# Patient Record
Sex: MALE | Race: WHITE | URBAN - METROPOLITAN AREA
[De-identification: names, ages, dates, MRNs, and addresses within clinical notes are randomized per-mention and may not be internally consistent; named-entity substitution may affect disease eponyms.]

---

## 2018-03-18 ENCOUNTER — HOSPITAL ENCOUNTER (INPATIENT)
Facility: CLINIC | Age: 58
LOS: 19 days | Discharge: NURSING, REHAB OR LONG-TERM CARE | End: 2018-04-06
Attending: THORACIC SURGERY (CARDIOTHORACIC VASCULAR SURGERY) | Admitting: THORACIC SURGERY (CARDIOTHORACIC VASCULAR SURGERY)
Payer: MEDICAID

## 2018-03-18 ENCOUNTER — ANESTHESIA EVENT (OUTPATIENT)
Dept: SURGERY | Facility: CLINIC | Age: 58
End: 2018-03-18
Payer: MEDICAID

## 2018-03-18 ENCOUNTER — ANESTHESIA (OUTPATIENT)
Dept: SURGERY | Facility: CLINIC | Age: 58
End: 2018-03-18
Payer: MEDICAID

## 2018-03-18 ENCOUNTER — APPOINTMENT (OUTPATIENT)
Dept: GENERAL RADIOLOGY | Facility: CLINIC | Age: 58
End: 2018-03-18
Attending: THORACIC SURGERY (CARDIOTHORACIC VASCULAR SURGERY)
Payer: MEDICAID

## 2018-03-18 ENCOUNTER — SURGERY (OUTPATIENT)
Age: 58
End: 2018-03-18
Payer: MEDICAID

## 2018-03-18 DIAGNOSIS — R19.4 BOWEL HABIT CHANGES: ICD-10-CM

## 2018-03-18 DIAGNOSIS — K22.3 ESOPHAGEAL PERFORATION: Primary | ICD-10-CM

## 2018-03-18 LAB
ABO + RH BLD: NORMAL
ABO + RH BLD: NORMAL
ALBUMIN SERPL-MCNC: 3.2 G/DL (ref 3.4–5)
ALP SERPL-CCNC: 101 U/L (ref 40–150)
ALT SERPL W P-5'-P-CCNC: 15 U/L (ref 0–70)
ANION GAP SERPL CALCULATED.3IONS-SCNC: 10 MMOL/L (ref 3–14)
ANION GAP SERPL CALCULATED.3IONS-SCNC: 7 MMOL/L (ref 3–14)
AST SERPL W P-5'-P-CCNC: 22 U/L (ref 0–45)
BASE DEFICIT BLDA-SCNC: 5.5 MMOL/L
BASE DEFICIT BLDA-SCNC: 6.2 MMOL/L
BASE DEFICIT BLDA-SCNC: 6.6 MMOL/L
BASE DEFICIT BLDA-SCNC: 6.8 MMOL/L
BILIRUB SERPL-MCNC: 0.5 MG/DL (ref 0.2–1.3)
BLD GP AB SCN SERPL QL: NORMAL
BLD PROD TYP BPU: NORMAL
BLD UNIT ID BPU: 0
BLD UNIT ID BPU: 0
BLOOD BANK CMNT PATIENT-IMP: NORMAL
BLOOD PRODUCT CODE: NORMAL
BLOOD PRODUCT CODE: NORMAL
BPU ID: NORMAL
BPU ID: NORMAL
BUN SERPL-MCNC: 24 MG/DL (ref 7–30)
BUN SERPL-MCNC: 28 MG/DL (ref 7–30)
CA-I BLD-MCNC: 4.5 MG/DL (ref 4.4–5.2)
CA-I BLD-MCNC: 4.6 MG/DL (ref 4.4–5.2)
CA-I BLD-MCNC: 4.7 MG/DL (ref 4.4–5.2)
CALCIUM SERPL-MCNC: 7.3 MG/DL (ref 8.5–10.1)
CALCIUM SERPL-MCNC: 8 MG/DL (ref 8.5–10.1)
CHLORIDE SERPL-SCNC: 112 MMOL/L (ref 94–109)
CHLORIDE SERPL-SCNC: 113 MMOL/L (ref 94–109)
CO2 SERPL-SCNC: 20 MMOL/L (ref 20–32)
CO2 SERPL-SCNC: 22 MMOL/L (ref 20–32)
CREAT SERPL-MCNC: 0.92 MG/DL (ref 0.66–1.25)
CREAT SERPL-MCNC: 1.03 MG/DL (ref 0.66–1.25)
ERYTHROCYTE [DISTWIDTH] IN BLOOD BY AUTOMATED COUNT: 19.2 % (ref 10–15)
ERYTHROCYTE [DISTWIDTH] IN BLOOD BY AUTOMATED COUNT: 19.9 % (ref 10–15)
GFR SERPL CREATININE-BSD FRML MDRD: 75 ML/MIN/1.7M2
GFR SERPL CREATININE-BSD FRML MDRD: 85 ML/MIN/1.7M2
GLUCOSE BLD-MCNC: 109 MG/DL (ref 70–99)
GLUCOSE BLD-MCNC: 118 MG/DL (ref 70–99)
GLUCOSE BLD-MCNC: 124 MG/DL (ref 70–99)
GLUCOSE BLD-MCNC: 125 MG/DL (ref 70–99)
GLUCOSE BLDC GLUCOMTR-MCNC: 120 MG/DL (ref 70–99)
GLUCOSE SERPL-MCNC: 114 MG/DL (ref 70–99)
GLUCOSE SERPL-MCNC: 128 MG/DL (ref 70–99)
HBA1C MFR BLD: 5.1 % (ref 4.3–6)
HCO3 BLD-SCNC: 20 MMOL/L (ref 21–28)
HCO3 BLD-SCNC: 21 MMOL/L (ref 21–28)
HCT VFR BLD AUTO: 32.5 % (ref 40–53)
HCT VFR BLD AUTO: 41.2 % (ref 40–53)
HGB BLD-MCNC: 11.7 G/DL (ref 13.3–17.7)
HGB BLD-MCNC: 8.9 G/DL (ref 13.3–17.7)
HGB BLD-MCNC: 8.9 G/DL (ref 13.3–17.7)
HGB BLD-MCNC: 9 G/DL (ref 13.3–17.7)
HGB BLD-MCNC: 9.2 G/DL (ref 13.3–17.7)
HGB BLD-MCNC: 9.3 G/DL (ref 13.3–17.7)
INR PPP: 1.13 (ref 0.86–1.14)
INTERPRETATION ECG - MUSE: NORMAL
LACTATE BLD-SCNC: 2.5 MMOL/L (ref 0.7–2)
LACTATE BLD-SCNC: 2.6 MMOL/L (ref 0.7–2)
LACTATE BLD-SCNC: 2.7 MMOL/L (ref 0.7–2)
LACTATE BLD-SCNC: 2.9 MMOL/L (ref 0.7–2)
MAGNESIUM SERPL-MCNC: 1.6 MG/DL (ref 1.6–2.3)
MAGNESIUM SERPL-MCNC: 1.8 MG/DL (ref 1.6–2.3)
MCH RBC QN AUTO: 20.7 PG (ref 26.5–33)
MCH RBC QN AUTO: 20.7 PG (ref 26.5–33)
MCHC RBC AUTO-ENTMCNC: 28.4 G/DL (ref 31.5–36.5)
MCHC RBC AUTO-ENTMCNC: 28.6 G/DL (ref 31.5–36.5)
MCV RBC AUTO: 72 FL (ref 78–100)
MCV RBC AUTO: 73 FL (ref 78–100)
MRSA DNA SPEC QL NAA+PROBE: NEGATIVE
NUM BPU REQUESTED: 4
O2/TOTAL GAS SETTING VFR VENT: 100 %
O2/TOTAL GAS SETTING VFR VENT: 72 %
O2/TOTAL GAS SETTING VFR VENT: 99 %
O2/TOTAL GAS SETTING VFR VENT: 99 %
PCO2 BLD: 40 MM HG (ref 35–45)
PCO2 BLD: 41 MM HG (ref 35–45)
PCO2 BLD: 42 MM HG (ref 35–45)
PCO2 BLD: 46 MM HG (ref 35–45)
PH BLD: 7.25 PH (ref 7.35–7.45)
PH BLD: 7.28 PH (ref 7.35–7.45)
PH BLD: 7.3 PH (ref 7.35–7.45)
PH BLD: 7.31 PH (ref 7.35–7.45)
PHOSPHATE SERPL-MCNC: 3.2 MG/DL (ref 2.5–4.5)
PLATELET # BLD AUTO: 191 10E9/L (ref 150–450)
PLATELET # BLD AUTO: 336 10E9/L (ref 150–450)
PO2 BLD: 75 MM HG (ref 80–105)
PO2 BLD: 78 MM HG (ref 80–105)
PO2 BLD: 81 MM HG (ref 80–105)
PO2 BLD: 82 MM HG (ref 80–105)
POTASSIUM BLD-SCNC: 4.4 MMOL/L (ref 3.4–5.3)
POTASSIUM BLD-SCNC: 4.5 MMOL/L (ref 3.4–5.3)
POTASSIUM BLD-SCNC: 4.6 MMOL/L (ref 3.4–5.3)
POTASSIUM BLD-SCNC: 4.6 MMOL/L (ref 3.4–5.3)
POTASSIUM SERPL-SCNC: 4.5 MMOL/L (ref 3.4–5.3)
POTASSIUM SERPL-SCNC: 4.5 MMOL/L (ref 3.4–5.3)
PROT SERPL-MCNC: 6.5 G/DL (ref 6.8–8.8)
RBC # BLD AUTO: 4.5 10E12/L (ref 4.4–5.9)
RBC # BLD AUTO: 5.64 10E12/L (ref 4.4–5.9)
SODIUM BLD-SCNC: 139 MMOL/L (ref 133–144)
SODIUM BLD-SCNC: 139 MMOL/L (ref 133–144)
SODIUM BLD-SCNC: 140 MMOL/L (ref 133–144)
SODIUM BLD-SCNC: 140 MMOL/L (ref 133–144)
SODIUM SERPL-SCNC: 142 MMOL/L (ref 133–144)
SODIUM SERPL-SCNC: 142 MMOL/L (ref 133–144)
SPECIMEN EXP DATE BLD: NORMAL
SPECIMEN SOURCE: NORMAL
TRANSFUSION STATUS PATIENT QL: NORMAL
TROPONIN I SERPL-MCNC: 0.04 UG/L (ref 0–0.04)
WBC # BLD AUTO: 12.8 10E9/L (ref 4–11)
WBC # BLD AUTO: 8.1 10E9/L (ref 4–11)

## 2018-03-18 PROCEDURE — 25000125 ZZHC RX 250: Performed by: STUDENT IN AN ORGANIZED HEALTH CARE EDUCATION/TRAINING PROGRAM

## 2018-03-18 PROCEDURE — C1769 GUIDE WIRE: HCPCS | Performed by: THORACIC SURGERY (CARDIOTHORACIC VASCULAR SURGERY)

## 2018-03-18 PROCEDURE — 83036 HEMOGLOBIN GLYCOSYLATED A1C: CPT | Performed by: SURGERY

## 2018-03-18 PROCEDURE — 85610 PROTHROMBIN TIME: CPT | Performed by: STUDENT IN AN ORGANIZED HEALTH CARE EDUCATION/TRAINING PROGRAM

## 2018-03-18 PROCEDURE — 82330 ASSAY OF CALCIUM: CPT | Performed by: THORACIC SURGERY (CARDIOTHORACIC VASCULAR SURGERY)

## 2018-03-18 PROCEDURE — 25000128 H RX IP 250 OP 636: Performed by: NURSE ANESTHETIST, CERTIFIED REGISTERED

## 2018-03-18 PROCEDURE — 25000565 ZZH ISOFLURANE, EA 15 MIN: Performed by: THORACIC SURGERY (CARDIOTHORACIC VASCULAR SURGERY)

## 2018-03-18 PROCEDURE — 37000009 ZZH ANESTHESIA TECHNICAL FEE, EACH ADDTL 15 MIN: Performed by: THORACIC SURGERY (CARDIOTHORACIC VASCULAR SURGERY)

## 2018-03-18 PROCEDURE — 36000064 ZZH SURGERY LEVEL 4 EA 15 ADDTL MIN - UMMC: Performed by: THORACIC SURGERY (CARDIOTHORACIC VASCULAR SURGERY)

## 2018-03-18 PROCEDURE — 80053 COMPREHEN METABOLIC PANEL: CPT | Performed by: STUDENT IN AN ORGANIZED HEALTH CARE EDUCATION/TRAINING PROGRAM

## 2018-03-18 PROCEDURE — 99291 CRITICAL CARE FIRST HOUR: CPT | Mod: GC | Performed by: SURGERY

## 2018-03-18 PROCEDURE — 36000062 ZZH SURGERY LEVEL 4 1ST 30 MIN - UMMC: Performed by: THORACIC SURGERY (CARDIOTHORACIC VASCULAR SURGERY)

## 2018-03-18 PROCEDURE — 0KXJ0ZZ TRANSFER LEFT THORAX MUSCLE, OPEN APPROACH: ICD-10-PCS | Performed by: THORACIC SURGERY (CARDIOTHORACIC VASCULAR SURGERY)

## 2018-03-18 PROCEDURE — 25000128 H RX IP 250 OP 636: Performed by: STUDENT IN AN ORGANIZED HEALTH CARE EDUCATION/TRAINING PROGRAM

## 2018-03-18 PROCEDURE — C9399 UNCLASSIFIED DRUGS OR BIOLOG: HCPCS | Performed by: STUDENT IN AN ORGANIZED HEALTH CARE EDUCATION/TRAINING PROGRAM

## 2018-03-18 PROCEDURE — 27210794 ZZH OR GENERAL SUPPLY STERILE: Performed by: THORACIC SURGERY (CARDIOTHORACIC VASCULAR SURGERY)

## 2018-03-18 PROCEDURE — 71045 X-RAY EXAM CHEST 1 VIEW: CPT

## 2018-03-18 PROCEDURE — 85027 COMPLETE CBC AUTOMATED: CPT | Performed by: STUDENT IN AN ORGANIZED HEALTH CARE EDUCATION/TRAINING PROGRAM

## 2018-03-18 PROCEDURE — 93005 ELECTROCARDIOGRAM TRACING: CPT

## 2018-03-18 PROCEDURE — 43415 REPAIR ESOPHAGUS WOUND: CPT | Performed by: THORACIC SURGERY (CARDIOTHORACIC VASCULAR SURGERY)

## 2018-03-18 PROCEDURE — 32551 INSERTION OF CHEST TUBE: CPT | Mod: 59 | Performed by: THORACIC SURGERY (CARDIOTHORACIC VASCULAR SURGERY)

## 2018-03-18 PROCEDURE — P9041 ALBUMIN (HUMAN),5%, 50ML: HCPCS | Performed by: NURSE ANESTHETIST, CERTIFIED REGISTERED

## 2018-03-18 PROCEDURE — 84295 ASSAY OF SERUM SODIUM: CPT | Performed by: THORACIC SURGERY (CARDIOTHORACIC VASCULAR SURGERY)

## 2018-03-18 PROCEDURE — 20000004 ZZH R&B ICU UMMC

## 2018-03-18 PROCEDURE — 83605 ASSAY OF LACTIC ACID: CPT | Performed by: STUDENT IN AN ORGANIZED HEALTH CARE EDUCATION/TRAINING PROGRAM

## 2018-03-18 PROCEDURE — 83735 ASSAY OF MAGNESIUM: CPT | Performed by: STUDENT IN AN ORGANIZED HEALTH CARE EDUCATION/TRAINING PROGRAM

## 2018-03-18 PROCEDURE — 40000141 ZZH STATISTIC PERIPHERAL IV START W/O US GUIDANCE

## 2018-03-18 PROCEDURE — 37000008 ZZH ANESTHESIA TECHNICAL FEE, 1ST 30 MIN: Performed by: THORACIC SURGERY (CARDIOTHORACIC VASCULAR SURGERY)

## 2018-03-18 PROCEDURE — 36415 COLL VENOUS BLD VENIPUNCTURE: CPT | Performed by: STUDENT IN AN ORGANIZED HEALTH CARE EDUCATION/TRAINING PROGRAM

## 2018-03-18 PROCEDURE — 80048 BASIC METABOLIC PNL TOTAL CA: CPT | Performed by: STUDENT IN AN ORGANIZED HEALTH CARE EDUCATION/TRAINING PROGRAM

## 2018-03-18 PROCEDURE — 15734 MUSCLE-SKIN GRAFT TRUNK: CPT | Performed by: THORACIC SURGERY (CARDIOTHORACIC VASCULAR SURGERY)

## 2018-03-18 PROCEDURE — 84132 ASSAY OF SERUM POTASSIUM: CPT | Performed by: THORACIC SURGERY (CARDIOTHORACIC VASCULAR SURGERY)

## 2018-03-18 PROCEDURE — 86850 RBC ANTIBODY SCREEN: CPT | Performed by: STUDENT IN AN ORGANIZED HEALTH CARE EDUCATION/TRAINING PROGRAM

## 2018-03-18 PROCEDURE — 25000125 ZZHC RX 250: Performed by: NURSE ANESTHETIST, CERTIFIED REGISTERED

## 2018-03-18 PROCEDURE — 83605 ASSAY OF LACTIC ACID: CPT | Performed by: THORACIC SURGERY (CARDIOTHORACIC VASCULAR SURGERY)

## 2018-03-18 PROCEDURE — 86900 BLOOD TYPING SEROLOGIC ABO: CPT | Performed by: STUDENT IN AN ORGANIZED HEALTH CARE EDUCATION/TRAINING PROGRAM

## 2018-03-18 PROCEDURE — 84484 ASSAY OF TROPONIN QUANT: CPT | Performed by: STUDENT IN AN ORGANIZED HEALTH CARE EDUCATION/TRAINING PROGRAM

## 2018-03-18 PROCEDURE — 82803 BLOOD GASES ANY COMBINATION: CPT | Performed by: THORACIC SURGERY (CARDIOTHORACIC VASCULAR SURGERY)

## 2018-03-18 PROCEDURE — 87641 MR-STAPH DNA AMP PROBE: CPT | Performed by: THORACIC SURGERY (CARDIOTHORACIC VASCULAR SURGERY)

## 2018-03-18 PROCEDURE — 00000146 ZZHCL STATISTIC GLUCOSE BY METER IP

## 2018-03-18 PROCEDURE — 86901 BLOOD TYPING SEROLOGIC RH(D): CPT | Performed by: STUDENT IN AN ORGANIZED HEALTH CARE EDUCATION/TRAINING PROGRAM

## 2018-03-18 PROCEDURE — 84100 ASSAY OF PHOSPHORUS: CPT | Performed by: STUDENT IN AN ORGANIZED HEALTH CARE EDUCATION/TRAINING PROGRAM

## 2018-03-18 PROCEDURE — 25000128 H RX IP 250 OP 636: Performed by: SURGERY

## 2018-03-18 PROCEDURE — 0D9640Z DRAINAGE OF STOMACH WITH DRAINAGE DEVICE, PERCUTANEOUS ENDOSCOPIC APPROACH: ICD-10-PCS | Performed by: THORACIC SURGERY (CARDIOTHORACIC VASCULAR SURGERY)

## 2018-03-18 PROCEDURE — 93010 ELECTROCARDIOGRAM REPORT: CPT | Performed by: INTERNAL MEDICINE

## 2018-03-18 PROCEDURE — 43246 EGD PLACE GASTROSTOMY TUBE: CPT | Performed by: THORACIC SURGERY (CARDIOTHORACIC VASCULAR SURGERY)

## 2018-03-18 PROCEDURE — 87640 STAPH A DNA AMP PROBE: CPT | Performed by: THORACIC SURGERY (CARDIOTHORACIC VASCULAR SURGERY)

## 2018-03-18 PROCEDURE — 71000014 ZZH RECOVERY PHASE 1 LEVEL 2 FIRST HR: Performed by: THORACIC SURGERY (CARDIOTHORACIC VASCULAR SURGERY)

## 2018-03-18 PROCEDURE — 86923 COMPATIBILITY TEST ELECTRIC: CPT | Performed by: STUDENT IN AN ORGANIZED HEALTH CARE EDUCATION/TRAINING PROGRAM

## 2018-03-18 PROCEDURE — 82330 ASSAY OF CALCIUM: CPT | Performed by: STUDENT IN AN ORGANIZED HEALTH CARE EDUCATION/TRAINING PROGRAM

## 2018-03-18 PROCEDURE — 0DB50ZZ EXCISION OF ESOPHAGUS, OPEN APPROACH: ICD-10-PCS | Performed by: THORACIC SURGERY (CARDIOTHORACIC VASCULAR SURGERY)

## 2018-03-18 PROCEDURE — 25800025 ZZH RX 258: Performed by: STUDENT IN AN ORGANIZED HEALTH CARE EDUCATION/TRAINING PROGRAM

## 2018-03-18 PROCEDURE — 82947 ASSAY GLUCOSE BLOOD QUANT: CPT | Performed by: THORACIC SURGERY (CARDIOTHORACIC VASCULAR SURGERY)

## 2018-03-18 RX ORDER — BISACODYL 10 MG
10 SUPPOSITORY, RECTAL RECTAL DAILY PRN
Status: DISCONTINUED | OUTPATIENT
Start: 2018-03-18 | End: 2018-03-18

## 2018-03-18 RX ORDER — SODIUM CHLORIDE, SODIUM LACTATE, POTASSIUM CHLORIDE, CALCIUM CHLORIDE 600; 310; 30; 20 MG/100ML; MG/100ML; MG/100ML; MG/100ML
INJECTION, SOLUTION INTRAVENOUS CONTINUOUS PRN
Status: DISCONTINUED | OUTPATIENT
Start: 2018-03-18 | End: 2018-03-18

## 2018-03-18 RX ORDER — ONDANSETRON 4 MG/1
4 TABLET, ORALLY DISINTEGRATING ORAL EVERY 30 MIN PRN
Status: DISCONTINUED | OUTPATIENT
Start: 2018-03-18 | End: 2018-03-19 | Stop reason: HOSPADM

## 2018-03-18 RX ORDER — FENTANYL CITRATE 50 UG/ML
INJECTION, SOLUTION INTRAMUSCULAR; INTRAVENOUS PRN
Status: DISCONTINUED | OUTPATIENT
Start: 2018-03-18 | End: 2018-03-18

## 2018-03-18 RX ORDER — NALOXONE HYDROCHLORIDE 0.4 MG/ML
.1-.4 INJECTION, SOLUTION INTRAMUSCULAR; INTRAVENOUS; SUBCUTANEOUS
Status: DISCONTINUED | OUTPATIENT
Start: 2018-03-18 | End: 2018-03-18

## 2018-03-18 RX ORDER — MAGNESIUM SULFATE HEPTAHYDRATE 40 MG/ML
4 INJECTION, SOLUTION INTRAVENOUS EVERY 4 HOURS PRN
Status: DISCONTINUED | OUTPATIENT
Start: 2018-03-18 | End: 2018-03-18

## 2018-03-18 RX ORDER — HYDROMORPHONE HYDROCHLORIDE 1 MG/ML
.3-.5 INJECTION, SOLUTION INTRAMUSCULAR; INTRAVENOUS; SUBCUTANEOUS
Status: DISCONTINUED | OUTPATIENT
Start: 2018-03-18 | End: 2018-03-18

## 2018-03-18 RX ORDER — DEXAMETHASONE SODIUM PHOSPHATE 4 MG/ML
INJECTION, SOLUTION INTRA-ARTICULAR; INTRALESIONAL; INTRAMUSCULAR; INTRAVENOUS; SOFT TISSUE PRN
Status: DISCONTINUED | OUTPATIENT
Start: 2018-03-18 | End: 2018-03-18

## 2018-03-18 RX ORDER — NICOTINE POLACRILEX 4 MG
15-30 LOZENGE BUCCAL
Status: DISCONTINUED | OUTPATIENT
Start: 2018-03-18 | End: 2018-03-18

## 2018-03-18 RX ORDER — ONDANSETRON 2 MG/ML
4 INJECTION INTRAMUSCULAR; INTRAVENOUS EVERY 30 MIN PRN
Status: DISCONTINUED | OUTPATIENT
Start: 2018-03-18 | End: 2018-03-19 | Stop reason: HOSPADM

## 2018-03-18 RX ORDER — ONDANSETRON 4 MG/1
4 TABLET, ORALLY DISINTEGRATING ORAL EVERY 6 HOURS PRN
Status: DISCONTINUED | OUTPATIENT
Start: 2018-03-18 | End: 2018-03-18

## 2018-03-18 RX ORDER — POTASSIUM CHLORIDE 1.5 G/1.58G
20-40 POWDER, FOR SOLUTION ORAL
Status: DISCONTINUED | OUTPATIENT
Start: 2018-03-18 | End: 2018-03-18

## 2018-03-18 RX ORDER — LABETALOL HYDROCHLORIDE 5 MG/ML
10 INJECTION, SOLUTION INTRAVENOUS
Status: DISCONTINUED | OUTPATIENT
Start: 2018-03-18 | End: 2018-03-19 | Stop reason: HOSPADM

## 2018-03-18 RX ORDER — ACETAMINOPHEN 10 MG/ML
INJECTION, SOLUTION INTRAVENOUS PRN
Status: DISCONTINUED | OUTPATIENT
Start: 2018-03-18 | End: 2018-03-18

## 2018-03-18 RX ORDER — PROPOFOL 10 MG/ML
INJECTION, EMULSION INTRAVENOUS PRN
Status: DISCONTINUED | OUTPATIENT
Start: 2018-03-18 | End: 2018-03-18

## 2018-03-18 RX ORDER — PROCHLORPERAZINE MALEATE 5 MG
10 TABLET ORAL EVERY 6 HOURS PRN
Status: DISCONTINUED | OUTPATIENT
Start: 2018-03-18 | End: 2018-03-18

## 2018-03-18 RX ORDER — DEXTROSE MONOHYDRATE 25 G/50ML
25-50 INJECTION, SOLUTION INTRAVENOUS
Status: DISCONTINUED | OUTPATIENT
Start: 2018-03-18 | End: 2018-03-18

## 2018-03-18 RX ORDER — PIPERACILLIN SODIUM, TAZOBACTAM SODIUM 3; .375 G/15ML; G/15ML
3.38 INJECTION, POWDER, LYOPHILIZED, FOR SOLUTION INTRAVENOUS EVERY 6 HOURS
Status: DISCONTINUED | OUTPATIENT
Start: 2018-03-18 | End: 2018-03-18

## 2018-03-18 RX ORDER — POTASSIUM CHLORIDE 750 MG/1
20-40 TABLET, EXTENDED RELEASE ORAL
Status: DISCONTINUED | OUTPATIENT
Start: 2018-03-18 | End: 2018-03-18

## 2018-03-18 RX ORDER — POTASSIUM CHLORIDE 7.45 MG/ML
10 INJECTION INTRAVENOUS
Status: DISCONTINUED | OUTPATIENT
Start: 2018-03-18 | End: 2018-03-18

## 2018-03-18 RX ORDER — LIDOCAINE 40 MG/G
CREAM TOPICAL
Status: DISCONTINUED | OUTPATIENT
Start: 2018-03-18 | End: 2018-03-18

## 2018-03-18 RX ORDER — ONDANSETRON 2 MG/ML
INJECTION INTRAMUSCULAR; INTRAVENOUS PRN
Status: DISCONTINUED | OUTPATIENT
Start: 2018-03-18 | End: 2018-03-18

## 2018-03-18 RX ORDER — LIDOCAINE HYDROCHLORIDE 20 MG/ML
INJECTION, SOLUTION INFILTRATION; PERINEURAL PRN
Status: DISCONTINUED | OUTPATIENT
Start: 2018-03-18 | End: 2018-03-18

## 2018-03-18 RX ORDER — ALBUMIN, HUMAN INJ 5% 5 %
SOLUTION INTRAVENOUS CONTINUOUS PRN
Status: DISCONTINUED | OUTPATIENT
Start: 2018-03-18 | End: 2018-03-18

## 2018-03-18 RX ORDER — FLUCONAZOLE 2 MG/ML
400 INJECTION, SOLUTION INTRAVENOUS EVERY 24 HOURS
Status: DISCONTINUED | OUTPATIENT
Start: 2018-03-18 | End: 2018-03-18

## 2018-03-18 RX ORDER — ESMOLOL HYDROCHLORIDE 10 MG/ML
INJECTION INTRAVENOUS PRN
Status: DISCONTINUED | OUTPATIENT
Start: 2018-03-18 | End: 2018-03-18

## 2018-03-18 RX ORDER — SODIUM CHLORIDE 9 MG/ML
INJECTION, SOLUTION INTRAVENOUS CONTINUOUS PRN
Status: DISCONTINUED | OUTPATIENT
Start: 2018-03-18 | End: 2018-03-18

## 2018-03-18 RX ORDER — PROCHLORPERAZINE 25 MG
25 SUPPOSITORY, RECTAL RECTAL EVERY 12 HOURS PRN
Status: DISCONTINUED | OUTPATIENT
Start: 2018-03-18 | End: 2018-03-18

## 2018-03-18 RX ORDER — PIPERACILLIN SODIUM, TAZOBACTAM SODIUM 2; .25 G/10ML; G/10ML
INJECTION, POWDER, LYOPHILIZED, FOR SOLUTION INTRAVENOUS PRN
Status: DISCONTINUED | OUTPATIENT
Start: 2018-03-18 | End: 2018-03-18

## 2018-03-18 RX ORDER — SODIUM CHLORIDE, SODIUM LACTATE, POTASSIUM CHLORIDE, CALCIUM CHLORIDE 600; 310; 30; 20 MG/100ML; MG/100ML; MG/100ML; MG/100ML
INJECTION, SOLUTION INTRAVENOUS CONTINUOUS
Status: DISCONTINUED | OUTPATIENT
Start: 2018-03-18 | End: 2018-03-19 | Stop reason: HOSPADM

## 2018-03-18 RX ORDER — POTASSIUM CHLORIDE 29.8 MG/ML
20 INJECTION INTRAVENOUS
Status: DISCONTINUED | OUTPATIENT
Start: 2018-03-18 | End: 2018-03-18

## 2018-03-18 RX ORDER — FENTANYL CITRATE 50 UG/ML
25-50 INJECTION, SOLUTION INTRAMUSCULAR; INTRAVENOUS
Status: DISCONTINUED | OUTPATIENT
Start: 2018-03-18 | End: 2018-03-19 | Stop reason: HOSPADM

## 2018-03-18 RX ORDER — DEXTROSE MONOHYDRATE, SODIUM CHLORIDE, AND POTASSIUM CHLORIDE 50; 1.49; 4.5 G/1000ML; G/1000ML; G/1000ML
INJECTION, SOLUTION INTRAVENOUS CONTINUOUS
Status: DISCONTINUED | OUTPATIENT
Start: 2018-03-18 | End: 2018-03-19

## 2018-03-18 RX ORDER — ONDANSETRON 2 MG/ML
4 INJECTION INTRAMUSCULAR; INTRAVENOUS EVERY 6 HOURS PRN
Status: DISCONTINUED | OUTPATIENT
Start: 2018-03-18 | End: 2018-03-18

## 2018-03-18 RX ORDER — POTASSIUM CL/LIDO/0.9 % NACL 10MEQ/0.1L
10 INTRAVENOUS SOLUTION, PIGGYBACK (ML) INTRAVENOUS
Status: DISCONTINUED | OUTPATIENT
Start: 2018-03-18 | End: 2018-03-18

## 2018-03-18 RX ORDER — SODIUM CHLORIDE, SODIUM LACTATE, POTASSIUM CHLORIDE, CALCIUM CHLORIDE 600; 310; 30; 20 MG/100ML; MG/100ML; MG/100ML; MG/100ML
INJECTION, SOLUTION INTRAVENOUS CONTINUOUS
Status: DISCONTINUED | OUTPATIENT
Start: 2018-03-18 | End: 2018-03-18

## 2018-03-18 RX ADMIN — HYDROMORPHONE HYDROCHLORIDE 0.5 MG: 1 INJECTION, SOLUTION INTRAMUSCULAR; INTRAVENOUS; SUBCUTANEOUS at 22:10

## 2018-03-18 RX ADMIN — ACETAMINOPHEN 1000 MG: 10 INJECTION, SOLUTION INTRAVENOUS at 23:12

## 2018-03-18 RX ADMIN — PHENYLEPHRINE HYDROCHLORIDE 100 MCG: 10 INJECTION, SOLUTION INTRAMUSCULAR; INTRAVENOUS; SUBCUTANEOUS at 20:47

## 2018-03-18 RX ADMIN — PHENYLEPHRINE HYDROCHLORIDE 100 MCG: 10 INJECTION, SOLUTION INTRAMUSCULAR; INTRAVENOUS; SUBCUTANEOUS at 16:47

## 2018-03-18 RX ADMIN — POTASSIUM CHLORIDE, DEXTROSE MONOHYDRATE AND SODIUM CHLORIDE: 150; 5; 450 INJECTION, SOLUTION INTRAVENOUS at 23:30

## 2018-03-18 RX ADMIN — ROCURONIUM BROMIDE 10 MG: 10 INJECTION INTRAVENOUS at 20:30

## 2018-03-18 RX ADMIN — PHENYLEPHRINE HYDROCHLORIDE 0.03 MCG/KG/MIN: 10 INJECTION, SOLUTION INTRAMUSCULAR; INTRAVENOUS; SUBCUTANEOUS at 17:56

## 2018-03-18 RX ADMIN — PHENYLEPHRINE HYDROCHLORIDE 100 MCG: 10 INJECTION, SOLUTION INTRAMUSCULAR; INTRAVENOUS; SUBCUTANEOUS at 17:49

## 2018-03-18 RX ADMIN — Medication 0.5 MG: at 15:22

## 2018-03-18 RX ADMIN — ROCURONIUM BROMIDE 10 MG: 10 INJECTION INTRAVENOUS at 21:24

## 2018-03-18 RX ADMIN — Medication 100 MG: at 16:43

## 2018-03-18 RX ADMIN — FLUCONAZOLE 200 MG: 2 INJECTION INTRAVENOUS at 17:00

## 2018-03-18 RX ADMIN — ESMOLOL HYDROCHLORIDE 50 MG: 10 INJECTION, SOLUTION INTRAVENOUS at 23:11

## 2018-03-18 RX ADMIN — DEXAMETHASONE SODIUM PHOSPHATE 8 MG: 4 INJECTION, SOLUTION INTRA-ARTICULAR; INTRALESIONAL; INTRAMUSCULAR; INTRAVENOUS; SOFT TISSUE at 18:28

## 2018-03-18 RX ADMIN — ALBUMIN HUMAN: 0.05 INJECTION, SOLUTION INTRAVENOUS at 17:15

## 2018-03-18 RX ADMIN — SODIUM CHLORIDE: 9 INJECTION, SOLUTION INTRAVENOUS at 16:35

## 2018-03-18 RX ADMIN — PIPERACILLIN SODIUM AND TAZOBACTAM SODIUM 2.25 G: .25; 2 INJECTION, POWDER, LYOPHILIZED, FOR SOLUTION INTRAVENOUS at 19:00

## 2018-03-18 RX ADMIN — ROCURONIUM BROMIDE 30 MG: 10 INJECTION INTRAVENOUS at 17:54

## 2018-03-18 RX ADMIN — PANTOPRAZOLE SODIUM 40 MG: 40 INJECTION, POWDER, FOR SOLUTION INTRAVENOUS at 15:19

## 2018-03-18 RX ADMIN — PHENYLEPHRINE HYDROCHLORIDE 100 MCG: 10 INJECTION, SOLUTION INTRAMUSCULAR; INTRAVENOUS; SUBCUTANEOUS at 16:53

## 2018-03-18 RX ADMIN — PHENYLEPHRINE HYDROCHLORIDE 200 MCG: 10 INJECTION, SOLUTION INTRAMUSCULAR; INTRAVENOUS; SUBCUTANEOUS at 16:51

## 2018-03-18 RX ADMIN — ALBUMIN HUMAN: 0.05 INJECTION, SOLUTION INTRAVENOUS at 16:55

## 2018-03-18 RX ADMIN — ROCURONIUM BROMIDE 20 MG: 10 INJECTION INTRAVENOUS at 19:58

## 2018-03-18 RX ADMIN — SUGAMMADEX 160 MG: 100 INJECTION, SOLUTION INTRAVENOUS at 22:17

## 2018-03-18 RX ADMIN — FENTANYL CITRATE 50 MCG: 50 INJECTION, SOLUTION INTRAMUSCULAR; INTRAVENOUS at 18:28

## 2018-03-18 RX ADMIN — PHENYLEPHRINE HYDROCHLORIDE 100 MCG: 10 INJECTION, SOLUTION INTRAMUSCULAR; INTRAVENOUS; SUBCUTANEOUS at 22:00

## 2018-03-18 RX ADMIN — LIDOCAINE HYDROCHLORIDE 100 MG: 20 INJECTION, SOLUTION INFILTRATION; PERINEURAL at 16:43

## 2018-03-18 RX ADMIN — SODIUM CHLORIDE, POTASSIUM CHLORIDE, SODIUM LACTATE AND CALCIUM CHLORIDE: 600; 310; 30; 20 INJECTION, SOLUTION INTRAVENOUS at 15:19

## 2018-03-18 RX ADMIN — Medication 0.5 MG: at 16:24

## 2018-03-18 RX ADMIN — FENTANYL CITRATE 100 MCG: 50 INJECTION, SOLUTION INTRAMUSCULAR; INTRAVENOUS at 16:43

## 2018-03-18 RX ADMIN — ROCURONIUM BROMIDE 50 MG: 10 INJECTION INTRAVENOUS at 16:48

## 2018-03-18 RX ADMIN — SODIUM CHLORIDE, POTASSIUM CHLORIDE, SODIUM LACTATE AND CALCIUM CHLORIDE: 600; 310; 30; 20 INJECTION, SOLUTION INTRAVENOUS at 16:43

## 2018-03-18 RX ADMIN — ROCURONIUM BROMIDE 20 MG: 10 INJECTION INTRAVENOUS at 18:27

## 2018-03-18 RX ADMIN — FENTANYL CITRATE 50 MCG: 50 INJECTION, SOLUTION INTRAMUSCULAR; INTRAVENOUS at 17:15

## 2018-03-18 RX ADMIN — FLUCONAZOLE 400 MG: 2 INJECTION INTRAVENOUS at 16:22

## 2018-03-18 RX ADMIN — FENTANYL CITRATE 50 MCG: 50 INJECTION, SOLUTION INTRAMUSCULAR; INTRAVENOUS at 18:55

## 2018-03-18 RX ADMIN — PHENYLEPHRINE HYDROCHLORIDE 100 MCG: 10 INJECTION, SOLUTION INTRAMUSCULAR; INTRAVENOUS; SUBCUTANEOUS at 22:12

## 2018-03-18 RX ADMIN — PHENYLEPHRINE HYDROCHLORIDE 200 MCG: 10 INJECTION, SOLUTION INTRAMUSCULAR; INTRAVENOUS; SUBCUTANEOUS at 16:49

## 2018-03-18 RX ADMIN — PHENYLEPHRINE HYDROCHLORIDE 100 MCG: 10 INJECTION, SOLUTION INTRAMUSCULAR; INTRAVENOUS; SUBCUTANEOUS at 16:55

## 2018-03-18 RX ADMIN — PIPERACILLIN SODIUM AND TAZOBACTAM SODIUM 3.38 G: .375; 3 INJECTION, POWDER, LYOPHILIZED, FOR SOLUTION INTRAVENOUS at 17:00

## 2018-03-18 RX ADMIN — ROCURONIUM BROMIDE 20 MG: 10 INJECTION INTRAVENOUS at 17:18

## 2018-03-18 RX ADMIN — PHENYLEPHRINE HYDROCHLORIDE 100 MCG: 10 INJECTION, SOLUTION INTRAMUSCULAR; INTRAVENOUS; SUBCUTANEOUS at 17:54

## 2018-03-18 RX ADMIN — ONDANSETRON 4 MG: 2 INJECTION INTRAMUSCULAR; INTRAVENOUS at 22:17

## 2018-03-18 RX ADMIN — HYDROMORPHONE HYDROCHLORIDE: 10 INJECTION, SOLUTION INTRAMUSCULAR; INTRAVENOUS; SUBCUTANEOUS at 23:30

## 2018-03-18 RX ADMIN — VANCOMYCIN HYDROCHLORIDE 1.5 G: 10 INJECTION, POWDER, LYOPHILIZED, FOR SOLUTION INTRAVENOUS at 19:40

## 2018-03-18 RX ADMIN — ROCURONIUM BROMIDE 20 MG: 10 INJECTION INTRAVENOUS at 19:13

## 2018-03-18 RX ADMIN — PHENYLEPHRINE HYDROCHLORIDE 100 MCG: 10 INJECTION, SOLUTION INTRAMUSCULAR; INTRAVENOUS; SUBCUTANEOUS at 17:58

## 2018-03-18 RX ADMIN — PIPERACILLIN SODIUM AND TAZOBACTAM SODIUM 2.25 G: .375; 3 INJECTION, POWDER, LYOPHILIZED, FOR SOLUTION INTRAVENOUS at 21:00

## 2018-03-18 RX ADMIN — PROPOFOL 180 MG: 10 INJECTION, EMULSION INTRAVENOUS at 16:43

## 2018-03-18 RX ADMIN — PHENYLEPHRINE HYDROCHLORIDE 100 MCG: 10 INJECTION, SOLUTION INTRAMUSCULAR; INTRAVENOUS; SUBCUTANEOUS at 22:05

## 2018-03-18 ASSESSMENT — PAIN DESCRIPTION - DESCRIPTORS: DESCRIPTORS: ACHING

## 2018-03-18 NOTE — LETTER
Transition Communication Hand-off for Care Transitions to Next Level of Care Provider    Name: Michael Saldivar  : 1960  MRN #: [id number]  Primary Care Provider: JOSI YOUNG     Primary Clinic: [facility] [address]   [place] [address]     Reason for Hospitalization:  Esophageal perforation [K22.3]  Status post thoracotomy [Z98.890]  Admit Date/Time: 3/18/2018  2:13 PM  Discharge Date: 18  Payor Source: Payor: [place] / Plan: [place] / Product Type: Medicaid /     Readmission Assessment Measure (ADRI) Risk Score/category: Elevated         Reason for Communication Hand-off Referral: Other D/c to TCU    Discharge Plan:  Social Work Services Discharge Note      Patient Name:  Michael Saldivar     Anticipated Discharge Date:  18    Discharge Disposition:   TCU:   TCU   [address] 4th floor  [phone]     Pre-Admission Screening (PAS) online form has been completed.  The Level of Care (LOC) is:  Determined  Confirmation Code is:  [id number]  Patient/caregiver informed of referral to [facility] for Pre-Admission Screening for skilled nursing facility (SNF) placement and to expect a phone call post discharge from SNF.     Additional Services/Equipment Arranged:  W/c transport arranged via [employer] ([phone]) for today at 4:15pm.      Patient / Family response to discharge plan:  Pt is agreeable and was ok with sw updating his daughter Juanis     Persons notified of above discharge plan:  Pt, pt's daughter Juanis via  ([phone]), bedside nurse, charge nurse, NST, receiving facility, Thoracic team     Concern for non-adherence with plan of care:   Y/N Unknown  Discharge Needs Assessment:  Needs       Most Recent Value    Equipment Currently Used at Home none    Transportation Available car          Already enrolled in Tele-monitoring program and name of program:  Unknown  Follow-up specialty is recommended: Yes    Follow-up plan:  Future  Appointments  Date Time Provider Department Center   4/7/2018 9:30 AM Duane Perez, PT [facility]   4/7/2018 1:45 PM Randee Ferrell, OT [facility]       Any outstanding tests or procedures:        Referrals     Future Labs/Procedures    Nutrition Services Adult IP Consult     Comments:    Reason:  Tube feeds    Occupational Therapy Adult Consult     Comments:    Evaluate and treat as clinically indicated.    Reason:  S/p thoracic surgery, deconditioning    Physical Therapy Adult Consult     Comments:    Evaluate and treat as clinically indicated.    Reason:  S/p thoracic surgery, deconditioning            ISIAH Sweet, MSW  7[address]   [phone] (pager) [phone]  4/6/2018

## 2018-03-18 NOTE — ANESTHESIA PREPROCEDURE EVALUATION
Anesthesia Evaluation     .             ROS/MED HX    ENT/Pulmonary: Comment: Patient on 15 L Oxi Plus; Tachypnea RR 40s      Neurologic:       Cardiovascular: Comment: Tachycardiac with HR 120s    (+) ----. : . . . :. . Previous cardiac testing date:results:date: results:ECG reviewed date: results:3/18/16: Sinus tachycardia with , LAD, and LBBB date: results:          METS/Exercise Tolerance:     Hematologic:         Musculoskeletal:         GI/Hepatic: Comment: -Michael Saldivar is a 57 year old male who presented with esophageal perforation s/p left chest tube placement.     -He had the flu since Friday and has been having nausea and vomiting. Patient had forceful emesis this morning with acute onset of back pain and SOB. He presented to an OSH ED where a left chest tube was placed and a CT showed perforated esophagus. He was found to have WBC of 27 and lactate of 10. Patient was transferred to [facility] for further cares. On admission, patient was tachycardiac and had tachypnea.        (+) GERD       Renal/Genitourinary:         Endo:         Psychiatric:         Infectious Disease: Comment: Recent flu with N/V        Malignancy:         Other:                                     Lab Results   Component Value Date    WBC 12.8 (H) 03/18/2018    HGB 11.7 (L) 03/18/2018    HCT 41.2 03/18/2018     03/18/2018     03/18/2018    POTASSIUM 4.5 03/18/2018    CHLORIDE 112 (H) 03/18/2018    CO2 20 03/18/2018    BUN 28 03/18/2018    CR 1.03 03/18/2018     (H) 03/18/2018    TROPI 0.036 03/18/2018    AST 22 03/18/2018    ALT 15 03/18/2018    ALKPHOS 101 03/18/2018    BILITOTAL 0.5 03/18/2018    INR 1.13 03/18/2018       Anesthesia Plan      History & Physical Review  History and physical reviewed and following examination; no interval change.    ASA Status:  3 emergent.    NPO Status:  Waived due to emergency    Plan for General, RSI and ETT with Intravenous induction. Maintenance will be Balanced.    PONV  prophylaxis:  Ondansetron (or other 5HT-3) and Dexamethasone or Solumedrol  Additional equipment: Arterial Line, 2nd IV and Double Lumen ETT      Postoperative Care  Postoperative pain management:  IV analgesics.      Consents  Anesthetic plan, risks, benefits and alternatives discussed with:  Patient.  Use of blood products discussed: Yes.   Use of blood products discussed with Patient.  Consented to blood products.  .

## 2018-03-18 NOTE — H&P
THORACIC SURGERY H&P  March 18, 2018      ASSESSMENT: Michael Saldivar is a 57 year old male who presented with esophageal perforation s/p left chest tube placement. Patient is currently tachycardiac and has tachypnea.     PLAN:    - To the OR for EGD, possible right chest tube, possible thoracotomy, possible stent and possible NGT.   - STAT labs  - IV zosyn and fluconazole.   - Place salmeron for strict I&Os  - Continue fluid resuscitation     Patient seen, findings and plan discussed with thoracic fellow,   staff, Dr. Ybarra.    ------------------------------------------------------------------------------------------    HISTORY PRESENTING ILLNESS: Michael Saldivar is a 57 year old male with PMH only significant for GERD. He had the flu since Friday and has been having nausea and vomiting. Patient had forceful emesis this morning with acute onset of back pain and SOB. He presented to an OSH ED where a left chest tube was placed and a CT showed perforated esophagus. He was found to have WBC of 27 and lactate of 10. Patient was transferred to [facility] for further cares. On admission, patient was tachycardiac and had tachypnea.     REVIEW OF SYSTEMS: A 10 point ROS was negative except as noted above.    PAST MEDICAL HISTORY:   GERD    SURGICAL HISTORY:   None     SOCIAL HISTORY: Tobacco - Quit smoking 5 years ago. Etoh - Denies.   Social History     Social History     Marital status: N/A     Spouse name: N/A     Number of children: N/A     Years of education: N/A     Occupational History     Not on file.     Social History Main Topics     Smoking status: Not on file     Smokeless tobacco: Not on file     Alcohol use Not on file     Drug use: Not on file     Sexual activity: Not on file     Other Topics Concern     Not on file     Social History Narrative       FAMILY HISTORY: No bleeding/clotting disorders nor problems with anesthesia.     ALLERGIES:    Allergies not on file    MEDICATIONS:    No current  facility-administered medications on file prior to encounter.   No current outpatient prescriptions on file prior to encounter.    PHYSICAL EXAMINATION:  Temp:  [98.3  F (36.8  C)] 98.3  F (36.8  C)  General: Alert, well-appearing in no acute distress.  HEENT: Normocephalic, atraumatic. Patent nares.   Respiratory: somewhat labored breathing. Chest tube on the left without air leak. Dark ss output.   Cardiovascular: Regular rhythm. Tachycardiac   Gastrointestinal: Abdomen soft, non-distended, non-tender to palpation.   Extremities: Moving all four extremities. No pedal edema.   Skin: As noted above. No rashes or lesions appreciated.    LABS: Reviewed.   Arterial Blood Gases   No lab results found in last 7 days.  Complete Blood Count     Recent Labs  Lab 03/18/18  1535   WBC 12.8*   HGB 11.7*        Basic Metabolic Panel    Recent Labs  Lab 03/18/18  1535      POTASSIUM 4.5   CHLORIDE 112*   CO2 20   BUN 28   CR 1.03   *     Liver Function Tests    Recent Labs  Lab 03/18/18  1535   AST 22   ALT 15   ALKPHOS 101   BILITOTAL 0.5   ALBUMIN 3.2*   INR 1.13     Pancreatic Enzymes  No lab results found in last 7 days.  Coagulation Profile    Recent Labs  Lab 03/18/18  1535   INR 1.13         IMAGING:  No results found for this or any previous visit.      CO-MORBIDITIES:   No diagnosis found.      Trever Johnson MD  General Surgery, PGY-2  [phone]

## 2018-03-18 NOTE — PROGRESS NOTES
"D: Patient arrived on 4A via [facility] lift [facility] at 1400. AFVSS, supplemental oxygen via mask at 15LPM, CT to H2O seal, c/o pain at CT site, denies nausea.   I: MD notified upon arrival. Admission documentation started. Attempted to contact patients brother \"Michael\" but was unable. PIV placed, labs drawn, specimens collected. Unable to empty bladder, salmeron placed. Medicated for pain x2. CT placed to -20cm suction. Report given to Anesthesia, brought to OR.  A: Salmeron w/ adequate output.  Pain adequately controlled. CT w/ brown / dark red output, 150ml since arrival.  P: Await return to ICU post-op.  "

## 2018-03-18 NOTE — H&P
SURGICAL ICU ADMISSION NOTE  3/18/2018      ASSESSMENT: Michael Saldivar is a 57 year old male with a history of HTN and GERD who presents with esophageal perforation 2/2 [place].    PLAN:   Neuro/ pain/ sedation:  - Monitor neurological status. Notify the MD/DO for any acute changes in exam.  - Dilaudid for pain.     Pulmonary care:   - Supplemental oxygen to keep saturation above 92 %.  - Chest tube to suction     Cardiovascular:    #Aberrant conduction EKG  #HTN  - Monitor hemodynamic status.      GI care:  #Esophageal rupture   - Strict NPO  - Thoracic surgical plan pending     Fluids/ Electrolytes/ Nutrition:   - LR for IV fluid hydration     Renal/ Fluid Balance:    - Labs pending  - OSH Cr 1.64     Endocrine:    - SSI  - Check Hgb A1c due to glucose of 302     ID/ Antibiotics:  - WBC 25.5, lactate 10  - Zosyn, vancomycin, diflucan for esophageal perforation     Heme:     - Labs pending  - OSH INR 1.1, RBC 12.4,      Prophylaxis:    - Mechanical prophylaxis for DVT  - PPI     Lines/ tubes/ drains:  - PIV  - Place salmeron for strict I&O     Disposition:  - Surgical ICU    CODE:  - Full    - - - - - - - - - - - - - - - - - - - - - - - - - - - - - - - - - - - - - - - - - - - - - - - - - - - - - - - - - - - - - - - - - - - - -     PRIMARY TEAM: Thoracic surgery  PRIMARY PHYSICIAN: Dr. Ybarra    REASON FOR CRITICAL CARE ADMISSION: Esophageal perforation   ADMITTING PHYSICIAN: Dr. Lainez    HISTORY PRESENTING ILLNESS: Michael Saldivar is a 57-year-old male with a PMHx s/f hypertension, GERD who presented on 3/18/18 to ED at OSH in [place] for back pain and hematemesis. Initially with nausea/vomiting since Friday, no fever/chills, no bowel habit changes. Sunday morning with immediate back pain after emesis. CXR showed L hydropneumothorax, pneumomediastinum, distal esophageal perforation; a L chest tube was placed, and he was transferred to [facility]. Currently with moderate back pain. Denies CP, SOB, abdominal  pain. No fever/chills.    REVIEW OF SYSTEMS: As noted above.     PAST MEDICAL HISTORY:   GERD    SURGICAL HISTORY:   None    SOCIAL HISTORY: Tobacco - 40 pack year history, quit 5 years ago. Etoh - None.     FAMILY HISTORY: No bleeding/clotting disorders nor problems with anesthesia.    ALLERGIES:   NKDA    MEDICATIONS:    No current facility-administered medications on file prior to encounter.   No current outpatient prescriptions on file prior to encounter.    PHYSICAL EXAMINATION:  Temp:  [98.3  F (36.8  C)] 98.3  F (36.8  C)  Heart Rate:  [121] 121  Resp:  [40] 40  BP: (129)/(94) 129/94  SpO2:  [97 %] 97 %  General: NAD, AOx3  Chest: tachy at 120s, NLB on 15L facemask. Left chest tube with dark output, no air leaks.  Abdomen: soft, ND, NT  Ext: WWP    LABS: Reviewed.   WBC 25  Lactate 10  Cr 1.64  Glucose 302    IMAGING:  CT with distal esophageal perforation, chest tube in good position.    Patient seen, findings and plan discussed with surgical ICU staff Dr. Lainez.

## 2018-03-18 NOTE — PHARMACY-VANCOMYCIN DOSING SERVICE
Pharmacy Vancomycin Initial Note  Date of Service 2018  Patient's  1960  57 year old, male    Indication: Esophageal perforation, patient was started at outside hospital one time dose of vancomycin 1250 mg IV was at 1122 am in the ED.    Current Calculated CrCl= 92 ml/min    Creatinine for last 3 days  No results found for requested labs within last 72 hours.    Recent Vancomycin Level(s) for last 3 days  No results found for requested labs within last 72 hours.      Vancomycin IV Administrations (past 72 hours)      No vancomycin orders with administrations in past 72 hours.                Nephrotoxins and other renal medications (Future)    Start     Dose/Rate Route Frequency Ordered Stop    18 1645  piperacillin-tazobactam (ZOSYN) 3.375 g vial to attach to  mL bag      3.375 g  over 30 Minutes Intravenous EVERY 6 HOURS 18 1458            Contrast Orders - past 72 hours     None                Plan:  1.  Start vancomycin 1500 mg IV q12h. (18.8 mg/kg, start next dose at 2300)  2.  Goal Trough Level: 10-15 mg/L   3.  Pharmacy will check trough levels as appropriate in 1-3 Days.    4. Serum creatinine levels will be ordered daily for the first week of therapy and at least twice weekly for subsequent weeks.    5. [place] method utilized to dose vancomycin therapy: Method 2    Mela Oleary, PharmD Resident

## 2018-03-19 ENCOUNTER — APPOINTMENT (OUTPATIENT)
Dept: GENERAL RADIOLOGY | Facility: CLINIC | Age: 58
End: 2018-03-19
Attending: THORACIC SURGERY (CARDIOTHORACIC VASCULAR SURGERY)
Payer: MEDICAID

## 2018-03-19 LAB
ALBUMIN SERPL-MCNC: 2.6 G/DL (ref 3.4–5)
ALP SERPL-CCNC: 63 U/L (ref 40–150)
ALT SERPL W P-5'-P-CCNC: 17 U/L (ref 0–70)
ANION GAP SERPL CALCULATED.3IONS-SCNC: 9 MMOL/L (ref 3–14)
AST SERPL W P-5'-P-CCNC: 34 U/L (ref 0–45)
BILIRUB SERPL-MCNC: 0.9 MG/DL (ref 0.2–1.3)
BUN SERPL-MCNC: 22 MG/DL (ref 7–30)
CALCIUM SERPL-MCNC: 8.1 MG/DL (ref 8.5–10.1)
CHLORIDE SERPL-SCNC: 112 MMOL/L (ref 94–109)
CO2 SERPL-SCNC: 22 MMOL/L (ref 20–32)
CREAT SERPL-MCNC: 1.02 MG/DL (ref 0.66–1.25)
ERYTHROCYTE [DISTWIDTH] IN BLOOD BY AUTOMATED COUNT: 19.6 % (ref 10–15)
GFR SERPL CREATININE-BSD FRML MDRD: 75 ML/MIN/1.7M2
GLUCOSE BLDC GLUCOMTR-MCNC: 119 MG/DL (ref 70–99)
GLUCOSE BLDC GLUCOMTR-MCNC: 120 MG/DL (ref 70–99)
GLUCOSE BLDC GLUCOMTR-MCNC: 144 MG/DL (ref 70–99)
GLUCOSE SERPL-MCNC: 122 MG/DL (ref 70–99)
HCT VFR BLD AUTO: 34.3 % (ref 40–53)
HGB BLD-MCNC: 9.6 G/DL (ref 13.3–17.7)
INR PPP: 1.65 (ref 0.86–1.14)
MAGNESIUM SERPL-MCNC: 1.8 MG/DL (ref 1.6–2.3)
MCH RBC QN AUTO: 20.4 PG (ref 26.5–33)
MCHC RBC AUTO-ENTMCNC: 28 G/DL (ref 31.5–36.5)
MCV RBC AUTO: 73 FL (ref 78–100)
PHOSPHATE SERPL-MCNC: 2.1 MG/DL (ref 2.5–4.5)
PLATELET # BLD AUTO: 204 10E9/L (ref 150–450)
POTASSIUM SERPL-SCNC: 4.2 MMOL/L (ref 3.4–5.3)
PROT SERPL-MCNC: 5.2 G/DL (ref 6.8–8.8)
RBC # BLD AUTO: 4.71 10E12/L (ref 4.4–5.9)
SODIUM SERPL-SCNC: 143 MMOL/L (ref 133–144)
TRIGL SERPL-MCNC: 29 MG/DL
WBC # BLD AUTO: 9.7 10E9/L (ref 4–11)

## 2018-03-19 PROCEDURE — 83735 ASSAY OF MAGNESIUM: CPT | Performed by: THORACIC SURGERY (CARDIOTHORACIC VASCULAR SURGERY)

## 2018-03-19 PROCEDURE — 80053 COMPREHEN METABOLIC PANEL: CPT | Performed by: STUDENT IN AN ORGANIZED HEALTH CARE EDUCATION/TRAINING PROGRAM

## 2018-03-19 PROCEDURE — 25000128 H RX IP 250 OP 636: Performed by: NURSE PRACTITIONER

## 2018-03-19 PROCEDURE — 25000128 H RX IP 250 OP 636: Performed by: STUDENT IN AN ORGANIZED HEALTH CARE EDUCATION/TRAINING PROGRAM

## 2018-03-19 PROCEDURE — 25000132 ZZH RX MED GY IP 250 OP 250 PS 637: Performed by: STUDENT IN AN ORGANIZED HEALTH CARE EDUCATION/TRAINING PROGRAM

## 2018-03-19 PROCEDURE — 36569 INSJ PICC 5 YR+ W/O IMAGING: CPT

## 2018-03-19 PROCEDURE — 25000125 ZZHC RX 250: Performed by: STUDENT IN AN ORGANIZED HEALTH CARE EDUCATION/TRAINING PROGRAM

## 2018-03-19 PROCEDURE — 25000125 ZZHC RX 250: Performed by: NURSE PRACTITIONER

## 2018-03-19 PROCEDURE — 99291 CRITICAL CARE FIRST HOUR: CPT | Mod: GC | Performed by: SURGERY

## 2018-03-19 PROCEDURE — 25800025 ZZH RX 258: Performed by: NURSE PRACTITIONER

## 2018-03-19 PROCEDURE — 84100 ASSAY OF PHOSPHORUS: CPT | Performed by: STUDENT IN AN ORGANIZED HEALTH CARE EDUCATION/TRAINING PROGRAM

## 2018-03-19 PROCEDURE — 36415 COLL VENOUS BLD VENIPUNCTURE: CPT | Performed by: STUDENT IN AN ORGANIZED HEALTH CARE EDUCATION/TRAINING PROGRAM

## 2018-03-19 PROCEDURE — 3E0436Z INTRODUCTION OF NUTRITIONAL SUBSTANCE INTO CENTRAL VEIN, PERCUTANEOUS APPROACH: ICD-10-PCS | Performed by: THORACIC SURGERY (CARDIOTHORACIC VASCULAR SURGERY)

## 2018-03-19 PROCEDURE — 85610 PROTHROMBIN TIME: CPT | Performed by: STUDENT IN AN ORGANIZED HEALTH CARE EDUCATION/TRAINING PROGRAM

## 2018-03-19 PROCEDURE — 85027 COMPLETE CBC AUTOMATED: CPT | Performed by: STUDENT IN AN ORGANIZED HEALTH CARE EDUCATION/TRAINING PROGRAM

## 2018-03-19 PROCEDURE — 40000986 XR CHEST PORT 1 VW

## 2018-03-19 PROCEDURE — 25000125 ZZHC RX 250: Performed by: THORACIC SURGERY (CARDIOTHORACIC VASCULAR SURGERY)

## 2018-03-19 PROCEDURE — 83735 ASSAY OF MAGNESIUM: CPT | Performed by: STUDENT IN AN ORGANIZED HEALTH CARE EDUCATION/TRAINING PROGRAM

## 2018-03-19 PROCEDURE — 20000004 ZZH R&B ICU UMMC

## 2018-03-19 PROCEDURE — 84478 ASSAY OF TRIGLYCERIDES: CPT | Performed by: STUDENT IN AN ORGANIZED HEALTH CARE EDUCATION/TRAINING PROGRAM

## 2018-03-19 PROCEDURE — 27210197 ZZH KIT POWER PICC TRIPLE LUMEN

## 2018-03-19 PROCEDURE — 00000146 ZZHCL STATISTIC GLUCOSE BY METER IP

## 2018-03-19 RX ORDER — NALOXONE HYDROCHLORIDE 0.4 MG/ML
.1-.4 INJECTION, SOLUTION INTRAMUSCULAR; INTRAVENOUS; SUBCUTANEOUS
Status: DISCONTINUED | OUTPATIENT
Start: 2018-03-19 | End: 2018-03-19

## 2018-03-19 RX ORDER — ONDANSETRON 2 MG/ML
4 INJECTION INTRAMUSCULAR; INTRAVENOUS EVERY 6 HOURS PRN
Status: DISCONTINUED | OUTPATIENT
Start: 2018-03-19 | End: 2018-04-06 | Stop reason: HOSPADM

## 2018-03-19 RX ORDER — METOCLOPRAMIDE 10 MG/1
10 TABLET ORAL EVERY 6 HOURS PRN
Status: DISCONTINUED | OUTPATIENT
Start: 2018-03-19 | End: 2018-04-06 | Stop reason: HOSPADM

## 2018-03-19 RX ORDER — LIDOCAINE 40 MG/G
CREAM TOPICAL
Status: DISCONTINUED | OUTPATIENT
Start: 2018-03-19 | End: 2018-04-06 | Stop reason: HOSPADM

## 2018-03-19 RX ORDER — POTASSIUM CHLORIDE 7.45 MG/ML
10 INJECTION INTRAVENOUS
Status: DISCONTINUED | OUTPATIENT
Start: 2018-03-19 | End: 2018-03-21 | Stop reason: RX

## 2018-03-19 RX ORDER — HEPARIN SODIUM,PORCINE 10 UNIT/ML
5-10 VIAL (ML) INTRAVENOUS
Status: DISCONTINUED | OUTPATIENT
Start: 2018-03-19 | End: 2018-04-06 | Stop reason: HOSPADM

## 2018-03-19 RX ORDER — HEPARIN SODIUM,PORCINE 10 UNIT/ML
5-10 VIAL (ML) INTRAVENOUS EVERY 24 HOURS
Status: DISCONTINUED | OUTPATIENT
Start: 2018-03-19 | End: 2018-04-06 | Stop reason: HOSPADM

## 2018-03-19 RX ORDER — MAGNESIUM SULFATE HEPTAHYDRATE 40 MG/ML
4 INJECTION, SOLUTION INTRAVENOUS EVERY 4 HOURS PRN
Status: DISCONTINUED | OUTPATIENT
Start: 2018-03-19 | End: 2018-04-06 | Stop reason: HOSPADM

## 2018-03-19 RX ORDER — POTASSIUM CHLORIDE 1.5 G/1.58G
20-40 POWDER, FOR SOLUTION ORAL
Status: DISCONTINUED | OUTPATIENT
Start: 2018-03-19 | End: 2018-04-06 | Stop reason: HOSPADM

## 2018-03-19 RX ORDER — HEPARIN SODIUM 5000 [USP'U]/.5ML
5000 INJECTION, SOLUTION INTRAVENOUS; SUBCUTANEOUS EVERY 8 HOURS
Status: DISCONTINUED | OUTPATIENT
Start: 2018-03-19 | End: 2018-03-27

## 2018-03-19 RX ORDER — POTASSIUM CHLORIDE 29.8 MG/ML
20 INJECTION INTRAVENOUS
Status: DISCONTINUED | OUTPATIENT
Start: 2018-03-19 | End: 2018-04-06 | Stop reason: HOSPADM

## 2018-03-19 RX ORDER — POTASSIUM CL/LIDO/0.9 % NACL 10MEQ/0.1L
10 INTRAVENOUS SOLUTION, PIGGYBACK (ML) INTRAVENOUS
Status: DISCONTINUED | OUTPATIENT
Start: 2018-03-19 | End: 2018-04-06 | Stop reason: HOSPADM

## 2018-03-19 RX ORDER — DEXTROSE MONOHYDRATE 25 G/50ML
25-50 INJECTION, SOLUTION INTRAVENOUS
Status: DISCONTINUED | OUTPATIENT
Start: 2018-03-19 | End: 2018-04-06 | Stop reason: HOSPADM

## 2018-03-19 RX ORDER — NALOXONE HYDROCHLORIDE 0.4 MG/ML
.1-.4 INJECTION, SOLUTION INTRAMUSCULAR; INTRAVENOUS; SUBCUTANEOUS
Status: DISCONTINUED | OUTPATIENT
Start: 2018-03-19 | End: 2018-04-06 | Stop reason: HOSPADM

## 2018-03-19 RX ORDER — ACETAMINOPHEN 650 MG/1
650 SUPPOSITORY RECTAL EVERY 6 HOURS
Status: DISCONTINUED | OUTPATIENT
Start: 2018-03-19 | End: 2018-03-25

## 2018-03-19 RX ORDER — POTASSIUM CHLORIDE 750 MG/1
20-40 TABLET, EXTENDED RELEASE ORAL
Status: DISCONTINUED | OUTPATIENT
Start: 2018-03-19 | End: 2018-04-06 | Stop reason: HOSPADM

## 2018-03-19 RX ORDER — HEPARIN SODIUM,PORCINE 10 UNIT/ML
2-5 VIAL (ML) INTRAVENOUS
Status: COMPLETED | OUTPATIENT
Start: 2018-03-19 | End: 2018-04-01

## 2018-03-19 RX ORDER — FLUCONAZOLE 2 MG/ML
400 INJECTION, SOLUTION INTRAVENOUS EVERY 24 HOURS
Status: DISCONTINUED | OUTPATIENT
Start: 2018-03-19 | End: 2018-03-23

## 2018-03-19 RX ORDER — PROCHLORPERAZINE MALEATE 5 MG
10 TABLET ORAL EVERY 6 HOURS PRN
Status: DISCONTINUED | OUTPATIENT
Start: 2018-03-19 | End: 2018-04-06 | Stop reason: HOSPADM

## 2018-03-19 RX ORDER — NICOTINE POLACRILEX 4 MG
15-30 LOZENGE BUCCAL
Status: DISCONTINUED | OUTPATIENT
Start: 2018-03-19 | End: 2018-04-06 | Stop reason: HOSPADM

## 2018-03-19 RX ORDER — SODIUM CHLORIDE, SODIUM LACTATE, POTASSIUM CHLORIDE, CALCIUM CHLORIDE 600; 310; 30; 20 MG/100ML; MG/100ML; MG/100ML; MG/100ML
INJECTION, SOLUTION INTRAVENOUS CONTINUOUS
Status: DISCONTINUED | OUTPATIENT
Start: 2018-03-19 | End: 2018-03-22

## 2018-03-19 RX ORDER — METOCLOPRAMIDE HYDROCHLORIDE 5 MG/ML
10 INJECTION INTRAMUSCULAR; INTRAVENOUS EVERY 6 HOURS PRN
Status: DISCONTINUED | OUTPATIENT
Start: 2018-03-19 | End: 2018-04-06 | Stop reason: HOSPADM

## 2018-03-19 RX ORDER — PIPERACILLIN SODIUM, TAZOBACTAM SODIUM 4; .5 G/20ML; G/20ML
4.5 INJECTION, POWDER, LYOPHILIZED, FOR SOLUTION INTRAVENOUS EVERY 6 HOURS
Status: DISCONTINUED | OUTPATIENT
Start: 2018-03-19 | End: 2018-03-23

## 2018-03-19 RX ORDER — PANTOPRAZOLE SODIUM 40 MG/1
40 TABLET, DELAYED RELEASE ORAL DAILY
Status: DISCONTINUED | OUTPATIENT
Start: 2018-03-19 | End: 2018-03-19

## 2018-03-19 RX ORDER — ONDANSETRON 4 MG/1
4 TABLET, ORALLY DISINTEGRATING ORAL EVERY 6 HOURS PRN
Status: DISCONTINUED | OUTPATIENT
Start: 2018-03-19 | End: 2018-04-06 | Stop reason: HOSPADM

## 2018-03-19 RX ADMIN — SODIUM CHLORIDE, POTASSIUM CHLORIDE, SODIUM LACTATE AND CALCIUM CHLORIDE: 600; 310; 30; 20 INJECTION, SOLUTION INTRAVENOUS at 14:23

## 2018-03-19 RX ADMIN — POTASSIUM CHLORIDE: 2 INJECTION, SOLUTION, CONCENTRATE INTRAVENOUS at 20:59

## 2018-03-19 RX ADMIN — FLUCONAZOLE 400 MG: 2 INJECTION INTRAVENOUS at 05:55

## 2018-03-19 RX ADMIN — HEPARIN SODIUM 5000 UNITS: 5000 INJECTION, SOLUTION INTRAVENOUS; SUBCUTANEOUS at 08:24

## 2018-03-19 RX ADMIN — PIPERACILLIN SODIUM AND TAZOBACTAM SODIUM 4.5 G: 4; .5 INJECTION, POWDER, LYOPHILIZED, FOR SOLUTION INTRAVENOUS at 14:23

## 2018-03-19 RX ADMIN — Medication 2 G: at 12:12

## 2018-03-19 RX ADMIN — DEXTROSE AND SODIUM CHLORIDE: 5; 450 INJECTION, SOLUTION INTRAVENOUS at 11:09

## 2018-03-19 RX ADMIN — I.V. FAT EMULSION 250 ML: 20 EMULSION INTRAVENOUS at 20:59

## 2018-03-19 RX ADMIN — HEPARIN SODIUM 5000 UNITS: 5000 INJECTION, SOLUTION INTRAVENOUS; SUBCUTANEOUS at 23:46

## 2018-03-19 RX ADMIN — POTASSIUM PHOSPHATE, MONOBASIC AND POTASSIUM PHOSPHATE, DIBASIC 15 MMOL: 224; 236 INJECTION, SOLUTION INTRAVENOUS at 14:25

## 2018-03-19 RX ADMIN — PIPERACILLIN SODIUM AND TAZOBACTAM SODIUM 4.5 G: 4; .5 INJECTION, POWDER, LYOPHILIZED, FOR SOLUTION INTRAVENOUS at 08:25

## 2018-03-19 RX ADMIN — PIPERACILLIN SODIUM AND TAZOBACTAM SODIUM 4.5 G: 4; .5 INJECTION, POWDER, LYOPHILIZED, FOR SOLUTION INTRAVENOUS at 03:23

## 2018-03-19 RX ADMIN — PANTOPRAZOLE SODIUM 40 MG: 40 INJECTION, POWDER, FOR SOLUTION INTRAVENOUS at 08:24

## 2018-03-19 RX ADMIN — PIPERACILLIN SODIUM AND TAZOBACTAM SODIUM 4.5 G: 4; .5 INJECTION, POWDER, LYOPHILIZED, FOR SOLUTION INTRAVENOUS at 21:01

## 2018-03-19 RX ADMIN — LIDOCAINE HYDROCHLORIDE 3 ML: 10 INJECTION, SOLUTION EPIDURAL; INFILTRATION; INTRACAUDAL; PERINEURAL at 09:55

## 2018-03-19 RX ADMIN — HEPARIN SODIUM 5000 UNITS: 5000 INJECTION, SOLUTION INTRAVENOUS; SUBCUTANEOUS at 17:18

## 2018-03-19 ASSESSMENT — PAIN DESCRIPTION - DESCRIPTORS
DESCRIPTORS: ACHING
DESCRIPTORS: ACHING

## 2018-03-19 NOTE — PROGRESS NOTES
Thoracic Surgery Progress Note  March 19, 2018    S: Feeling well this AM. C/o chronic back pain. Breathing well. Went to the OR last night, no acute events since then.    O:  Temp:  [97.8  F (36.6  C)-98.6  F (37  C)] 97.8  F (36.6  C)  Heart Rate:  [] 96  Resp:  [10-40] 20  BP: (123-147)/(68-94) 134/89  MAP:  [89 mmHg-153 mmHg] 124 mmHg  Arterial Line BP: (104-158)/() 135/119  SpO2:  [92 %-98 %] 97 %    Well appearing man in nad w/ NG in place and multiple chest tubes.   Nlb on 5L o2 by NC. Chest tubes to suction. Serosang output. MALINA drain serosang.  Abd not distended    Hgb 9.3->9.6  WBC 9.7    UOP adequate    MALINA put out 70 yesterday  Chest tube 1 (right pleural) put out 35 yesterday, total of 70 since surgery  Chest tube 2 and 3 (left pleural) put out 95 yesterday, total of 400 since surgery    A/P:  - C/w STRICT npo, fluc/zosyn  - Shayla can come out today. If he can't urinate then please replace it w/o straight caths  - TPN today, I have ordered this along with PICC placement  - PT/OT, these orders are placed  - We have changed the LEFT chest tube to water seal. The RIGHT chest tube can stay on suction.  - Ok to start DVT prophylaxis. I have ordered 5000 units of subq heparin tid  - Will need a swallow study in the future but not today  - No other changes at this time. Please call w/ any questions    --  Tushar Roa MD  Gen Surg PGY1

## 2018-03-19 NOTE — BRIEF OP NOTE
[facility], [place]    Brief Operative Note    Pre-operative diagnosis: esophageal perforation   Post-operative diagnosis same  Procedure: Procedure(s):  Esophogastroduodenoscopy, PEG-TUBE INSERTION, Left THORACOTOMY, Chest tube insertion - Wound Class: II-Clean Contaminated  EGD, PEG tube placement, NG tube placement, Left thoracotomy, Repair of esophageal perforation, Right chest tube placement, L chest tube placement x 3  Surgeon: Surgeon(s) and Role:     * Keith Ybarra MD - Primary     * Dominick Galvez MD - Assisting     * Luisito Pichardo MD - Assisting  Anesthesia: General   Estimated blood loss: Less than 100 ml  Drains: Right chest tube x 1, L chest tubes x 3, NG tube  Specimens: * No specimens in log *  Findings:   Esophageal tear in distal esoghagus; see dictated note for complete details.  Complications: None.  Implants: PEG tube (2.5 cm at skin)

## 2018-03-19 NOTE — PLAN OF CARE
Problem: Patient Care Overview  Goal: Plan of Care/Patient Progress Review  Outcome: Improving  D/I:?Patient on unit 4A Surgical/Neuro ICU   Neuro- AxO X4. PERRLA. No neurological deficits. Denies pain. PCA pump of 0.2 dilaudid in place, hasn't been used.   CV- NSR and sometimes sinus tach. HR 90s-low 100s. BPs 130s/ 70s. Left BBB. Arterial line removed due to not being able to read or drawback blood. MD was notified, arterial line was then pulled at MD request.   Pulm- 3 L NC, sats in high 90s.   GI- WDL. NPO  - Mcguire w/ 50 ml of gee urine/ hour  Gtts- Maint-100   Skin- Surgical sites have dried drainage but otherwise CDI.   IV's/Drains- 2 PIVs.  Pain- Denies, PCA pump if needed.   See flow sheets for further interventions and assessments.   A: Stable   P:?Continue to monitor pt closely. Notify MD of significant changes.

## 2018-03-19 NOTE — PLAN OF CARE
Problem: Patient Care Overview  Goal: Plan of Care/Patient Progress Review  Outcome: No Change  D: Pt alert and oriented. Neurologically intact. Sinus tach thorough out day with LBBB. BP wnl. Using IS with encouragement, Continues on 3L NC. NG to LIS. Mcguire removed. Chest tubes changed to water seal. MALINA with moderate output. Small amount of bruising. LR at 100. Will continue to monitor.

## 2018-03-19 NOTE — PROGRESS NOTES
SURGICAL ICU PROGRESS NOTE  March 19, 2018    CO-MORBIDITIES:   GERD    ASSESSMENT: Michael Saldivar is a 57 year old male with history of GERD now POD # s/p repair of esophageal perforation (3/18/18) with associated left thoracotomy and 3x chest tube and PEG placement.    TODAY'S PROGRESS/PLANS:   - continuation of antibiotic prophylaxis with zosyn q6h, fluconazole (stop date remains pending discussion with primary team)  - Heparin for DVT ppx  - Salmeron out  - Tylenol per rectum  - PICC, TPN  - Left chest tube water seal, right chest tube to suction    PLAN:  Neuro/ pain/ sedation:  - Monitor neurological status. Notify the MD for any acute changes in exam.  - Dilaudid PCA for pain.  - Acetaminophen suppository q6h     Pulmonary care:   - Supplemental oxygen to keep saturation above 92 %.  - Incentive spirometer every 15- 30 minutes when awake.  - Chest tube management per thoracic surgery     Cardiovascular:    - Monitor hemodynamic status.   - Telemetry monitoring given high risk of arrhythmia     GI care:   #GERD  #esophageal perforation, of unclear etiology  - NPO, strict   - TPN today     Fluids/ Electrolytes/ Nutrition:   - LR infusion until TPN started; discontinue LR upon initiation of TPN  - electrolyte replacement protocols      Renal/ Fluid Balance:    - Urine output is adequate so far.  - Will continue to monitor intake and output.  - Discontinue salmeron     Endocrine:    - Sliding scale insulin available for BG > 140     ID/ Antibiotics:  - Continue antibiotic prophylaxis with zosyn q6h. Continue fluconazole for fungal prophylaxis. Stop date pending discussion with thoracic team.     Heme:    #microcytic anemia on admission  - Hemoglobin stable at 9.6 from pre-op of 11.7     MSK:  - PT/OT    Prophylaxis:    - Mechanical prophylaxis for DVT. Sub-cutaneous heparin 5000 mg q8h.   - PPI     Lines/ tubes/ drains:  - Chest tube on L   - Chest tube on R x 2  - Chest bulb on L   - PEG to gravity   - NG tube  -  "salmeron, discontinue   - PICC     Disposition:  - Surgical ICU.     CODE:   - FULL    Patient seen and discussed with Dr. Garner.     Nunes \"Delfino\" MD Sammi  Neurosurgery, PGY-1   ====================================    SUBJECTIVE:   - Admitted to SICU after esophageal perforation repair. No acute events overnight.     OBJECTIVE:   1. VITAL SIGNS:   Temp:  [97.8  F (36.6  C)-98.6  F (37  C)] 97.8  F (36.6  C)  Heart Rate:  [] 96  Resp:  [10-40] 20  BP: (123-147)/(68-94) 134/89  MAP:  [89 mmHg-153 mmHg] 124 mmHg  Arterial Line BP: (104-158)/() 135/119  SpO2:  [92 %-98 %] 97 %  Resp: 20    2. INTAKE/ OUTPUT:   I/O last 3 completed shifts:  In: 4050 [I.V.:3550]  Out: 2810 [Urine:1500; Emesis/NG output:95; Drains:150; Chest Tube:1065]    3. PHYSICAL EXAMINATION:   General: awake, alert, on nasal cannula O2  Neuro: A&Ox3, NAD. Moves all 4 extremities to command at least anti-gravity   Resp: Breathing shallow. No air leak in bilateral chest tubes. Appropriately tender at incision sites.   CV: DP pulses 2+ bilaterally.   Abdomen: Soft, Non-distended   Incisions: dressed bandage without significant drainage.   Extremities: warm and well perfused    4. INVESTIGATIONS:   Arterial Blood Gases     Recent Labs  Lab 03/18/18 2121 03/18/18 1953 03/18/18  1848 03/18/18  1808   PH 7.31* 7.30* 7.25* 7.28*   PCO2 41 40 46* 42   PO2 81 75* 82 78*   HCO3 21 20* 20* 20*     Complete Blood Count     Recent Labs  Lab 03/19/18  0628 03/18/18  2317 03/18/18 2121 03/18/18 1953 03/18/18  1535   WBC 9.7 8.1  --   --   --  12.8*   HGB 9.6* 9.3* 9.2* 8.9*  < > 11.7*    191  --   --   --  336   < > = values in this interval not displayed.  Basic Metabolic Panel    Recent Labs  Lab 03/19/18  0628 03/18/18  2317 03/18/18 2121 03/18/18  1953  03/18/18  1535    142 139 140  < > 142   POTASSIUM 4.2 4.5 4.5 4.4  < > 4.5   CHLORIDE 112* 113*  --   --   --  112*   CO2 22 22  --   --   --  20   BUN 22 24  --   --   --  28   CR " 1.02 0.92  --   --   --  1.03   * 128* 125* 124*  < > 114*   < > = values in this interval not displayed.  Liver Function Tests    Recent Labs  Lab 03/19/18  0628 03/18/18  1535   AST 34 22   ALT 17 15   ALKPHOS 63 101   BILITOTAL 0.9 0.5   ALBUMIN 2.6* 3.2*   INR 1.65* 1.13     Pancreatic Enzymes  No lab results found in last 7 days.  Coagulation Profile    Recent Labs  Lab 03/19/18  0628 03/18/18  1535   INR 1.65* 1.13     Lactate  Invalid input(s): LACTATE    5. RADIOLOGY:   Recent Results (from the past 24 hour(s))   XR Chest Port 1 View    Narrative    EXAM: XR CHEST PORT 1 VW  3/18/2018 11:50 PM      HISTORY: Post Op repair of esophageal perforation;     COMPARISON: No previous CTs or chest x-rays for comparison.    FINDINGS: Semiupright portable AP view of the chest obtained. Enteric  tube courses beyond the margins of the film. Left basilar chest tubes  as well as biapical chest tubes. No pleural effusion or pneumothorax.  Patchy right lateral midlung and left basilar opacities. Upper abdomen  is unremarkable. The trachea is displaced to the right at the level of  the aortic arch and there is increased soft tissues within the left  paratracheal region. The cardiac silhouette is within normal limits.  Mild widening of the upper mediastinum.      Impression    IMPRESSION:   1. Patchy right lateral midlung and left basilar opacities, favoring  atelectasis in the setting of lower lung volumes, versus aspiration.  2. Mild widening of the upper mediastinum, possibly postsurgical fluid  or secondary to the esophageal perforation. Consider CT of the chest  for further evaluation.    I have personally reviewed the examination and initial interpretation  and I agree with the findings.    KATHERINE BECERRA MD       =========================================

## 2018-03-19 NOTE — ANESTHESIA CARE TRANSFER NOTE
Patient: Michael Saldivar    Procedure(s):  Esophogastroduodenoscopy, PEG-TUBE INSERTION, Left THORACOTOMY, Chest tube insertion - Wound Class: II-Clean Contaminated  COMPINED ESOPHAGOSCOPY, GASTROSCOPY, DUODENOSCOPY, PEG-TUBE INSERTION, RIGHT CHEST TUBE INSERTION, POSSIBLE NASOGASTRIC TUBE, POSSIBLE THORACOTOMY.  - Wound Class: I-Clean  COMPINED ESOPHAGOSCOPY, GASTROSCOPY, DUODENOSCOPY, PEG-TUBE INSERTION, RIGHT CHEST TUBE INSERTION, POSSIBLE NASOGASTRIC TUBE, POSSIBLE THORACOTOMY.  - Wound Class: II-Clean Contaminated    Diagnosis: esophageal perforation   Diagnosis Additional Information: No value filed.    Anesthesia Type:   General, RSI, ETT     Note:  Airway :Face Mask  Patient transferred to:PACU  Comments: Vss, sats 100% on facemask, No pain on extubation, No adverse anesthesia events.          Vitals: (Last set prior to Anesthesia Care Transfer)    CRNA VITALS  3/18/2018 2222 - 3/18/2018 2314      3/18/2018             Pulse: 94    ART BP: 150/79    ART Mean: 104    Ht Rate: 91    SpO2: 98 %    Resp Rate (observed): (!)  3                Electronically Signed By: Janessa Carreon MD  March 18, 2018  11:14 PM

## 2018-03-19 NOTE — PROGRESS NOTES
CLINICAL NUTRITION SERVICES - ASSESSMENT NOTE     Nutrition Prescription    RECOMMENDATIONS FOR MDs/PROVIDERS TO ORDER:  1.  PHarmacy to begin CPN with the following custom CPN:   --Use dosing weight 77 kg (OSH adm wt)  --Begin CPN @ goal 60 ml/hr (1440 ml/day or volume required to fit the following goal provisions using 15% AA solution) with initial 160 g Dex daily (544 kcal, GIR 1.4), 130 g AA daily (520 kcal, 1.7 g/kg/day), and 250 ml 20% IV lipids M-F.    --Micro/Rx: daily MVI + daily Trace  --ONLY once pt receives ~100% of initial continuous PN volume with K+/Mg++/Phos WNL, advance PN dex by 50 g every 1-2 days (pending lytes/Glu and Pharm D/RD discretion) to initial goal of 310 g Dex (1054 kcal, GIR 2.8) to increase provisions to 1931 kcals (25 kcal/kg/day) with 18% kcals from Fat.    2.  Providers change to non-dextrose containing MIVF and clarify goals for total IVFs (with above CPN regimen).    3.  If/when approp to consider TF trials (use PEG vs convert to PEG-J), order Nutrition Consult for TF (assess/ORDER) to enable RDs to order/help manage TF therapy per all recs below.    4.  Ensure K+/Mg++/Phos ordered daily until CPN dextrose adv to goal infusion to evaluate for sx of refeeding with nutrition received, need for lyte replacement per already-ordered lyte protocols and approp to continue to adv nutritional provisions.     Malnutrition Status:    Patient does not meet two of the above criteria necessary for diagnosing malnutrition but is at risk for malnutrition    Recommendations already ordered by Registered Dietitian (RD):  Ordered add on (baseline) TGs to AM labs.    Future/Additional Recommendations:  1.  If/when approp FT access to begin TF, order Impact Peptide @ 15 ml/hr and adv by 10 ml q 8 hrs (or per Thoracic discretion) to goal 65 ml/hr (1560 ml/day) to provide 2340 kcals (30 kcal/kg/day), 147 g PRO (1.9 g/kg/day), 1201 ml free H2O, 100 g Fat (50% from MCTs), 218 g CHO and no Fiber daily.  "    2.  If/when begin TFs, Order to obtain baseline acute phase PRO (CRP) and recheck every Monday to evaluate trend with duration/volumes of immune-modulating TF received and ongoing approp of specialized TF therapy vs. approp to change to maintenance-type formula.     3.  If/when begin above TFs and anticipate able to wean off CPN (MVI) then order multivitamin/mineral (15 ml/day via FT) to help ensure micronutrient needs being met with suspected hypermetabolic demands and potential interruptions to TF infusions.          REASON FOR ASSESSMENT  Michael Saldivar is a/an 57 year old male assessed by the dietitian for Pharmacy/Nutrition to Start and Manage PN (comments: Pt is having a PICC placed. Please use this for TPN)    NUTRITION HISTORY  -Pt adm from [facility] ([facility] in [place], [place]) where was initially adm on 3/18/18.  Noted c/o N/V since Friday (3/16)    -Pt confirmed that he has only had decreased intakes since 3/16 and prior to that denied any nutrition intake issues (noted wt hx below).    CURRENT NUTRITION ORDERS  -Diet: NPO    -Enteral access: PEG + NGT (for decompression) placed intraoperatively on 3/18/18    -IV access: Peripheral only at this time and pending PICC placement.    -MIVF: currently receiving D5 1/2 NS with KCL (20 mEq/L) @ 100 ml/hr (started this AM, none received yesterday per I/Os; if receives 2400 ml/day would provide 120 g  Dex)    LABS  K+ 4.2, Phos 2.1 mg/dL (today) - high risk for refeeding with unknown diet intake hx and low Phos even with dextrose in MIVF    Alk Phos 63, T.bili 0.9 (today); no TG in EMR - will need baseline TG and recheck LFT/TGs q Mon with duration of CPN therapy.    MEDICATIONS  Medications reviewed    ANTHROPOMETRICS  Height: 167.6 cm (5' 6\")  Most Recent Weight: 79.5 kg (3/18/2018); noted OSH adm wt of 77.1 kg on 3/18/18  IBW: 65 kg (@119%)  BMI: 27.4 kg/m2 =  Overweight BMI 25-29.9  Weight History: Pt denied any unintentional wt " losses PTA and actually reported was probably gaining a little.  Wt Readings from Last 3 Encounters:   03/18/18 79.5 kg (175 lb 4.3 oz)      Dosing Weight: 77 kg (OSH adm/actual) - 77.1 kg on 3/18/18 prior to transfer.    ASSESSED NUTRITION NEEDS  Estimated Energy Needs: 0661-6250 kcals/day (25 - 30 kcals/kg)  Justification: Maintenance and Post-op  Estimated Protein Needs: + grams protein/day (1.2 - 1.5+ grams of pro/kg)  Justification: Hypercatabolism with critical illness and Post-op  Estimated Fluid Needs: (1 mL/kcal) for Maintenance or Per provider pending fluid status    PHYSICAL FINDINGS  See malnutrition section below.  No abnormal nutrition-related physical findings observed.     MALNUTRITION  % Intake: Decreased intake does not meet criteria (NPO x ~4 days)  % Weight Loss: None noted  Subcutaneous Fat Loss: None observed  Muscle Loss: None observed  Fluid Accumulation/Edema: None noted  Malnutrition Diagnosis: Patient does not meet two of the above criteria necessary for diagnosing malnutrition but is at risk for malnutrition    NUTRITION DIAGNOSIS  Inadequate protein-energy intake r/t esophageal perforation inhibiting ability to resume oral diet in pt with flu PTA and sx N/V since 3/16 AEB poor tolerance to oral intakes and NPO since 3/16.    INTERVENTIONS  Implementation  Nutrition Education: Provided education on plan for NPO/TPN to pt.     Collaboration and Referral of Nutrition care - Discussed plan for FEN/GI on rounds with Providers and requested adjust MIVF per above and collaborated with Pharmacy to begin CPN via PICC (once placed/confirmed).      Goals  1.  Tolerate adv of CPN dextrose to goal provision without sx of refeeding within the next 3 days.  2.  Total ave nutrition intakes (PN vs EN once approp) to provide minimum 25 kcal/kg/day and 1.2 g PRO/kg/day (per 77 kg).      Monitoring/Evaluation  Progress toward goals will be monitored and evaluated per protocol.     Hilda Garcia  ,RD, LD, CNSC (4A SICU pgr [phone])

## 2018-03-19 NOTE — OP NOTE
The patient was brought to the room and placed supine upon the table.  After confirming the patient's identity and the consent, appropriate monitoring devices were placed.  General anesthesia was administered and the patient was intubated with a double lumen endotracheal tube.  Proper positioning was confirmed with bronchoscopy.  Intravenous antibiotics were administered within one hour prior to the incision.  The endoscope was passed into the posterior pharynx and into the esophagus without difficulty.  The tumor was noted in the distal esophagus and easily traversed.  The GE junction was located at 40 cm from the incisors and the perforation was noted at approximately 37 cm from the incisors.  There was no evidence of a hernia.  The stomach was easily entered.  Dark contents were aspirated and the mucosa appeared normal.  On retroflexion, there was no evidence of extension of the perforation onto the stomach.  The stomach was insufflated and the proposed PEG site showed 1 to 1 transmission of pressure.  The finder needle entered the stomach easily and a snare was used to pull the guide wire out of the mouth.  A 20 Fr PEG tube was then passed through the esophagus into the stomach and the PEG was secured with a bumper at 2.5 cm at the skin.  The PEG appeared appropriately placed against the stomach wall.  The air was aspirated and the endoscope was withdrawn.  A sterile dressing was placed.  The catheter was placed to gravity drainage.   Next, a 28 Kinyarwanda straight chest tube was placed in the 5th intercostal space on the right.  The tube drained serosanguinous fluid and was secured to the skin with 0-silk suture.    The patient was turned to the right lateral decubitus position and all pressure points were padded.  The bed was flexed and the left arm was suspended.  The patient was secured to the table.  .  The left chest was prepped with chlorhexidine and  draped in the standard surgical fashion.  The previously  placed chest tube was removed.  A posterolateral thoracotomy was made over the 7th intercostal space.  The latissimus dorsi muscle was divided and the serratus anterior spared.  The seventh intercostal space was entered and a retractor was used to retract the chest wall.  Turbid fluid was aspirated from the pleural cavity.  A stitch was placed in the dome of the diaphragm and brought out through a stab incision in the low chest to retract the diaphragm.  The perforation was visualized and necrotic tissue was noted surrounding the mucosal defect.  The esophagus was dissected circumferentially and a Penrose drain was placed to further mobilize the esophagus.  The area was debrided and the muscularis layers were opened to expose the edges of the mucosal defect.  Edges of the mucosa were also debrided.  The mucosal defect was repaired with interrupted 3-0 vicryl sutures and a leak test was performed.  There was no significant leak noted.  The cavity was irrigated copiously with warm saline.  An intercostal muscle flap had been harvested on entry and was now tacked to the repair site.  A 19 Ivorian albino drain was placed along the repair site as well.  A 28 Ivorian angled chest tube was placed along the diaphragm and a 28 Ivorian straight chest tube was placed going ot the apex through the initial chest tube site.  The tubes were secured to the skin with 0-silk suture.  The serratus anterior was reapproximated using 0 Vicryl.  The latissimus dorsi was closed with 0 Vicryl.  Each layer was irrigated prior to closure.  The remaining layers were closed with 2-0 Vicryl, 3-0 Vicryl and 4-0 Monocryl.  The areas were cleaned and dried and benzoin and Steri-Strips were applied.  At the end of the case, sponge, needle, and instrument counts were correct.     The patient was awoken from anesthesia and extubated without incident.

## 2018-03-20 ENCOUNTER — APPOINTMENT (OUTPATIENT)
Dept: GENERAL RADIOLOGY | Facility: CLINIC | Age: 58
End: 2018-03-20
Attending: PHYSICIAN ASSISTANT
Payer: MEDICAID

## 2018-03-20 ENCOUNTER — APPOINTMENT (OUTPATIENT)
Dept: GENERAL RADIOLOGY | Facility: CLINIC | Age: 58
End: 2018-03-20
Attending: THORACIC SURGERY (CARDIOTHORACIC VASCULAR SURGERY)
Payer: MEDICAID

## 2018-03-20 ENCOUNTER — APPOINTMENT (OUTPATIENT)
Dept: OCCUPATIONAL THERAPY | Facility: CLINIC | Age: 58
End: 2018-03-20
Attending: THORACIC SURGERY (CARDIOTHORACIC VASCULAR SURGERY)
Payer: MEDICAID

## 2018-03-20 LAB
ALBUMIN SERPL-MCNC: 2 G/DL (ref 3.4–5)
ALP SERPL-CCNC: 56 U/L (ref 40–150)
ALT SERPL W P-5'-P-CCNC: 17 U/L (ref 0–70)
ANION GAP SERPL CALCULATED.3IONS-SCNC: 5 MMOL/L (ref 3–14)
AST SERPL W P-5'-P-CCNC: 25 U/L (ref 0–45)
BILIRUB SERPL-MCNC: 0.2 MG/DL (ref 0.2–1.3)
BUN SERPL-MCNC: 22 MG/DL (ref 7–30)
CALCIUM SERPL-MCNC: 7.9 MG/DL (ref 8.5–10.1)
CHLORIDE SERPL-SCNC: 113 MMOL/L (ref 94–109)
CO2 SERPL-SCNC: 24 MMOL/L (ref 20–32)
CREAT SERPL-MCNC: 0.92 MG/DL (ref 0.66–1.25)
ERYTHROCYTE [DISTWIDTH] IN BLOOD BY AUTOMATED COUNT: 19.8 % (ref 10–15)
GFR SERPL CREATININE-BSD FRML MDRD: 85 ML/MIN/1.7M2
GLUCOSE BLDC GLUCOMTR-MCNC: 112 MG/DL (ref 70–99)
GLUCOSE BLDC GLUCOMTR-MCNC: 121 MG/DL (ref 70–99)
GLUCOSE BLDC GLUCOMTR-MCNC: 136 MG/DL (ref 70–99)
GLUCOSE BLDC GLUCOMTR-MCNC: 137 MG/DL (ref 70–99)
GLUCOSE BLDC GLUCOMTR-MCNC: 159 MG/DL (ref 70–99)
GLUCOSE BLDC GLUCOMTR-MCNC: 92 MG/DL (ref 70–99)
GLUCOSE SERPL-MCNC: 124 MG/DL (ref 70–99)
HCT VFR BLD AUTO: 29.9 % (ref 40–53)
HGB BLD-MCNC: 8.6 G/DL (ref 13.3–17.7)
INR PPP: 1.37 (ref 0.86–1.14)
MAGNESIUM SERPL-MCNC: 2.3 MG/DL (ref 1.6–2.3)
MCH RBC QN AUTO: 20.6 PG (ref 26.5–33)
MCHC RBC AUTO-ENTMCNC: 28.8 G/DL (ref 31.5–36.5)
MCV RBC AUTO: 72 FL (ref 78–100)
PHOSPHATE SERPL-MCNC: 1.3 MG/DL (ref 2.5–4.5)
PHOSPHATE SERPL-MCNC: 1.5 MG/DL (ref 2.5–4.5)
PLATELET # BLD AUTO: 188 10E9/L (ref 150–450)
POTASSIUM SERPL-SCNC: 3.8 MMOL/L (ref 3.4–5.3)
PREALB SERPL IA-MCNC: 8 MG/DL (ref 15–45)
PROT SERPL-MCNC: 4.8 G/DL (ref 6.8–8.8)
RBC # BLD AUTO: 4.18 10E12/L (ref 4.4–5.9)
SODIUM SERPL-SCNC: 142 MMOL/L (ref 133–144)
WBC # BLD AUTO: 8.9 10E9/L (ref 4–11)

## 2018-03-20 PROCEDURE — 25000125 ZZHC RX 250: Performed by: THORACIC SURGERY (CARDIOTHORACIC VASCULAR SURGERY)

## 2018-03-20 PROCEDURE — 25000131 ZZH RX MED GY IP 250 OP 636 PS 637: Performed by: NURSE PRACTITIONER

## 2018-03-20 PROCEDURE — 12000006 ZZH R&B IMCU INTERMEDIATE UMMC

## 2018-03-20 PROCEDURE — 25000128 H RX IP 250 OP 636: Performed by: NURSE PRACTITIONER

## 2018-03-20 PROCEDURE — 40000986 XR CHEST PORT 1 VW

## 2018-03-20 PROCEDURE — 84100 ASSAY OF PHOSPHORUS: CPT | Performed by: THORACIC SURGERY (CARDIOTHORACIC VASCULAR SURGERY)

## 2018-03-20 PROCEDURE — 84134 ASSAY OF PREALBUMIN: CPT | Performed by: THORACIC SURGERY (CARDIOTHORACIC VASCULAR SURGERY)

## 2018-03-20 PROCEDURE — 36592 COLLECT BLOOD FROM PICC: CPT | Performed by: THORACIC SURGERY (CARDIOTHORACIC VASCULAR SURGERY)

## 2018-03-20 PROCEDURE — 71045 X-RAY EXAM CHEST 1 VIEW: CPT

## 2018-03-20 PROCEDURE — 97165 OT EVAL LOW COMPLEX 30 MIN: CPT | Mod: GO

## 2018-03-20 PROCEDURE — 40000133 ZZH STATISTIC OT WARD VISIT

## 2018-03-20 PROCEDURE — 25000125 ZZHC RX 250: Performed by: NURSE PRACTITIONER

## 2018-03-20 PROCEDURE — 25000128 H RX IP 250 OP 636: Performed by: STUDENT IN AN ORGANIZED HEALTH CARE EDUCATION/TRAINING PROGRAM

## 2018-03-20 PROCEDURE — 80053 COMPREHEN METABOLIC PANEL: CPT | Performed by: THORACIC SURGERY (CARDIOTHORACIC VASCULAR SURGERY)

## 2018-03-20 PROCEDURE — 85027 COMPLETE CBC AUTOMATED: CPT | Performed by: THORACIC SURGERY (CARDIOTHORACIC VASCULAR SURGERY)

## 2018-03-20 PROCEDURE — 97110 THERAPEUTIC EXERCISES: CPT | Mod: GO

## 2018-03-20 PROCEDURE — 83735 ASSAY OF MAGNESIUM: CPT | Performed by: THORACIC SURGERY (CARDIOTHORACIC VASCULAR SURGERY)

## 2018-03-20 PROCEDURE — 00000146 ZZHCL STATISTIC GLUCOSE BY METER IP

## 2018-03-20 PROCEDURE — 85610 PROTHROMBIN TIME: CPT | Performed by: THORACIC SURGERY (CARDIOTHORACIC VASCULAR SURGERY)

## 2018-03-20 PROCEDURE — 97535 SELF CARE MNGMENT TRAINING: CPT | Mod: GO

## 2018-03-20 PROCEDURE — 25000125 ZZHC RX 250: Performed by: STUDENT IN AN ORGANIZED HEALTH CARE EDUCATION/TRAINING PROGRAM

## 2018-03-20 RX ADMIN — SODIUM CHLORIDE, POTASSIUM CHLORIDE, SODIUM LACTATE AND CALCIUM CHLORIDE: 600; 310; 30; 20 INJECTION, SOLUTION INTRAVENOUS at 07:54

## 2018-03-20 RX ADMIN — PIPERACILLIN SODIUM AND TAZOBACTAM SODIUM 4.5 G: 4; .5 INJECTION, POWDER, LYOPHILIZED, FOR SOLUTION INTRAVENOUS at 09:30

## 2018-03-20 RX ADMIN — PANTOPRAZOLE SODIUM 40 MG: 40 INJECTION, POWDER, FOR SOLUTION INTRAVENOUS at 07:54

## 2018-03-20 RX ADMIN — POTASSIUM PHOSPHATE, MONOBASIC AND POTASSIUM PHOSPHATE, DIBASIC 30 MMOL: 224; 236 INJECTION, SOLUTION INTRAVENOUS at 12:00

## 2018-03-20 RX ADMIN — POTASSIUM PHOSPHATE, MONOBASIC AND POTASSIUM PHOSPHATE, DIBASIC 20 MMOL: 224; 236 INJECTION, SOLUTION INTRAVENOUS at 22:40

## 2018-03-20 RX ADMIN — SODIUM CHLORIDE, PRESERVATIVE FREE 5 ML: 5 INJECTION INTRAVENOUS at 20:20

## 2018-03-20 RX ADMIN — PIPERACILLIN SODIUM AND TAZOBACTAM SODIUM 4.5 G: 4; .5 INJECTION, POWDER, LYOPHILIZED, FOR SOLUTION INTRAVENOUS at 21:00

## 2018-03-20 RX ADMIN — FLUCONAZOLE 400 MG: 2 INJECTION INTRAVENOUS at 04:29

## 2018-03-20 RX ADMIN — PIPERACILLIN SODIUM AND TAZOBACTAM SODIUM 4.5 G: 4; .5 INJECTION, POWDER, LYOPHILIZED, FOR SOLUTION INTRAVENOUS at 15:42

## 2018-03-20 RX ADMIN — I.V. FAT EMULSION 250 ML: 20 EMULSION INTRAVENOUS at 20:40

## 2018-03-20 RX ADMIN — POTASSIUM CHLORIDE 20 MEQ: 400 INJECTION, SOLUTION INTRAVENOUS at 04:24

## 2018-03-20 RX ADMIN — POTASSIUM PHOSPHATE, MONOBASIC AND POTASSIUM PHOSPHATE, DIBASIC 20 MMOL: 224; 236 INJECTION, SOLUTION INTRAVENOUS at 06:27

## 2018-03-20 RX ADMIN — HEPARIN SODIUM 5000 UNITS: 5000 INJECTION, SOLUTION INTRAVENOUS; SUBCUTANEOUS at 15:42

## 2018-03-20 RX ADMIN — HEPARIN SODIUM 5000 UNITS: 5000 INJECTION, SOLUTION INTRAVENOUS; SUBCUTANEOUS at 07:54

## 2018-03-20 RX ADMIN — HEPARIN SODIUM 5000 UNITS: 5000 INJECTION, SOLUTION INTRAVENOUS; SUBCUTANEOUS at 23:39

## 2018-03-20 RX ADMIN — PIPERACILLIN SODIUM AND TAZOBACTAM SODIUM 4.5 G: 4; .5 INJECTION, POWDER, LYOPHILIZED, FOR SOLUTION INTRAVENOUS at 02:55

## 2018-03-20 RX ADMIN — INSULIN ASPART 1 UNITS: 100 INJECTION, SOLUTION INTRAVENOUS; SUBCUTANEOUS at 01:03

## 2018-03-20 RX ADMIN — CALCIUM GLUCONATE: 98 INJECTION, SOLUTION INTRAVENOUS at 20:40

## 2018-03-20 ASSESSMENT — PAIN DESCRIPTION - DESCRIPTORS
DESCRIPTORS: ACHING

## 2018-03-20 ASSESSMENT — ACTIVITIES OF DAILY LIVING (ADL)
PREVIOUS_RESPONSIBILITIES: MEAL PREP;HOUSEKEEPING;LAUNDRY;SHOPPING;YARDWORK;MEDICATION MANAGEMENT;FINANCES;DRIVING;WORK
ADLS_ACUITY_SCORE: 12

## 2018-03-20 NOTE — PROGRESS NOTES
03/20/18 1000   Quick Adds   Type of Visit Initial Occupational Therapy Evaluation   Living Environment   Lives With alone   Living Ar[place]ements [place]   Home Accessibility no concerns   Number of Stairs to Enter Home 0   Number of Stairs Within Home 0   Transportation Available car   Living Environment Comment Pt states that he lives alone in a cabin home, no BRENT[place] and home is a single story, all needs met on one level.  Pt's bathroom is set up with a walk in shower, no shower chair.     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    Functional Level Prior   Ambulation 0-->independent   Transferring 0-->independent   Toileting 0-->independent   Bathing 0-->independent   Dressing 0-->independent   Eating 0-->independent   Communication 0-->understands/communicates without difficulty   Swallowing 0-->swallows foods/liquids without difficulty   Cognition 0 - no cognition issues reported   Fall history within last six months no   Which of the above functional risks had a recent onset or change? ambulation;transferring;toileting;bathing;dressing   Prior Functional Level Comment Previously IND        Present no   Language English    General Information   Onset of Illness/Injury or Date of Surgery - Date 03/18/18   Referring Physician Dominick Galvez MD   Patient/Family Goals Statement Home when medically ready    Additional Occupational Profile Info/Pertinent History of Current Problem 57 year old male with history of GERD now POD # s/p repair of esophageal perforation (3/18/18) with associated left thoracotomy and 3x chest tube and PEG placement.   Precautions/Limitations other (see  comments);oxygen therapy device and L/min  (Thoracotomy precautions, 4L/min on eval )   Weight-Bearing Status - LUE other (see comments)  (10 lb lifting restriciton )   Weight-Bearing Status - RUE other (see comments)  (10 lb lifting restriciton )   Weight-Bearing Status - LLE full weight-bearing   Weight-Bearing Status - RLE full weight-bearing   General Observations Pt resting in bed upon arrival, agreeable to OT   General Info Comments Activity: Up in chair   Cognitive Status Examination   Orientation orientation to person, place and time   Cognitive Comment Pt presents with minor delay in processing information - may benefit from formal cog screen   Visual Perception   Visual Perception Wears glasses;No deficits were identified   Sensory Examination   Sensory Quick Adds No deficits were identified   Pain Assessment   Patient Currently in Pain Yes, see Vital Sign flowsheet   Strength   Strength Comments grossly decreased   Hand Strength   Hand Strength Comments WFL for ADLs    Coordination   Coordination Comments No deficits identified    Mobility   Bed Mobility Comments Min A at trunk for supine to sit transfer   Transfer Skill: Bed to Chair/Chair to Bed   Level of [place]: Bed to Chair contact guard   Physical Assist/Nonphysical Assist: Bed to Chair 1 person + 1 person to manage equipment   Weight-Bearing Restrictions full weight-bearing   Transfer Skill: Sit to Stand   Level of [place]: Sit/Stand contact guard   Physical Assist/Nonphysical Assist: Sit/Stand 1 person + 1 person to manage equipment   Transfer Skill: Sit to Stand full weight-bearing   Transfer Skill: Toilet Transfer   Level of [place]: Toilet contact guard   Physical Assist/Nonphysical Assist: Toilet 1 person + 1 person to manage equipment   Weight-Bearing Restrictions: Toilet full weight-bearing   Assistive Device grab bars   Lower Body Dressing   Level of [place]: Dress Lower Body maximum assist (25% patients effort)  "  Physical Assist/Nonphysical Assist: Dress Lower Body 1 person assist  (from supine in bed )   Toileting   Level of [place]: Toilet stand-by assist   Physical Assist/Nonphysical Assist: Toilet 1 person assist   Grooming   Level of [place]: Grooming contact guard   Physical Assist/Nonphysical Assist: Grooming set-up required;1 person assist   Instrumental Activities of Daily Living (IADL)   Previous Responsibilities meal prep;housekeeping;laundry;shopping;yardwork;medication management;finances;driving;work   Activities of Daily Living Analysis   Impairments Contributing to Impaired Activities of Daily Living cognition impaired;pain;post surgical precautions;ROM decreased;strength decreased  (SOB)   General Therapy Interventions   Planned Therapy Interventions ADL retraining;IADL retraining;bed mobility training;cognition;ROM;strengthening;stretching;transfer training;home program guidelines;progressive activity/exercise;risk factor education   Clinical Impression   Criteria for Skilled Therapeutic Interventions Met yes, treatment indicated   OT Diagnosis Decreased I/ADL IND    Influenced by the following impairments ROM, strength, pain, post-surgical precautions, endurance, SOB   Assessment of Occupational Performance 3-5 Performance Deficits   Identified Performance Deficits Dressing, bathing, home management   Clinical Decision Making (Complexity) Low complexity   Therapy Frequency daily   Predicted Duration of Therapy Intervention (days/wks) 2 weeks    Anticipated Equipment Needs at Discharge (TBD )   Anticipated Discharge Disposition Transitional Care Facility;Home with Assist   Risks and Benefits of Treatment have been explained. Yes   Patient, Family & other staff in agreement with plan of care Yes   [facility] AM-PAC TM \"6 Clicks\"   2016, Trustees of [facility], under license to [employer].  All rights reserved.   6 Clicks Short Forms Daily Activity Inpatient Short Form   [facility] AM-PAC  \"6 Clicks\" Daily Activity Inpatient Short Form   1. Putting on and taking off regular lower body clothing? 2 - A Lot   2. Bathing (including washing, rinsing, drying)? 2 - A Lot   3. Toileting, which includes using toilet, bedpan or urinal? 3 - A Little   4. Putting on and taking off regular upper body clothing? 3 - A Little   5. Taking care of personal grooming such as brushing teeth? 4 - None   6. Eating meals? 1 - Total   Daily Activity Raw Score (Score out of 24.Lower scores equate to lower levels of function) 15   Total Evaluation Time   Total Evaluation Time (Minutes) 5     "

## 2018-03-20 NOTE — PLAN OF CARE
Problem: Patient Care Overview  Goal: Plan of Care/Patient Progress Review  Discharge Planner OT   Patient plan for discharge: Pt hopeful to DC home, could stay with his brother temporarily if need be  Current status: OT eval completed, treatment initiated.  Max A to don B socks from supine in bed, min A at trunk for supine to sit transfer.  CGA for STS from EOB, ambulates ~20' X2 (bed<>bathroom) with CGAX1 + AX1 for IV pole management (no use of AD).  CGA for toilet transfer using GBs, supervision for seated hygiene.  Pt tolerates ~4 minutes of standing G//H tasks at sink with set up and CGA for safety.  Educated pt on thoracotomy precautions in relation to I/ADLs, issued handout of exercises and reviewed with pt.  Pt limited by pain and endurance, requires 4L O2 via NC to maintain sats >90%.   Barriers to return to prior living situation: Pain, post-surgical precautions, strength, endurance, SOB, pt lives alone  Recommendations for discharge: TCU at this time; however, anticipate that with continued progress in therapy pt will be safe to return either home or to brother's home if assist is needed at DC.  Rationale for recommendations: Pt lives alone and demonstrates poor tolerance for activity this date (likely due to pain control issues).  Pt will need to be fully IND with I/ADLs and functional mobility prior to return home - currently needing some assist for safety.       Entered by: Javon Hankins 03/20/2018 10:14 AM

## 2018-03-20 NOTE — OP NOTE
Procedure Date: 2018      PREOPERATIVE DIAGNOSIS:  Esophageal perforation.      PROCEDURE:     1.  Primary repair of esophageal perforation.   2.  Esophagogastroscopy.      SURGEON:  Mai Pichardo MD      CO-SURGEON:  Dr. Ybarra.  (Please refer to his operative report for details).      DESCRIPTION OF PROCEDURE:  I assisted Dr. Ybarra in this very challenging case that required 2 staff surgeons for the closure.  The esophagus was extremely frail and several of the stitches were pulling through.  I assisted Dr. Ybarra to debride the edges again and then closed it again primarily.  I then tested it under emersion with endoscopy and there was no leak.  We placed a nasogastric tube over a wire into the stomach to keep the lumen stented open, since we had narrowed the lumen with the repair.         MAI PICHARDO MD             D: 2018   T: 2018   MT: CATHI      Name:     MJ VELASCO   MRN:      [id number]        Account:        [id number]   :      1960           Procedure Date: 2018      Document: [id number]

## 2018-03-20 NOTE — PROGRESS NOTES
SURGICAL ICU PROGRESS NOTE  March 19, 2018    CO-MORBIDITIES:   GERD    ASSESSMENT: Michael Saldivar is a 57 year old male with history of GERD now s/p repair of esophageal perforation (3/18/18) with associated left thoracotomy and 3x chest tube and PEG placement. Patient remains NPO. Chest tubes to water seal.     TODAY'S PROGRESS/PLANS:   - Phos replacement  - LR to TKO  - Chest tubes to water seal   - s/p PICC on 3/19 and TPN started   - transfer to floor     PLAN:  Neuro/ pain/ sedation:  - Monitor neurological status. Notify the MD for any acute changes in exam.  - Dilaudid PCA for pain given NPO status   - Acetaminophen suppository q6h     Pulmonary care:   #bilateral chest tubes to water seal  - Supplemental oxygen to keep saturation above 92 %.  - Incentive spirometer every 15- 30 minutes when awake.  - Chest tube management per thoracic surgery     Cardiovascular:    - Monitor hemodynamic status.   - Telemetry monitoring given high risk of arrhythmia     GI care:   #GERD  #esophageal perforation, of unclear etiology  - NPO, strict   - TPN at goal   - NGT: low continuous at present, management per primary team     Fluids/ Electrolytes/ Nutrition:   - LR TKO while TPN running   - electrolyte replacement protocols      Renal/ Fluid Balance:    - Urine output is adequate so far.  - Will continue to monitor intake and output.    Endocrine:    - Sliding scale insulin available for BG > 140     ID/ Antibiotics:  - Continue antibiotic prophylaxis with zosyn q6h. Continue fluconazole for fungal prophylaxis. Stop date pending discussion with thoracic team.     Heme:    #microcytic anemia on admission  - Hemoglobin stable at 8.6 from pre-op of 11.7     MSK:  - PT (holding)/OT (TCU at present, possibly advancing to home)     Prophylaxis:    - Mechanical prophylaxis for DVT. Sub-cutaneous heparin 5000 mg q8h.   - PPI     Lines/ tubes/ drains:  - Chest tube on L (currently water-sealed)  - Chest tube on R x 2 (currently  "water-sealed)  - Chest bulb on L   - PEG to gravity   - NG tube to low suction   - PICC     Disposition:  - Transfer to floor    CODE:   - FULL    Patient seen and discussed with SICU fellow.     Des \"Delfino\" MD Sammi  Neurosurgery, PGY-1   ====================================    SUBJECTIVE:   - Admitted to SICU after esophageal perforation repair. No acute events overnight.     OBJECTIVE:   1. VITAL SIGNS:   Temp:  [98.2  F (36.8  C)-99.1  F (37.3  C)] 99.1  F (37.3  C)  Heart Rate:  [] 102  Resp:  [13-29] 22  BP: (113-163)/() 138/85  SpO2:  [89 %-98 %] 95 %  Resp: 22    2. INTAKE/ OUTPUT:   I/O last 3 completed shifts:  In: 3568.72 [I.V.:2780.17]  Out: 1861 [Urine:560; Emesis/NG output:470; Drains:321; Chest Tube:510]    3. PHYSICAL EXAMINATION:   General: awake, alert, on nasal cannula O2  Neuro: A&Ox3, NAD. Moves all 4 extremities to command at least anti-gravity   Resp: Breathing shallow. No air leak in bilateral chest tubes. Appropriately tender at incision sites.   CV: DP pulses 2+ bilaterally.   Abdomen: Soft, Non-distended   Incisions: dressed bandage without drainage.   Extremities: warm and well perfused    4. INVESTIGATIONS:   Arterial Blood Gases     Recent Labs  Lab 03/18/18 2121 03/18/18 1953 03/18/18  1848 03/18/18  1808   PH 7.31* 7.30* 7.25* 7.28*   PCO2 41 40 46* 42   PO2 81 75* 82 78*   HCO3 21 20* 20* 20*     Complete Blood Count     Recent Labs  Lab 03/20/18  0258 03/19/18  0628 03/18/18  2317 03/18/18 2121 03/18/18  1535   WBC 8.9 9.7 8.1  --   --  12.8*   HGB 8.6* 9.6* 9.3* 9.2*  < > 11.7*    204 191  --   --  336   < > = values in this interval not displayed.  Basic Metabolic Panel    Recent Labs  Lab 03/20/18  0258 03/19/18  0628 03/18/18  2317 03/18/18  2121  03/18/18  1535    143 142 139  < > 142   POTASSIUM 3.8 4.2 4.5 4.5  < > 4.5   CHLORIDE 113* 112* 113*  --   --  112*   CO2 24 22 22  --   --  20   BUN 22 22 24  --   --  28   CR 0.92 1.02 0.92  --   --  1.03 "   * 122* 128* 125*  < > 114*   < > = values in this interval not displayed.  Liver Function Tests    Recent Labs  Lab 03/20/18  0258 03/19/18  0628 03/18/18  1535   AST 25 34 22   ALT 17 17 15   ALKPHOS 56 63 101   BILITOTAL 0.2 0.9 0.5   ALBUMIN 2.0* 2.6* 3.2*   INR 1.37* 1.65* 1.13     Pancreatic Enzymes  No lab results found in last 7 days.  Coagulation Profile    Recent Labs  Lab 03/20/18  0258 03/19/18  0628 03/18/18  1535   INR 1.37* 1.65* 1.13     Lactate  Invalid input(s): LACTATE    5. RADIOLOGY:   Recent Results (from the past 24 hour(s))   XR Chest Port 1 View    Narrative    EXAM: XR CHEST PORT 1 VW  3/18/2018 11:50 PM      HISTORY: Post Op repair of esophageal perforation;     COMPARISON: No previous CTs or chest x-rays for comparison.    FINDINGS: Semiupright portable AP view of the chest obtained. Enteric  tube courses beyond the margins of the film. Left basilar chest tubes  as well as biapical chest tubes. No pleural effusion or pneumothorax.  Patchy right lateral midlung and left basilar opacities. Upper abdomen  is unremarkable. The trachea is displaced to the right at the level of  the aortic arch and there is increased soft tissues within the left  paratracheal region. The cardiac silhouette is within normal limits.  Mild widening of the upper mediastinum.      Impression    IMPRESSION:   1. Patchy right lateral midlung and left basilar opacities, favoring  atelectasis in the setting of lower lung volumes, versus aspiration.  2. Mild widening of the upper mediastinum, possibly postsurgical fluid  or secondary to the esophageal perforation. Consider CT of the chest  for further evaluation.    I have personally reviewed the examination and initial interpretation  and I agree with the findings.    KATHERINE BECERRA MD       =========================================

## 2018-03-20 NOTE — PLAN OF CARE
Problem: Patient Care Overview  Goal: Plan of Care/Patient Progress Review  Outcome: Improving  N: Alert, oriented X4. Slept most of the night. Pupils equal and reactive. Moves all extremities to command with equal strength. Denies numbness/tingling. Pain controlled with dilaudid PCA pump.  C: NSR/ST with LBBB. 's. 130's with activity. SBP <140, maintained without intervention. Pulses palpable. Afebrile.   R: NC 3L, lungs coarse - clear with coughing. Uses IS independently. CT X3 to waterseal, no leak.   GI: Strict NPO. NG with minimal output. PEG to gravity - dark bile drainage. TPN and lipids started tonight.   : Voiding in urinal without difficulty.   Left thoracotomy dressing CDI, L MALINA to bulb suction.   Access: L PIC, triple lumen. R PIV X2.   Gtts: LR @ 100 ml/hr. TPN + lipids. PCA.   K and Phos replaced.     Continue to monitor and notify MD of changes.

## 2018-03-20 NOTE — PLAN OF CARE
Problem: Patient Care Overview  Goal: Plan of Care/Patient Progress Review  4A - Per discussion with OT and chart review, Plan to HOLD PT at this time, moving with CGA in room, able to stand at sink and walk in room without AD, moving slowly but primarily appears limited due to pain. Anticipate pt will not require skilled inpatient PT unless further deficits such as balance or gait identified. OT following for thoracotomy precautions, ADL education as appropriate.

## 2018-03-21 ENCOUNTER — APPOINTMENT (OUTPATIENT)
Dept: OCCUPATIONAL THERAPY | Facility: CLINIC | Age: 58
End: 2018-03-21
Attending: THORACIC SURGERY (CARDIOTHORACIC VASCULAR SURGERY)
Payer: MEDICAID

## 2018-03-21 ENCOUNTER — APPOINTMENT (OUTPATIENT)
Dept: GENERAL RADIOLOGY | Facility: CLINIC | Age: 58
End: 2018-03-21
Attending: PHYSICIAN ASSISTANT
Payer: MEDICAID

## 2018-03-21 LAB
ANION GAP SERPL CALCULATED.3IONS-SCNC: 9 MMOL/L (ref 3–14)
ANION GAP SERPL CALCULATED.3IONS-SCNC: NORMAL MMOL/L (ref 6–17)
BUN SERPL-MCNC: 18 MG/DL (ref 7–30)
BUN SERPL-MCNC: NORMAL MG/DL (ref 7–30)
CALCIUM SERPL-MCNC: 8 MG/DL (ref 8.5–10.1)
CALCIUM SERPL-MCNC: NORMAL MG/DL (ref 8.5–10.1)
CHLORIDE SERPL-SCNC: 110 MMOL/L (ref 94–109)
CHLORIDE SERPL-SCNC: NORMAL MMOL/L (ref 94–109)
CO2 SERPL-SCNC: 24 MMOL/L (ref 20–32)
CO2 SERPL-SCNC: NORMAL MMOL/L (ref 20–32)
CREAT SERPL-MCNC: 0.75 MG/DL (ref 0.66–1.25)
CREAT SERPL-MCNC: NORMAL MG/DL (ref 0.66–1.25)
ERYTHROCYTE [DISTWIDTH] IN BLOOD BY AUTOMATED COUNT: 20.3 % (ref 10–15)
GFR SERPL CREATININE-BSD FRML MDRD: >90 ML/MIN/1.7M2
GFR SERPL CREATININE-BSD FRML MDRD: NORMAL ML/MIN/1.7M2
GLUCOSE BLDC GLUCOMTR-MCNC: 106 MG/DL (ref 70–99)
GLUCOSE BLDC GLUCOMTR-MCNC: 112 MG/DL (ref 70–99)
GLUCOSE BLDC GLUCOMTR-MCNC: 115 MG/DL (ref 70–99)
GLUCOSE BLDC GLUCOMTR-MCNC: 119 MG/DL (ref 70–99)
GLUCOSE BLDC GLUCOMTR-MCNC: 119 MG/DL (ref 70–99)
GLUCOSE SERPL-MCNC: 117 MG/DL (ref 70–99)
GLUCOSE SERPL-MCNC: NORMAL MG/DL (ref 70–99)
HCT VFR BLD AUTO: 27.7 % (ref 40–53)
HGB BLD-MCNC: 8.3 G/DL (ref 13.3–17.7)
MAGNESIUM SERPL-MCNC: 2.3 MG/DL (ref 1.6–2.3)
MAGNESIUM SERPL-MCNC: NORMAL MG/DL (ref 1.6–2.3)
MCH RBC QN AUTO: 22.1 PG (ref 26.5–33)
MCHC RBC AUTO-ENTMCNC: 30 G/DL (ref 31.5–36.5)
MCV RBC AUTO: 74 FL (ref 78–100)
PHOSPHATE SERPL-MCNC: 1.8 MG/DL (ref 2.5–4.5)
PHOSPHATE SERPL-MCNC: NORMAL MG/DL (ref 2.5–4.5)
PLATELET # BLD AUTO: 198 10E9/L (ref 150–450)
POTASSIUM SERPL-SCNC: 3.6 MMOL/L (ref 3.4–5.3)
POTASSIUM SERPL-SCNC: 3.6 MMOL/L (ref 3.4–5.3)
POTASSIUM SERPL-SCNC: NORMAL MMOL/L (ref 3.4–5.3)
RBC # BLD AUTO: 3.76 10E12/L (ref 4.4–5.9)
SODIUM SERPL-SCNC: 143 MMOL/L (ref 133–144)
SODIUM SERPL-SCNC: NORMAL MMOL/L (ref 133–144)
WBC # BLD AUTO: 8.3 10E9/L (ref 4–11)

## 2018-03-21 PROCEDURE — 25000128 H RX IP 250 OP 636: Performed by: NURSE PRACTITIONER

## 2018-03-21 PROCEDURE — 36592 COLLECT BLOOD FROM PICC: CPT | Performed by: NURSE PRACTITIONER

## 2018-03-21 PROCEDURE — 40000133 ZZH STATISTIC OT WARD VISIT

## 2018-03-21 PROCEDURE — 25000132 ZZH RX MED GY IP 250 OP 250 PS 637: Performed by: NURSE PRACTITIONER

## 2018-03-21 PROCEDURE — 25000132 ZZH RX MED GY IP 250 OP 250 PS 637: Performed by: THORACIC SURGERY (CARDIOTHORACIC VASCULAR SURGERY)

## 2018-03-21 PROCEDURE — 25000125 ZZHC RX 250: Performed by: STUDENT IN AN ORGANIZED HEALTH CARE EDUCATION/TRAINING PROGRAM

## 2018-03-21 PROCEDURE — 84100 ASSAY OF PHOSPHORUS: CPT | Performed by: THORACIC SURGERY (CARDIOTHORACIC VASCULAR SURGERY)

## 2018-03-21 PROCEDURE — 00000146 ZZHCL STATISTIC GLUCOSE BY METER IP

## 2018-03-21 PROCEDURE — 40000802 ZZH SITE CHECK

## 2018-03-21 PROCEDURE — 83735 ASSAY OF MAGNESIUM: CPT | Performed by: THORACIC SURGERY (CARDIOTHORACIC VASCULAR SURGERY)

## 2018-03-21 PROCEDURE — 25000128 H RX IP 250 OP 636: Performed by: STUDENT IN AN ORGANIZED HEALTH CARE EDUCATION/TRAINING PROGRAM

## 2018-03-21 PROCEDURE — 84132 ASSAY OF SERUM POTASSIUM: CPT | Performed by: THORACIC SURGERY (CARDIOTHORACIC VASCULAR SURGERY)

## 2018-03-21 PROCEDURE — 97535 SELF CARE MNGMENT TRAINING: CPT | Mod: GO

## 2018-03-21 PROCEDURE — 97110 THERAPEUTIC EXERCISES: CPT | Mod: GO

## 2018-03-21 PROCEDURE — 85027 COMPLETE CBC AUTOMATED: CPT | Performed by: NURSE PRACTITIONER

## 2018-03-21 PROCEDURE — 25000125 ZZHC RX 250: Performed by: THORACIC SURGERY (CARDIOTHORACIC VASCULAR SURGERY)

## 2018-03-21 PROCEDURE — 12000006 ZZH R&B IMCU INTERMEDIATE UMMC

## 2018-03-21 PROCEDURE — 80048 BASIC METABOLIC PNL TOTAL CA: CPT | Performed by: THORACIC SURGERY (CARDIOTHORACIC VASCULAR SURGERY)

## 2018-03-21 PROCEDURE — 71046 X-RAY EXAM CHEST 2 VIEWS: CPT

## 2018-03-21 PROCEDURE — 36592 COLLECT BLOOD FROM PICC: CPT | Performed by: THORACIC SURGERY (CARDIOTHORACIC VASCULAR SURGERY)

## 2018-03-21 RX ADMIN — HEPARIN SODIUM 5000 UNITS: 5000 INJECTION, SOLUTION INTRAVENOUS; SUBCUTANEOUS at 07:39

## 2018-03-21 RX ADMIN — ACETAMINOPHEN 650 MG: 650 SUPPOSITORY RECTAL at 05:34

## 2018-03-21 RX ADMIN — FLUCONAZOLE 400 MG: 2 INJECTION INTRAVENOUS at 08:07

## 2018-03-21 RX ADMIN — PIPERACILLIN SODIUM AND TAZOBACTAM SODIUM 4.5 G: 4; .5 INJECTION, POWDER, LYOPHILIZED, FOR SOLUTION INTRAVENOUS at 20:41

## 2018-03-21 RX ADMIN — CALCIUM GLUCONATE: 98 INJECTION, SOLUTION INTRAVENOUS at 20:16

## 2018-03-21 RX ADMIN — HEPARIN SODIUM 5000 UNITS: 5000 INJECTION, SOLUTION INTRAVENOUS; SUBCUTANEOUS at 16:30

## 2018-03-21 RX ADMIN — PIPERACILLIN SODIUM AND TAZOBACTAM SODIUM 4.5 G: 4; .5 INJECTION, POWDER, LYOPHILIZED, FOR SOLUTION INTRAVENOUS at 15:27

## 2018-03-21 RX ADMIN — DOCUSATE SODIUM 286 ML: 50 LIQUID ORAL at 13:05

## 2018-03-21 RX ADMIN — I.V. FAT EMULSION 250 ML: 20 EMULSION INTRAVENOUS at 20:16

## 2018-03-21 RX ADMIN — PIPERACILLIN SODIUM AND TAZOBACTAM SODIUM 4.5 G: 4; .5 INJECTION, POWDER, LYOPHILIZED, FOR SOLUTION INTRAVENOUS at 09:49

## 2018-03-21 RX ADMIN — PIPERACILLIN SODIUM AND TAZOBACTAM SODIUM 4.5 G: 4; .5 INJECTION, POWDER, LYOPHILIZED, FOR SOLUTION INTRAVENOUS at 03:30

## 2018-03-21 RX ADMIN — Medication 10 MEQ: at 23:06

## 2018-03-21 RX ADMIN — PANTOPRAZOLE SODIUM 40 MG: 40 INJECTION, POWDER, FOR SOLUTION INTRAVENOUS at 07:39

## 2018-03-21 RX ADMIN — POTASSIUM PHOSPHATE, MONOBASIC AND POTASSIUM PHOSPHATE, DIBASIC 30 MMOL: 224; 236 INJECTION, SOLUTION INTRAVENOUS at 09:49

## 2018-03-21 RX ADMIN — HEPARIN SODIUM 5000 UNITS: 5000 INJECTION, SOLUTION INTRAVENOUS; SUBCUTANEOUS at 23:25

## 2018-03-21 ASSESSMENT — ACTIVITIES OF DAILY LIVING (ADL)
ADLS_ACUITY_SCORE: 11

## 2018-03-21 NOTE — PLAN OF CARE
"Problem: Patient Care Overview  Goal: Plan of Care/Patient Progress Review  Outcome: Improving  Neuro: A&Ox4. Hard of hearing.   Cardiac: SR- Sinus tachycardia with a BBB, HR 's up to 120's with activity. Afebrile.  Respiratory: Sating >94% on 3-4 lpm via NC. Clear/diminished throughout.   GI/: Adequate urine output. No BM, not passing gas. NG tube to LIS, with 0 output.   Diet/appetite: Strict NPO. TPN and lipids infusing.   Activity:  Assist of 1, up to side of bed.   Pain: At acceptable level on current regimen. Dilaudid PCA pump in use.   Skin: Intact, no new deficits noted. Dressings, incisions C/D/I.   LDA's: L PICC, TPN/lipids.  2 R PIVs. LR running 10 ml/hr with PCA.   NG to LIS, no output.    Peg, g to gravity with bile/green drainage.   MALINA drain with minimal output.   R and L CT to waterseal.     Plan: Replaced 20 mmol phosphate, recheck for am. Continue BG q4hrs, did not require any insulin. Continue with POC. Notify primary team with changes.  /87 (BP Location: Right arm)  Temp 98.1  F (36.7  C) (Oral)  Resp 16  Ht 1.676 m (5' 6\")  Wt 83.6 kg (184 lb 6.4 oz)  SpO2 92%  BMI 29.76 kg/m2          "

## 2018-03-21 NOTE — PLAN OF CARE
Problem: Patient Care Overview  Goal: Plan of Care/Patient Progress Review  Discharge Planner OT   Patient plan for discharge: Would like to d/c home  Current status: pt with flat affect and delayed processing, decreased safety awareness. Reminded of L thoracotomy precautions as pt unable to state. Pt completed bed mobility with SBA and v/c. O2 increased from 3L to 4L with activity as pt dropped to 88% for ~30 seconds while sitting at EOB. Pt ambulated x ~120' with CGA while pushing IV pole. No LOB noted, pt required seated rest and then ambulated back to room x 120'. While seated in chair, washed face and brushed teeth. HR up to 124 with ambulation, O2 in low 90s, denied s/s of activity intolerance. RN present at end of tx.   Barriers to return to prior living situation: cognition, decreased endurance, L thoracotomy precautions.   Recommendations for discharge: Currently would rec TCU but pt may progress with therapy to home, will monitor.   Rationale for recommendations: Decreased ADL I/functional endurance. Lives alone, must be highly independent.        Entered by: Sabina Hope 03/21/2018 9:56 AM     OT 6B

## 2018-03-21 NOTE — PROVIDER NOTIFICATION
1045: This writer spoke with Han Carl PA-C in regards to a couple of concerns.    Asked to clarify NG orders.  Ordered for low continuous suction to NG and to flushed with 30 cc of tap water Q 4 hours.  Orders placed.  Also made aware that patient's NG had not had any output.    Made aware that patient lung sounds are crackles at bases.  Han acknowledged.  Stated he is going to d/c the right chest tube and order for a chest x-ray post pull.    Phos was 1.8 this AM.  Replaced with potassium phosphate 30 mmol 1x order per Issac Roa MD.  Asked Han Carl if he wanted a recheck 2 hours after infusion was completed.  MD stated to okay to just recheck in the AM.     Notified that the patient's AM K+ was 3.6 and questioned if I should replace per high dose protocol with 20 mEq per protocol, in addition to the TPN and the potassium phosphate that was ordered this AM.  Han stated that he wanted the patient to be replaced with 40 mEq of potassium total for level of 3.6.  Spoke with pharmacist with [facility] who stated that there is 44 mEq of potassium infusing currently with AM order of potassium phosphate.  Will recheck potassium level 2 hrs after infusion is complete.    1109: Right chest tube d/c'd.      1309: Pt has not had a BM since 3/17/2018. Declines passing gas.  Pink Lady enema given.

## 2018-03-21 NOTE — PROGRESS NOTES
Transfer  Transferred from: 4A  Via:bed  Reason for transfer:Pt appropriate for 6B- improved patient condition  Family: Aware of transfer  Belongings: Received with pt  Chart: Received with pt  Medications: Meds received from old unit with pt    2 RN Skin Assessment Completed By: Margot ZALDIVAR RN and this writer.  NG to LIS with slight bloody drainage from nasal, R & L chest tube to water seal, incision from thoracotomy with steri strips in place, left MALINA drain, PEG tube to gravity, old chest tube site covered with gauze and Tegaderm, and left PICC.  Coccyx intact. Bruising noted t/o.  Area to right ankle, pt states is from IV line.  Report received from: JANELLE Randall  Pt status: Stable

## 2018-03-21 NOTE — PROGRESS NOTES
Thoracic Surgery Progress Note    S: Doing well. Pain adequately controlled. Endorses flatus. No acute events overnight.    O:  Temp:  [98  F (36.7  C)-98.9  F (37.2  C)] 98.1  F (36.7  C)  Heart Rate:  [] 109  Resp:  [16-24] 16  BP: (120-140)/(75-89) 134/87  SpO2:  [92 %-97 %] 95 %    Well appearing in nad  Nlb on 3L O2 by NC  Abd not distended.    NG put out 490 / 24 hrs  MALINA drain 117 / 24 hrs    Chest tube 1 Right 150 / 24 hrs  Chest tube 2 Left 220 / 24 hrs    Phos 1.5->1.8  Electrolytes and cbc otherwise unremarkable    A/P: 58 y/o male w/ esophageal perforation s/p repair on 3-18, recovering appropriately.  - C/w fluc/zosyn  - Phosphorus repletion  - Cxr 2 view today. Possibly d/c right chest tube today  - Otherwise c/w strict NPO, TPN, dilaudid PCA     --  Tushar Roa MD  Gen Surg PGY1

## 2018-03-22 ENCOUNTER — APPOINTMENT (OUTPATIENT)
Dept: PHYSICAL THERAPY | Facility: CLINIC | Age: 58
End: 2018-03-22
Attending: THORACIC SURGERY (CARDIOTHORACIC VASCULAR SURGERY)
Payer: MEDICAID

## 2018-03-22 ENCOUNTER — APPOINTMENT (OUTPATIENT)
Dept: OCCUPATIONAL THERAPY | Facility: CLINIC | Age: 58
End: 2018-03-22
Attending: THORACIC SURGERY (CARDIOTHORACIC VASCULAR SURGERY)
Payer: MEDICAID

## 2018-03-22 LAB
ANION GAP SERPL CALCULATED.3IONS-SCNC: 6 MMOL/L (ref 3–14)
BLD PROD TYP BPU: NORMAL
BLD UNIT ID BPU: 0
BLOOD PRODUCT CODE: NORMAL
BPU ID: NORMAL
BUN SERPL-MCNC: 18 MG/DL (ref 7–30)
CALCIUM SERPL-MCNC: 8 MG/DL (ref 8.5–10.1)
CHLORIDE SERPL-SCNC: 111 MMOL/L (ref 94–109)
CO2 SERPL-SCNC: 24 MMOL/L (ref 20–32)
CREAT SERPL-MCNC: 0.66 MG/DL (ref 0.66–1.25)
ERYTHROCYTE [DISTWIDTH] IN BLOOD BY AUTOMATED COUNT: 19.7 % (ref 10–15)
GFR SERPL CREATININE-BSD FRML MDRD: >90 ML/MIN/1.7M2
GLUCOSE BLDC GLUCOMTR-MCNC: 106 MG/DL (ref 70–99)
GLUCOSE BLDC GLUCOMTR-MCNC: 108 MG/DL (ref 70–99)
GLUCOSE BLDC GLUCOMTR-MCNC: 108 MG/DL (ref 70–99)
GLUCOSE SERPL-MCNC: 133 MG/DL (ref 70–99)
HCT VFR BLD AUTO: 26.7 % (ref 40–53)
HGB BLD-MCNC: 7.7 G/DL (ref 13.3–17.7)
MAGNESIUM SERPL-MCNC: 2.2 MG/DL (ref 1.6–2.3)
MCH RBC QN AUTO: 20.3 PG (ref 26.5–33)
MCHC RBC AUTO-ENTMCNC: 28.8 G/DL (ref 31.5–36.5)
MCV RBC AUTO: 70 FL (ref 78–100)
PHOSPHATE SERPL-MCNC: 2.3 MG/DL (ref 2.5–4.5)
PLATELET # BLD AUTO: 189 10E9/L (ref 150–450)
POTASSIUM SERPL-SCNC: 3.5 MMOL/L (ref 3.4–5.3)
POTASSIUM SERPL-SCNC: 3.7 MMOL/L (ref 3.4–5.3)
RBC # BLD AUTO: 3.79 10E12/L (ref 4.4–5.9)
SODIUM SERPL-SCNC: 142 MMOL/L (ref 133–144)
TRANSFUSION STATUS PATIENT QL: NORMAL
WBC # BLD AUTO: 7.5 10E9/L (ref 4–11)

## 2018-03-22 PROCEDURE — 25000128 H RX IP 250 OP 636: Performed by: NURSE PRACTITIONER

## 2018-03-22 PROCEDURE — 00000146 ZZHCL STATISTIC GLUCOSE BY METER IP

## 2018-03-22 PROCEDURE — 36592 COLLECT BLOOD FROM PICC: CPT | Performed by: NURSE PRACTITIONER

## 2018-03-22 PROCEDURE — 36592 COLLECT BLOOD FROM PICC: CPT | Performed by: THORACIC SURGERY (CARDIOTHORACIC VASCULAR SURGERY)

## 2018-03-22 PROCEDURE — 25000125 ZZHC RX 250: Performed by: PHYSICIAN ASSISTANT

## 2018-03-22 PROCEDURE — 25000132 ZZH RX MED GY IP 250 OP 250 PS 637: Performed by: PHYSICIAN ASSISTANT

## 2018-03-22 PROCEDURE — 25000125 ZZHC RX 250: Performed by: STUDENT IN AN ORGANIZED HEALTH CARE EDUCATION/TRAINING PROGRAM

## 2018-03-22 PROCEDURE — 12000006 ZZH R&B IMCU INTERMEDIATE UMMC

## 2018-03-22 PROCEDURE — 83735 ASSAY OF MAGNESIUM: CPT | Performed by: NURSE PRACTITIONER

## 2018-03-22 PROCEDURE — 94640 AIRWAY INHALATION TREATMENT: CPT

## 2018-03-22 PROCEDURE — 94640 AIRWAY INHALATION TREATMENT: CPT | Mod: 76

## 2018-03-22 PROCEDURE — 40000133 ZZH STATISTIC OT WARD VISIT

## 2018-03-22 PROCEDURE — 97535 SELF CARE MNGMENT TRAINING: CPT | Mod: GO

## 2018-03-22 PROCEDURE — 25000128 H RX IP 250 OP 636: Performed by: STUDENT IN AN ORGANIZED HEALTH CARE EDUCATION/TRAINING PROGRAM

## 2018-03-22 PROCEDURE — 25000125 ZZHC RX 250: Performed by: THORACIC SURGERY (CARDIOTHORACIC VASCULAR SURGERY)

## 2018-03-22 PROCEDURE — 84132 ASSAY OF SERUM POTASSIUM: CPT | Performed by: THORACIC SURGERY (CARDIOTHORACIC VASCULAR SURGERY)

## 2018-03-22 PROCEDURE — 97116 GAIT TRAINING THERAPY: CPT | Mod: GP | Performed by: PHYSICAL THERAPIST

## 2018-03-22 PROCEDURE — 97530 THERAPEUTIC ACTIVITIES: CPT | Mod: GP | Performed by: PHYSICAL THERAPIST

## 2018-03-22 PROCEDURE — 85027 COMPLETE CBC AUTOMATED: CPT | Performed by: NURSE PRACTITIONER

## 2018-03-22 PROCEDURE — 40000193 ZZH STATISTIC PT WARD VISIT: Performed by: PHYSICAL THERAPIST

## 2018-03-22 PROCEDURE — 97161 PT EVAL LOW COMPLEX 20 MIN: CPT | Mod: GP | Performed by: PHYSICAL THERAPIST

## 2018-03-22 PROCEDURE — 40000802 ZZH SITE CHECK

## 2018-03-22 PROCEDURE — 84100 ASSAY OF PHOSPHORUS: CPT | Performed by: NURSE PRACTITIONER

## 2018-03-22 PROCEDURE — 40000275 ZZH STATISTIC RCP TIME EA 10 MIN

## 2018-03-22 PROCEDURE — 80048 BASIC METABOLIC PNL TOTAL CA: CPT | Performed by: NURSE PRACTITIONER

## 2018-03-22 PROCEDURE — 97110 THERAPEUTIC EXERCISES: CPT | Mod: GO

## 2018-03-22 RX ORDER — IPRATROPIUM BROMIDE AND ALBUTEROL SULFATE 2.5; .5 MG/3ML; MG/3ML
3 SOLUTION RESPIRATORY (INHALATION)
Status: DISCONTINUED | OUTPATIENT
Start: 2018-03-22 | End: 2018-03-23

## 2018-03-22 RX ORDER — SODIUM CHLORIDE FOR INHALATION 3 %
3 VIAL, NEBULIZER (ML) INHALATION
Status: DISCONTINUED | OUTPATIENT
Start: 2018-03-22 | End: 2018-03-24

## 2018-03-22 RX ADMIN — Medication 10 MEQ: at 23:51

## 2018-03-22 RX ADMIN — IPRATROPIUM BROMIDE AND ALBUTEROL SULFATE 3 ML: .5; 3 SOLUTION RESPIRATORY (INHALATION) at 20:17

## 2018-03-22 RX ADMIN — PIPERACILLIN SODIUM AND TAZOBACTAM SODIUM 4.5 G: 4; .5 INJECTION, POWDER, LYOPHILIZED, FOR SOLUTION INTRAVENOUS at 18:28

## 2018-03-22 RX ADMIN — HEPARIN SODIUM 5000 UNITS: 5000 INJECTION, SOLUTION INTRAVENOUS; SUBCUTANEOUS at 23:25

## 2018-03-22 RX ADMIN — POTASSIUM PHOSPHATE, MONOBASIC AND POTASSIUM PHOSPHATE, DIBASIC 30 MMOL: 224; 236 INJECTION, SOLUTION INTRAVENOUS at 12:00

## 2018-03-22 RX ADMIN — I.V. FAT EMULSION 250 ML: 20 EMULSION INTRAVENOUS at 19:51

## 2018-03-22 RX ADMIN — CALCIUM GLUCONATE: 98 INJECTION, SOLUTION INTRAVENOUS at 19:50

## 2018-03-22 RX ADMIN — PIPERACILLIN SODIUM AND TAZOBACTAM SODIUM 4.5 G: 4; .5 INJECTION, POWDER, LYOPHILIZED, FOR SOLUTION INTRAVENOUS at 02:20

## 2018-03-22 RX ADMIN — SODIUM CHLORIDE SOLN NEBU 3% 3 ML: 3 NEBU SOLN at 16:03

## 2018-03-22 RX ADMIN — SODIUM CHLORIDE SOLN NEBU 3% 3 ML: 3 NEBU SOLN at 20:17

## 2018-03-22 RX ADMIN — IPRATROPIUM BROMIDE AND ALBUTEROL SULFATE 3 ML: .5; 3 SOLUTION RESPIRATORY (INHALATION) at 16:03

## 2018-03-22 RX ADMIN — PIPERACILLIN SODIUM AND TAZOBACTAM SODIUM 4.5 G: 4; .5 INJECTION, POWDER, LYOPHILIZED, FOR SOLUTION INTRAVENOUS at 09:26

## 2018-03-22 RX ADMIN — HEPARIN SODIUM 5000 UNITS: 5000 INJECTION, SOLUTION INTRAVENOUS; SUBCUTANEOUS at 09:44

## 2018-03-22 RX ADMIN — FLUCONAZOLE 400 MG: 2 INJECTION INTRAVENOUS at 05:04

## 2018-03-22 RX ADMIN — PANTOPRAZOLE SODIUM 40 MG: 40 INJECTION, POWDER, FOR SOLUTION INTRAVENOUS at 09:30

## 2018-03-22 RX ADMIN — PIPERACILLIN SODIUM AND TAZOBACTAM SODIUM 4.5 G: 4; .5 INJECTION, POWDER, LYOPHILIZED, FOR SOLUTION INTRAVENOUS at 22:39

## 2018-03-22 RX ADMIN — Medication 10 MEQ: at 00:41

## 2018-03-22 RX ADMIN — HEPARIN SODIUM 5000 UNITS: 5000 INJECTION, SOLUTION INTRAVENOUS; SUBCUTANEOUS at 17:12

## 2018-03-22 ASSESSMENT — ACTIVITIES OF DAILY LIVING (ADL)
ADLS_ACUITY_SCORE: 12
ADLS_ACUITY_SCORE: 12
ADLS_ACUITY_SCORE: 11
ADLS_ACUITY_SCORE: 12
ADLS_ACUITY_SCORE: 11
ADLS_ACUITY_SCORE: 11

## 2018-03-22 NOTE — PROGRESS NOTES
03/22/18 1500   Quick Adds   Type of Visit Initial PT Evaluation       Present no   Living Environment   Lives With alone   Living Arrangements house   Home Accessibility no concerns   Number of Stairs to Enter Home 0   Number of Stairs Within Home 0   Transportation Available car   Living Environment Comment patient states his brother can help upon discharge   Self-Care   Usual Activity Tolerance good   Current Activity Tolerance fair   Regular Exercise no   Equipment Currently Used at Home none   Functional Level Prior   Ambulation 0-->independent   Transferring 0-->independent   Toileting 0-->independent   Bathing 0-->independent   Dressing 0-->independent   Eating 0-->independent   Communication 0-->understands/communicates without difficulty   Swallowing 0-->swallows foods/liquids without difficulty   Cognition 0 - no cognition issues reported   Fall history within last six months no   Prior Functional Level Comment independent with all functional mobility and ADLs, worked full time in construction prior to admit   General Information   Onset of Illness/Injury or Date of Surgery - Date 03/18/18   Referring Physician Dominick Galvez MD   Patient/Family Goals Statement return home   Pertinent History of Current Problem (include personal factors and/or comorbidities that impact the POC) 56 y/o male w/ esophageal perforation s/p repair on 3-18, recovering appropriately. NG tube isn't bringing anything up, but is not clogged. Awaiting ROBF.   Precautions/Limitations other (see comments)   Weight-Bearing Status - LUE (L thoracotomy)   Cognitive Status Examination   Orientation orientation to person, place and time   Level of Consciousness alert   Follows Commands and Answers Questions 100% of the time   Personal Safety and Judgment intact   Posture    Posture Forward head position;Protracted shoulders   Range of Motion (ROM)   ROM Comment B LE grossly WNL   Strength   Strength Comments SABINE NGUYEN  "grossly 5/5   Bed Mobility   Bed Mobility Comments supine > sit with SBA   Transfer Skills   Transfer Comments sit > stand SBA   Gait   Gait Comments ambulated in room with 1 hand on IV pole and SBA   Balance   Balance Comments maintained romberg eyes closed tolerating min pertubations   Sensory Examination   Sensory Perception no deficits were identified   General Therapy Interventions   Planned Therapy Interventions balance training;bed mobility training;gait training;transfer training;progressive activity/exercise   Clinical Impression   Criteria for Skilled Therapeutic Intervention yes, treatment indicated   PT Diagnosis impaired functional mobility s/p thoracic surgerery   Influenced by the following impairments pain, impaired balance, decreased functional endurance   Functional limitations due to impairments impaired bed mobility, impaired transfers, impaired gait   Clinical Presentation Stable/Uncomplicated   Clinical Presentation Rationale limited comorbidities   Clinical Decision Making (Complexity) Low complexity   Therapy Frequency` daily   Predicted Duration of Therapy Intervention (days/wks) 5 days   Anticipated Discharge Disposition Home with Assist;Transitional Care Facility   Risk & Benefits of therapy have been explained Yes   Patient, Family & other staff in agreement with plan of care Yes   [facility] AM-PAC  \"6 Clicks\" V.2 Basic Mobility Inpatient Short Form   1. Turning from your back to your side while in a flat bed without using bedrails? 4 - None   2. Moving from lying on your back to sitting on the side of a flat bed without using bedrails? 4 - None   3. Moving to and from a bed to a chair (including a wheelchair)? 4 - None   4. Standing up from a chair using your arms (e.g., wheelchair, or bedside chair)? 4 - None   5. To walk in hospital room? 4 - None   6. Climbing 3-5 steps with a railing? 3 - A Little   Basic Mobility Raw Score (Score out of 24.Lower scores equate to lower levels " of function) 23   Total Evaluation Time   Total Evaluation Time (Minutes) 8

## 2018-03-22 NOTE — PLAN OF CARE
Problem: Patient Care Overview  Goal: Plan of Care/Patient Progress Review  Discharge Planner OT   Patient plan for discharge: open to rehab setting   Current status: Pt CGA for bed mobility and functional transfers. Pt ambulated hallway 125ft, pt with balance impairments needing close CGA. Pt with LOB during turn needing mod A to correct. Pt engaged in LUE thoracotomy exercises with min cues. Pt with delayed processing and memory deficits, further cognitive testing required.   Barriers to return to prior living situation: pain, post surgical precautions, balance deficits, deconditioning   Recommendations for discharge: TCU   Rationale for recommendations: Pt lives alone and below baseline in ADls and mobility. Pt would benefit from continued rehab to increase independence and safety prior to discharge home.        Entered by: Eliza Polanco 03/22/2018 10:24 AM

## 2018-03-22 NOTE — PROGRESS NOTES
Thoracic Surgery Progress Note  March 22, 2018    S: Doing well. Got up and out of bed yesterday. Pain is adequately controlled. Still no BM w/ enema. No flatus. No acute events.    O:  Temp:  [98  F (36.7  C)-99.1  F (37.3  C)] 98.3  F (36.8  C)  Pulse:  [107-110] 107  Heart Rate:  [] 108  Resp:  [18-20] 20  BP: (138-160)/(77-89) 160/89  SpO2:  [92 %-98 %] 97 %    Well appearing in nad  Nlb on 4l O2 by NC  Abd not distended    Hgb 8.3->7.7  Phos 1.8->2.3    Glucose 115, 108, 133, 106    UOP adequate   put out 750 / 24 hours  NG tube 0 / 24 hrs  MALINA drain 72.5 / 24 hrs    A/P: 56 y/o male w/ esophageal perforation s/p repair on 3-18, recovering appropriately. NG tube isn't bringing anything up, but is not clogged. Awaiting ROBF.  - Enema today  - Phos repletion  - Pt hasn't gotten any insulin with sliding scale since 3-20. I discontinued his ssi.  - Possibly going to pull remaining chest tube today  - C/w TPN, strict NPO, fluc/zosyn, dilaudid pca  - PT/OT, heparin 5000 tid    --  Tushar Roa MD  Gen Surg PGY1

## 2018-03-22 NOTE — PLAN OF CARE
"Problem: Patient Care Overview  Goal: Plan of Care/Patient Progress Review  Outcome: Improving  Neuro: A&Ox4.   Cardiac: SR- Sinus tachycardia with a LBBB, HR 's. Afebrile.  Respiratory: Sating >96% on 3-4 lpm via NC. Clear with fine crackles at bases.    GI/: Adequate urine output. No BM, not passing gas, faint bowel sounds. NG tube to low cont suction with 0 output, flush 30 ml q4hrs.  Diet/appetite: Strict NPO. TPN and lipids infusing.   Activity: Standby assist, up to side of bed.   Pain: At acceptable level on current regimen. Dilaudid PCA pump in use.   Skin: Intact, no new deficits noted. Dressings, incisions C/D/I.   LDA's: L PICC, TPN/lipids.  2 R PIVs. LR running 10 ml/hr with PCA.   NG tube,  Peg with G to gravity with bile/green drainage.   MALINA drain with minimal output.   L CT to waterseal.      Plan: Replaced 20 meq of potassium. Continue BG q4hrs, did not require any insulin. Continue with POC. Notify primary team with changes.   /79 (BP Location: Right arm)  Pulse 110  Temp 98.7  F (37.1  C) (Oral)  Resp 20  Ht 1.676 m (5' 6\")  Wt 83.6 kg (184 lb 6.4 oz)  SpO2 97%  BMI 29.76 kg/m2        "

## 2018-03-22 NOTE — PLAN OF CARE
Problem: Patient Care Overview  Goal: Plan of Care/Patient Progress Review  Neuro: A&Ox4. Calm and cooperative, flat affect.  Cardiac: Telemetry running NSR/ST with LBB.  Declined any chest pain t/o shift.   Respiratory: Sating above 90% on 3-4 L of oxygen.  Lung sounds crackles noted to bases.  Uses IS independently    GI/: Adequate urine output, using urinal to void.  No results noted with enema.  Last BM 3/17  Diet/appetite: Strict NPO  Activity:  Assist of stand by assistance to chair and ambulating in [place]nard.  Pain: At acceptable level on current regimen. Dilaudid PCA.  Declined scheduled tylenol supp  Skin: Dressing over old right chest tube site, dressings intact.  Coccyx intact.  Bruising noted t/o  LDA's: Right 2x PIV, Left PICC 3x lumen, NG to Low continuous suction, left chest tube to water seal, PEG tube to gravity, Left MALINA drain    Plan: Continue with POC. Notify primary team with changes.

## 2018-03-22 NOTE — PLAN OF CARE
Problem: Patient Care Overview  Goal: Plan of Care/Patient Progress Review  PT 6B: Evaluation completed, treatment initiated.      Discharge Planner PT   Patient plan for discharge: home vs TCU  Current status: transferred OOB with SBA.  Ambulated 225' without AD and min assist.  Slow sandeep but no overt LOB.  Required 1-2 LPM via NC to maintain SpO2 > 90%.   Barriers to return to prior living situation: limited tolerance to activity.   Recommendations for discharge: currently recommend TCU, however from mobility standpoint anticipate patient able to discharge to home setting pending continued improvement during acute stay.  Will defer to OT for additional discharge recommendations.   Rationale for recommendations: Mobility much improved today, however patient lives alone and my have limited help available upon discharge.        Entered by: Jose Alberto Kerr 03/22/2018 3:55 PM

## 2018-03-23 ENCOUNTER — APPOINTMENT (OUTPATIENT)
Dept: PHYSICAL THERAPY | Facility: CLINIC | Age: 58
End: 2018-03-23
Attending: THORACIC SURGERY (CARDIOTHORACIC VASCULAR SURGERY)
Payer: MEDICAID

## 2018-03-23 ENCOUNTER — APPOINTMENT (OUTPATIENT)
Dept: GENERAL RADIOLOGY | Facility: CLINIC | Age: 58
End: 2018-03-23
Attending: THORACIC SURGERY (CARDIOTHORACIC VASCULAR SURGERY)
Payer: MEDICAID

## 2018-03-23 LAB
ANION GAP SERPL CALCULATED.3IONS-SCNC: 8 MMOL/L (ref 3–14)
BUN SERPL-MCNC: 16 MG/DL (ref 7–30)
CALCIUM SERPL-MCNC: 8.1 MG/DL (ref 8.5–10.1)
CHLORIDE SERPL-SCNC: 109 MMOL/L (ref 94–109)
CO2 SERPL-SCNC: 22 MMOL/L (ref 20–32)
CREAT SERPL-MCNC: 0.68 MG/DL (ref 0.66–1.25)
ERYTHROCYTE [DISTWIDTH] IN BLOOD BY AUTOMATED COUNT: 19.6 % (ref 10–15)
GFR SERPL CREATININE-BSD FRML MDRD: >90 ML/MIN/1.7M2
GLUCOSE SERPL-MCNC: 121 MG/DL (ref 70–99)
HCT VFR BLD AUTO: 25.4 % (ref 40–53)
HGB BLD-MCNC: 7.6 G/DL (ref 13.3–17.7)
MAGNESIUM SERPL-MCNC: 2.3 MG/DL (ref 1.6–2.3)
MCH RBC QN AUTO: 20.5 PG (ref 26.5–33)
MCHC RBC AUTO-ENTMCNC: 29.9 G/DL (ref 31.5–36.5)
MCV RBC AUTO: 69 FL (ref 78–100)
PHOSPHATE SERPL-MCNC: 2.7 MG/DL (ref 2.5–4.5)
PLATELET # BLD AUTO: 201 10E9/L (ref 150–450)
POTASSIUM SERPL-SCNC: 3.8 MMOL/L (ref 3.4–5.3)
RBC # BLD AUTO: 3.71 10E12/L (ref 4.4–5.9)
SODIUM SERPL-SCNC: 140 MMOL/L (ref 133–144)
WBC # BLD AUTO: 8.5 10E9/L (ref 4–11)

## 2018-03-23 PROCEDURE — 25000128 H RX IP 250 OP 636: Performed by: PHYSICIAN ASSISTANT

## 2018-03-23 PROCEDURE — 12000006 ZZH R&B IMCU INTERMEDIATE UMMC

## 2018-03-23 PROCEDURE — 25000125 ZZHC RX 250: Performed by: PHYSICIAN ASSISTANT

## 2018-03-23 PROCEDURE — 40000275 ZZH STATISTIC RCP TIME EA 10 MIN

## 2018-03-23 PROCEDURE — 85027 COMPLETE CBC AUTOMATED: CPT | Performed by: NURSE PRACTITIONER

## 2018-03-23 PROCEDURE — 97116 GAIT TRAINING THERAPY: CPT | Mod: GP | Performed by: PHYSICAL THERAPIST

## 2018-03-23 PROCEDURE — 25000128 H RX IP 250 OP 636: Performed by: NURSE PRACTITIONER

## 2018-03-23 PROCEDURE — 94640 AIRWAY INHALATION TREATMENT: CPT | Mod: 76

## 2018-03-23 PROCEDURE — 97112 NEUROMUSCULAR REEDUCATION: CPT | Mod: GP | Performed by: PHYSICAL THERAPIST

## 2018-03-23 PROCEDURE — 84100 ASSAY OF PHOSPHORUS: CPT | Performed by: NURSE PRACTITIONER

## 2018-03-23 PROCEDURE — 80048 BASIC METABOLIC PNL TOTAL CA: CPT | Performed by: NURSE PRACTITIONER

## 2018-03-23 PROCEDURE — 25000125 ZZHC RX 250: Performed by: STUDENT IN AN ORGANIZED HEALTH CARE EDUCATION/TRAINING PROGRAM

## 2018-03-23 PROCEDURE — 71046 X-RAY EXAM CHEST 2 VIEWS: CPT

## 2018-03-23 PROCEDURE — 93005 ELECTROCARDIOGRAM TRACING: CPT

## 2018-03-23 PROCEDURE — 25000128 H RX IP 250 OP 636: Performed by: STUDENT IN AN ORGANIZED HEALTH CARE EDUCATION/TRAINING PROGRAM

## 2018-03-23 PROCEDURE — 93010 ELECTROCARDIOGRAM REPORT: CPT | Performed by: INTERNAL MEDICINE

## 2018-03-23 PROCEDURE — 40000193 ZZH STATISTIC PT WARD VISIT: Performed by: PHYSICAL THERAPIST

## 2018-03-23 PROCEDURE — 36592 COLLECT BLOOD FROM PICC: CPT | Performed by: NURSE PRACTITIONER

## 2018-03-23 PROCEDURE — 25000125 ZZHC RX 250: Performed by: NURSE PRACTITIONER

## 2018-03-23 PROCEDURE — 94640 AIRWAY INHALATION TREATMENT: CPT

## 2018-03-23 PROCEDURE — 25000125 ZZHC RX 250: Performed by: THORACIC SURGERY (CARDIOTHORACIC VASCULAR SURGERY)

## 2018-03-23 PROCEDURE — 83735 ASSAY OF MAGNESIUM: CPT | Performed by: NURSE PRACTITIONER

## 2018-03-23 PROCEDURE — 25000132 ZZH RX MED GY IP 250 OP 250 PS 637: Performed by: PHYSICIAN ASSISTANT

## 2018-03-23 PROCEDURE — 40000802 ZZH SITE CHECK

## 2018-03-23 RX ORDER — METOPROLOL TARTRATE 1 MG/ML
5 INJECTION, SOLUTION INTRAVENOUS EVERY 6 HOURS
Status: DISCONTINUED | OUTPATIENT
Start: 2018-03-23 | End: 2018-03-27

## 2018-03-23 RX ORDER — METOPROLOL TARTRATE 1 MG/ML
2.5 INJECTION, SOLUTION INTRAVENOUS EVERY 6 HOURS
Status: DISCONTINUED | OUTPATIENT
Start: 2018-03-23 | End: 2018-03-23

## 2018-03-23 RX ORDER — LEVALBUTEROL INHALATION SOLUTION 0.63 MG/3ML
0.63 SOLUTION RESPIRATORY (INHALATION)
Status: DISCONTINUED | OUTPATIENT
Start: 2018-03-23 | End: 2018-03-24

## 2018-03-23 RX ORDER — METOPROLOL TARTRATE 1 MG/ML
5 INJECTION, SOLUTION INTRAVENOUS EVERY 6 HOURS
Status: DISCONTINUED | OUTPATIENT
Start: 2018-03-23 | End: 2018-03-23

## 2018-03-23 RX ORDER — POTASSIUM CHLORIDE 29.8 MG/ML
20 INJECTION INTRAVENOUS ONCE
Status: COMPLETED | OUTPATIENT
Start: 2018-03-23 | End: 2018-03-23

## 2018-03-23 RX ORDER — FUROSEMIDE 10 MG/ML
20 INJECTION INTRAMUSCULAR; INTRAVENOUS ONCE
Status: COMPLETED | OUTPATIENT
Start: 2018-03-23 | End: 2018-03-23

## 2018-03-23 RX ADMIN — POTASSIUM CHLORIDE 20 MEQ: 400 INJECTION, SOLUTION INTRAVENOUS at 09:17

## 2018-03-23 RX ADMIN — FLUCONAZOLE 400 MG: 2 INJECTION INTRAVENOUS at 04:36

## 2018-03-23 RX ADMIN — POTASSIUM CHLORIDE 20 MEQ: 400 INJECTION, SOLUTION INTRAVENOUS at 12:42

## 2018-03-23 RX ADMIN — LEVALBUTEROL HYDROCHLORIDE 0.63 MG: 0.63 SOLUTION RESPIRATORY (INHALATION) at 20:35

## 2018-03-23 RX ADMIN — METOPROLOL TARTRATE 5 MG: 5 INJECTION, SOLUTION INTRAVENOUS at 17:49

## 2018-03-23 RX ADMIN — IPRATROPIUM BROMIDE AND ALBUTEROL SULFATE 3 ML: .5; 3 SOLUTION RESPIRATORY (INHALATION) at 08:36

## 2018-03-23 RX ADMIN — LEVALBUTEROL HYDROCHLORIDE 0.63 MG: 0.63 SOLUTION RESPIRATORY (INHALATION) at 12:58

## 2018-03-23 RX ADMIN — CALCIUM GLUCONATE: 98 INJECTION, SOLUTION INTRAVENOUS at 19:42

## 2018-03-23 RX ADMIN — HEPARIN SODIUM 5000 UNITS: 5000 INJECTION, SOLUTION INTRAVENOUS; SUBCUTANEOUS at 16:36

## 2018-03-23 RX ADMIN — FUROSEMIDE 20 MG: 10 INJECTION, SOLUTION INTRAVENOUS at 09:09

## 2018-03-23 RX ADMIN — LEVALBUTEROL HYDROCHLORIDE 0.63 MG: 0.63 SOLUTION RESPIRATORY (INHALATION) at 17:06

## 2018-03-23 RX ADMIN — POTASSIUM PHOSPHATE, MONOBASIC AND POTASSIUM PHOSPHATE, DIBASIC 10 MMOL: 224; 236 INJECTION, SOLUTION INTRAVENOUS at 21:13

## 2018-03-23 RX ADMIN — HEPARIN SODIUM 5000 UNITS: 5000 INJECTION, SOLUTION INTRAVENOUS; SUBCUTANEOUS at 09:13

## 2018-03-23 RX ADMIN — SODIUM CHLORIDE SOLN NEBU 3% 3 ML: 3 NEBU SOLN at 08:36

## 2018-03-23 RX ADMIN — SODIUM CHLORIDE SOLN NEBU 3% 3 ML: 3 NEBU SOLN at 20:35

## 2018-03-23 RX ADMIN — Medication 10 MEQ: at 01:13

## 2018-03-23 RX ADMIN — SODIUM CHLORIDE, PRESERVATIVE FREE 5 ML: 5 INJECTION INTRAVENOUS at 06:04

## 2018-03-23 RX ADMIN — PIPERACILLIN SODIUM AND TAZOBACTAM SODIUM 4.5 G: 4; .5 INJECTION, POWDER, LYOPHILIZED, FOR SOLUTION INTRAVENOUS at 05:46

## 2018-03-23 RX ADMIN — PANTOPRAZOLE SODIUM 40 MG: 40 INJECTION, POWDER, FOR SOLUTION INTRAVENOUS at 09:15

## 2018-03-23 RX ADMIN — I.V. FAT EMULSION 250 ML: 20 EMULSION INTRAVENOUS at 19:50

## 2018-03-23 RX ADMIN — SODIUM CHLORIDE SOLN NEBU 3% 3 ML: 3 NEBU SOLN at 17:06

## 2018-03-23 RX ADMIN — SODIUM CHLORIDE SOLN NEBU 3% 3 ML: 3 NEBU SOLN at 12:58

## 2018-03-23 ASSESSMENT — ACTIVITIES OF DAILY LIVING (ADL)
ADLS_ACUITY_SCORE: 12

## 2018-03-23 NOTE — PLAN OF CARE
"Problem: Patient Care Overview  Goal: Plan of Care/Patient Progress Review  Outcome: Improving  Neuro: A&Ox4. Flat affect.   Cardiac: SR-Sinus tachycardia with a BBB, HR 's. Afebrile.  Respiratory: Sating >96% on 2-3 lpm via NC. Clear with fine crackles at bases. Unable to wean off O2.   GI/: Adequate urine output. No BM, pt states he is passing gas, bowel sounds are still hypoactive. NG tube to low cont suction with 0 output, flush 30 ml q4hrs.  Diet/appetite: Strict NPO. TPN and lipids infusing.   Activity: Standby assist, up to side of bed.   Pain: At acceptable level on current regimen. Dilaudid PCA pump in use.   Skin: Intact, no new deficits noted. Dressings, incisions C/D/I.   LDA's: L PICC, TPN/lipids.  2 R PIVs.   NG tube.  Peg with G to gravity with bile/green drainage, drainage decreased after 0400, flushed with 30 ml.   MALINA drain with minimal output.       Plan: Replaced 20 meq of potassium.  Continue with POC. Notify primary team with changes.   /78 (BP Location: Right arm)  Pulse 100  Temp 98.8  F (37.1  C) (Oral)  Resp 18  Ht 1.676 m (5' 6\")  Wt 83.4 kg (183 lb 14.4 oz)  SpO2 94%  BMI 29.68 kg/m2        "

## 2018-03-23 NOTE — PLAN OF CARE
Problem: Patient Care Overview  Goal: Plan of Care/Patient Progress Review  Outcome: Improving  Neuro: A&Ox4. Flat affect  Cardiac: SR/ST. VSS.   Respiratory: Sating >92 % on 2L  GI/: Adequate urine output. BM X1(unseen)  Diet/appetite: NPO  Activity:  Stand by assist, up to chair and in halls.  Pain: At acceptable level on current regimen.   Skin: Intact, no new deficits noted.  LDA's: No change    Plan: Continue with POC. Notify primary team with changes.

## 2018-03-23 NOTE — PLAN OF CARE
Problem: Patient Care Overview  Goal: Plan of Care/Patient Progress Review  Outcome: No Change  Neuro: A&Ox4. Flat affect.  Cardiac: ST 110s with LBBB. VSS.   Respiratory: Sating 93% on RA.  GI/: Adequate urine output. No BM. Bowel sounds hypoactive. Pt not passing flatus.  Diet/appetite: Strict NPO.  Activity:  SBA, ambulated in hallway. Up in chair for shift.  Pain: At acceptable level on current regimen. Dilaudid PCA in use. No nausea reported.  Skin: Old CT site dressings CDI. No changes to rash. No new deficits noted.  LDA's: G-tube to gravity. MALINA to bulb suction. NG to continuous low suction, flushed per orders.    Plan: Care from [id number]. Continue with POC. Notify primary team with changes.

## 2018-03-23 NOTE — PLAN OF CARE
Problem: Patient Care Overview  Goal: Plan of Care/Patient Progress Review  Outcome: No Change  Alert oriented, flat affect.  Pulse tachy, other vitals stable.  BM reported, although unseen.  Adequate urine output.  NPO, TPN running.  G tube, MALINA, NGt unchanged.  Walking in the halls, using IS, still requiring some O2

## 2018-03-23 NOTE — PROGRESS NOTES
Thoracic Surgery Progress Note  March 23, 2018    S: Feels well this AM. Ambulating well. Pain adequately controlled. +BM. No acute events.    O:  Temp:  [97.9  F (36.6  C)-98.9  F (37.2  C)] 98.8  F (37.1  C)  Pulse:  [100] 100  Heart Rate:  [] 97  Resp:  [18-20] 18  BP: (129-140)/(77-87) 135/78  SpO2:  [93 %-96 %] 94 %    Well appearing in nad   Nlb on 2L O2 by NC. No chest tubes. MALINA serosang.  Abd not distended    UOP adequate  NG put out nothing   out / 24 hrs    Medial chest bulb put out 45 / 24 hrs    Electrolytes wnl    Hgb 7.7->7.6  WBC 8.5    A/P: 56 y/o male w/ esophageal perforation s/p repair on 3-18 recovering appropriately. NG still not bringing anything up but not clogged. +ROBF.  - CXR 2 view this AM. Had chest tube removed yesterday.  - Stopping a 6 day course of fluc/zosyn today  - C/w dilaudid PCA, tylenol suppositories, PCA, TPN  - Strict NPO   - Planning NG out on 3/26, then esophagram and possible GJ after that    --  Tushar Roa MD  Gen Surg PGY1

## 2018-03-23 NOTE — PLAN OF CARE
Problem: Patient Care Overview  Goal: Plan of Care/Patient Progress Review  PT 6B / Discharge Planner PT   Patient plan for discharge: home  Current status: pt ambulates 400ft with SBA, completes dynamic balance challenges. Pt without significant LOB, SOB noted with activity, SpO2 down to 88% on room air with exertion.  Barriers to return to prior living situation: endurance, O2 needs, lives alone  Recommendations for discharge: home   Rationale for recommendations: pt currently moving well but with poor endurance, with progress will likely be able to return home.       Entered by: Kerri Mc 03/23/2018 4:32 PM

## 2018-03-24 ENCOUNTER — APPOINTMENT (OUTPATIENT)
Dept: PHYSICAL THERAPY | Facility: CLINIC | Age: 58
End: 2018-03-24
Attending: THORACIC SURGERY (CARDIOTHORACIC VASCULAR SURGERY)
Payer: MEDICAID

## 2018-03-24 LAB
ANION GAP SERPL CALCULATED.3IONS-SCNC: 10 MMOL/L (ref 3–14)
BUN SERPL-MCNC: 18 MG/DL (ref 7–30)
CALCIUM SERPL-MCNC: 8.5 MG/DL (ref 8.5–10.1)
CHLORIDE SERPL-SCNC: 108 MMOL/L (ref 94–109)
CO2 SERPL-SCNC: 21 MMOL/L (ref 20–32)
CREAT SERPL-MCNC: 0.65 MG/DL (ref 0.66–1.25)
ERYTHROCYTE [DISTWIDTH] IN BLOOD BY AUTOMATED COUNT: 19.7 % (ref 10–15)
GFR SERPL CREATININE-BSD FRML MDRD: >90 ML/MIN/1.7M2
GLUCOSE SERPL-MCNC: 124 MG/DL (ref 70–99)
HCT VFR BLD AUTO: 27.9 % (ref 40–53)
HGB BLD-MCNC: 8.1 G/DL (ref 13.3–17.7)
MAGNESIUM SERPL-MCNC: 2.4 MG/DL (ref 1.6–2.3)
MCH RBC QN AUTO: 20.3 PG (ref 26.5–33)
MCHC RBC AUTO-ENTMCNC: 29 G/DL (ref 31.5–36.5)
MCV RBC AUTO: 70 FL (ref 78–100)
PHOSPHATE SERPL-MCNC: 2.7 MG/DL (ref 2.5–4.5)
PLATELET # BLD AUTO: 260 10E9/L (ref 150–450)
POTASSIUM SERPL-SCNC: 4.5 MMOL/L (ref 3.4–5.3)
RBC # BLD AUTO: 3.99 10E12/L (ref 4.4–5.9)
SODIUM SERPL-SCNC: 139 MMOL/L (ref 133–144)
WBC # BLD AUTO: 11.3 10E9/L (ref 4–11)

## 2018-03-24 PROCEDURE — 40000193 ZZH STATISTIC PT WARD VISIT: Performed by: PHYSICAL THERAPIST

## 2018-03-24 PROCEDURE — 85027 COMPLETE CBC AUTOMATED: CPT | Performed by: NURSE PRACTITIONER

## 2018-03-24 PROCEDURE — 80048 BASIC METABOLIC PNL TOTAL CA: CPT | Performed by: NURSE PRACTITIONER

## 2018-03-24 PROCEDURE — 25000132 ZZH RX MED GY IP 250 OP 250 PS 637: Performed by: PHYSICIAN ASSISTANT

## 2018-03-24 PROCEDURE — 25000125 ZZHC RX 250: Performed by: PHYSICIAN ASSISTANT

## 2018-03-24 PROCEDURE — 25000125 ZZHC RX 250: Performed by: THORACIC SURGERY (CARDIOTHORACIC VASCULAR SURGERY)

## 2018-03-24 PROCEDURE — 40000274 ZZH STATISTIC RCP CONSULT EA 30 MIN

## 2018-03-24 PROCEDURE — 83735 ASSAY OF MAGNESIUM: CPT | Performed by: NURSE PRACTITIONER

## 2018-03-24 PROCEDURE — 94640 AIRWAY INHALATION TREATMENT: CPT

## 2018-03-24 PROCEDURE — 25000125 ZZHC RX 250: Performed by: NURSE PRACTITIONER

## 2018-03-24 PROCEDURE — 25000128 H RX IP 250 OP 636: Performed by: NURSE PRACTITIONER

## 2018-03-24 PROCEDURE — 25000128 H RX IP 250 OP 636: Performed by: STUDENT IN AN ORGANIZED HEALTH CARE EDUCATION/TRAINING PROGRAM

## 2018-03-24 PROCEDURE — 12000006 ZZH R&B IMCU INTERMEDIATE UMMC

## 2018-03-24 PROCEDURE — 36592 COLLECT BLOOD FROM PICC: CPT | Performed by: NURSE PRACTITIONER

## 2018-03-24 PROCEDURE — 40000275 ZZH STATISTIC RCP TIME EA 10 MIN

## 2018-03-24 PROCEDURE — 25000132 ZZH RX MED GY IP 250 OP 250 PS 637: Performed by: THORACIC SURGERY (CARDIOTHORACIC VASCULAR SURGERY)

## 2018-03-24 PROCEDURE — 94640 AIRWAY INHALATION TREATMENT: CPT | Mod: 76

## 2018-03-24 PROCEDURE — 84100 ASSAY OF PHOSPHORUS: CPT | Performed by: NURSE PRACTITIONER

## 2018-03-24 PROCEDURE — 25000125 ZZHC RX 250: Performed by: STUDENT IN AN ORGANIZED HEALTH CARE EDUCATION/TRAINING PROGRAM

## 2018-03-24 PROCEDURE — 97530 THERAPEUTIC ACTIVITIES: CPT | Mod: GP | Performed by: PHYSICAL THERAPIST

## 2018-03-24 RX ORDER — LEVALBUTEROL INHALATION SOLUTION 0.63 MG/3ML
0.63 SOLUTION RESPIRATORY (INHALATION) 2 TIMES DAILY
Status: DISCONTINUED | OUTPATIENT
Start: 2018-03-24 | End: 2018-04-02

## 2018-03-24 RX ORDER — SODIUM CHLORIDE FOR INHALATION 3 %
3 VIAL, NEBULIZER (ML) INHALATION 2 TIMES DAILY
Status: DISCONTINUED | OUTPATIENT
Start: 2018-03-24 | End: 2018-04-02

## 2018-03-24 RX ADMIN — LEVALBUTEROL HYDROCHLORIDE 0.63 MG: 0.63 SOLUTION RESPIRATORY (INHALATION) at 21:22

## 2018-03-24 RX ADMIN — METOPROLOL TARTRATE 5 MG: 5 INJECTION, SOLUTION INTRAVENOUS at 23:32

## 2018-03-24 RX ADMIN — HEPARIN SODIUM 5000 UNITS: 5000 INJECTION, SOLUTION INTRAVENOUS; SUBCUTANEOUS at 23:46

## 2018-03-24 RX ADMIN — SODIUM CHLORIDE SOLN NEBU 3% 3 ML: 3 NEBU SOLN at 09:05

## 2018-03-24 RX ADMIN — METOPROLOL TARTRATE 5 MG: 5 INJECTION, SOLUTION INTRAVENOUS at 18:36

## 2018-03-24 RX ADMIN — HEPARIN SODIUM 5000 UNITS: 5000 INJECTION, SOLUTION INTRAVENOUS; SUBCUTANEOUS at 18:36

## 2018-03-24 RX ADMIN — METOPROLOL TARTRATE 5 MG: 5 INJECTION, SOLUTION INTRAVENOUS at 05:53

## 2018-03-24 RX ADMIN — CALCIUM GLUCONATE: 98 INJECTION, SOLUTION INTRAVENOUS at 19:29

## 2018-03-24 RX ADMIN — SODIUM CHLORIDE SOLN NEBU 3% 4 ML: 3 NEBU SOLN at 21:22

## 2018-03-24 RX ADMIN — METOPROLOL TARTRATE 5 MG: 5 INJECTION, SOLUTION INTRAVENOUS at 12:53

## 2018-03-24 RX ADMIN — METOPROLOL TARTRATE 5 MG: 5 INJECTION, SOLUTION INTRAVENOUS at 00:35

## 2018-03-24 RX ADMIN — HEPARIN SODIUM 5000 UNITS: 5000 INJECTION, SOLUTION INTRAVENOUS; SUBCUTANEOUS at 10:00

## 2018-03-24 RX ADMIN — HEPARIN SODIUM 5000 UNITS: 5000 INJECTION, SOLUTION INTRAVENOUS; SUBCUTANEOUS at 00:35

## 2018-03-24 RX ADMIN — DOCUSATE SODIUM 286 ML: 50 LIQUID ORAL at 14:17

## 2018-03-24 RX ADMIN — POTASSIUM PHOSPHATE, MONOBASIC AND POTASSIUM PHOSPHATE, DIBASIC 10 MMOL: 224; 236 INJECTION, SOLUTION INTRAVENOUS at 06:48

## 2018-03-24 RX ADMIN — LEVALBUTEROL HYDROCHLORIDE 0.63 MG: 0.63 SOLUTION RESPIRATORY (INHALATION) at 09:05

## 2018-03-24 RX ADMIN — PANTOPRAZOLE SODIUM 40 MG: 40 INJECTION, POWDER, FOR SOLUTION INTRAVENOUS at 08:33

## 2018-03-24 ASSESSMENT — ACTIVITIES OF DAILY LIVING (ADL)
ADLS_ACUITY_SCORE: 12

## 2018-03-24 NOTE — PROGRESS NOTES
Thoracic Surgery Progress Note  March 24, 2018    S: Pain well controlled, ambulating. Urinating well. No flatus or BM yesterday. Completed anti-biotic course yesterday    O:  Temp:  [97.9  F (36.6  C)-98.5  F (36.9  C)] 97.9  F (36.6  C)  Pulse:  [92-96] 92  Heart Rate:  [] 92  Resp:  [18-20] 20  BP: (126-142)/(75-90) 126/75  SpO2:  [92 %-96 %] 93 %    Well appearing in nad   Non labored breathing on RA. Albino drain with serous output 25 cc  Abd not distended, not tender  Incisions without any erythema edema or exudate    UOP 2035 yesterday  NG put out nothing   out / 24 hrs    Electrolytes wnl    Hgb 7.7->7.6 -> 8.1  WBC 8.5 -> 11.5    A/P: 56 y/o male w/ esophageal perforation s/p repair on 3-18 recovering appropriately. NG still not bringing anything up but not clogged.     - continue albino drain to bulb suction  - C/w dilaudid PCA, tylenol suppositories, PCA, TPN  - Strict NPO   - Planning NG out on 3/26, then esophagram and possible GJ after that  -enema again today    Patient seen and discussed with Dr. Ybarra  --  Kayla Yung MD  Gen Surg PGY3

## 2018-03-24 NOTE — PROGRESS NOTES
[id number] shift  Problem: Patient Care Overview  Goal: Plan of Care/Patient Progress Review  Outcome: No Change  Neuro: A&Ox4. Flat affect.  Cardiac: ST  with LBBB. VSS.               Respiratory: Sating 95% on RA.  GI/: Adequate urine output, using urinal, voided 810 ml. No BM. Bowel sounds hypoactive. Pt not passing flatus.  Diet/appetite: Strict NPO.  Activity:  SBA, independent in bed. Walked in hallway for 7 minutes with SBA, tolerated well.  Sitting up in chair as of 0630.  Pain: At acceptable level on current regimen. Dilaudid PCA 0.2mg bumps. No nausea reported.  Skin: Old CT site dressings CDI. No changes to rash. No new deficits noted.  LDA's: G-tube to gravity 160 ml out. MALINA to bulb suction 5 ml out. NG to continuous low suction 0 ml out, flushed per orders. TPN continuous at 60 ml/hr, lipids infusing at 20.8 ml/hr. Potassium phosphate replaced.      Plan: Continue with POC. Notify primary team with changes.

## 2018-03-24 NOTE — PLAN OF CARE
Problem: Patient Care Overview  Goal: Plan of Care/Patient Progress Review  PT 6B    Discharge Planner PT   Patient plan for discharge: home  Current status: transfers with SBA.  Ambulated 375' without AD and SBA, no LOB.    Barriers to return to prior living situation: supervision for safety insight  Recommendations for discharge: from mobility standpoint, patient appropriate to discharge to home setting. Will defer to OT for additional recommendations regarding potential cognitive impairments.   Rationale for recommendations: anticipate patient will be independent with functional mobility during acute stay.       Entered by: Jose Alberto Kerr 03/24/2018 4:26 PM

## 2018-03-24 NOTE — PLAN OF CARE
AVSS HR 90s-100s. Pain controlled with PCA dilaudid, declines scheduled Tylenol suppositories. Strict NPO, TPN at 60mL/hr. NG to low continuous suction, no measurable output. PEG Gtube to gravity, output green, moderate amount. L MALINA with minimal serous output. Urine output good, gee. Not yet passing gas. Pink lady enema administered this afternoon with small results.

## 2018-03-25 ENCOUNTER — APPOINTMENT (OUTPATIENT)
Dept: GENERAL RADIOLOGY | Facility: CLINIC | Age: 58
End: 2018-03-25
Attending: THORACIC SURGERY (CARDIOTHORACIC VASCULAR SURGERY)
Payer: MEDICAID

## 2018-03-25 LAB
ANION GAP SERPL CALCULATED.3IONS-SCNC: 8 MMOL/L (ref 3–14)
BUN SERPL-MCNC: 23 MG/DL (ref 7–30)
CALCIUM SERPL-MCNC: 8.6 MG/DL (ref 8.5–10.1)
CHLORIDE SERPL-SCNC: 109 MMOL/L (ref 94–109)
CO2 SERPL-SCNC: 22 MMOL/L (ref 20–32)
CREAT SERPL-MCNC: 0.7 MG/DL (ref 0.66–1.25)
ERYTHROCYTE [DISTWIDTH] IN BLOOD BY AUTOMATED COUNT: 19.7 % (ref 10–15)
GFR SERPL CREATININE-BSD FRML MDRD: >90 ML/MIN/1.7M2
GLUCOSE SERPL-MCNC: 119 MG/DL (ref 70–99)
HCT VFR BLD AUTO: 26.5 % (ref 40–53)
HGB BLD-MCNC: 7.7 G/DL (ref 13.3–17.7)
MAGNESIUM SERPL-MCNC: 2.2 MG/DL (ref 1.6–2.3)
MCH RBC QN AUTO: 20.2 PG (ref 26.5–33)
MCHC RBC AUTO-ENTMCNC: 29.1 G/DL (ref 31.5–36.5)
MCV RBC AUTO: 70 FL (ref 78–100)
PHOSPHATE SERPL-MCNC: 2.9 MG/DL (ref 2.5–4.5)
PLATELET # BLD AUTO: 263 10E9/L (ref 150–450)
POTASSIUM SERPL-SCNC: 4.1 MMOL/L (ref 3.4–5.3)
RBC # BLD AUTO: 3.81 10E12/L (ref 4.4–5.9)
SODIUM SERPL-SCNC: 139 MMOL/L (ref 133–144)
WBC # BLD AUTO: 11.3 10E9/L (ref 4–11)

## 2018-03-25 PROCEDURE — 71046 X-RAY EXAM CHEST 2 VIEWS: CPT

## 2018-03-25 PROCEDURE — 36592 COLLECT BLOOD FROM PICC: CPT | Performed by: NURSE PRACTITIONER

## 2018-03-25 PROCEDURE — 25000128 H RX IP 250 OP 636: Performed by: THORACIC SURGERY (CARDIOTHORACIC VASCULAR SURGERY)

## 2018-03-25 PROCEDURE — 12000006 ZZH R&B IMCU INTERMEDIATE UMMC

## 2018-03-25 PROCEDURE — 25000128 H RX IP 250 OP 636: Performed by: STUDENT IN AN ORGANIZED HEALTH CARE EDUCATION/TRAINING PROGRAM

## 2018-03-25 PROCEDURE — 25000125 ZZHC RX 250: Performed by: THORACIC SURGERY (CARDIOTHORACIC VASCULAR SURGERY)

## 2018-03-25 PROCEDURE — 25000125 ZZHC RX 250: Performed by: STUDENT IN AN ORGANIZED HEALTH CARE EDUCATION/TRAINING PROGRAM

## 2018-03-25 PROCEDURE — 85027 COMPLETE CBC AUTOMATED: CPT | Performed by: NURSE PRACTITIONER

## 2018-03-25 PROCEDURE — 94640 AIRWAY INHALATION TREATMENT: CPT | Mod: 76

## 2018-03-25 PROCEDURE — 40000275 ZZH STATISTIC RCP TIME EA 10 MIN

## 2018-03-25 PROCEDURE — 25000132 ZZH RX MED GY IP 250 OP 250 PS 637: Performed by: THORACIC SURGERY (CARDIOTHORACIC VASCULAR SURGERY)

## 2018-03-25 PROCEDURE — 90686 IIV4 VACC NO PRSV 0.5 ML IM: CPT | Performed by: THORACIC SURGERY (CARDIOTHORACIC VASCULAR SURGERY)

## 2018-03-25 PROCEDURE — 83735 ASSAY OF MAGNESIUM: CPT | Performed by: NURSE PRACTITIONER

## 2018-03-25 PROCEDURE — 25000125 ZZHC RX 250: Performed by: PHYSICIAN ASSISTANT

## 2018-03-25 PROCEDURE — 84100 ASSAY OF PHOSPHORUS: CPT | Performed by: NURSE PRACTITIONER

## 2018-03-25 PROCEDURE — 80048 BASIC METABOLIC PNL TOTAL CA: CPT | Performed by: NURSE PRACTITIONER

## 2018-03-25 RX ORDER — ACETAMINOPHEN 650 MG/1
650 SUPPOSITORY RECTAL EVERY 6 HOURS PRN
Status: DISCONTINUED | OUTPATIENT
Start: 2018-03-25 | End: 2018-04-02

## 2018-03-25 RX ADMIN — CALCIUM GLUCONATE: 98 INJECTION, SOLUTION INTRAVENOUS at 20:08

## 2018-03-25 RX ADMIN — HYDROMORPHONE HYDROCHLORIDE: 10 INJECTION, SOLUTION INTRAMUSCULAR; INTRAVENOUS; SUBCUTANEOUS at 14:59

## 2018-03-25 RX ADMIN — SODIUM CHLORIDE SOLN NEBU 3% 3 ML: 3 NEBU SOLN at 21:49

## 2018-03-25 RX ADMIN — SODIUM CHLORIDE SOLN NEBU 3% 3 ML: 3 NEBU SOLN at 08:19

## 2018-03-25 RX ADMIN — HEPARIN SODIUM 5000 UNITS: 5000 INJECTION, SOLUTION INTRAVENOUS; SUBCUTANEOUS at 17:57

## 2018-03-25 RX ADMIN — INFLUENZA A VIRUS A/MICHIGAN/45/2015 X-275 (H1N1) ANTIGEN (FORMALDEHYDE INACTIVATED), INFLUENZA A VIRUS A/HONG KONG/4801/2014 X-263B (H3N2) ANTIGEN (FORMALDEHYDE INACTIVATED), INFLUENZA B VIRUS B/PHUKET/3073/2013 ANTIGEN (FORMALDEHYDE INACTIVATED), AND INFLUENZA B VIRUS B/BRISBANE/60/2008 ANTIGEN (FORMALDEHYDE INACTIVATED) 0.5 ML: 15; 15; 15; 15 INJECTION, SUSPENSION INTRAMUSCULAR at 11:46

## 2018-03-25 RX ADMIN — METOPROLOL TARTRATE 5 MG: 5 INJECTION, SOLUTION INTRAVENOUS at 11:51

## 2018-03-25 RX ADMIN — METOPROLOL TARTRATE 5 MG: 5 INJECTION, SOLUTION INTRAVENOUS at 06:07

## 2018-03-25 RX ADMIN — LEVALBUTEROL HYDROCHLORIDE 0.63 MG: 0.63 SOLUTION RESPIRATORY (INHALATION) at 08:19

## 2018-03-25 RX ADMIN — HEPARIN SODIUM 5000 UNITS: 5000 INJECTION, SOLUTION INTRAVENOUS; SUBCUTANEOUS at 08:46

## 2018-03-25 RX ADMIN — PANTOPRAZOLE SODIUM 40 MG: 40 INJECTION, POWDER, FOR SOLUTION INTRAVENOUS at 08:48

## 2018-03-25 RX ADMIN — LEVALBUTEROL HYDROCHLORIDE 0.63 MG: 0.63 SOLUTION RESPIRATORY (INHALATION) at 21:49

## 2018-03-25 RX ADMIN — METOPROLOL TARTRATE 5 MG: 5 INJECTION, SOLUTION INTRAVENOUS at 18:00

## 2018-03-25 ASSESSMENT — ACTIVITIES OF DAILY LIVING (ADL)
ADLS_ACUITY_SCORE: 12
ADLS_ACUITY_SCORE: 11
ADLS_ACUITY_SCORE: 12
ADLS_ACUITY_SCORE: 12

## 2018-03-25 NOTE — PROGRESS NOTES
Thoracic Surgery Progress Note  March 25, 2018    S: Feeling well this AM. Pain adequately controlled. +BM. Tachy to low 100s, otherwise no acute events.    O:  Temp:  [98.1  F (36.7  C)-99.3  F (37.4  C)] 98.3  F (36.8  C)  Heart Rate:  [101-105] 105  Resp:  [18-20] 18  BP: (126-136)/(78-83) 135/79  SpO2:  [92 %-95 %] 92 %    Well appearing in nad. NG in place.  Nlb on 2L NC.  Abd not distended. MALINA w/ serosang. Incisions c/d/i.    UOP adequate   out / 24 hrs  NG/OG nothing out  MALINA drain put out 15 / 24 hrs    Electrolytes wnl.  Wbc 11.3->11.3  Hgb 8.1->7.7    A/P: 58 y/o male w/ esophageal perforation s/p repair on 3-18 recovering appropriately. Leukocytosis likely reactive, stable. Completed 6 day course of fluc+zosyn.  - C/w tpn, strict npo, peg to gravity, ng to suction  - D/C telemetry  - CXR today. Has not had one for two days.  - Video swallow study w/ esophagram tomorrow (ordered)> If pass, then will tentatively take out NG and MALINA drain tomorrow.    Pt seen w/ Dr. Keith Ybarra    --  Tushar Roa MD  Gen Surg PGY1

## 2018-03-25 NOTE — PLAN OF CARE
Problem: Patient Care Overview  Goal: Plan of Care/Patient Progress Review    Patient is alert and oriented x 4, pleasant and cooperative with nursing cares. He is NPO with NGT to low continuous suction and irrigated with 30 ml tap water every 4 hours. He was in sinus rhythm on ECG which was discontinued this morning. Left lateral/posterior surgical incision is steri-stripped and open to air. The left lateral MALINA to bulb suction drain output is clear yellow (10cc) and dressing was changed. Left hip rash appears to be resolved; patient denies pain or itching in this area. Right upper lateral old chest tube site dressing was changed; the site is leaking a small amount of clear serosanguinous drainage. The G-tube site is clean and dry, covered with sterile gauze; the drain is to gravity bag drainage with green output. Dilaudid PCA is at 0.2mg bumps every 10 minutes; he is taking this infrequently and consistently denies pain during pain assessments. Scheduled rectal tylenol was changed to PRN as patient is refusing it. He has been up in the chair several times, ambulated in the maynard and walks to the bathroom. He helped wash himself this morning, then stood at the sink and shaved. TPN is infusing into the left forearm triple lumen PICC line. Labs are stable. Peripheral pulses are strong. Abdomen is round with hypoactive bowel sounds. He had a small bowel movement this morning that he flushed before I could see; he described it has small watery and pink in color. Influenza vaccination was given this shift.     Refer to doc flow sheets, MAR and notes for other specifics. Continue nursing cares per care plan and keep doctors informed of changes or concerns. Plan to discontinue NGT, esophogram, and possible G/J placement tomorrow per MDs on rounds this morning.

## 2018-03-26 ENCOUNTER — APPOINTMENT (OUTPATIENT)
Dept: PHYSICAL THERAPY | Facility: CLINIC | Age: 58
End: 2018-03-26
Attending: THORACIC SURGERY (CARDIOTHORACIC VASCULAR SURGERY)
Payer: MEDICAID

## 2018-03-26 ENCOUNTER — APPOINTMENT (OUTPATIENT)
Dept: CT IMAGING | Facility: CLINIC | Age: 58
End: 2018-03-26
Attending: THORACIC SURGERY (CARDIOTHORACIC VASCULAR SURGERY)
Payer: MEDICAID

## 2018-03-26 ENCOUNTER — APPOINTMENT (OUTPATIENT)
Dept: OCCUPATIONAL THERAPY | Facility: CLINIC | Age: 58
End: 2018-03-26
Attending: THORACIC SURGERY (CARDIOTHORACIC VASCULAR SURGERY)
Payer: MEDICAID

## 2018-03-26 LAB
ABO + RH BLD: NORMAL
ABO + RH BLD: NORMAL
ALBUMIN SERPL-MCNC: 1.8 G/DL (ref 3.4–5)
ALP SERPL-CCNC: 179 U/L (ref 40–150)
ALT SERPL W P-5'-P-CCNC: 112 U/L (ref 0–70)
ANION GAP SERPL CALCULATED.3IONS-SCNC: 9 MMOL/L (ref 3–14)
AST SERPL W P-5'-P-CCNC: 59 U/L (ref 0–45)
BILIRUB SERPL-MCNC: 0.5 MG/DL (ref 0.2–1.3)
BLD GP AB SCN SERPL QL: NORMAL
BLOOD BANK CMNT PATIENT-IMP: NORMAL
BUN SERPL-MCNC: 21 MG/DL (ref 7–30)
CALCIUM SERPL-MCNC: 8.2 MG/DL (ref 8.5–10.1)
CHLORIDE SERPL-SCNC: 108 MMOL/L (ref 94–109)
CO2 SERPL-SCNC: 23 MMOL/L (ref 20–32)
CREAT SERPL-MCNC: 0.68 MG/DL (ref 0.66–1.25)
ERYTHROCYTE [DISTWIDTH] IN BLOOD BY AUTOMATED COUNT: 19.7 % (ref 10–15)
GFR SERPL CREATININE-BSD FRML MDRD: >90 ML/MIN/1.7M2
GLUCOSE SERPL-MCNC: 123 MG/DL (ref 70–99)
HCT VFR BLD AUTO: 21.9 % (ref 40–53)
HGB BLD-MCNC: 6.4 G/DL (ref 13.3–17.7)
HGB BLD-MCNC: 8.3 G/DL (ref 13.3–17.7)
INR PPP: 1.16 (ref 0.86–1.14)
INTERPRETATION ECG - MUSE: NORMAL
LACTATE BLD-SCNC: 1 MMOL/L (ref 0.4–1.9)
MAGNESIUM SERPL-MCNC: 2.2 MG/DL (ref 1.6–2.3)
MCH RBC QN AUTO: 20.6 PG (ref 26.5–33)
MCHC RBC AUTO-ENTMCNC: 29.2 G/DL (ref 31.5–36.5)
MCV RBC AUTO: 70 FL (ref 78–100)
PHOSPHATE SERPL-MCNC: 2.6 MG/DL (ref 2.5–4.5)
PLATELET # BLD AUTO: 269 10E9/L (ref 150–450)
POTASSIUM SERPL-SCNC: 4 MMOL/L (ref 3.4–5.3)
PREALB SERPL IA-MCNC: 13 MG/DL (ref 15–45)
PROT SERPL-MCNC: 6 G/DL (ref 6.8–8.8)
RBC # BLD AUTO: 3.11 10E12/L (ref 4.4–5.9)
SODIUM SERPL-SCNC: 140 MMOL/L (ref 133–144)
SPECIMEN EXP DATE BLD: NORMAL
WBC # BLD AUTO: 13.1 10E9/L (ref 4–11)

## 2018-03-26 PROCEDURE — 83735 ASSAY OF MAGNESIUM: CPT | Performed by: NURSE PRACTITIONER

## 2018-03-26 PROCEDURE — 85027 COMPLETE CBC AUTOMATED: CPT | Performed by: NURSE PRACTITIONER

## 2018-03-26 PROCEDURE — 84100 ASSAY OF PHOSPHORUS: CPT | Performed by: NURSE PRACTITIONER

## 2018-03-26 PROCEDURE — 25000125 ZZHC RX 250: Performed by: THORACIC SURGERY (CARDIOTHORACIC VASCULAR SURGERY)

## 2018-03-26 PROCEDURE — 12000006 ZZH R&B IMCU INTERMEDIATE UMMC

## 2018-03-26 PROCEDURE — 86900 BLOOD TYPING SEROLOGIC ABO: CPT | Performed by: STUDENT IN AN ORGANIZED HEALTH CARE EDUCATION/TRAINING PROGRAM

## 2018-03-26 PROCEDURE — 97535 SELF CARE MNGMENT TRAINING: CPT | Mod: GO

## 2018-03-26 PROCEDURE — 85018 HEMOGLOBIN: CPT | Performed by: STUDENT IN AN ORGANIZED HEALTH CARE EDUCATION/TRAINING PROGRAM

## 2018-03-26 PROCEDURE — 40000193 ZZH STATISTIC PT WARD VISIT

## 2018-03-26 PROCEDURE — 36592 COLLECT BLOOD FROM PICC: CPT | Performed by: STUDENT IN AN ORGANIZED HEALTH CARE EDUCATION/TRAINING PROGRAM

## 2018-03-26 PROCEDURE — 84134 ASSAY OF PREALBUMIN: CPT | Performed by: NURSE PRACTITIONER

## 2018-03-26 PROCEDURE — 80053 COMPREHEN METABOLIC PANEL: CPT | Performed by: NURSE PRACTITIONER

## 2018-03-26 PROCEDURE — 94640 AIRWAY INHALATION TREATMENT: CPT | Mod: 76

## 2018-03-26 PROCEDURE — 40000558 ZZH STATISTIC CVC DRESSING CHANGE

## 2018-03-26 PROCEDURE — 97530 THERAPEUTIC ACTIVITIES: CPT | Mod: GP

## 2018-03-26 PROCEDURE — 25000128 H RX IP 250 OP 636

## 2018-03-26 PROCEDURE — 25000128 H RX IP 250 OP 636: Performed by: STUDENT IN AN ORGANIZED HEALTH CARE EDUCATION/TRAINING PROGRAM

## 2018-03-26 PROCEDURE — 25000125 ZZHC RX 250: Performed by: STUDENT IN AN ORGANIZED HEALTH CARE EDUCATION/TRAINING PROGRAM

## 2018-03-26 PROCEDURE — 25000128 H RX IP 250 OP 636: Performed by: NURSE PRACTITIONER

## 2018-03-26 PROCEDURE — 40000275 ZZH STATISTIC RCP TIME EA 10 MIN

## 2018-03-26 PROCEDURE — 25000125 ZZHC RX 250: Performed by: NURSE PRACTITIONER

## 2018-03-26 PROCEDURE — 97116 GAIT TRAINING THERAPY: CPT | Mod: GP

## 2018-03-26 PROCEDURE — 36592 COLLECT BLOOD FROM PICC: CPT | Performed by: THORACIC SURGERY (CARDIOTHORACIC VASCULAR SURGERY)

## 2018-03-26 PROCEDURE — 25000125 ZZHC RX 250: Performed by: PHYSICIAN ASSISTANT

## 2018-03-26 PROCEDURE — 25000132 ZZH RX MED GY IP 250 OP 250 PS 637: Performed by: THORACIC SURGERY (CARDIOTHORACIC VASCULAR SURGERY)

## 2018-03-26 PROCEDURE — 40000133 ZZH STATISTIC OT WARD VISIT

## 2018-03-26 PROCEDURE — 36415 COLL VENOUS BLD VENIPUNCTURE: CPT | Performed by: NURSE PRACTITIONER

## 2018-03-26 PROCEDURE — 86850 RBC ANTIBODY SCREEN: CPT | Performed by: STUDENT IN AN ORGANIZED HEALTH CARE EDUCATION/TRAINING PROGRAM

## 2018-03-26 PROCEDURE — 71260 CT THORAX DX C+: CPT

## 2018-03-26 PROCEDURE — 85610 PROTHROMBIN TIME: CPT | Performed by: NURSE PRACTITIONER

## 2018-03-26 PROCEDURE — 86901 BLOOD TYPING SEROLOGIC RH(D): CPT | Performed by: STUDENT IN AN ORGANIZED HEALTH CARE EDUCATION/TRAINING PROGRAM

## 2018-03-26 PROCEDURE — 83605 ASSAY OF LACTIC ACID: CPT | Performed by: THORACIC SURGERY (CARDIOTHORACIC VASCULAR SURGERY)

## 2018-03-26 PROCEDURE — 40000802 ZZH SITE CHECK

## 2018-03-26 RX ORDER — PIPERACILLIN SODIUM, TAZOBACTAM SODIUM 3; .375 G/15ML; G/15ML
3.38 INJECTION, POWDER, LYOPHILIZED, FOR SOLUTION INTRAVENOUS EVERY 6 HOURS
Status: DISCONTINUED | OUTPATIENT
Start: 2018-03-26 | End: 2018-04-02

## 2018-03-26 RX ORDER — FLUCONAZOLE 2 MG/ML
400 INJECTION, SOLUTION INTRAVENOUS EVERY 24 HOURS
Status: DISCONTINUED | OUTPATIENT
Start: 2018-03-26 | End: 2018-04-02

## 2018-03-26 RX ORDER — IOPAMIDOL 755 MG/ML
86 INJECTION, SOLUTION INTRAVASCULAR ONCE
Status: COMPLETED | OUTPATIENT
Start: 2018-03-26 | End: 2018-03-26

## 2018-03-26 RX ADMIN — HEPARIN SODIUM 5000 UNITS: 5000 INJECTION, SOLUTION INTRAVENOUS; SUBCUTANEOUS at 00:39

## 2018-03-26 RX ADMIN — METOPROLOL TARTRATE 5 MG: 5 INJECTION, SOLUTION INTRAVENOUS at 14:19

## 2018-03-26 RX ADMIN — HEPARIN SODIUM 5000 UNITS: 5000 INJECTION, SOLUTION INTRAVENOUS; SUBCUTANEOUS at 08:40

## 2018-03-26 RX ADMIN — METOPROLOL TARTRATE 5 MG: 5 INJECTION, SOLUTION INTRAVENOUS at 00:39

## 2018-03-26 RX ADMIN — PIPERACILLIN SODIUM AND TAZOBACTAM SODIUM 3.38 G: 3; .375 INJECTION, POWDER, LYOPHILIZED, FOR SOLUTION INTRAVENOUS at 18:47

## 2018-03-26 RX ADMIN — SODIUM CHLORIDE SOLN NEBU 3% 3 ML: 3 NEBU SOLN at 20:31

## 2018-03-26 RX ADMIN — FLUCONAZOLE 400 MG: 2 INJECTION INTRAVENOUS at 19:48

## 2018-03-26 RX ADMIN — LEVALBUTEROL HYDROCHLORIDE 0.63 MG: 0.63 SOLUTION RESPIRATORY (INHALATION) at 20:31

## 2018-03-26 RX ADMIN — POTASSIUM PHOSPHATE, MONOBASIC AND POTASSIUM PHOSPHATE, DIBASIC 10 MMOL: 224; 236 INJECTION, SOLUTION INTRAVENOUS at 09:58

## 2018-03-26 RX ADMIN — CALCIUM GLUCONATE: 98 INJECTION, SOLUTION INTRAVENOUS at 19:51

## 2018-03-26 RX ADMIN — IOPAMIDOL 86 ML: 755 INJECTION, SOLUTION INTRAVENOUS at 08:15

## 2018-03-26 RX ADMIN — POTASSIUM CHLORIDE 20 MEQ: 400 INJECTION, SOLUTION INTRAVENOUS at 08:40

## 2018-03-26 RX ADMIN — METOPROLOL TARTRATE 5 MG: 5 INJECTION, SOLUTION INTRAVENOUS at 08:40

## 2018-03-26 RX ADMIN — METOPROLOL TARTRATE 5 MG: 5 INJECTION, SOLUTION INTRAVENOUS at 19:56

## 2018-03-26 RX ADMIN — HEPARIN SODIUM 5000 UNITS: 5000 INJECTION, SOLUTION INTRAVENOUS; SUBCUTANEOUS at 16:19

## 2018-03-26 RX ADMIN — I.V. FAT EMULSION 250 ML: 20 EMULSION INTRAVENOUS at 19:51

## 2018-03-26 RX ADMIN — PANTOPRAZOLE SODIUM 40 MG: 40 INJECTION, POWDER, FOR SOLUTION INTRAVENOUS at 08:40

## 2018-03-26 ASSESSMENT — ACTIVITIES OF DAILY LIVING (ADL)
ADLS_ACUITY_SCORE: 11

## 2018-03-26 NOTE — PLAN OF CARE
Problem: Patient Care Overview  Goal: Plan of Care/Patient Progress Review  Outcome: Improving  Neuro: A&Ox4.   Cardiac: SR/ST 90-100s. VSS.   Respiratory: Sating 96% on 4L NC. No SOB reported.  GI/: Adequate urine output. BM X1  Diet/appetite: Strict NPO. TPN running.  Activity:  Up independently, with assist only for line management. Ambulated to bathroom. Up in chair x2.   Pain: At acceptable level on current regimen. Dilaudid PCA in use. No nausea reported.  Skin: Intact, no new deficits noted. All dressings changed per plan of care.  LDA's: G-tube to gravity. MALINA to bulb suction. NG pulled this AM.    Plan: Hgb re-check this AM 8.3. Continue with POC. Notify primary team with changes.

## 2018-03-26 NOTE — PLAN OF CARE
Problem: Patient Care Overview  Goal: Discharge Needs Assessment  Outcome: No Change  D AVSS with sat's 93% on room air when awake. Did have to place him on 2L/min of oxygen due to sats dropping with sleep. Has denied nausea and pain is well controlled with current pca setting. Scant out of MALINA and small amount out of the Gastric tube NG tube. Remains a/o x 4 with flat affect. Using IS with encouragement. Noted active bowel sound and reported that he did pass a small amount of gas. Slept well between cares. Patient hoping to get NG and MALINA pulled today with esophagram goes well.   I Vital's, assessment and med's per order.   A resting in bed with call light in reach.   P Continue to monitor and update MD with changes.

## 2018-03-26 NOTE — PROGRESS NOTES
CLINICAL NUTRITION SERVICES - REASSESSMENT NOTE     Nutrition Prescription    RECOMMENDATIONS FOR MDs/PROVIDERS TO ORDER:  1.  If/when approp to consider TF trials (use PEG vs convert to PEG-J), order Nutrition Consult for TF (assess/ORDER) to enable RDs to order/help manage TF therapy per all recs below.    Malnutrition Status:    None    Recommendations already ordered by Registered Dietitian (RD):  None at this time    Future/Additional Recommendations:  1.  If/when approp FT access to begin TF, order Impact Peptide @ 15 ml/hr and adv by 10 ml q 8 hrs (or per Thoracic discretion) to goal 65 ml/hr (1560 ml/day) to provide 2340 kcals (30 kcal/kg/day), 147 g PRO (1.9 g/kg/day), 1201 ml free H2O, 100 g Fat (50% from MCTs), 218 g CHO and no Fiber daily.      2.  If/when begin TFs, Order to obtain baseline acute phase PRO (CRP) and recheck every Monday to evaluate trend with duration/volumes of immune-modulating TF received and ongoing approp of specialized TF therapy vs. approp to change to maintenance-type formula.      3.  If/when begin above TFs and anticipate able to wean off CPN (MVI) then order multivitamin/mineral (15 ml/day via FT) to help ensure micronutrient needs being met with suspected hypermetabolic demands and potential interruptions to TF infusions.      4. If/ when ability to adv diet, order brent counts and supplements      EVALUATION OF THE PROGRESS TOWARD GOALS   Diet: NPO  Nutrition Support: CPN to goal tomorrow, goal of 310 g Dex (1054 kcal, GIR 2.8) to increase provisions to 1931 kcals (25 kcal/kg/day) with 18% kcals from Fat.  Intake: Pt not at goal CPN yet, plan to be at goal tomorrow 3/27.    Access: Pt current has an NG to LIS that is not really working, and a Gtube, no J extension at this time. PEG To gravity with green output.     NEW FINDINGS   Labs: hgb: 6.4 (L),    GI: 3/26:  Small amount out of the Gastric tube NG tube. Remains a/o x 4 with flat affect. Using IS with encouragement. Noted  active bowel sound and reported that he did pass a small amount of gas. Slept well between cares. Patient hoping to get NG and MALINA pulled today with esophagram goes well. Possible GJ placement today as well.     MALNUTRITION  % Intake: < 75% for > 7 days (non-severe)  % Weight Loss: None noted  Subcutaneous Fat Loss: None observed  Muscle Loss: None observed  Fluid Accumulation/Edema: None noted  Malnutrition Diagnosis: Patient does not meet two of the above criteria necessary for diagnosing malnutrition but is at risk for malnutrition    Previous Goals   1.  Tolerate adv of CPN dextrose to goal provision without sx of refeeding within the next 3 days.- not met  2.  Total ave nutrition intakes (PN vs EN once approp) to provide minimum 25 kcal/kg/day and 1.2 g PRO/kg/day (per 77 kg).- not met   Evaluation: Not met    Previous Nutrition Diagnosis  Inadequate protein-energy intake r/t esophageal perforation inhibiting ability to resume oral diet in pt with flu PTA and sx N/V since 3/16 AEB poor tolerance to oral intakes and NPO since 3/16.  Evaluation: No change    CURRENT NUTRITION DIAGNOSIS  Inadequate protein-energy intake related to slow advancement of CPN as evidenced by pt with refeeding syndrome and not yet at goal x 7.       INTERVENTIONS  Implementation  Collaboration with other providers- 6 B rounds    Goals  Total ave nutrition intakes (PN vs EN once approp) to provide minimum 25 kcal/kg/day and 1.2 g PRO/kg/day (per 77 kg).   Monitoring/Evaluation  Progress toward goals will be monitored and evaluated per protocol.      Felicia Lopez, RD, MS, LD  6B- Pager: [phone]

## 2018-03-26 NOTE — PLAN OF CARE
Problem: Patient Care Overview  Goal: Plan of Care/Patient Progress Review  Physical Therapy Discharge Summary    Reason for therapy discharge:    All goals and outcomes met, no further needs identified.    Progress towards therapy goal(s). See goals on Care Plan in [facility] electronic health record for goal details.  Goals met    Therapy recommendation(s):    Continued therapy is recommended.  Rationale/Recommendations:  Continued OT recommended.

## 2018-03-26 NOTE — PLAN OF CARE
Problem: Patient Care Overview  Goal: Plan of Care/Patient Progress Review  Discharge Planner OT   Patient plan for discharge: prefers to discharge home with assist of family   Current status: Pt SBA for bed mobility. Therapist administered MoCA to assess cognitive function, pt scored 19/36 indicating mild-moderate cognitive impairments in delayed recall, attention, language, and abstraction. Pt likely to have safety concerns with medication management, tube-feed management, driving, cooking, and return to work.   Barriers to return to prior living situation: pt lives alone, pt reports brother can assist however works full-time and has multiple medical concerns and not safe to drive pt to follow-up appointments or for community mobility.  Recommendations for discharge: TCU   Rationale for recommendations: Pt previously living alone independently and working full-time as , pt now below baseline in ADLs and IADLs. Pt would benefit from continued rehab to return to [facility].        Entered by: Eliza Polanco 03/26/2018 11:27 AM

## 2018-03-26 NOTE — PROGRESS NOTES
THORACIC SURGERY PROGRESS NOTE    Patient: Michael Saldivar  MRN: [id number]    Subjective  No acute overnight events. Pain well controlled on on PCA. No nausea. Required 2L NC while sleeping to maintain sats. NG tube with scant output. UOPA, voiding independently. Had a BM this morning.    Objective  Temp:  [97.8  F (36.6  C)-99  F (37.2  C)] 98.3  F (36.8  C)  Heart Rate:  [] 106  Resp:  [16-20] 18  BP: (108-135)/(54-83) 115/75  SpO2:  [93 %-97 %] 97 %    I/O last 3 completed shifts:  In: 1933 [NG/GT:200]  Out: 1757 [Urine:1025; Emesis/NG output:715; Drains:17]    PEG tube to gravity: 480 mL  NGT: 30 mL out O/N  Preston drain: 17 mL serous    Gen: middle aged male in NAD, sitting up in bed  CV: regular rhythm, mild intermittent trachycardia (low 100s)  Pulm: respirations even and unlabored on RA  Abd:  Soft, nondistended, nontender. Incisions c/d/i  Ext: warm and well perfused, no edema  Neuro: flat affect. Moves all four ext without apparent deficit    Labs:  Results for orders placed or performed during the hospital encounter of 03/18/18 (from the past 24 hour(s))   Phosphorus   Result Value Ref Range    Phosphorus 2.6 2.5 - 4.5 mg/dL   Comprehensive metabolic panel   Result Value Ref Range    Sodium 140 133 - 144 mmol/L    Potassium 4.0 3.4 - 5.3 mmol/L    Chloride 108 94 - 109 mmol/L    Carbon Dioxide 23 20 - 32 mmol/L    Anion Gap 9 3 - 14 mmol/L    Glucose 123 (H) 70 - 99 mg/dL    Urea Nitrogen 21 7 - 30 mg/dL    Creatinine 0.68 0.66 - 1.25 mg/dL    GFR Estimate >90 >60 mL/min/1.7m2    GFR Estimate If Black >90 >60 mL/min/1.7m2    Calcium 8.2 (L) 8.5 - 10.1 mg/dL    Bilirubin Total 0.5 0.2 - 1.3 mg/dL    Albumin 1.8 (L) 3.4 - 5.0 g/dL    Protein Total 6.0 (L) 6.8 - 8.8 g/dL    Alkaline Phosphatase 179 (H) 40 - 150 U/L     (H) 0 - 70 U/L    AST 59 (H) 0 - 45 U/L   Magnesium   Result Value Ref Range    Magnesium 2.2 1.6 - 2.3 mg/dL   INR   Result Value Ref Range    INR 1.16 (H) 0.86 - 1.14    Prealbumin   Result Value Ref Range    Prealbumin 13 (L) 15 - 45 mg/dL   CBC with platelets   Result Value Ref Range    WBC 13.1 (H) 4.0 - 11.0 10e9/L    RBC Count 3.11 (L) 4.4 - 5.9 10e12/L    Hemoglobin 6.4 (LL) 13.3 - 17.7 g/dL    Hematocrit 21.9 (L) 40.0 - 53.0 %    MCV 70 (L) 78 - 100 fl    MCH 20.6 (L) 26.5 - 33.0 pg    MCHC 29.2 (L) 31.5 - 36.5 g/dL    RDW 19.7 (H) 10.0 - 15.0 %    Platelet Count 269 150 - 450 10e9/L   Hemoglobin   Result Value Ref Range    Hemoglobin 8.3 (L) 13.3 - 17.7 g/dL   Lactic acid level STAT for sepsis protocol   Result Value Ref Range    Lactate for Sepsis Protocol 1.0 0.4 - 1.9 mmol/L       Imaging: Reviewed.  CT chest (3/26/18)  (per my review, full read pending)  Extravasation of oral contrast into paraesophageal spaces bilaterally, L > R into region of drain. Apparent esophageal leak.    Assessment & Plan  58 yo male POD 8 s/p L thoracotomy, primary repair of esophageal perforation, PEG tube placement, and chest tube placement x 4 (3 left, 1 right). Initially recovering appropriately from surgery, now complicated by presence of leak from esophageal repair.    NGT removed shortly after morning rounds prior to imaging results, had been low output. PT to remain NPO and albino drain in place.    HgB low on initial CBC this AM, 8.3 on recheck    Neuro: rectal APAP PRN, dilaudid PCA  CV: IV metoprolol Q6H   Pulm: levalbuterol neds BID  GI/FEN: TPN + lipids at goal. IV protonix QD. IV anti-emetics PRN. Enema PRN. Electrolyte replacement protocol  : UOPA, voiding independently  Heme: anemia, not critically low on recheck. SC hep Q8H  Abx: leukocytosis likely 2/2 leak, pt afebrile. Will restart zosyn and fluconazole    Discussed with staff.     Annabella Newell MD  PGY-1 General Surgery  [facility]

## 2018-03-26 NOTE — PLAN OF CARE
Problem: Patient Care Overview  Goal: Plan of Care/Patient Progress Review  6B - All goals met, Discharged from PT.  Discharge Planner PT   Patient plan for discharge: home with assist from family  Current status: IND with bed mobility, sit <> stands and ambulation with minor balance challenges and No LOB with AD.   Barriers to return to prior living situation: cognition, family support  Recommendations for discharge: home with assist of family; defer to OT based on cognitive concerns  Rationale for recommendations: Pt moving well, transfers and IND with ambulation without AD, Able to ascend/descend 3 stairs without railing. Defer to OT based on concerns for cognition.        Entered by: Ellen Manning 03/26/2018 5:15 PM

## 2018-03-27 ENCOUNTER — APPOINTMENT (OUTPATIENT)
Dept: OCCUPATIONAL THERAPY | Facility: CLINIC | Age: 58
End: 2018-03-27
Attending: THORACIC SURGERY (CARDIOTHORACIC VASCULAR SURGERY)
Payer: MEDICAID

## 2018-03-27 LAB
ANION GAP SERPL CALCULATED.3IONS-SCNC: 8 MMOL/L (ref 3–14)
BUN SERPL-MCNC: 19 MG/DL (ref 7–30)
CALCIUM SERPL-MCNC: 8.2 MG/DL (ref 8.5–10.1)
CHLORIDE SERPL-SCNC: 109 MMOL/L (ref 94–109)
CO2 SERPL-SCNC: 22 MMOL/L (ref 20–32)
CREAT SERPL-MCNC: 0.72 MG/DL (ref 0.66–1.25)
ERYTHROCYTE [DISTWIDTH] IN BLOOD BY AUTOMATED COUNT: 20 % (ref 10–15)
GFR SERPL CREATININE-BSD FRML MDRD: >90 ML/MIN/1.7M2
GLUCOSE BLDC GLUCOMTR-MCNC: 134 MG/DL (ref 70–99)
GLUCOSE SERPL-MCNC: 129 MG/DL (ref 70–99)
HCT VFR BLD AUTO: 24.9 % (ref 40–53)
HGB BLD-MCNC: 7 G/DL (ref 13.3–17.7)
LACTATE BLD-SCNC: 0.8 MMOL/L (ref 0.4–1.9)
MAGNESIUM SERPL-MCNC: 2.2 MG/DL (ref 1.6–2.3)
MCH RBC QN AUTO: 20.2 PG (ref 26.5–33)
MCHC RBC AUTO-ENTMCNC: 28.1 G/DL (ref 31.5–36.5)
MCV RBC AUTO: 72 FL (ref 78–100)
PHOSPHATE SERPL-MCNC: 2.7 MG/DL (ref 2.5–4.5)
PLATELET # BLD AUTO: 272 10E9/L (ref 150–450)
POTASSIUM SERPL-SCNC: 3.8 MMOL/L (ref 3.4–5.3)
RBC # BLD AUTO: 3.47 10E12/L (ref 4.4–5.9)
SODIUM SERPL-SCNC: 140 MMOL/L (ref 133–144)
WBC # BLD AUTO: 13 10E9/L (ref 4–11)

## 2018-03-27 PROCEDURE — 97530 THERAPEUTIC ACTIVITIES: CPT | Mod: GO | Performed by: OCCUPATIONAL THERAPIST

## 2018-03-27 PROCEDURE — 25000128 H RX IP 250 OP 636

## 2018-03-27 PROCEDURE — 25000128 H RX IP 250 OP 636: Performed by: STUDENT IN AN ORGANIZED HEALTH CARE EDUCATION/TRAINING PROGRAM

## 2018-03-27 PROCEDURE — 83605 ASSAY OF LACTIC ACID: CPT | Performed by: THORACIC SURGERY (CARDIOTHORACIC VASCULAR SURGERY)

## 2018-03-27 PROCEDURE — 40000275 ZZH STATISTIC RCP TIME EA 10 MIN

## 2018-03-27 PROCEDURE — 94640 AIRWAY INHALATION TREATMENT: CPT | Mod: 76

## 2018-03-27 PROCEDURE — 25000125 ZZHC RX 250: Performed by: THORACIC SURGERY (CARDIOTHORACIC VASCULAR SURGERY)

## 2018-03-27 PROCEDURE — 25000125 ZZHC RX 250: Performed by: STUDENT IN AN ORGANIZED HEALTH CARE EDUCATION/TRAINING PROGRAM

## 2018-03-27 PROCEDURE — 97535 SELF CARE MNGMENT TRAINING: CPT | Mod: GO | Performed by: OCCUPATIONAL THERAPIST

## 2018-03-27 PROCEDURE — 25000125 ZZHC RX 250: Performed by: PHYSICIAN ASSISTANT

## 2018-03-27 PROCEDURE — 36592 COLLECT BLOOD FROM PICC: CPT | Performed by: NURSE PRACTITIONER

## 2018-03-27 PROCEDURE — 25000128 H RX IP 250 OP 636: Performed by: NURSE PRACTITIONER

## 2018-03-27 PROCEDURE — 25000132 ZZH RX MED GY IP 250 OP 250 PS 637: Performed by: THORACIC SURGERY (CARDIOTHORACIC VASCULAR SURGERY)

## 2018-03-27 PROCEDURE — 83735 ASSAY OF MAGNESIUM: CPT | Performed by: NURSE PRACTITIONER

## 2018-03-27 PROCEDURE — 40000133 ZZH STATISTIC OT WARD VISIT: Performed by: OCCUPATIONAL THERAPIST

## 2018-03-27 PROCEDURE — 25000125 ZZHC RX 250: Performed by: NURSE PRACTITIONER

## 2018-03-27 PROCEDURE — 12000008 ZZH R&B INTERMEDIATE UMMC

## 2018-03-27 PROCEDURE — 80048 BASIC METABOLIC PNL TOTAL CA: CPT | Performed by: NURSE PRACTITIONER

## 2018-03-27 PROCEDURE — 36592 COLLECT BLOOD FROM PICC: CPT | Performed by: THORACIC SURGERY (CARDIOTHORACIC VASCULAR SURGERY)

## 2018-03-27 PROCEDURE — 00000146 ZZHCL STATISTIC GLUCOSE BY METER IP

## 2018-03-27 PROCEDURE — 85027 COMPLETE CBC AUTOMATED: CPT | Performed by: NURSE PRACTITIONER

## 2018-03-27 PROCEDURE — 84100 ASSAY OF PHOSPHORUS: CPT | Performed by: NURSE PRACTITIONER

## 2018-03-27 PROCEDURE — 94640 AIRWAY INHALATION TREATMENT: CPT

## 2018-03-27 RX ORDER — METOPROLOL TARTRATE 1 MG/ML
5 INJECTION, SOLUTION INTRAVENOUS EVERY 12 HOURS
Status: DISCONTINUED | OUTPATIENT
Start: 2018-03-27 | End: 2018-03-28

## 2018-03-27 RX ADMIN — HYDROMORPHONE HYDROCHLORIDE: 10 INJECTION, SOLUTION INTRAMUSCULAR; INTRAVENOUS; SUBCUTANEOUS at 06:26

## 2018-03-27 RX ADMIN — PIPERACILLIN SODIUM AND TAZOBACTAM SODIUM 3.38 G: 3; .375 INJECTION, POWDER, LYOPHILIZED, FOR SOLUTION INTRAVENOUS at 06:21

## 2018-03-27 RX ADMIN — METOPROLOL TARTRATE 5 MG: 5 INJECTION, SOLUTION INTRAVENOUS at 02:35

## 2018-03-27 RX ADMIN — HEPARIN SODIUM 5000 UNITS: 5000 INJECTION, SOLUTION INTRAVENOUS; SUBCUTANEOUS at 08:05

## 2018-03-27 RX ADMIN — FLUCONAZOLE 400 MG: 2 INJECTION INTRAVENOUS at 20:38

## 2018-03-27 RX ADMIN — POTASSIUM PHOSPHATE, MONOBASIC AND POTASSIUM PHOSPHATE, DIBASIC 10 MMOL: 224; 236 INJECTION, SOLUTION INTRAVENOUS at 10:28

## 2018-03-27 RX ADMIN — SODIUM CHLORIDE SOLN NEBU 3% 3 ML: 3 NEBU SOLN at 08:25

## 2018-03-27 RX ADMIN — PIPERACILLIN SODIUM AND TAZOBACTAM SODIUM 3.38 G: 3; .375 INJECTION, POWDER, LYOPHILIZED, FOR SOLUTION INTRAVENOUS at 20:34

## 2018-03-27 RX ADMIN — I.V. FAT EMULSION 250 ML: 20 EMULSION INTRAVENOUS at 20:28

## 2018-03-27 RX ADMIN — HEPARIN SODIUM 5000 UNITS: 5000 INJECTION, SOLUTION INTRAVENOUS; SUBCUTANEOUS at 00:34

## 2018-03-27 RX ADMIN — PANTOPRAZOLE SODIUM 40 MG: 40 INJECTION, POWDER, FOR SOLUTION INTRAVENOUS at 08:05

## 2018-03-27 RX ADMIN — LEVALBUTEROL HYDROCHLORIDE 0.63 MG: 0.63 SOLUTION RESPIRATORY (INHALATION) at 08:24

## 2018-03-27 RX ADMIN — CALCIUM GLUCONATE: 98 INJECTION, SOLUTION INTRAVENOUS at 20:28

## 2018-03-27 RX ADMIN — PIPERACILLIN SODIUM AND TAZOBACTAM SODIUM 3.38 G: 3; .375 INJECTION, POWDER, LYOPHILIZED, FOR SOLUTION INTRAVENOUS at 15:12

## 2018-03-27 RX ADMIN — POTASSIUM CHLORIDE 20 MEQ: 400 INJECTION, SOLUTION INTRAVENOUS at 08:24

## 2018-03-27 RX ADMIN — PIPERACILLIN SODIUM AND TAZOBACTAM SODIUM 3.38 G: 3; .375 INJECTION, POWDER, LYOPHILIZED, FOR SOLUTION INTRAVENOUS at 00:34

## 2018-03-27 RX ADMIN — METOPROLOL TARTRATE 5 MG: 5 INJECTION INTRAVENOUS at 15:15

## 2018-03-27 ASSESSMENT — ACTIVITIES OF DAILY LIVING (ADL)
ADLS_ACUITY_SCORE: 12
ADLS_ACUITY_SCORE: 11
ADLS_ACUITY_SCORE: 12

## 2018-03-27 NOTE — CONSULTS
Patient is on IR schedule 3/28/2018 for a G to GJ tube conversion.   Labs WNL for procedure.    Patient is NPO currently.  Consent will be done prior to procedure.     Please contact the IR [place] RN [place] [phone] for estimated time of procedure.     Case discussed with Dr. Newell and Dr. Ramirez from IR. Current G tube cannot be changed until >6 weeks post placement. We are willing to offer placement of jejunal extension through current G tube. This will limit effectiveness of drainage/decompression through the g-tube. This will be reviewed with thoracic staff.    Antonietta Engle DNP, APRN  Interventional Radiology  Phone: [phone]  Pager: [phone]

## 2018-03-27 NOTE — PLAN OF CARE
Problem: Patient Care Overview  Goal: Plan of Care/Patient Progress Review  Outcome: No Change  Neuro: A&Ox4, flat affect.   Cardiac: HR's upper 90's to low 100's.   Respiratory: Sating >94% on 3L NC.  GI/: Adequate urine output. No BM, hypoactive bowl sounds.  Diet/appetite: Strict NPO, TPN and lipids infusing.  Activity: Independent/SBA for line management.  Pain: Denies pain, well controlled with PCA Dilaudid PRN.   Skin: G-tube and MALINA sites reddened, chest incision w/steri strips CDI.  LDA's: G-tube to gravity, MALINA to bulb suction. (L) Triple Lumen PICC.    Plan: Restarted IV antibiotics this shift for leak in esophageal repair. Continue with POC. Notify primary team with changes.

## 2018-03-27 NOTE — PROGRESS NOTES
Care Coordinator Progress Note     Admission Date/Time:  3/18/2018  Attending MD:  Keith Ybarra MD     Data  Chart reviewed, discussed with interdisciplinary team.   Patient was admitted for: Esophageal perforation     Concerns with insurance coverage for discharge needs: No insurance  Current Living Situation: Patient lives alone  Support System: BrotherMichael is Primary Caregiver  Services Involved:   Transportation: Family  Barriers to Discharge:        Assessment  Pt transferred from [facility] with esophageal perforation. Pt went to the OR on 3/18, for PEG tube placement, left thoracotomy, repair of esophageal perforation, and chest tube placement x3. Pt is currently receiving TPN and lipids, is on the IR schedule tomorrow for GJ tube conversion tomorrow.  Pt does not have insurance, a financial counselor met with Pt today to assist Pt in applying for Medical Assistance.  Prior to admission, Pt was independent working construction. Pt lives alone, he identifies his Brother Michael as his his Primary Caregiver but states he has a Daughter and Niece that have offered to help post discharge.  CC will follow and assist with discharge planning.       Plan  Anticipated Discharge Date:  TBD  Anticipated Discharge Plan:  TBD    Syeda Clark RN   [phone]

## 2018-03-27 NOTE — PLAN OF CARE
Problem: Surgery Nonspecified (Adult)  Goal: Signs and Symptoms of Listed Potential Problems Will be Absent, Minimized or Managed (Surgery Nonspecified)  Signs and symptoms of listed potential problems will be absent, minimized or managed by discharge/transition of care (reference Surgery Nonspecified (Adult) CPG).   Outcome: No Change  Neuro: A&Ox4.   Cardiac: Regular rhythm. VSS.   Respiratory: Sating >96% on 2L NC. Sats dropped to low 90's high 80's on RA  GI/: Adequate urine output in bedside urinal. Last BM yesterday  Diet/appetite: Strict NPO. TPN continuous 60mL/hr  Activity: SBA  Pain: At acceptable level on current regimen. PCA 0.2mg. Pt using once per shift.   Skin: L incision adhesive strips WDL. Old CT site dressings CDI. MALINA and G tube sites WDL  LDA's: 3L PICC TKO x2 w/ PCA and antibiotics, TPN 60mL/hr. MALINA bulb suction, no output. G tube gravity    Hgb 7.0-MD aware, ok. K and phos replaced per protocol     Plan: IR tomorrow for J extension to be placed on current PEG tube. Continue with POC. Notify primary team with changes.

## 2018-03-27 NOTE — PLAN OF CARE
"Problem: Patient Care Overview  Goal: Plan of Care/Patient Progress Review  Outcome: No Change  /73 (BP Location: Right arm)  Pulse 103  Temp 98.4  F (36.9  C) (Oral)  Resp 16  Ht 1.676 m (5' 6\")  Wt 79.3 kg (174 lb 13.2 oz)  SpO2 96%  BMI 28.22 kg/m2  Neuro: A&Ox4, flat affect.   Cardiac: NO tele, regular rhythm w/ HR 80-90s. VSS.       Respiratory: Sating >92% on 2-4L NC.  GI/: Gtube to gravity. Adequate urine output. No BM.   Diet/appetite: Strict NPO. TPN and lipids infusing.  Activity:  Assist stand by, up to chair and in halls.  Pain: At acceptable level on current regimen. Dilaudid PCA w/ 0.2 mg PRN q10min.    Skin: Intact, no new deficits noted.  L MALINA drain in place w/minimal output.    R: Continue with POC. Notify primary team with changes.          "

## 2018-03-27 NOTE — PROGRESS NOTES
THORACIC SURGERY PROGRESS NOTE    Patient: Michael Saldivar  MRN: [id number]    Subjective  No acute overnight events. Continues to have good pain control on PCA. Denies nausea, doing well since NGT removed.  Ambulating frequently and independently. No BM today    Objective  Temp:  [97.9  F (36.6  C)-99.4  F (37.4  C)] 98.4  F (36.9  C)  Pulse:  [] 109  Heart Rate:  [] 103  Resp:  [16-18] 16  BP: (123-130)/(68-76) 127/71  SpO2:  [94 %-97 %] 94 %    I/O last 3 completed shifts:  In: 2453.8 [I.V.:540]  Out: 1905 [Urine:1375; Emesis/NG output:525; Drains:5]    PEG tube to gravity: 100 mL out O/N  Preston drain: 5 mL serous    Gen: middle aged male in NAD, sitting up in bed  CV: regular rhythm, mild intermittent trachycardia (low 100s)  Pulm: respirations even and unlabored on RA  Abd:  Soft, nondistended, nontender. Incisions c/d/i  Ext: warm and well perfused, no edema  Neuro: flat affect. Moves all four ext without apparent deficit    Labs:  Results for orders placed or performed during the hospital encounter of 03/18/18 (from the past 24 hour(s))   Phosphorus   Result Value Ref Range    Phosphorus 2.7 2.5 - 4.5 mg/dL   Basic metabolic panel   Result Value Ref Range    Sodium 140 133 - 144 mmol/L    Potassium 3.8 3.4 - 5.3 mmol/L    Chloride 109 94 - 109 mmol/L    Carbon Dioxide 22 20 - 32 mmol/L    Anion Gap 8 3 - 14 mmol/L    Glucose 129 (H) 70 - 99 mg/dL    Urea Nitrogen 19 7 - 30 mg/dL    Creatinine 0.72 0.66 - 1.25 mg/dL    GFR Estimate >90 >60 mL/min/1.7m2    GFR Estimate If Black >90 >60 mL/min/1.7m2    Calcium 8.2 (L) 8.5 - 10.1 mg/dL   CBC with platelets   Result Value Ref Range    WBC 13.0 (H) 4.0 - 11.0 10e9/L    RBC Count 3.47 (L) 4.4 - 5.9 10e12/L    Hemoglobin 7.0 (L) 13.3 - 17.7 g/dL    Hematocrit 24.9 (L) 40.0 - 53.0 %    MCV 72 (L) 78 - 100 fl    MCH 20.2 (L) 26.5 - 33.0 pg    MCHC 28.1 (L) 31.5 - 36.5 g/dL    RDW 20.0 (H) 10.0 - 15.0 %    Platelet Count 272 150 - 450 10e9/L   Magnesium    Result Value Ref Range    Magnesium 2.2 1.6 - 2.3 mg/dL   Lactic acid level STAT for sepsis protocol   Result Value Ref Range    Lactate for Sepsis Protocol 0.8 0.4 - 1.9 mmol/L       No new imaging    Assessment & Plan  56 yo male POD 9 s/p L thoracotomy, primary repair of esophageal perforation, PEG tube placement, and chest tube placement x 4 (3 left, 1 right). Initially recovering appropriately from surgery, now complicated by presence of leak from esophageal repair.    Discussed placement of GJ tube with IR. PEG tube tract not mature enough for placement of new device, will add J extension to current PEG tube. May impact drainage abilities of G portion, Dr. Flores aware and ok to proceed. Transfer to floor today.     Neuro: rectal APAP PRN, dilaudid PCA  CV: IV metoprolol Q6H   Pulm: levalbuterol neds BID  GI/FEN: TPN + lipids at goal. IV protonix QD. IV anti-emetics PRN. Enema PRN. Electrolyte replacement protocol  : UOPA, voiding independently    Heme: anemia, not critically low on recheck. SC hep held for IR procedure  Abx: WBC count stable, continue zosyn and fluconazole    Discussed with staff.     Annabella Newell MD  PGY-1 General Surgery  [facility]

## 2018-03-27 NOTE — PLAN OF CARE
Problem: Patient Care Overview  Goal: Plan of Care/Patient Progress Review    Discharge Planner OT   Patient plan for discharge: home with assist  Current status: Pt ambulating hallway with SBA and IV pole for 10 min, tolerating well with VSS, appearing steady on feet, with mild SOB reported on 3L O2. Performed simulated medication management task, with pt performing well with 1 minor error. Pt endorsing some lingering cognitive issues yet. Pt will have daily assistance available from family, but no one will be living with him at d/c. Suggest pt have assist for driving initially at d/c. Otherwise, pt will likely be able to manage most ADLs with his available level of assistance, however pt concerned about potentially having to deal with dressing changes.  Barriers to return to prior living situation: decreased ADL independence and cognition   Recommendations for discharge: home with assist for driving and heavy IADLs  Rationale for recommendations: pt still with some cognitive issues and somewhat deconditioned       Entered by: Ricky Ruff 03/27/2018 1:59 PM

## 2018-03-28 ENCOUNTER — HOME INFUSION (PRE-WILLOW HOME INFUSION) (OUTPATIENT)
Dept: PHARMACY | Facility: CLINIC | Age: 58
End: 2018-03-28

## 2018-03-28 ENCOUNTER — ANESTHESIA (OUTPATIENT)
Dept: SURGERY | Facility: CLINIC | Age: 58
End: 2018-03-28
Payer: MEDICAID

## 2018-03-28 ENCOUNTER — ANESTHESIA EVENT (OUTPATIENT)
Dept: SURGERY | Facility: CLINIC | Age: 58
End: 2018-03-28
Payer: MEDICAID

## 2018-03-28 ENCOUNTER — APPOINTMENT (OUTPATIENT)
Dept: OCCUPATIONAL THERAPY | Facility: CLINIC | Age: 58
End: 2018-03-28
Attending: THORACIC SURGERY (CARDIOTHORACIC VASCULAR SURGERY)
Payer: MEDICAID

## 2018-03-28 ENCOUNTER — APPOINTMENT (OUTPATIENT)
Dept: INTERVENTIONAL RADIOLOGY/VASCULAR | Facility: CLINIC | Age: 58
End: 2018-03-28
Attending: STUDENT IN AN ORGANIZED HEALTH CARE EDUCATION/TRAINING PROGRAM
Payer: MEDICAID

## 2018-03-28 LAB
ANION GAP SERPL CALCULATED.3IONS-SCNC: 8 MMOL/L (ref 3–14)
BUN SERPL-MCNC: 16 MG/DL (ref 7–30)
CALCIUM SERPL-MCNC: 8.3 MG/DL (ref 8.5–10.1)
CHLORIDE SERPL-SCNC: 109 MMOL/L (ref 94–109)
CO2 SERPL-SCNC: 22 MMOL/L (ref 20–32)
CREAT SERPL-MCNC: 0.68 MG/DL (ref 0.66–1.25)
ERYTHROCYTE [DISTWIDTH] IN BLOOD BY AUTOMATED COUNT: 20.6 % (ref 10–15)
GFR SERPL CREATININE-BSD FRML MDRD: >90 ML/MIN/1.7M2
GLUCOSE BLDC GLUCOMTR-MCNC: 113 MG/DL (ref 70–99)
GLUCOSE BLDC GLUCOMTR-MCNC: 149 MG/DL (ref 70–99)
GLUCOSE SERPL-MCNC: 126 MG/DL (ref 70–99)
HCT VFR BLD AUTO: 24.9 % (ref 40–53)
HGB BLD-MCNC: 7.1 G/DL (ref 13.3–17.7)
MAGNESIUM SERPL-MCNC: 2.1 MG/DL (ref 1.6–2.3)
MCH RBC QN AUTO: 20.6 PG (ref 26.5–33)
MCHC RBC AUTO-ENTMCNC: 28.5 G/DL (ref 31.5–36.5)
MCV RBC AUTO: 72 FL (ref 78–100)
PHOSPHATE SERPL-MCNC: 2.3 MG/DL (ref 2.5–4.5)
PLATELET # BLD AUTO: 339 10E9/L (ref 150–450)
POTASSIUM SERPL-SCNC: 3.8 MMOL/L (ref 3.4–5.3)
RBC # BLD AUTO: 3.45 10E12/L (ref 4.4–5.9)
SODIUM SERPL-SCNC: 139 MMOL/L (ref 133–144)
WBC # BLD AUTO: 12.4 10E9/L (ref 4–11)

## 2018-03-28 PROCEDURE — 25000125 ZZHC RX 250: Performed by: NURSE PRACTITIONER

## 2018-03-28 PROCEDURE — 40000170 ZZH STATISTIC PRE-PROCEDURE ASSESSMENT II: Performed by: STUDENT IN AN ORGANIZED HEALTH CARE EDUCATION/TRAINING PROGRAM

## 2018-03-28 PROCEDURE — 71000014 ZZH RECOVERY PHASE 1 LEVEL 2 FIRST HR: Performed by: STUDENT IN AN ORGANIZED HEALTH CARE EDUCATION/TRAINING PROGRAM

## 2018-03-28 PROCEDURE — 37000009 ZZH ANESTHESIA TECHNICAL FEE, EACH ADDTL 15 MIN: Performed by: STUDENT IN AN ORGANIZED HEALTH CARE EDUCATION/TRAINING PROGRAM

## 2018-03-28 PROCEDURE — 25000125 ZZHC RX 250: Performed by: STUDENT IN AN ORGANIZED HEALTH CARE EDUCATION/TRAINING PROGRAM

## 2018-03-28 PROCEDURE — C1887 CATHETER, GUIDING: HCPCS | Performed by: STUDENT IN AN ORGANIZED HEALTH CARE EDUCATION/TRAINING PROGRAM

## 2018-03-28 PROCEDURE — C1894 INTRO/SHEATH, NON-LASER: HCPCS | Performed by: STUDENT IN AN ORGANIZED HEALTH CARE EDUCATION/TRAINING PROGRAM

## 2018-03-28 PROCEDURE — 80048 BASIC METABOLIC PNL TOTAL CA: CPT

## 2018-03-28 PROCEDURE — 00000146 ZZHCL STATISTIC GLUCOSE BY METER IP

## 2018-03-28 PROCEDURE — P9041 ALBUMIN (HUMAN),5%, 50ML: HCPCS | Performed by: NURSE ANESTHETIST, CERTIFIED REGISTERED

## 2018-03-28 PROCEDURE — 25000128 H RX IP 250 OP 636: Performed by: STUDENT IN AN ORGANIZED HEALTH CARE EDUCATION/TRAINING PROGRAM

## 2018-03-28 PROCEDURE — 36000053 ZZH SURGERY LEVEL 2 EA 15 ADDTL MIN - UMMC: Performed by: STUDENT IN AN ORGANIZED HEALTH CARE EDUCATION/TRAINING PROGRAM

## 2018-03-28 PROCEDURE — 12000008 ZZH R&B INTERMEDIATE UMMC

## 2018-03-28 PROCEDURE — 25000128 H RX IP 250 OP 636: Performed by: NURSE ANESTHETIST, CERTIFIED REGISTERED

## 2018-03-28 PROCEDURE — 25000128 H RX IP 250 OP 636

## 2018-03-28 PROCEDURE — 0D20XUZ CHANGE FEEDING DEVICE IN UPPER INTESTINAL TRACT, EXTERNAL APPROACH: ICD-10-PCS | Performed by: STUDENT IN AN ORGANIZED HEALTH CARE EDUCATION/TRAINING PROGRAM

## 2018-03-28 PROCEDURE — 27210794 ZZH OR GENERAL SUPPLY STERILE: Performed by: STUDENT IN AN ORGANIZED HEALTH CARE EDUCATION/TRAINING PROGRAM

## 2018-03-28 PROCEDURE — 25000132 ZZH RX MED GY IP 250 OP 250 PS 637: Performed by: THORACIC SURGERY (CARDIOTHORACIC VASCULAR SURGERY)

## 2018-03-28 PROCEDURE — 25000566 ZZH SEVOFLURANE, EA 15 MIN: Performed by: STUDENT IN AN ORGANIZED HEALTH CARE EDUCATION/TRAINING PROGRAM

## 2018-03-28 PROCEDURE — 36592 COLLECT BLOOD FROM PICC: CPT

## 2018-03-28 PROCEDURE — C9399 UNCLASSIFIED DRUGS OR BIOLOG: HCPCS | Performed by: NURSE ANESTHETIST, CERTIFIED REGISTERED

## 2018-03-28 PROCEDURE — 40000279 XR SURGERY CARM FLUORO GREATER THAN 5 MIN W STILLS: Mod: TC

## 2018-03-28 PROCEDURE — 25000125 ZZHC RX 250: Performed by: THORACIC SURGERY (CARDIOTHORACIC VASCULAR SURGERY)

## 2018-03-28 PROCEDURE — 71000015 ZZH RECOVERY PHASE 1 LEVEL 2 EA ADDTL HR: Performed by: STUDENT IN AN ORGANIZED HEALTH CARE EDUCATION/TRAINING PROGRAM

## 2018-03-28 PROCEDURE — 40000133 ZZH STATISTIC OT WARD VISIT: Performed by: OCCUPATIONAL THERAPIST

## 2018-03-28 PROCEDURE — 83735 ASSAY OF MAGNESIUM: CPT

## 2018-03-28 PROCEDURE — 25000128 H RX IP 250 OP 636: Performed by: NURSE PRACTITIONER

## 2018-03-28 PROCEDURE — 85027 COMPLETE CBC AUTOMATED: CPT

## 2018-03-28 PROCEDURE — 37000008 ZZH ANESTHESIA TECHNICAL FEE, 1ST 30 MIN: Performed by: STUDENT IN AN ORGANIZED HEALTH CARE EDUCATION/TRAINING PROGRAM

## 2018-03-28 PROCEDURE — 36000055 ZZH SURGERY LEVEL 2 W FLUORO 1ST 30 MIN - UMMC: Performed by: STUDENT IN AN ORGANIZED HEALTH CARE EDUCATION/TRAINING PROGRAM

## 2018-03-28 PROCEDURE — C1769 GUIDE WIRE: HCPCS | Performed by: STUDENT IN AN ORGANIZED HEALTH CARE EDUCATION/TRAINING PROGRAM

## 2018-03-28 PROCEDURE — 84100 ASSAY OF PHOSPHORUS: CPT

## 2018-03-28 PROCEDURE — 97530 THERAPEUTIC ACTIVITIES: CPT | Mod: GO | Performed by: OCCUPATIONAL THERAPIST

## 2018-03-28 PROCEDURE — 97110 THERAPEUTIC EXERCISES: CPT | Mod: GO | Performed by: OCCUPATIONAL THERAPIST

## 2018-03-28 PROCEDURE — 40000275 ZZH STATISTIC RCP TIME EA 10 MIN

## 2018-03-28 PROCEDURE — 25000125 ZZHC RX 250: Performed by: NURSE ANESTHETIST, CERTIFIED REGISTERED

## 2018-03-28 PROCEDURE — 0W9C40Z DRAINAGE OF MEDIASTINUM WITH DRAINAGE DEVICE, PERCUTANEOUS ENDOSCOPIC APPROACH: ICD-10-PCS | Performed by: STUDENT IN AN ORGANIZED HEALTH CARE EDUCATION/TRAINING PROGRAM

## 2018-03-28 PROCEDURE — 94640 AIRWAY INHALATION TREATMENT: CPT | Mod: 76

## 2018-03-28 PROCEDURE — 94640 AIRWAY INHALATION TREATMENT: CPT

## 2018-03-28 RX ORDER — ONDANSETRON 2 MG/ML
4 INJECTION INTRAMUSCULAR; INTRAVENOUS EVERY 30 MIN PRN
Status: DISCONTINUED | OUTPATIENT
Start: 2018-03-28 | End: 2018-03-28 | Stop reason: HOSPADM

## 2018-03-28 RX ORDER — CEFAZOLIN SODIUM 1 G/3ML
1 INJECTION, POWDER, FOR SOLUTION INTRAMUSCULAR; INTRAVENOUS SEE ADMIN INSTRUCTIONS
Status: DISCONTINUED | OUTPATIENT
Start: 2018-03-28 | End: 2018-03-28 | Stop reason: HOSPADM

## 2018-03-28 RX ORDER — ONDANSETRON 4 MG/1
4 TABLET, ORALLY DISINTEGRATING ORAL EVERY 30 MIN PRN
Status: DISCONTINUED | OUTPATIENT
Start: 2018-03-28 | End: 2018-03-28 | Stop reason: HOSPADM

## 2018-03-28 RX ORDER — HYDRALAZINE HYDROCHLORIDE 20 MG/ML
2.5-5 INJECTION INTRAMUSCULAR; INTRAVENOUS EVERY 10 MIN PRN
Status: DISCONTINUED | OUTPATIENT
Start: 2018-03-28 | End: 2018-03-28 | Stop reason: HOSPADM

## 2018-03-28 RX ORDER — ONDANSETRON 2 MG/ML
INJECTION INTRAMUSCULAR; INTRAVENOUS PRN
Status: DISCONTINUED | OUTPATIENT
Start: 2018-03-28 | End: 2018-03-28

## 2018-03-28 RX ORDER — SODIUM CHLORIDE, SODIUM LACTATE, POTASSIUM CHLORIDE, CALCIUM CHLORIDE 600; 310; 30; 20 MG/100ML; MG/100ML; MG/100ML; MG/100ML
INJECTION, SOLUTION INTRAVENOUS CONTINUOUS
Status: DISCONTINUED | OUTPATIENT
Start: 2018-03-28 | End: 2018-03-28 | Stop reason: HOSPADM

## 2018-03-28 RX ORDER — METOPROLOL TARTRATE 1 MG/ML
1-2 INJECTION, SOLUTION INTRAVENOUS EVERY 5 MIN PRN
Status: DISCONTINUED | OUTPATIENT
Start: 2018-03-28 | End: 2018-03-28 | Stop reason: HOSPADM

## 2018-03-28 RX ORDER — CEFAZOLIN SODIUM 1 G/3ML
INJECTION, POWDER, FOR SOLUTION INTRAMUSCULAR; INTRAVENOUS PRN
Status: DISCONTINUED | OUTPATIENT
Start: 2018-03-28 | End: 2018-03-28

## 2018-03-28 RX ORDER — FENTANYL CITRATE 50 UG/ML
25-50 INJECTION, SOLUTION INTRAMUSCULAR; INTRAVENOUS
Status: DISCONTINUED | OUTPATIENT
Start: 2018-03-28 | End: 2018-03-28 | Stop reason: HOSPADM

## 2018-03-28 RX ORDER — DEXAMETHASONE SODIUM PHOSPHATE 4 MG/ML
INJECTION, SOLUTION INTRA-ARTICULAR; INTRALESIONAL; INTRAMUSCULAR; INTRAVENOUS; SOFT TISSUE PRN
Status: DISCONTINUED | OUTPATIENT
Start: 2018-03-28 | End: 2018-03-28

## 2018-03-28 RX ORDER — PROPOFOL 10 MG/ML
INJECTION, EMULSION INTRAVENOUS PRN
Status: DISCONTINUED | OUTPATIENT
Start: 2018-03-28 | End: 2018-03-28

## 2018-03-28 RX ORDER — ALBUMIN, HUMAN INJ 5% 5 %
SOLUTION INTRAVENOUS CONTINUOUS PRN
Status: DISCONTINUED | OUTPATIENT
Start: 2018-03-28 | End: 2018-03-28

## 2018-03-28 RX ORDER — FENTANYL CITRATE 50 UG/ML
INJECTION, SOLUTION INTRAMUSCULAR; INTRAVENOUS PRN
Status: DISCONTINUED | OUTPATIENT
Start: 2018-03-28 | End: 2018-03-28

## 2018-03-28 RX ORDER — SODIUM CHLORIDE, SODIUM LACTATE, POTASSIUM CHLORIDE, CALCIUM CHLORIDE 600; 310; 30; 20 MG/100ML; MG/100ML; MG/100ML; MG/100ML
INJECTION, SOLUTION INTRAVENOUS CONTINUOUS PRN
Status: DISCONTINUED | OUTPATIENT
Start: 2018-03-28 | End: 2018-03-28

## 2018-03-28 RX ORDER — NALOXONE HYDROCHLORIDE 0.4 MG/ML
.1-.4 INJECTION, SOLUTION INTRAMUSCULAR; INTRAVENOUS; SUBCUTANEOUS
Status: DISCONTINUED | OUTPATIENT
Start: 2018-03-28 | End: 2018-03-28

## 2018-03-28 RX ORDER — CEFAZOLIN SODIUM 2 G/100ML
2 INJECTION, SOLUTION INTRAVENOUS
Status: DISCONTINUED | OUTPATIENT
Start: 2018-03-28 | End: 2018-03-28 | Stop reason: HOSPADM

## 2018-03-28 RX ORDER — LIDOCAINE HYDROCHLORIDE 20 MG/ML
INJECTION, SOLUTION INFILTRATION; PERINEURAL PRN
Status: DISCONTINUED | OUTPATIENT
Start: 2018-03-28 | End: 2018-03-28

## 2018-03-28 RX ADMIN — FENTANYL CITRATE 50 MCG: 50 INJECTION, SOLUTION INTRAMUSCULAR; INTRAVENOUS at 16:46

## 2018-03-28 RX ADMIN — PIPERACILLIN SODIUM AND TAZOBACTAM SODIUM 3.38 G: 3; .375 INJECTION, POWDER, LYOPHILIZED, FOR SOLUTION INTRAVENOUS at 21:44

## 2018-03-28 RX ADMIN — CALCIUM GLUCONATE 60 ML/HR: 98 INJECTION, SOLUTION INTRAVENOUS at 13:21

## 2018-03-28 RX ADMIN — I.V. FAT EMULSION 250 ML: 20 EMULSION INTRAVENOUS at 19:34

## 2018-03-28 RX ADMIN — ROCURONIUM BROMIDE 20 MG: 10 INJECTION INTRAVENOUS at 14:02

## 2018-03-28 RX ADMIN — ROCURONIUM BROMIDE 10 MG: 10 INJECTION INTRAVENOUS at 15:15

## 2018-03-28 RX ADMIN — FENTANYL CITRATE 50 MCG: 50 INJECTION, SOLUTION INTRAMUSCULAR; INTRAVENOUS at 14:02

## 2018-03-28 RX ADMIN — Medication 10 MEQ: at 11:16

## 2018-03-28 RX ADMIN — PIPERACILLIN SODIUM AND TAZOBACTAM SODIUM 3.38 G: 3; .375 INJECTION, POWDER, LYOPHILIZED, FOR SOLUTION INTRAVENOUS at 15:15

## 2018-03-28 RX ADMIN — PIPERACILLIN SODIUM AND TAZOBACTAM SODIUM 3.38 G: 3; .375 INJECTION, POWDER, LYOPHILIZED, FOR SOLUTION INTRAVENOUS at 09:21

## 2018-03-28 RX ADMIN — FLUCONAZOLE 400 MG: 2 INJECTION INTRAVENOUS at 19:37

## 2018-03-28 RX ADMIN — DEXAMETHASONE SODIUM PHOSPHATE 4 MG: 4 INJECTION, SOLUTION INTRA-ARTICULAR; INTRALESIONAL; INTRAMUSCULAR; INTRAVENOUS; SOFT TISSUE at 14:06

## 2018-03-28 RX ADMIN — POTASSIUM PHOSPHATE, MONOBASIC AND POTASSIUM PHOSPHATE, DIBASIC 15 MMOL: 224; 236 INJECTION, SOLUTION INTRAVENOUS at 11:57

## 2018-03-28 RX ADMIN — SODIUM CHLORIDE, POTASSIUM CHLORIDE, SODIUM LACTATE AND CALCIUM CHLORIDE: 600; 310; 30; 20 INJECTION, SOLUTION INTRAVENOUS at 13:21

## 2018-03-28 RX ADMIN — METOPROLOL TARTRATE 5 MG: 5 INJECTION INTRAVENOUS at 02:56

## 2018-03-28 RX ADMIN — CEFAZOLIN 1 G: 1 INJECTION, POWDER, FOR SOLUTION INTRAMUSCULAR; INTRAVENOUS at 16:16

## 2018-03-28 RX ADMIN — CEFAZOLIN 2 G: 1 INJECTION, POWDER, FOR SOLUTION INTRAMUSCULAR; INTRAVENOUS at 13:53

## 2018-03-28 RX ADMIN — ROCURONIUM BROMIDE 20 MG: 10 INJECTION INTRAVENOUS at 14:39

## 2018-03-28 RX ADMIN — LEVALBUTEROL HYDROCHLORIDE 0.63 MG: 0.63 SOLUTION RESPIRATORY (INHALATION) at 21:27

## 2018-03-28 RX ADMIN — CALCIUM GLUCONATE: 98 INJECTION, SOLUTION INTRAVENOUS at 19:37

## 2018-03-28 RX ADMIN — PANTOPRAZOLE SODIUM 40 MG: 40 INJECTION, POWDER, FOR SOLUTION INTRAVENOUS at 09:22

## 2018-03-28 RX ADMIN — PIPERACILLIN SODIUM AND TAZOBACTAM SODIUM 3.38 G: 3; .375 INJECTION, POWDER, LYOPHILIZED, FOR SOLUTION INTRAVENOUS at 02:54

## 2018-03-28 RX ADMIN — FENTANYL CITRATE 50 MCG: 50 INJECTION, SOLUTION INTRAMUSCULAR; INTRAVENOUS at 16:17

## 2018-03-28 RX ADMIN — ROCURONIUM BROMIDE 50 MG: 10 INJECTION INTRAVENOUS at 13:36

## 2018-03-28 RX ADMIN — FENTANYL CITRATE 50 MCG: 50 INJECTION, SOLUTION INTRAMUSCULAR; INTRAVENOUS at 14:39

## 2018-03-28 RX ADMIN — ONDANSETRON 4 MG: 2 INJECTION INTRAMUSCULAR; INTRAVENOUS at 15:11

## 2018-03-28 RX ADMIN — SUGAMMADEX 200 MG: 100 INJECTION, SOLUTION INTRAVENOUS at 16:22

## 2018-03-28 RX ADMIN — DEXAMETHASONE SODIUM PHOSPHATE 6 MG: 4 INJECTION, SOLUTION INTRA-ARTICULAR; INTRALESIONAL; INTRAMUSCULAR; INTRAVENOUS; SOFT TISSUE at 16:17

## 2018-03-28 RX ADMIN — SODIUM CHLORIDE SOLN NEBU 3% 3 ML: 3 NEBU SOLN at 21:27

## 2018-03-28 RX ADMIN — FENTANYL CITRATE 50 MCG: 50 INJECTION, SOLUTION INTRAMUSCULAR; INTRAVENOUS at 13:36

## 2018-03-28 RX ADMIN — PROPOFOL 80 MG: 10 INJECTION, EMULSION INTRAVENOUS at 16:42

## 2018-03-28 RX ADMIN — PROPOFOL 100 MG: 10 INJECTION, EMULSION INTRAVENOUS at 13:36

## 2018-03-28 RX ADMIN — MIDAZOLAM 2 MG: 1 INJECTION INTRAMUSCULAR; INTRAVENOUS at 13:30

## 2018-03-28 RX ADMIN — SODIUM CHLORIDE SOLN NEBU 3% 3 ML: 3 NEBU SOLN at 08:02

## 2018-03-28 RX ADMIN — ALBUMIN HUMAN: 0.05 INJECTION, SOLUTION INTRAVENOUS at 15:08

## 2018-03-28 RX ADMIN — Medication 10 MEQ: at 12:45

## 2018-03-28 RX ADMIN — LEVALBUTEROL HYDROCHLORIDE 0.63 MG: 0.63 SOLUTION RESPIRATORY (INHALATION) at 08:02

## 2018-03-28 RX ADMIN — HYDROMORPHONE HYDROCHLORIDE: 10 INJECTION, SOLUTION INTRAMUSCULAR; INTRAVENOUS; SUBCUTANEOUS at 05:09

## 2018-03-28 RX ADMIN — LIDOCAINE HYDROCHLORIDE 100 MG: 20 INJECTION, SOLUTION INFILTRATION; PERINEURAL at 16:42

## 2018-03-28 ASSESSMENT — PAIN DESCRIPTION - DESCRIPTORS: DESCRIPTORS: ACHING

## 2018-03-28 NOTE — PROGRESS NOTES
Therapy: ENTERAL  Insurance: MA PENDING  Ded: $0  Met: $0    Co-Insurance: 100%  Max Out of Pocket: $0  Met: $0    Please contact Intake with any questions, [phone] or In Basket pool, [facility] ([phone]).    In reference to admission on 3/18/18 to check enteral nutrition coverage. If MA does not go through, pt is responsible for all charges.

## 2018-03-28 NOTE — PROGRESS NOTES
THORACIC SURGERY PROGRESS NOTE    Patient: Michael Saldivar  MRN: [id number]    Subjective  No acute overnight events. Continues to have good pain control on PCA. Denies nausea. Ambulating frequently and independently. No BM today, had one the day before    Objective  Temp:  [97.4  F (36.3  C)-99.7  F (37.6  C)] 97.6  F (36.4  C)  Pulse:  [] 95  Heart Rate:  [] 87  Resp:  [16-18] 18  BP: (117-142)/(60-73) 123/68  SpO2:  [93 %-98 %] 98 %    I/O last 3 completed shifts:  In: 3057.16 [I.V.:810]  Out: 2050 [Urine:1125; Emesis/NG output:925]    PEG tube to gravity: 675 ml yesterday  Preston drain: 5 mL serous output    Gen: middle aged male in NAD, sitting up in bed  CV: regular rhythm, mild intermittent trachycardia (low 100s)  Pulm: respirations even and unlabored on RA  Abd:  Soft, nondistended, nontender. Incisions c/d/i  Ext: warm and well perfused, no edema  Neuro: flat affect. Moves all four ext without apparent deficit    Labs:  Results for orders placed or performed during the hospital encounter of 03/18/18 (from the past 24 hour(s))   Phosphorus   Result Value Ref Range    Phosphorus 2.7 2.5 - 4.5 mg/dL   Basic metabolic panel   Result Value Ref Range    Sodium 140 133 - 144 mmol/L    Potassium 3.8 3.4 - 5.3 mmol/L    Chloride 109 94 - 109 mmol/L    Carbon Dioxide 22 20 - 32 mmol/L    Anion Gap 8 3 - 14 mmol/L    Glucose 129 (H) 70 - 99 mg/dL    Urea Nitrogen 19 7 - 30 mg/dL    Creatinine 0.72 0.66 - 1.25 mg/dL    GFR Estimate >90 >60 mL/min/1.7m2    GFR Estimate If Black >90 >60 mL/min/1.7m2    Calcium 8.2 (L) 8.5 - 10.1 mg/dL   CBC with platelets   Result Value Ref Range    WBC 13.0 (H) 4.0 - 11.0 10e9/L    RBC Count 3.47 (L) 4.4 - 5.9 10e12/L    Hemoglobin 7.0 (L) 13.3 - 17.7 g/dL    Hematocrit 24.9 (L) 40.0 - 53.0 %    MCV 72 (L) 78 - 100 fl    MCH 20.2 (L) 26.5 - 33.0 pg    MCHC 28.1 (L) 31.5 - 36.5 g/dL    RDW 20.0 (H) 10.0 - 15.0 %    Platelet Count 272 150 - 450 10e9/L   Magnesium   Result  Value Ref Range    Magnesium 2.2 1.6 - 2.3 mg/dL   Lactic acid level STAT for sepsis protocol   Result Value Ref Range    Lactate for Sepsis Protocol 0.8 0.4 - 1.9 mmol/L       No new imaging    Assessment & Plan  56 yo male s/p 3/19 L thoracotomy, primary repair of esophageal perforation, PEG tube placement, and chest tube placement x 4 (3 left, 1 right). Initially recovering appropriately from surgery, now complicated by presence of leak from esophageal repair.    Plan for OR today for EGD, pharyngostomy and GJ placement    Neuro: rectal APAP PRN, dilaudid PCA  CV: IV metoprolol Q6H   Pulm: levalbuterol neds BID  GI/FEN: TPN + lipids at goal. IV protonix QD. IV anti-emetics PRN. Enema PRN. Electrolyte replacement protocol  : UOPA, voiding independently    Heme: anemia, not critically low on recheck.   Abx: WBC count stable, continue zosyn and fluconazole    Discussed with staff.     Kayla Yung MD  PGY-3 General Surgery  [facility]

## 2018-03-28 NOTE — PLAN OF CARE
Pt transferred to unit from [facility] at 7 pm. Sats in mid 90s on 2 L NC. Alert and oriented x4. Denies pain. PCA at 0.2 mg q 10 min but pt has not used it this shift.  Continuous TPN infusing at 60 ml/hr and lipids at 20.8 ml/hr. +hypo. Strict NPO. G tube to gravity with 250 ml green output. MALINA so suction with no output. IV abx given via left triple lumen PICC line. Left chest incision with steri strips. Hg stable at 7.0.  Up to bathroom with SBA. Voiding x1. Plan for J extension placement  tomorrow in IR. Continue with poc

## 2018-03-28 NOTE — PLAN OF CARE
Problem: Patient Care Overview  Goal: Plan of Care/Patient Progress Review  Discharge Planner OT   Patient plan for discharge: home with assist as needed from family  Current status: Pt unable to recall thoracotomy precautions or teach back thoracotomy stretches. Pt ambulating ~250ft SBA using no AD.  Barriers to return to prior living situation: Deconditioning, surgical status  Recommendations for discharge: home with assist as needed for ADLs requiring >10lbs lifting  Rationale for recommendations: Pt to benefit from skilled OT to increase functional/strength endurance and training in compensatory strategies for increased safety and I with ADL/IADLs.       Entered by: Maritza Sheffield 03/28/2018 1:39 PM

## 2018-03-28 NOTE — ANESTHESIA CARE TRANSFER NOTE
Patient: Michael Saldivar    Procedure(s):  Esophagogastroduodenoscopy, Pharyngostomy Tube Placement, Gastroplexy, Feeding Tube Exchange - Wound Class: II-Clean Contaminated   - Wound Class: II-Clean Contaminated   - Wound Class: I-Clean    Diagnosis: Esophageal Perforation   Diagnosis Additional Information: No value filed.    Anesthesia Type:   General     Note:  Airway :Nasal Cannula  Patient transferred to:PACU  Comments: Anesthesia Care Transfer Note    Patient: Michael Saldivar    Transferred to: PACU    Patient vital signs: stable    Airway: none    Monitors on, VSS, pt. Stable, Report given to PACU RN.     Keshav Alejandro CRNA  3/28/2018 5:10 PM      Handoff Report: Identifed the Patient, Identified the Reponsible Provider, Reviewed the pertinent medical history, Discussed the surgical course, Reviewed Intra-OP anesthesia mangement and issues during anesthesia, Set expectations for post-procedure period and Allowed opportunity for questions and acknowledgement of understanding      Vitals: (Last set prior to Anesthesia Care Transfer)    CRNA VITALS  3/28/2018 1636 - 3/28/2018 1710      3/28/2018             Pulse: 97    Ht Rate: 98    SpO2: 100 %    Resp Rate (observed): 10                Electronically Signed By: KATHRYN Cordoba CRNA  March 28, 2018  5:10 PM

## 2018-03-28 NOTE — ANESTHESIA PREPROCEDURE EVALUATION
Anesthesia Evaluation     . Pt has had prior anesthetic. Type: MAC and General           ROS/MED HX    ENT/Pulmonary:  - neg pulmonary ROS     Neurologic:  - neg neurologic ROS     Cardiovascular:  - neg cardiovascular ROS       METS/Exercise Tolerance:     Hematologic:  - neg hematologic  ROS       Musculoskeletal:  - neg musculoskeletal ROS       GI/Hepatic:     (+) Other GI/Hepatic perforated esophagus      Renal/Genitourinary:  - ROS Renal section negative       Endo:  - neg endo ROS       Psychiatric:  - neg psychiatric ROS       Infectious Disease:  - neg infectious disease ROS       Malignancy:         Other:                     Physical Exam  Normal systems: cardiovascular and pulmonary    Airway   Mallampati: II  TM distance: >3 FB  Neck ROM: full    Dental   (+) missing and chipped    Cardiovascular       Pulmonary                     Anesthesia Plan      History & Physical Review  History and physical reviewed and following examination; no interval change.    ASA Status:  3 .    NPO Status:  > 8 hours    Plan for General and ETT with Propofol induction. Maintenance will be Balanced.    PONV prophylaxis:  Ondansetron (or other 5HT-3)  Additional equipment: Videolaryngoscope and 2nd IV      Postoperative Care  Postoperative pain management:  IV analgesics.      Consents  Anesthetic plan, risks, benefits and alternatives discussed with:  Patient.  Use of blood products discussed: Yes.   Use of blood products discussed with Patient.  Consented to blood products.  .                          .

## 2018-03-28 NOTE — ANESTHESIA POSTPROCEDURE EVALUATION
Patient: Michael Saldivar    Procedure(s):  Esophagogastroduodenoscopy, Pharyngostomy Tube Placement, Gastroplexy, Feeding Tube Exchange - Wound Class: II-Clean Contaminated   - Wound Class: II-Clean Contaminated   - Wound Class: I-Clean    Diagnosis:Esophageal Perforation   Diagnosis Additional Information: No value filed.    Anesthesia Type:  General    Note:  Anesthesia Post Evaluation    Patient location during evaluation: PACU  Patient participation: Able to fully participate in evaluation  Level of consciousness: awake and alert  Pain management: adequate  Airway patency: patent  Cardiovascular status: acceptable  Respiratory status: acceptable  Hydration status: acceptable  PONV: none     Anesthetic complications: None          Last vitals:  Vitals:    03/28/18 0252 03/28/18 0700 03/28/18 0802   BP: 120/68 123/68    Pulse:  95    Resp:  18    Temp:  36.4  C (97.6  F)    SpO2:  93% 98%         Electronically Signed By: Kate Meneses MD  March 28, 2018  5:16 PM

## 2018-03-28 NOTE — PLAN OF CARE
Problem: Patient Care Overview  Goal: Plan of Care/Patient Progress Review  Outcome: No Change  Vital signs:  Temp: 97.4  F (36.3  C) Temp src: Oral BP: 120/68 Pulse: 108 Heart Rate: 87 Resp: 18 SpO2: 93 % O2 Device: Nasal cannula Oxygen Delivery: 2 LPM   VSS. Denies pain and nausea. L chest incision with steri strips, C/D/I. MALINA in place with no output. G-tube to gravity with brown output. Triple lumen PICC with TPN and lipids infusing and PCA dilaudid. Scheduled abx and metoprolol infused. Voiding adequate amounts in urinal at bedside. Plan for J tube extension in IR today. Appears to sleep between cares.

## 2018-03-29 ENCOUNTER — APPOINTMENT (OUTPATIENT)
Dept: GENERAL RADIOLOGY | Facility: CLINIC | Age: 58
End: 2018-03-29
Attending: PHYSICIAN ASSISTANT
Payer: MEDICAID

## 2018-03-29 ENCOUNTER — ANESTHESIA EVENT (OUTPATIENT)
Dept: SURGERY | Facility: CLINIC | Age: 58
End: 2018-03-29

## 2018-03-29 ENCOUNTER — APPOINTMENT (OUTPATIENT)
Dept: OCCUPATIONAL THERAPY | Facility: CLINIC | Age: 58
End: 2018-03-29
Attending: THORACIC SURGERY (CARDIOTHORACIC VASCULAR SURGERY)
Payer: MEDICAID

## 2018-03-29 LAB
ANION GAP SERPL CALCULATED.3IONS-SCNC: 8 MMOL/L (ref 3–14)
BUN SERPL-MCNC: 16 MG/DL (ref 7–30)
CALCIUM SERPL-MCNC: 8.5 MG/DL (ref 8.5–10.1)
CHLORIDE SERPL-SCNC: 111 MMOL/L (ref 94–109)
CO2 SERPL-SCNC: 22 MMOL/L (ref 20–32)
CREAT SERPL-MCNC: 0.6 MG/DL (ref 0.66–1.25)
ERYTHROCYTE [DISTWIDTH] IN BLOOD BY AUTOMATED COUNT: 21.2 % (ref 10–15)
GFR SERPL CREATININE-BSD FRML MDRD: >90 ML/MIN/1.7M2
GLUCOSE BLDC GLUCOMTR-MCNC: 141 MG/DL (ref 70–99)
GLUCOSE BLDC GLUCOMTR-MCNC: 168 MG/DL (ref 70–99)
GLUCOSE BLDC GLUCOMTR-MCNC: 168 MG/DL (ref 70–99)
GLUCOSE BLDC GLUCOMTR-MCNC: 170 MG/DL (ref 70–99)
GLUCOSE SERPL-MCNC: 164 MG/DL (ref 70–99)
HCT VFR BLD AUTO: 24.4 % (ref 40–53)
HGB BLD-MCNC: 6.9 G/DL (ref 13.3–17.7)
MAGNESIUM SERPL-MCNC: 2.3 MG/DL (ref 1.6–2.3)
MCH RBC QN AUTO: 20.3 PG (ref 26.5–33)
MCHC RBC AUTO-ENTMCNC: 28.3 G/DL (ref 31.5–36.5)
MCV RBC AUTO: 72 FL (ref 78–100)
PHOSPHATE SERPL-MCNC: 1.8 MG/DL (ref 2.5–4.5)
PHOSPHATE SERPL-MCNC: 2.8 MG/DL (ref 2.5–4.5)
PLATELET # BLD AUTO: 392 10E9/L (ref 150–450)
POTASSIUM SERPL-SCNC: 4 MMOL/L (ref 3.4–5.3)
RBC # BLD AUTO: 3.4 10E12/L (ref 4.4–5.9)
SODIUM SERPL-SCNC: 141 MMOL/L (ref 133–144)
WBC # BLD AUTO: 18.5 10E9/L (ref 4–11)

## 2018-03-29 PROCEDURE — 12000008 ZZH R&B INTERMEDIATE UMMC

## 2018-03-29 PROCEDURE — 93010 ELECTROCARDIOGRAM REPORT: CPT | Performed by: INTERNAL MEDICINE

## 2018-03-29 PROCEDURE — 94640 AIRWAY INHALATION TREATMENT: CPT

## 2018-03-29 PROCEDURE — 80048 BASIC METABOLIC PNL TOTAL CA: CPT | Performed by: STUDENT IN AN ORGANIZED HEALTH CARE EDUCATION/TRAINING PROGRAM

## 2018-03-29 PROCEDURE — 25000128 H RX IP 250 OP 636: Performed by: NURSE PRACTITIONER

## 2018-03-29 PROCEDURE — 84100 ASSAY OF PHOSPHORUS: CPT | Performed by: STUDENT IN AN ORGANIZED HEALTH CARE EDUCATION/TRAINING PROGRAM

## 2018-03-29 PROCEDURE — 00000146 ZZHCL STATISTIC GLUCOSE BY METER IP

## 2018-03-29 PROCEDURE — 83605 ASSAY OF LACTIC ACID: CPT | Performed by: THORACIC SURGERY (CARDIOTHORACIC VASCULAR SURGERY)

## 2018-03-29 PROCEDURE — 85027 COMPLETE CBC AUTOMATED: CPT | Performed by: STUDENT IN AN ORGANIZED HEALTH CARE EDUCATION/TRAINING PROGRAM

## 2018-03-29 PROCEDURE — 94640 AIRWAY INHALATION TREATMENT: CPT | Mod: 76

## 2018-03-29 PROCEDURE — 40000133 ZZH STATISTIC OT WARD VISIT: Performed by: OCCUPATIONAL THERAPIST

## 2018-03-29 PROCEDURE — 84100 ASSAY OF PHOSPHORUS: CPT | Performed by: THORACIC SURGERY (CARDIOTHORACIC VASCULAR SURGERY)

## 2018-03-29 PROCEDURE — 71045 X-RAY EXAM CHEST 1 VIEW: CPT

## 2018-03-29 PROCEDURE — 25000128 H RX IP 250 OP 636: Performed by: STUDENT IN AN ORGANIZED HEALTH CARE EDUCATION/TRAINING PROGRAM

## 2018-03-29 PROCEDURE — 25000125 ZZHC RX 250: Performed by: THORACIC SURGERY (CARDIOTHORACIC VASCULAR SURGERY)

## 2018-03-29 PROCEDURE — 25000125 ZZHC RX 250: Performed by: STUDENT IN AN ORGANIZED HEALTH CARE EDUCATION/TRAINING PROGRAM

## 2018-03-29 PROCEDURE — 84132 ASSAY OF SERUM POTASSIUM: CPT | Performed by: THORACIC SURGERY (CARDIOTHORACIC VASCULAR SURGERY)

## 2018-03-29 PROCEDURE — 25000125 ZZHC RX 250: Performed by: NURSE PRACTITIONER

## 2018-03-29 PROCEDURE — 40000275 ZZH STATISTIC RCP TIME EA 10 MIN

## 2018-03-29 PROCEDURE — 25000128 H RX IP 250 OP 636: Performed by: PHYSICIAN ASSISTANT

## 2018-03-29 PROCEDURE — 25000128 H RX IP 250 OP 636

## 2018-03-29 PROCEDURE — 93005 ELECTROCARDIOGRAM TRACING: CPT

## 2018-03-29 PROCEDURE — 36592 COLLECT BLOOD FROM PICC: CPT | Performed by: THORACIC SURGERY (CARDIOTHORACIC VASCULAR SURGERY)

## 2018-03-29 PROCEDURE — 36592 COLLECT BLOOD FROM PICC: CPT | Performed by: STUDENT IN AN ORGANIZED HEALTH CARE EDUCATION/TRAINING PROGRAM

## 2018-03-29 PROCEDURE — 25000132 ZZH RX MED GY IP 250 OP 250 PS 637: Performed by: THORACIC SURGERY (CARDIOTHORACIC VASCULAR SURGERY)

## 2018-03-29 PROCEDURE — 83735 ASSAY OF MAGNESIUM: CPT | Performed by: STUDENT IN AN ORGANIZED HEALTH CARE EDUCATION/TRAINING PROGRAM

## 2018-03-29 PROCEDURE — 97530 THERAPEUTIC ACTIVITIES: CPT | Mod: GO | Performed by: OCCUPATIONAL THERAPIST

## 2018-03-29 RX ORDER — CEFAZOLIN SODIUM 2 G/100ML
2 INJECTION, SOLUTION INTRAVENOUS
Status: CANCELLED | OUTPATIENT
Start: 2018-03-29

## 2018-03-29 RX ORDER — FENTANYL CITRATE 50 UG/ML
25-50 INJECTION, SOLUTION INTRAMUSCULAR; INTRAVENOUS
Status: CANCELLED | OUTPATIENT
Start: 2018-03-29

## 2018-03-29 RX ORDER — NALOXONE HYDROCHLORIDE 0.4 MG/ML
.1-.4 INJECTION, SOLUTION INTRAMUSCULAR; INTRAVENOUS; SUBCUTANEOUS
Status: CANCELLED | OUTPATIENT
Start: 2018-03-29 | End: 2018-03-30

## 2018-03-29 RX ORDER — CEFAZOLIN SODIUM 1 G
1 VIAL (EA) INJECTION SEE ADMIN INSTRUCTIONS
Status: CANCELLED | OUTPATIENT
Start: 2018-03-29

## 2018-03-29 RX ORDER — ONDANSETRON 4 MG/1
4 TABLET, ORALLY DISINTEGRATING ORAL EVERY 30 MIN PRN
Status: CANCELLED | OUTPATIENT
Start: 2018-03-29

## 2018-03-29 RX ORDER — SODIUM CHLORIDE, SODIUM LACTATE, POTASSIUM CHLORIDE, CALCIUM CHLORIDE 600; 310; 30; 20 MG/100ML; MG/100ML; MG/100ML; MG/100ML
INJECTION, SOLUTION INTRAVENOUS CONTINUOUS
Status: CANCELLED | OUTPATIENT
Start: 2018-03-29

## 2018-03-29 RX ORDER — HYDROMORPHONE HYDROCHLORIDE 1 MG/ML
.3-.5 INJECTION, SOLUTION INTRAMUSCULAR; INTRAVENOUS; SUBCUTANEOUS EVERY 5 MIN PRN
Status: CANCELLED | OUTPATIENT
Start: 2018-03-29

## 2018-03-29 RX ORDER — ONDANSETRON 2 MG/ML
4 INJECTION INTRAMUSCULAR; INTRAVENOUS EVERY 30 MIN PRN
Status: CANCELLED | OUTPATIENT
Start: 2018-03-29

## 2018-03-29 RX ADMIN — SODIUM CHLORIDE SOLN NEBU 3% 3 ML: 3 NEBU SOLN at 19:29

## 2018-03-29 RX ADMIN — LEVALBUTEROL HYDROCHLORIDE 0.63 MG: 0.63 SOLUTION RESPIRATORY (INHALATION) at 19:29

## 2018-03-29 RX ADMIN — POTASSIUM PHOSPHATE, MONOBASIC AND POTASSIUM PHOSPHATE, DIBASIC 20 MMOL: 224; 236 INJECTION, SOLUTION INTRAVENOUS at 10:39

## 2018-03-29 RX ADMIN — PANTOPRAZOLE SODIUM 40 MG: 40 INJECTION, POWDER, FOR SOLUTION INTRAVENOUS at 08:07

## 2018-03-29 RX ADMIN — SODIUM CHLORIDE, PRESERVATIVE FREE 5 ML: 5 INJECTION INTRAVENOUS at 21:36

## 2018-03-29 RX ADMIN — CALCIUM GLUCONATE: 98 INJECTION, SOLUTION INTRAVENOUS at 19:32

## 2018-03-29 RX ADMIN — Medication 10 MEQ: at 14:31

## 2018-03-29 RX ADMIN — PIPERACILLIN SODIUM AND TAZOBACTAM SODIUM 3.38 G: 3; .375 INJECTION, POWDER, LYOPHILIZED, FOR SOLUTION INTRAVENOUS at 03:28

## 2018-03-29 RX ADMIN — LEVALBUTEROL HYDROCHLORIDE 0.63 MG: 0.63 SOLUTION RESPIRATORY (INHALATION) at 08:03

## 2018-03-29 RX ADMIN — I.V. FAT EMULSION 250 ML: 20 EMULSION INTRAVENOUS at 20:16

## 2018-03-29 RX ADMIN — SODIUM CHLORIDE SOLN NEBU 3% 3 ML: 3 NEBU SOLN at 08:04

## 2018-03-29 RX ADMIN — Medication 10 MEQ: at 10:05

## 2018-03-29 RX ADMIN — PIPERACILLIN SODIUM AND TAZOBACTAM SODIUM 3.38 G: 3; .375 INJECTION, POWDER, LYOPHILIZED, FOR SOLUTION INTRAVENOUS at 12:30

## 2018-03-29 RX ADMIN — PIPERACILLIN SODIUM AND TAZOBACTAM SODIUM 3.38 G: 3; .375 INJECTION, POWDER, LYOPHILIZED, FOR SOLUTION INTRAVENOUS at 18:51

## 2018-03-29 RX ADMIN — ENOXAPARIN SODIUM 40 MG: 40 INJECTION SUBCUTANEOUS at 19:32

## 2018-03-29 RX ADMIN — FLUCONAZOLE 400 MG: 2 INJECTION INTRAVENOUS at 19:36

## 2018-03-29 RX ADMIN — SODIUM CHLORIDE, PRESERVATIVE FREE 5 ML: 5 INJECTION INTRAVENOUS at 23:42

## 2018-03-29 ASSESSMENT — PAIN DESCRIPTION - DESCRIPTORS: DESCRIPTORS: ACHING

## 2018-03-29 NOTE — PROGRESS NOTES
Focus:  status  D:  Monitoring status  I:    Vitals taken        amb with OT without difficulty         cont'd on strict NPO        At 1300 pt called to the nurse and said he was SOB and unable to swallow, nurse went in to assess pt and found his with slight labored breathing, nurse encourage pt to relay and breath slowly, dr was called and vitals taken, dr came and checked the Pharngostomy tube which was coil in his throat, dr pulled the tube and uncoiled it, dr also place a CT, an EKG and XR was done also the the suction was place to LCS instead of LIS.  said it was ok for pt to amb and go to procedures off suction. Pt said he felt better after what the doctors did.       Voiding adequately and + flatus        Will have another CXR tomorrow        K+ and Phos replaced,  K+ will be checked tomorrow morning and the phos will be rechecked at 2000 and in AM        Dr aware of HGB of  6.9, dr aware  A:    Denied resp distress/cp nor any signs observed  P:    Report to oncoming nurse

## 2018-03-29 NOTE — PROGRESS NOTES
THORACIC & FOREGUT SURGERY    S:  No acute overnight events.  Pt seen at bedside resting comfortably.   Does note discomfort at pharyngostomy site.    O:  Vitals:    03/28/18 2349 03/29/18 0403 03/29/18 0800 03/29/18 1150   BP: 125/66 129/61 133/69 132/73   BP Location: Right arm Right arm  Right arm   Pulse:       Resp: 23 21 20 20   Temp: 96.6  F (35.9  C) 96.2  F (35.7  C) 96.9  F (36.1  C) 96  F (35.6  C)   TempSrc: Axillary Axillary  Oral   SpO2: 92% 94% 98% 99%   Weight:       Height:           A&Ox3, NAD  Breathing non-labored  RRR  Soft, NDNT  Distal extremities warm    A/P: Michael Saldivar is a 58 yo male s/p OR on 3/19- L thoracotomy, primary repair of esophageal perforation, PEG tube placement, and chest tube placement x 4 (3 left, 1 right). Initially recovering appropriately from surgery, now complicated by presence of leak from esophageal repair.  Takeback to OR on 3/28 for pharyngostomy placement and attempted G to GJ conversion.  Now stable.  -Continue NPO  -Continue TPN  -Continue abx  -Will consider surgical J in near future for feedings   -Lovenox    Han Carl PA-C  Thoracic Surgery

## 2018-03-29 NOTE — PLAN OF CARE
"3/29/18 Patient(pt) correctly returned G-J tube /Arvin pump skills,water flushes, medications via J-tube,tube site care and anchoring the tube using [facility] supplies on PLC model.Pt.able to answer and demonstrate \"teach back \" questions,and verbalized understanding of content presented.Home care to follow.Pt.to continue to practice skills on the unit with nursing supervision. Also see education flow sheet  Written material given and reviewed at today's appointment :Hand Washing,Caring For Your G-J tube,Arvin Pump instruction,PLC card         "

## 2018-03-29 NOTE — CONSULTS
IR consulted for GJ tube placement.    Case and imaging reviewed with Dr. Amador and Dr. Godoy from IR. Patient currently has PEG in place and team is looking to have GJ to vent and feed. Per op report from 3/28 surgeon had difficulty under direct visualization to replace G tube with GJ tube due to very tight angle and likely stricturing of the first and second portion of the duodenum. IR would have similar, if not more, difficulty maneuvering jejunal limb with wires and fluoroscopy. We would defer GJ tube placement at this time.    We recommend consideration of direct jejunostomy tube. This was reviewed with Dr. eNwell.    Antonietta Engle, SIXTO, APRN  Interventional Radiology   Phone: [phone]  Pager: [phone]

## 2018-03-29 NOTE — PROGRESS NOTES
Care Coordinator- Discharge Planning     Admission Date/Time:  3/18/2018  Attending MD:  Keith Ybarra MD     Data  Date of initial CC assessment:  3/27/2018  Chart reviewed, discussed with interdisciplinary team.   Patient was admitted for: No diagnosis found.     Assessment  Full assessment completed in previous note    Coordination of Care and Referrals: Provided patient/family with options for Home Infusion.    Per MD team plan for patient to get J tube to start tube feedings and will likely need these at time of discharge.  Spoke with financial counselors who reported that they assisted patient in filling out MA application and now is MA pending.  Met with patient at bedside to introduce RNCC role and discuss discharge planning.  Patient understands that it is possible that he will discharge to home with tube feedings due to his ongoing esophageal perforation.  Patient has patient learning teaching this am to learn about tube feeding cares.  After giving patient choice of home infusion agency patient chose [facility](P: [phone], F: [phone]).  Referral made and orders placed.  RNCC to continue to follow and assist with discharge planning as needed.         Plan  Anticipated Discharge Date: TBD    Anticipated Discharge Plan:  Home with \[facility]\"" for enteral nutrition support    Malini Parekh, JANELLECC  [phone]

## 2018-03-29 NOTE — PLAN OF CARE
Problem: Patient Care Overview  Goal: Plan of Care/Patient Progress Review  Discharge Planner OT   Patient plan for discharge: unstated  Current status: Pt inappropriate for OOB at this time due to procedure yesterday afternoon. Pt mod (I) in supine >EOB. Tolerating EOB well. Pt required repeated commands on multiple occasions and reports continued cognitive fatigue.  Barriers to return to prior living situation: Deconditioning, medical status, precautions of >10lbs lifting  Recommendations for discharge: TCU, due to procedure yesterday, pt displaying increased needs. Pt likely to progress in therapy to maintain safety at home after hospital LOS.  Rationale for recommendations: Pt to benefit from skilled OT to increase strength/functional endurance and training in restorative/compensatory strategies for increased safety and I with ADL/IADLs.       Entered by: Maritza Sheffield 03/29/2018 11:39 AM

## 2018-03-29 NOTE — OP NOTE
Procedure Date: 03/28/2018      DATE OF PROCEDURE: 03/28/2018.      PREOPERATIVE DIAGNOSIS:  Esophageal perforation, status post repair, now with leak.        POSTOPERATIVE DIAGNOSIS:  Esophageal perforation, status post repair, now with leak      PROCEDURE PERFORMED:  Esophagogastroduodenoscopy exchange of gastrostomy tube and pharyngostomy tube placement.      OPERATING SURGEON:  Maria Del Rsoario Flores MD      ASSISTANT SURGEON:  Kayla Oliva MD      OPERATIVE FINDINGS:  An area of stricturing at the GE junction, breakdown of repair of the esophagus leading to a cavity in the mediastinum.  Mediastinal Preston visualized through this cavity.  Attempt to replace gastrostomy with gastrojejunostomy unsuccessful due to a very tight angle and likely stricturing of first and second portion of the duodenum.      DETAILS OF PROCEDURE:  After obtaining informed consent, the patient was brought to the operating room and laid supine on the operating table.  After induction of general anesthesia, and introduction of an endotracheal tube, the patient was positioned supine.  We performed an upper endoscopy.  Upon entering the esophagus and going down to the GE junction, we noted that the GE junction appeared a little tight, however, right adjacent to this, the previous repair seemed to have broken down and led to a large cavity as described above.  We then proceeded to perform a gastropexy using the Bebeto-Melly and 0 Vicryl stitches around the preexisting PEG tube.  The PEG tube was then removed and a wire was inserted and we attempted multiple times with a 26-Finnish peel-away sheath and also with a pediatric endoscope through the sheath to intubate the duodenum.  However, there appeared to be a very tight angle and narrowing at the first and into the second portion of the duodenum and we were not able to accomplish this.  Hence, we decided at this point to replace the gastrostomy tube with a 20-Finnish GENEVIEVE-G-tube to provide access.   The patient will likely need an attempt by Interventional Radiology versus an open jejunostomy tube for feeding.  We then proceeded to perform the pharyngostomy tube using a Castellanos 3 blade and an MD Mancilla the posterior tonsillar pillar was identified.  An incision was made posterior, inferior and anterior to the angle of the mandible where the clamp emerged from the pharynx.   An 18 Malay Nash sump tube was then threaded through this tract.  The gastroscope was then passed into the mediastinal abscess cavity.  After thoroughly washing out this cavity, a 260 Super Stiff Amplatz wire was passed into the cavity using fluoroscopic guidance.  The scope was removed and the Nash sump tube was then threaded over the wire into the cavity and the wire was removed.  We used fluoroscopy to confirm the placement of this tube.  The tube was then secured in position.  The patient tolerated the procedure well.         KRISHAN DOYLE MD             D: 2018   T: 2018   MT: MD      Name:     MJ VELASCO   MRN:      [id number]        Account:        [id number]   :      1960           Procedure Date: 2018      Document: [id number]

## 2018-03-29 NOTE — PLAN OF CARE
"Problem: Patient Care Overview  Goal: Plan of Care/Patient Progress Review  Outcome: No Change  /66 (BP Location: Right arm)  Pulse 95  Temp 96.6  F (35.9  C) (Axillary)  Resp 23  Ht 1.676 m (5' 6\")  Wt 79.5 kg (175 lb 4.3 oz)  SpO2 92%  BMI 28.29 kg/m2  Pharyngostomy tube/on LIS with no output. Voiding adequately. Capnography IPI between 8-10. Vitals ok. PCA hydromorphone is effective for pain. Gtube under gravity/small output. Abdomen appear rounded/no flatus. Incentive with encouragement. Continue with plan of care.        "

## 2018-03-29 NOTE — ANESTHESIA PREPROCEDURE EVALUATION
Anesthesia Evaluation     . Pt has had prior anesthetic. Type: MAC and General           ROS/MED HX    ENT/Pulmonary:  - neg pulmonary ROS     Neurologic:  - neg neurologic ROS     Cardiovascular:  - neg cardiovascular ROS       METS/Exercise Tolerance:     Hematologic:  - neg hematologic  ROS       Musculoskeletal:  - neg musculoskeletal ROS       GI/Hepatic:     (+) Other GI/Hepatic perforated esophagus 3/18.  EGD with pharyngostomy placement 3/28.      Renal/Genitourinary:  - ROS Renal section negative       Endo:  - neg endo ROS       Psychiatric:  - neg psychiatric ROS       Infectious Disease:  - neg infectious disease ROS       Malignancy:      - no malignancy   Other:    - neg other ROS                                Anesthesia Plan      History & Physical Review  History and physical reviewed and following examination; no interval change.    ASA Status:  3 .        Plan for General and ETT with Intravenous and Propofol induction. Maintenance will be Balanced.    PONV prophylaxis:  Ondansetron (or other 5HT-3) and Dexamethasone or Solumedrol        56 yo M known to me after uneventful EGD and pharyngostomy tube placement on 3/28.  Was admitted on 3/18 after esophageal perforation.  Had difficulty under direct visualization to replace G tube with GJ tube on 3/28, now presenting for lap assisted jejunostomy tube.        Postoperative Care  Postoperative pain management:  Multi-modal analgesia.      Consents

## 2018-03-30 ENCOUNTER — APPOINTMENT (OUTPATIENT)
Dept: GENERAL RADIOLOGY | Facility: CLINIC | Age: 58
End: 2018-03-30
Attending: PHYSICIAN ASSISTANT
Payer: MEDICAID

## 2018-03-30 ENCOUNTER — APPOINTMENT (OUTPATIENT)
Dept: INTERVENTIONAL RADIOLOGY/VASCULAR | Facility: CLINIC | Age: 58
End: 2018-03-30
Attending: NURSE PRACTITIONER
Payer: MEDICAID

## 2018-03-30 ENCOUNTER — ANESTHESIA (OUTPATIENT)
Dept: SURGERY | Facility: CLINIC | Age: 58
End: 2018-03-30

## 2018-03-30 ENCOUNTER — APPOINTMENT (OUTPATIENT)
Dept: OCCUPATIONAL THERAPY | Facility: CLINIC | Age: 58
End: 2018-03-30
Attending: THORACIC SURGERY (CARDIOTHORACIC VASCULAR SURGERY)
Payer: MEDICAID

## 2018-03-30 LAB
ANION GAP SERPL CALCULATED.3IONS-SCNC: 9 MMOL/L (ref 3–14)
BUN SERPL-MCNC: 19 MG/DL (ref 7–30)
CALCIUM SERPL-MCNC: 8.6 MG/DL (ref 8.5–10.1)
CHLORIDE SERPL-SCNC: 112 MMOL/L (ref 94–109)
CO2 SERPL-SCNC: 21 MMOL/L (ref 20–32)
CREAT SERPL-MCNC: 0.64 MG/DL (ref 0.66–1.25)
ERYTHROCYTE [DISTWIDTH] IN BLOOD BY AUTOMATED COUNT: 21.5 % (ref 10–15)
GFR SERPL CREATININE-BSD FRML MDRD: >90 ML/MIN/1.7M2
GLUCOSE BLDC GLUCOMTR-MCNC: 130 MG/DL (ref 70–99)
GLUCOSE BLDC GLUCOMTR-MCNC: 131 MG/DL (ref 70–99)
GLUCOSE BLDC GLUCOMTR-MCNC: 142 MG/DL (ref 70–99)
GLUCOSE BLDC GLUCOMTR-MCNC: 148 MG/DL (ref 70–99)
GLUCOSE SERPL-MCNC: 124 MG/DL (ref 70–99)
HCT VFR BLD AUTO: 25.8 % (ref 40–53)
HGB BLD-MCNC: 7.2 G/DL (ref 13.3–17.7)
INTERPRETATION ECG - MUSE: NORMAL
LACTATE BLD-SCNC: 1.1 MMOL/L (ref 0.4–1.9)
MAGNESIUM SERPL-MCNC: 2.1 MG/DL (ref 1.6–2.3)
MCH RBC QN AUTO: 20.3 PG (ref 26.5–33)
MCHC RBC AUTO-ENTMCNC: 27.9 G/DL (ref 31.5–36.5)
MCV RBC AUTO: 73 FL (ref 78–100)
PHOSPHATE SERPL-MCNC: 2.3 MG/DL (ref 2.5–4.5)
PLATELET # BLD AUTO: 465 10E9/L (ref 150–450)
POTASSIUM SERPL-SCNC: 4 MMOL/L (ref 3.4–5.3)
RBC # BLD AUTO: 3.54 10E12/L (ref 4.4–5.9)
SODIUM SERPL-SCNC: 142 MMOL/L (ref 133–144)
WBC # BLD AUTO: 12.4 10E9/L (ref 4–11)

## 2018-03-30 PROCEDURE — 40000275 ZZH STATISTIC RCP TIME EA 10 MIN

## 2018-03-30 PROCEDURE — C1887 CATHETER, GUIDING: HCPCS

## 2018-03-30 PROCEDURE — 27210436 ZZH NUTRITION PRODUCT SEMIELEM INTERMED CAN

## 2018-03-30 PROCEDURE — 49446 CHANGE G-TUBE TO G-J PERC: CPT

## 2018-03-30 PROCEDURE — 25000128 H RX IP 250 OP 636: Performed by: NURSE PRACTITIONER

## 2018-03-30 PROCEDURE — C1769 GUIDE WIRE: HCPCS

## 2018-03-30 PROCEDURE — 25000125 ZZHC RX 250: Performed by: NURSE PRACTITIONER

## 2018-03-30 PROCEDURE — 94640 AIRWAY INHALATION TREATMENT: CPT

## 2018-03-30 PROCEDURE — 00000146 ZZHCL STATISTIC GLUCOSE BY METER IP

## 2018-03-30 PROCEDURE — 0DHA3UZ INSERTION OF FEEDING DEVICE INTO JEJUNUM, PERCUTANEOUS APPROACH: ICD-10-PCS | Performed by: RADIOLOGY

## 2018-03-30 PROCEDURE — 27210905 ZZH KIT CR7

## 2018-03-30 PROCEDURE — 71046 X-RAY EXAM CHEST 2 VIEWS: CPT

## 2018-03-30 PROCEDURE — 94640 AIRWAY INHALATION TREATMENT: CPT | Mod: 76

## 2018-03-30 PROCEDURE — 40000133 ZZH STATISTIC OT WARD VISIT: Performed by: OCCUPATIONAL THERAPIST

## 2018-03-30 PROCEDURE — 25000128 H RX IP 250 OP 636

## 2018-03-30 PROCEDURE — 84100 ASSAY OF PHOSPHORUS: CPT | Performed by: STUDENT IN AN ORGANIZED HEALTH CARE EDUCATION/TRAINING PROGRAM

## 2018-03-30 PROCEDURE — 25000128 H RX IP 250 OP 636: Performed by: STUDENT IN AN ORGANIZED HEALTH CARE EDUCATION/TRAINING PROGRAM

## 2018-03-30 PROCEDURE — 80048 BASIC METABOLIC PNL TOTAL CA: CPT | Performed by: STUDENT IN AN ORGANIZED HEALTH CARE EDUCATION/TRAINING PROGRAM

## 2018-03-30 PROCEDURE — 27210735 ZZH ACCESSORY CR12

## 2018-03-30 PROCEDURE — 25000125 ZZHC RX 250: Performed by: THORACIC SURGERY (CARDIOTHORACIC VASCULAR SURGERY)

## 2018-03-30 PROCEDURE — 36592 COLLECT BLOOD FROM PICC: CPT | Performed by: STUDENT IN AN ORGANIZED HEALTH CARE EDUCATION/TRAINING PROGRAM

## 2018-03-30 PROCEDURE — 85027 COMPLETE CBC AUTOMATED: CPT | Performed by: STUDENT IN AN ORGANIZED HEALTH CARE EDUCATION/TRAINING PROGRAM

## 2018-03-30 PROCEDURE — 83735 ASSAY OF MAGNESIUM: CPT | Performed by: STUDENT IN AN ORGANIZED HEALTH CARE EDUCATION/TRAINING PROGRAM

## 2018-03-30 PROCEDURE — 25000132 ZZH RX MED GY IP 250 OP 250 PS 637: Performed by: THORACIC SURGERY (CARDIOTHORACIC VASCULAR SURGERY)

## 2018-03-30 PROCEDURE — 97530 THERAPEUTIC ACTIVITIES: CPT | Mod: GO | Performed by: OCCUPATIONAL THERAPIST

## 2018-03-30 PROCEDURE — 12000003 ZZH R&B CRITICAL UMMC

## 2018-03-30 PROCEDURE — 27210814 ZZ H TUBE GASTRO CR12

## 2018-03-30 PROCEDURE — 25000125 ZZHC RX 250: Performed by: STUDENT IN AN ORGANIZED HEALTH CARE EDUCATION/TRAINING PROGRAM

## 2018-03-30 RX ADMIN — SODIUM CHLORIDE SOLN NEBU 3% 3 ML: 3 NEBU SOLN at 19:28

## 2018-03-30 RX ADMIN — HYDROMORPHONE HYDROCHLORIDE: 10 INJECTION, SOLUTION INTRAMUSCULAR; INTRAVENOUS; SUBCUTANEOUS at 23:12

## 2018-03-30 RX ADMIN — FLUCONAZOLE 400 MG: 2 INJECTION INTRAVENOUS at 20:29

## 2018-03-30 RX ADMIN — I.V. FAT EMULSION 250 ML: 20 EMULSION INTRAVENOUS at 19:44

## 2018-03-30 RX ADMIN — PIPERACILLIN SODIUM AND TAZOBACTAM SODIUM 3.38 G: 3; .375 INJECTION, POWDER, LYOPHILIZED, FOR SOLUTION INTRAVENOUS at 00:15

## 2018-03-30 RX ADMIN — POTASSIUM PHOSPHATE, MONOBASIC AND POTASSIUM PHOSPHATE, DIBASIC 15 MMOL: 224; 236 INJECTION, SOLUTION INTRAVENOUS at 14:09

## 2018-03-30 RX ADMIN — LEVALBUTEROL HYDROCHLORIDE 0.63 MG: 0.63 SOLUTION RESPIRATORY (INHALATION) at 07:47

## 2018-03-30 RX ADMIN — PANTOPRAZOLE SODIUM 40 MG: 40 INJECTION, POWDER, FOR SOLUTION INTRAVENOUS at 08:39

## 2018-03-30 RX ADMIN — CALCIUM GLUCONATE: 98 INJECTION, SOLUTION INTRAVENOUS at 19:38

## 2018-03-30 RX ADMIN — PIPERACILLIN SODIUM AND TAZOBACTAM SODIUM 3.38 G: 3; .375 INJECTION, POWDER, LYOPHILIZED, FOR SOLUTION INTRAVENOUS at 12:08

## 2018-03-30 RX ADMIN — LEVALBUTEROL HYDROCHLORIDE 0.63 MG: 0.63 SOLUTION RESPIRATORY (INHALATION) at 19:27

## 2018-03-30 RX ADMIN — PIPERACILLIN SODIUM AND TAZOBACTAM SODIUM 3.38 G: 3; .375 INJECTION, POWDER, LYOPHILIZED, FOR SOLUTION INTRAVENOUS at 17:26

## 2018-03-30 RX ADMIN — PIPERACILLIN SODIUM AND TAZOBACTAM SODIUM 3.38 G: 3; .375 INJECTION, POWDER, LYOPHILIZED, FOR SOLUTION INTRAVENOUS at 05:29

## 2018-03-30 RX ADMIN — SODIUM CHLORIDE SOLN NEBU 3% 3 ML: 3 NEBU SOLN at 07:48

## 2018-03-30 NOTE — OR NURSING
Called 7B to obtain updated report for pt to come to preop, advised by JANELLE Ling on 7B pt was taken to IR this am and GJ tube was successfully placed, she is notifying surgeon of this to determine if pt still needs further procedure will advise after discussion with surgeon

## 2018-03-30 NOTE — PLAN OF CARE
"Problem: Patient Care Overview  Goal: Plan of Care/Patient Progress Review  Outcome: No Change  /70 (BP Location: Right arm)  Pulse 95  Temp 97.8  F (36.6  C) (Oral)  Resp 16  Ht 1.676 m (5' 6\")  Wt 79.5 kg (175 lb 4.3 oz)  SpO2 98%  BMI 28.29 kg/m2. Pharyngostomy Tube remain intact and secure on the left chest/Low intermittent suction with no drainage.  Irrigating with saline per order. Gtube under gravity/small output. Pleura vac under gravity with pink tinged thick secretion. PCA hydromorphone for pain. Capnography IPI consistently at 10. Lactic at 1.1. Voiding adequately. TPN/Lipids running.   Continue with plan of care.       "

## 2018-03-30 NOTE — PROGRESS NOTES
Patient Name: Michael Saldivar  Medical Record Number: [id number]  Today's Date: 3/30/2018    Procedure: Convert Gastrostomy to Gastrojejunostomy tube   Proceduralist: Dr. Godoy    Procedure start time: 0920  Procedure end time: 1000    Report given to: LYNDSEY LUIS      Other Notes: Pt arrived to IR room 2 from 7236. Pt denies any questions or concerns regarding procedure. Pt positioned supine and monitored per protocol. Pt tolerated procedure without any noted complications. Pt transferred to [facility]

## 2018-03-30 NOTE — PROGRESS NOTES
CLINICAL NUTRITION SERVICES - BRIEF NOTE  *See RD note on 3/26 for last nutrition reassessment details  *Received consult: Registered Dietitian to Assess and Order TF per Medical Nutrition Therapy Recommendations (Comments: Trickle foods only please, via J tube)   Nutrition Prescription    RECOMMENDATIONS FOR MDs/PROVIDERS TO ORDER:  1. Once providers approve TF advance to goal, recommend increase rate by 10 mL Q8H as tolerated to goal rate Impact Peptide @ goal 60 ml/hr (1440 ml/day) to provide 2160 kcals (28 kcal/kg/day), 135 g PRO (1.8 g/kg/day), 1109 ml free H2O, 92 g Fat (50% from MCTs), 202 g CHO and no Fiber daily.      2. Recommendations for weaning off TPN  -- Once TF advances to 20 mL/hr, recommend d/c IV lipids  -- Wean off PN once TF advances to 40 mL/hr    Recommendations already ordered by Registered Dietitian (RD):  1. Start trickle TF of Isosource 1.5 @ 10 mL/hr    Addendum @ 1433: Thoracic team request using Impact Peptide formula to help support healing of wound/abscess , will change order to Impact Peptide 1.5 @ 10 mL/hr  2. 30 mL Q4H free water flushes for FT patency (does not provide full hydration)    Future/Additional Recommendations:  1. Once no longer on PN (contains MVI + trace elements), recommend order Certavite via FT (15 mL/day)    2. If requires full hydration via FT, recommend increase free water flushes to 70 mL Q2H [provides additional 840 mL/day free water; TF + free water would provide 1934 mL/day (25 mL/kg)]     INTERVENTIONS  Implementation  Enteral Nutrition - Initiate  Feeding tube flush - see above    Monitoring/Evaluation  Progress toward goals will be monitored and evaluated per protocol.     Sherri aBh, RD, LD   7B RD Pager: [phone]

## 2018-03-30 NOTE — OP NOTE
Interventional Radiology Brief Post Minor Procedure Note: Without Sedation    Pre Procedure Diagnosis:  leak from esophageal repair    Post Procedure Diagnosis: Same    Procedure: GI: Jejunal limb added to Gastrostomy tube    Proceduralist: Ashok Godoy MD    Time Out: Prior to the start of the procedure and with procedural staff participation, I verbally confirmed the patient s identity using two indicators, relevant allergies, that the procedure was appropriate and matched the consent or emergent situation, and that the correct equipment/implants were available. Immediately prior to starting the procedure I conducted the Time Out with the procedural staff and re-confirmed the patient s name, procedure, and site/side. (The Joint Commission universal protocol was followed.)  YES    Findings:   None.    Complications: None.    Estimated Blood Loss: None    Fluoroscopy Time: See dictation    SPECIMENS: None    Condition: Stable    Plan: use as needed     Comments: See dictated procedure note for full details      Libby Matta RPA

## 2018-03-30 NOTE — PLAN OF CARE
"Blood pressure 127/73, pulse (!) 8, temperature 96.4  F (35.8  C), temperature source Oral, resp. rate 17, height 1.676 m (5' 6\"), weight 79.5 kg (175 lb 4.3 oz), SpO2 98 %.    Pt afebrile, VSS. Noted dyspnea on exertion, on 1L of O2 sats between 94-97%. PCA at 0.2mls/10min. Has pharyngostomy to LIS with small output. Pharngostomy irrigated as per orders. Pt went down to IR for G/J tube creation, has G tube to gravity and J tube clamped. Plan for TF to start this evening, nutritional consult today. R.Chest drain to pleuro-vac to water seal, no output in chamber, dressing CDI. TPN and lipids running via TL PICC. On zosyn, fluconazole. Voiding adequately via urinal. +BM x1 this am, +BS. Phos 2.3, replacement running. Has been up SBA to bathroom. Will continue with POC.   "

## 2018-03-30 NOTE — PLAN OF CARE
Problem: Patient Care Overview  Goal: Plan of Care/Patient Progress Review  Discharge Planner OT   Patient plan for discharge: unstated   Current status: Pt mod I with bed mobility, ambulating >500 ft with no AD SBA. Pt reports no change in cognition, however appears distracted throughout session, continues to require multiple, direct commands.  Barriers to return to prior living situation: Post surgical precautions, deconditioning  Recommendations for discharge: TCU  Rationale for recommendations: Pt to benefit from skilled OT to increase strength/functional endurance and training in AE/compensatory strategies for increased safety and I with ADL/IADLs.       Entered by: Maritza Sheffield 03/30/2018 4:09 PM

## 2018-03-30 NOTE — PHARMACY-CONSULT NOTE
Pharmacy Tube Feeding Consult    Medication reviewed for administration by feeding tube and for potential food/drug interactions.    Recommendation: No changes are needed at this time.     Pharmacy will continue to follow as new medications are ordered.    Nuvia Fields, PharmD, BCPS

## 2018-03-30 NOTE — PROGRESS NOTES
THORACIC & FOREGUT SURGERY    S:  No acute overnight events.  Pt seen at bedside resting comfortably.   Pharyngostomy discomfort improved after repositioning.    O:  Vitals:    03/30/18 0748 03/30/18 0920 03/30/18 1000 03/30/18 1223   BP:  128/75 126/73 127/73   BP Location:  Right arm Right arm Right arm   Pulse:  90 (!) 8    Resp:  17 20 17   Temp:    96.4  F (35.8  C)   TempSrc:    Oral   SpO2: 97% 96% 96% 98%   Weight:       Height:           A&Ox3, NAD  Breathing non-labored  RRR  Soft, NDNT  Distal extremities warm    A/P: Michael Saldivar is a 56 yo male s/p OR on 3/19- L thoracotomy, primary repair of esophageal perforation, PEG tube placement, and chest tube placement x 4 (3 left, 1 right). Initially recovering appropriately from surgery, now complicated by presence of leak from esophageal repair.  Takeback to OR on 3/28 for pharyngostomy placement and attempted G to GJ conversion.  J extension placed via G tube this AM.    -Continue NPO  -Continue TPN  -Continue abx  -Lovenox  -Trickle Tube feeds to start this PM    FRIEDA CarmonaC  Thoracic Surgery

## 2018-03-31 ENCOUNTER — APPOINTMENT (OUTPATIENT)
Dept: OCCUPATIONAL THERAPY | Facility: CLINIC | Age: 58
End: 2018-03-31
Attending: THORACIC SURGERY (CARDIOTHORACIC VASCULAR SURGERY)
Payer: MEDICAID

## 2018-03-31 LAB
ANION GAP SERPL CALCULATED.3IONS-SCNC: 8 MMOL/L (ref 3–14)
BUN SERPL-MCNC: 18 MG/DL (ref 7–30)
CALCIUM SERPL-MCNC: 8.9 MG/DL (ref 8.5–10.1)
CHLORIDE SERPL-SCNC: 110 MMOL/L (ref 94–109)
CO2 SERPL-SCNC: 22 MMOL/L (ref 20–32)
CREAT SERPL-MCNC: 0.66 MG/DL (ref 0.66–1.25)
ERYTHROCYTE [DISTWIDTH] IN BLOOD BY AUTOMATED COUNT: 21.3 % (ref 10–15)
GFR SERPL CREATININE-BSD FRML MDRD: >90 ML/MIN/1.7M2
GLUCOSE BLDC GLUCOMTR-MCNC: 116 MG/DL (ref 70–99)
GLUCOSE BLDC GLUCOMTR-MCNC: 118 MG/DL (ref 70–99)
GLUCOSE BLDC GLUCOMTR-MCNC: 131 MG/DL (ref 70–99)
GLUCOSE BLDC GLUCOMTR-MCNC: 142 MG/DL (ref 70–99)
GLUCOSE BLDC GLUCOMTR-MCNC: 144 MG/DL (ref 70–99)
GLUCOSE SERPL-MCNC: 112 MG/DL (ref 70–99)
HCT VFR BLD AUTO: 28.9 % (ref 40–53)
HGB BLD-MCNC: 8 G/DL (ref 13.3–17.7)
LACTATE BLD-SCNC: 0.8 MMOL/L (ref 0.7–2)
MAGNESIUM SERPL-MCNC: 2.1 MG/DL (ref 1.6–2.3)
MCH RBC QN AUTO: 20.2 PG (ref 26.5–33)
MCHC RBC AUTO-ENTMCNC: 27.7 G/DL (ref 31.5–36.5)
MCV RBC AUTO: 73 FL (ref 78–100)
PHOSPHATE SERPL-MCNC: 2.6 MG/DL (ref 2.5–4.5)
PLATELET # BLD AUTO: 607 10E9/L (ref 150–450)
POTASSIUM SERPL-SCNC: 4 MMOL/L (ref 3.4–5.3)
RBC # BLD AUTO: 3.97 10E12/L (ref 4.4–5.9)
SODIUM SERPL-SCNC: 139 MMOL/L (ref 133–144)
WBC # BLD AUTO: 11.9 10E9/L (ref 4–11)

## 2018-03-31 PROCEDURE — 40000133 ZZH STATISTIC OT WARD VISIT

## 2018-03-31 PROCEDURE — 25000125 ZZHC RX 250: Performed by: STUDENT IN AN ORGANIZED HEALTH CARE EDUCATION/TRAINING PROGRAM

## 2018-03-31 PROCEDURE — 40000802 ZZH SITE CHECK

## 2018-03-31 PROCEDURE — 97535 SELF CARE MNGMENT TRAINING: CPT | Mod: GO

## 2018-03-31 PROCEDURE — 94640 AIRWAY INHALATION TREATMENT: CPT | Mod: 76

## 2018-03-31 PROCEDURE — 94640 AIRWAY INHALATION TREATMENT: CPT

## 2018-03-31 PROCEDURE — 25000128 H RX IP 250 OP 636: Performed by: NURSE PRACTITIONER

## 2018-03-31 PROCEDURE — 12000003 ZZH R&B CRITICAL UMMC

## 2018-03-31 PROCEDURE — 00000146 ZZHCL STATISTIC GLUCOSE BY METER IP

## 2018-03-31 PROCEDURE — 40000275 ZZH STATISTIC RCP TIME EA 10 MIN

## 2018-03-31 PROCEDURE — 27210437 ZZH NUTRITION PRODUCT SEMIELEM INTERMED LITER

## 2018-03-31 PROCEDURE — 97530 THERAPEUTIC ACTIVITIES: CPT | Mod: GO

## 2018-03-31 PROCEDURE — 85027 COMPLETE CBC AUTOMATED: CPT | Performed by: STUDENT IN AN ORGANIZED HEALTH CARE EDUCATION/TRAINING PROGRAM

## 2018-03-31 PROCEDURE — 25000128 H RX IP 250 OP 636: Performed by: PHYSICIAN ASSISTANT

## 2018-03-31 PROCEDURE — 36592 COLLECT BLOOD FROM PICC: CPT | Performed by: STUDENT IN AN ORGANIZED HEALTH CARE EDUCATION/TRAINING PROGRAM

## 2018-03-31 PROCEDURE — 25000132 ZZH RX MED GY IP 250 OP 250 PS 637: Performed by: THORACIC SURGERY (CARDIOTHORACIC VASCULAR SURGERY)

## 2018-03-31 PROCEDURE — 84100 ASSAY OF PHOSPHORUS: CPT | Performed by: STUDENT IN AN ORGANIZED HEALTH CARE EDUCATION/TRAINING PROGRAM

## 2018-03-31 PROCEDURE — 25000128 H RX IP 250 OP 636: Performed by: STUDENT IN AN ORGANIZED HEALTH CARE EDUCATION/TRAINING PROGRAM

## 2018-03-31 PROCEDURE — 36592 COLLECT BLOOD FROM PICC: CPT | Performed by: THORACIC SURGERY (CARDIOTHORACIC VASCULAR SURGERY)

## 2018-03-31 PROCEDURE — 25000128 H RX IP 250 OP 636

## 2018-03-31 PROCEDURE — 25000125 ZZHC RX 250: Performed by: THORACIC SURGERY (CARDIOTHORACIC VASCULAR SURGERY)

## 2018-03-31 PROCEDURE — 83735 ASSAY OF MAGNESIUM: CPT | Performed by: STUDENT IN AN ORGANIZED HEALTH CARE EDUCATION/TRAINING PROGRAM

## 2018-03-31 PROCEDURE — 25000125 ZZHC RX 250

## 2018-03-31 PROCEDURE — 83605 ASSAY OF LACTIC ACID: CPT | Performed by: THORACIC SURGERY (CARDIOTHORACIC VASCULAR SURGERY)

## 2018-03-31 PROCEDURE — 25000125 ZZHC RX 250: Performed by: NURSE PRACTITIONER

## 2018-03-31 PROCEDURE — 80048 BASIC METABOLIC PNL TOTAL CA: CPT | Performed by: STUDENT IN AN ORGANIZED HEALTH CARE EDUCATION/TRAINING PROGRAM

## 2018-03-31 RX ADMIN — ENOXAPARIN SODIUM 40 MG: 40 INJECTION SUBCUTANEOUS at 09:07

## 2018-03-31 RX ADMIN — PIPERACILLIN SODIUM AND TAZOBACTAM SODIUM 3.38 G: 3; .375 INJECTION, POWDER, LYOPHILIZED, FOR SOLUTION INTRAVENOUS at 05:53

## 2018-03-31 RX ADMIN — PIPERACILLIN SODIUM AND TAZOBACTAM SODIUM 3.38 G: 3; .375 INJECTION, POWDER, LYOPHILIZED, FOR SOLUTION INTRAVENOUS at 18:13

## 2018-03-31 RX ADMIN — LEVALBUTEROL HYDROCHLORIDE 0.63 MG: 0.63 SOLUTION RESPIRATORY (INHALATION) at 20:53

## 2018-03-31 RX ADMIN — SODIUM CHLORIDE, PRESERVATIVE FREE 5 ML: 5 INJECTION INTRAVENOUS at 11:12

## 2018-03-31 RX ADMIN — CALCIUM GLUCONATE: 98 INJECTION, SOLUTION INTRAVENOUS at 21:21

## 2018-03-31 RX ADMIN — Medication 10 MEQ: at 09:08

## 2018-03-31 RX ADMIN — SODIUM CHLORIDE SOLN NEBU 3% 3 ML: 3 NEBU SOLN at 20:53

## 2018-03-31 RX ADMIN — PIPERACILLIN SODIUM AND TAZOBACTAM SODIUM 3.38 G: 3; .375 INJECTION, POWDER, LYOPHILIZED, FOR SOLUTION INTRAVENOUS at 00:44

## 2018-03-31 RX ADMIN — POTASSIUM PHOSPHATE, MONOBASIC AND POTASSIUM PHOSPHATE, DIBASIC 10 MMOL: 224; 236 INJECTION, SOLUTION INTRAVENOUS at 13:30

## 2018-03-31 RX ADMIN — SODIUM CHLORIDE SOLN NEBU 3% 3 ML: 3 NEBU SOLN at 09:51

## 2018-03-31 RX ADMIN — PANTOPRAZOLE SODIUM 40 MG: 40 INJECTION, POWDER, FOR SOLUTION INTRAVENOUS at 09:07

## 2018-03-31 RX ADMIN — LEVALBUTEROL HYDROCHLORIDE 0.63 MG: 0.63 SOLUTION RESPIRATORY (INHALATION) at 09:51

## 2018-03-31 RX ADMIN — FLUCONAZOLE 400 MG: 2 INJECTION INTRAVENOUS at 21:26

## 2018-03-31 RX ADMIN — Medication 10 MEQ: at 11:13

## 2018-03-31 RX ADMIN — HYDROMORPHONE HYDROCHLORIDE: 10 INJECTION, SOLUTION INTRAMUSCULAR; INTRAVENOUS; SUBCUTANEOUS at 21:57

## 2018-03-31 RX ADMIN — PIPERACILLIN SODIUM AND TAZOBACTAM SODIUM 3.38 G: 3; .375 INJECTION, POWDER, LYOPHILIZED, FOR SOLUTION INTRAVENOUS at 12:44

## 2018-03-31 NOTE — PROVIDER NOTIFICATION
Pt c/o throat irritation on R side of neck, felt like something poking him in his neck, reports increased secretions in his mouth. Said the same thing happened 2 days ago. Pt concerns that the pharyngostomy tube may have moved. MD on call notified.

## 2018-03-31 NOTE — PROGRESS NOTES
THORACIC & FOREGUT SURGERY    S:  No acute overnight events.  Pt seen at bedside resting comfortably.  Tolerating trickle tube feeds without nausea or distension.     O:  Vitals:    03/31/18 0200 03/31/18 0400 03/31/18 0600 03/31/18 0814   BP:  129/72  126/77   BP Location:    Right arm   Pulse: 75 68 81 91   Resp: 16 14 15 16   Temp:  96.6  F (35.9  C)  97  F (36.1  C)   TempSrc:  Oral  Oral   SpO2: 98% 98% 96% 97%   Weight:       Height:         A&Ox3, NAD  Breathing non-labored  Minimal output from the pharyngostomy  RRR  Soft, NDNT  Distal extremities warm    A/P: Michael Saldivar is a 58 yo male s/p OR on 3/19- L thoracotomy, primary repair of esophageal perforation, PEG tube placement, and chest tube placement x 4 (3 left, 1 right). Initially recovering appropriately from surgery, now complicated by presence of leak from esophageal repair.  Takeback to OR on 3/28 for pharyngostomy placement and attempted G to GJ conversion.  J extension placed via G tube on 3/30 by IR    -Continue NPO, can advance tube feeds by 20 ml q4h  -Continue TPN  -Continue Zosyn, fluconazole  -Lovenox    Patient discussed with Dr. Mark Yung MD PGY-3  Thoracic Surgery

## 2018-03-31 NOTE — PLAN OF CARE
Problem: Patient Care Overview  Goal: Plan of Care/Patient Progress Review  Outcome: Improving  Ambulated x2 and up in chair for 3 hours this shift. Lungs have fine crackles on L and otherwise diminished througout. TF started this evening at 10cc/hour. Phosphorus replacement finished and redraw in AM. Pharyngostomy tube irrigated x1. G-tube to gravity with green returns. Pt states that pain is well controlled with PCA. NPO. WA=464 and 131.

## 2018-03-31 NOTE — PLAN OF CARE
"Problem: Patient Care Overview  Goal: Plan of Care/Patient Progress Review  Outcome: No Change  Afeb, vitals stable, 02 sats 96% on 3LPM/nc, states pain is \"OK\", cont on dilaudid pca with good relief, all tube patent, see I&O sheet for totals,  voiding in good amts, tube feeds infusing at 10cc/hour, no c/o nausea, belly soft with positive bowel sounds, lungs diminished, glucoses 99 and 131, npo, ZANA/IL cont, offers no c/o, appeared to rest/sleep between cares      "

## 2018-03-31 NOTE — PLAN OF CARE
"Problem: Patient Care Overview  Goal: Plan of Care/Patient Progress Review  Outcome: No Change  /77 (BP Location: Right arm)  Pulse 91  Temp 97  F (36.1  C) (Oral)  Resp 16  Ht 1.676 m (5' 6\")  Wt 79.2 kg (174 lb 8 oz)  SpO2 97%  BMI 28.17 kg/m2    AVSS, 3L NC. Sating upper 90's. Capno in place. Pain controlled with dilaudid pca 0.2mg q 10 minutes, got 1mg total this shift. C/O throat irritation this am. Concerns about pharyngostomy being moved. MD checked pt out and used a flash light to look at his throat and no tube coiling were noted. Nurse was present in the room at that time. Pt reports throat irritation a little better this afternoon, using yaunker for throat secretions.TF increased to 30ml/hr at 11am. No c/o nausea. GT to gravity with green bile. Pharyngostomy to LIS with small brown drainage and was flushed with NS and aspirate back per order. Left CT to water seal. Up to bathroom with 1 assist.     Problem: Surgery Nonspecified (Adult)  Goal: Signs and Symptoms of Listed Potential Problems Will be Absent, Minimized or Managed (Surgery Nonspecified)  Signs and symptoms of listed potential problems will be absent, minimized or managed by discharge/transition of care (reference Surgery Nonspecified (Adult) CPG).    03/31/18 1438   Problems Assessed (Surgery) all   Problems Present (Surgery) pain;situational response         "

## 2018-03-31 NOTE — PROGRESS NOTES
SPIRITUAL HEALTH SERVICES  SPIRITUAL ASSESSMENT Progress Note  [facility] ([place]) 7B    REFERRAL SOURCE:  initiated length of stay.    Attempted. Reviewed documentation. Pt asleep.    PLAN: I will follow up at a later shift to assess need.                                                                                                                                           Roge Michael   Intern  Pager [phone]

## 2018-03-31 NOTE — PLAN OF CARE
Problem: Patient Care Overview  Goal: Plan of Care/Patient Progress Review  Discharge Planner OT   Patient plan for discharge: unstated   Current status: Pt seated in chair upon arrival. Performed seated LE exercises with min cues for pace. Completed transfers and ambulation with IV pole and SBA. Reviewed precautions-pt marika to recall. Provided education on continued cog activity to be done daily; word finds, mazes, puzzles, etc. Pt receptive and agreeable.  Barriers to return to prior living situation: Post surgical precautions, deconditioning  Recommendations for discharge: Per plan established by the OT, the recommendation for dc location is TCU  Rationale for recommendations: Pt to benefit from skilled OT to increase strength/functional endurance and training in AE/compensatory strategies for increased safety and I with ADL/IADLs.

## 2018-03-31 NOTE — PROGRESS NOTES
CLINICAL NUTRITION SERVICES    NEW FINDINGS   Received call from RN. Pt is cleared to adv TF per MD, but no goal rate is specified in orders.    Interventions  Entered goal rate of 60 ml/hr into TF orders as outlined in RD 3/30/18.    Please review RD note 3/30/18 for all current recommendations.    Bryon Mcmanus RD, LD  Wkend Pager: [phone]  7B Wkday Pager: [phone]

## 2018-04-01 ENCOUNTER — APPOINTMENT (OUTPATIENT)
Dept: CT IMAGING | Facility: CLINIC | Age: 58
End: 2018-04-01
Attending: THORACIC SURGERY (CARDIOTHORACIC VASCULAR SURGERY)
Payer: MEDICAID

## 2018-04-01 LAB
ANION GAP SERPL CALCULATED.3IONS-SCNC: 5 MMOL/L (ref 3–14)
BUN SERPL-MCNC: 27 MG/DL (ref 7–30)
C DIFF TOX B STL QL: NEGATIVE
CALCIUM SERPL-MCNC: 8.4 MG/DL (ref 8.5–10.1)
CHLORIDE SERPL-SCNC: 111 MMOL/L (ref 94–109)
CO2 SERPL-SCNC: 24 MMOL/L (ref 20–32)
CREAT SERPL-MCNC: 0.66 MG/DL (ref 0.66–1.25)
ERYTHROCYTE [DISTWIDTH] IN BLOOD BY AUTOMATED COUNT: 21.7 % (ref 10–15)
GFR SERPL CREATININE-BSD FRML MDRD: >90 ML/MIN/1.7M2
GLUCOSE BLDC GLUCOMTR-MCNC: 132 MG/DL (ref 70–99)
GLUCOSE BLDC GLUCOMTR-MCNC: 145 MG/DL (ref 70–99)
GLUCOSE BLDC GLUCOMTR-MCNC: 148 MG/DL (ref 70–99)
GLUCOSE BLDC GLUCOMTR-MCNC: 148 MG/DL (ref 70–99)
GLUCOSE BLDC GLUCOMTR-MCNC: 152 MG/DL (ref 70–99)
GLUCOSE SERPL-MCNC: 124 MG/DL (ref 70–99)
HCT VFR BLD AUTO: 28.2 % (ref 40–53)
HGB BLD-MCNC: 7.6 G/DL (ref 13.3–17.7)
MAGNESIUM SERPL-MCNC: 2.2 MG/DL (ref 1.6–2.3)
MCH RBC QN AUTO: 19.8 PG (ref 26.5–33)
MCHC RBC AUTO-ENTMCNC: 27 G/DL (ref 31.5–36.5)
MCV RBC AUTO: 74 FL (ref 78–100)
PHOSPHATE SERPL-MCNC: 2.1 MG/DL (ref 2.5–4.5)
PLATELET # BLD AUTO: 565 10E9/L (ref 150–450)
POTASSIUM SERPL-SCNC: 4.3 MMOL/L (ref 3.4–5.3)
RBC # BLD AUTO: 3.83 10E12/L (ref 4.4–5.9)
SODIUM SERPL-SCNC: 140 MMOL/L (ref 133–144)
SPECIMEN SOURCE: NORMAL
WBC # BLD AUTO: 11 10E9/L (ref 4–11)

## 2018-04-01 PROCEDURE — 87493 C DIFF AMPLIFIED PROBE: CPT

## 2018-04-01 PROCEDURE — 12000008 ZZH R&B INTERMEDIATE UMMC

## 2018-04-01 PROCEDURE — 00000146 ZZHCL STATISTIC GLUCOSE BY METER IP

## 2018-04-01 PROCEDURE — 25000128 H RX IP 250 OP 636

## 2018-04-01 PROCEDURE — 94640 AIRWAY INHALATION TREATMENT: CPT | Mod: 76

## 2018-04-01 PROCEDURE — 83735 ASSAY OF MAGNESIUM: CPT | Performed by: STUDENT IN AN ORGANIZED HEALTH CARE EDUCATION/TRAINING PROGRAM

## 2018-04-01 PROCEDURE — 25000128 H RX IP 250 OP 636: Performed by: NURSE PRACTITIONER

## 2018-04-01 PROCEDURE — 25000125 ZZHC RX 250: Performed by: THORACIC SURGERY (CARDIOTHORACIC VASCULAR SURGERY)

## 2018-04-01 PROCEDURE — 25000128 H RX IP 250 OP 636: Performed by: PHYSICIAN ASSISTANT

## 2018-04-01 PROCEDURE — 94640 AIRWAY INHALATION TREATMENT: CPT

## 2018-04-01 PROCEDURE — 25000125 ZZHC RX 250: Performed by: NURSE PRACTITIONER

## 2018-04-01 PROCEDURE — 27210437 ZZH NUTRITION PRODUCT SEMIELEM INTERMED LITER

## 2018-04-01 PROCEDURE — 74177 CT ABD & PELVIS W/CONTRAST: CPT

## 2018-04-01 PROCEDURE — 36592 COLLECT BLOOD FROM PICC: CPT | Performed by: STUDENT IN AN ORGANIZED HEALTH CARE EDUCATION/TRAINING PROGRAM

## 2018-04-01 PROCEDURE — 85049 AUTOMATED PLATELET COUNT: CPT | Performed by: PHYSICIAN ASSISTANT

## 2018-04-01 PROCEDURE — 25000132 ZZH RX MED GY IP 250 OP 250 PS 637: Performed by: THORACIC SURGERY (CARDIOTHORACIC VASCULAR SURGERY)

## 2018-04-01 PROCEDURE — 25000128 H RX IP 250 OP 636: Performed by: STUDENT IN AN ORGANIZED HEALTH CARE EDUCATION/TRAINING PROGRAM

## 2018-04-01 PROCEDURE — 85027 COMPLETE CBC AUTOMATED: CPT | Performed by: STUDENT IN AN ORGANIZED HEALTH CARE EDUCATION/TRAINING PROGRAM

## 2018-04-01 PROCEDURE — 84100 ASSAY OF PHOSPHORUS: CPT | Performed by: STUDENT IN AN ORGANIZED HEALTH CARE EDUCATION/TRAINING PROGRAM

## 2018-04-01 PROCEDURE — 80048 BASIC METABOLIC PNL TOTAL CA: CPT | Performed by: STUDENT IN AN ORGANIZED HEALTH CARE EDUCATION/TRAINING PROGRAM

## 2018-04-01 PROCEDURE — 25000125 ZZHC RX 250: Performed by: STUDENT IN AN ORGANIZED HEALTH CARE EDUCATION/TRAINING PROGRAM

## 2018-04-01 PROCEDURE — 25000128 H RX IP 250 OP 636: Performed by: THORACIC SURGERY (CARDIOTHORACIC VASCULAR SURGERY)

## 2018-04-01 RX ORDER — IOPAMIDOL 755 MG/ML
105 INJECTION, SOLUTION INTRAVASCULAR ONCE
Status: COMPLETED | OUTPATIENT
Start: 2018-04-01 | End: 2018-04-01

## 2018-04-01 RX ADMIN — LEVALBUTEROL HYDROCHLORIDE 0.63 MG: 0.63 SOLUTION RESPIRATORY (INHALATION) at 08:55

## 2018-04-01 RX ADMIN — ENOXAPARIN SODIUM 40 MG: 40 INJECTION SUBCUTANEOUS at 08:51

## 2018-04-01 RX ADMIN — LEVALBUTEROL HYDROCHLORIDE 0.63 MG: 0.63 SOLUTION RESPIRATORY (INHALATION) at 20:33

## 2018-04-01 RX ADMIN — SODIUM CHLORIDE, PRESERVATIVE FREE 5 ML: 5 INJECTION INTRAVENOUS at 20:30

## 2018-04-01 RX ADMIN — HYDROMORPHONE HYDROCHLORIDE: 10 INJECTION, SOLUTION INTRAMUSCULAR; INTRAVENOUS; SUBCUTANEOUS at 14:06

## 2018-04-01 RX ADMIN — PIPERACILLIN SODIUM AND TAZOBACTAM SODIUM 3.38 G: 3; .375 INJECTION, POWDER, LYOPHILIZED, FOR SOLUTION INTRAVENOUS at 00:05

## 2018-04-01 RX ADMIN — IOPAMIDOL 105 ML: 755 INJECTION, SOLUTION INTRAVENOUS at 12:35

## 2018-04-01 RX ADMIN — HYDROMORPHONE HYDROCHLORIDE: 10 INJECTION, SOLUTION INTRAMUSCULAR; INTRAVENOUS; SUBCUTANEOUS at 21:09

## 2018-04-01 RX ADMIN — PANTOPRAZOLE SODIUM 40 MG: 40 INJECTION, POWDER, FOR SOLUTION INTRAVENOUS at 08:51

## 2018-04-01 RX ADMIN — SODIUM CHLORIDE SOLN NEBU 3% 3 ML: 3 NEBU SOLN at 20:33

## 2018-04-01 RX ADMIN — PIPERACILLIN SODIUM AND TAZOBACTAM SODIUM 3.38 G: 3; .375 INJECTION, POWDER, LYOPHILIZED, FOR SOLUTION INTRAVENOUS at 12:19

## 2018-04-01 RX ADMIN — FLUCONAZOLE 400 MG: 2 INJECTION INTRAVENOUS at 20:59

## 2018-04-01 RX ADMIN — SODIUM CHLORIDE, PRESERVATIVE FREE 5 ML: 5 INJECTION INTRAVENOUS at 10:00

## 2018-04-01 RX ADMIN — POTASSIUM PHOSPHATE, MONOBASIC AND POTASSIUM PHOSPHATE, DIBASIC 15 MMOL: 224; 236 INJECTION, SOLUTION INTRAVENOUS at 10:00

## 2018-04-01 RX ADMIN — PIPERACILLIN SODIUM AND TAZOBACTAM SODIUM 3.38 G: 3; .375 INJECTION, POWDER, LYOPHILIZED, FOR SOLUTION INTRAVENOUS at 20:00

## 2018-04-01 RX ADMIN — PIPERACILLIN SODIUM AND TAZOBACTAM SODIUM 3.38 G: 3; .375 INJECTION, POWDER, LYOPHILIZED, FOR SOLUTION INTRAVENOUS at 05:40

## 2018-04-01 RX ADMIN — SODIUM CHLORIDE, PRESERVATIVE FREE 5 ML: 5 INJECTION INTRAVENOUS at 07:38

## 2018-04-01 RX ADMIN — SODIUM CHLORIDE, PRESERVATIVE FREE 76 ML: 5 INJECTION INTRAVENOUS at 12:35

## 2018-04-01 RX ADMIN — SODIUM CHLORIDE SOLN NEBU 3% 3 ML: 3 NEBU SOLN at 08:55

## 2018-04-01 ASSESSMENT — PAIN DESCRIPTION - DESCRIPTORS: DESCRIPTORS: DISCOMFORT

## 2018-04-01 NOTE — PROGRESS NOTES
THORACIC & FOREGUT SURGERY    S: NAEO. Pt tolerating tube feeds at goal but now having loose stools. Reports pharyngostomy tube discomfort is manageable.     O:  Vitals:    04/01/18 0000 04/01/18 0400 04/01/18 0600 04/01/18 0716   BP:  124/74  125/76   BP Location:    Right arm   Pulse: 104 100 95 106   Resp: 20 20  20   Temp:  96.3  F (35.7  C)  95.8  F (35.4  C)   TempSrc:  Oral  Oral   SpO2: 94% 98% 98% 96%   Weight:       Height:         A&Ox3, NAD  Breathing non-labored  Pharyngostomy in place, not coiled in throat. Output increased, 170 mL/24 h  RRR  Soft, NDNT  Distal extremities warm    A/P: Michael Saldivar is a 58 yo male s/p OR on 3/19- L thoracotomy, primary repair of esophageal perforation, PEG tube placement, and chest tube placement x 4 (3 left, 1 right). Initially recovering appropriately from surgery, now complicated by presence of leak from esophageal repair.  Takeback to OR on 3/28 for pharyngostomy placement and attempted G to GJ conversion.  J extension placed via G tube on 3/30 by IR    - Continue NPO, can tube feeds at goal. D/C TPN  - Continue Zosyn, fluconazole.   - Will send C diff today given loose stools and prolonged abx  - CT chest/ab/pelvis with IV contrast (no PO) to eval leak  - Lovenox    Patient discussed with Dr. Mark Newell MD   PGY-1 General Surgery

## 2018-04-01 NOTE — PLAN OF CARE
"Problem: Patient Care Overview  Goal: Plan of Care/Patient Progress Review  Outcome: No Change  Afeb, vitals stable, 02 sats 94-96% on 2.5LPM/nc, states pain is \"OK\", cont on dilaudid pca with relief, left thoracotomy incision dry, intact and steri stripped, lap sites covered, dry and intact, belly soft with positive bowel sounds, lungs diminished, CT with 0 output, to water seal, g-tube to gravity with mod amts dark green drng, pharn tube to lis, small amts out, j-tube with tube feeds running at goal, no c/o nausea, ZANA cont via PICC, glucoses 152 and 142, npo, offers no further c/o, appeared to rest/sleep between cares,       "

## 2018-04-01 NOTE — PLAN OF CARE
Problem: Patient Care Overview  Goal: Plan of Care/Patient Progress Review  Outcome: No Change  AVSS. 2L NC. walked in maynard x2 with standby assist. PCA controlling pain. Pharyngostomy tube to LIS with brown fluid 25 ml this shift. Irrigated and same amount aspirated. Nothing out from CT to water seal. Voiding per urinal. Loose stools x2, needs c -diff sample. Had chest/abd CT. Phos replaced.

## 2018-04-01 NOTE — PLAN OF CARE
Problem: Patient Care Overview  Goal: Plan of Care/Patient Progress Review  Outcome: No Change  Up in chair and walked in maynard with standby assist. PCA managing pain well. Pharyngostomy tube draining 125cc green fluid this shift. Irrigated and same amount aspirated. Nothing out from CT. Using urinal and voiding in adequate amounts. G-tube drain 300cc of dark green fluid. Lungs have fine crackles in upper L lobe. Lactic acid result =0.8. VS remained stable except for pulse rate of 12s. Tube feeding at goal rate. TPN at 60.

## 2018-04-02 ENCOUNTER — HOME INFUSION (PRE-WILLOW HOME INFUSION) (OUTPATIENT)
Dept: PHARMACY | Facility: CLINIC | Age: 58
End: 2018-04-02

## 2018-04-02 ENCOUNTER — APPOINTMENT (OUTPATIENT)
Dept: OCCUPATIONAL THERAPY | Facility: CLINIC | Age: 58
End: 2018-04-02
Attending: THORACIC SURGERY (CARDIOTHORACIC VASCULAR SURGERY)
Payer: MEDICAID

## 2018-04-02 LAB
ALBUMIN SERPL-MCNC: 2.2 G/DL (ref 3.4–5)
ALP SERPL-CCNC: 197 U/L (ref 40–150)
ALT SERPL W P-5'-P-CCNC: 66 U/L (ref 0–70)
ANION GAP SERPL CALCULATED.3IONS-SCNC: 6 MMOL/L (ref 3–14)
AST SERPL W P-5'-P-CCNC: 22 U/L (ref 0–45)
BILIRUB SERPL-MCNC: 0.4 MG/DL (ref 0.2–1.3)
BUN SERPL-MCNC: 28 MG/DL (ref 7–30)
CALCIUM SERPL-MCNC: 8.6 MG/DL (ref 8.5–10.1)
CHLORIDE SERPL-SCNC: 113 MMOL/L (ref 94–109)
CO2 SERPL-SCNC: 24 MMOL/L (ref 20–32)
CREAT SERPL-MCNC: 0.67 MG/DL (ref 0.66–1.25)
CRP SERPL-MCNC: 37 MG/L (ref 0–8)
ERYTHROCYTE [DISTWIDTH] IN BLOOD BY AUTOMATED COUNT: 21.8 % (ref 10–15)
GFR SERPL CREATININE-BSD FRML MDRD: >90 ML/MIN/1.7M2
GLUCOSE BLDC GLUCOMTR-MCNC: 111 MG/DL (ref 70–99)
GLUCOSE BLDC GLUCOMTR-MCNC: 132 MG/DL (ref 70–99)
GLUCOSE BLDC GLUCOMTR-MCNC: 140 MG/DL (ref 70–99)
GLUCOSE BLDC GLUCOMTR-MCNC: 143 MG/DL (ref 70–99)
GLUCOSE SERPL-MCNC: 137 MG/DL (ref 70–99)
HCT VFR BLD AUTO: 28.2 % (ref 40–53)
HGB BLD-MCNC: 7.6 G/DL (ref 13.3–17.7)
INR PPP: 1.17 (ref 0.86–1.14)
LACTATE BLD-SCNC: 1.2 MMOL/L (ref 0.4–1.9)
MAGNESIUM SERPL-MCNC: 2.4 MG/DL (ref 1.6–2.3)
MCH RBC QN AUTO: 19.7 PG (ref 26.5–33)
MCHC RBC AUTO-ENTMCNC: 27 G/DL (ref 31.5–36.5)
MCV RBC AUTO: 73 FL (ref 78–100)
PHOSPHATE SERPL-MCNC: 2.4 MG/DL (ref 2.5–4.5)
PLATELET # BLD AUTO: 580 10E9/L (ref 150–450)
POTASSIUM SERPL-SCNC: 4 MMOL/L (ref 3.4–5.3)
PREALB SERPL IA-MCNC: 22 MG/DL (ref 15–45)
PROT SERPL-MCNC: 7 G/DL (ref 6.8–8.8)
RBC # BLD AUTO: 3.85 10E12/L (ref 4.4–5.9)
SODIUM SERPL-SCNC: 142 MMOL/L (ref 133–144)
WBC # BLD AUTO: 12.1 10E9/L (ref 4–11)

## 2018-04-02 PROCEDURE — 00000146 ZZHCL STATISTIC GLUCOSE BY METER IP

## 2018-04-02 PROCEDURE — 97535 SELF CARE MNGMENT TRAINING: CPT | Mod: GO | Performed by: OCCUPATIONAL THERAPIST

## 2018-04-02 PROCEDURE — 84100 ASSAY OF PHOSPHORUS: CPT | Performed by: STUDENT IN AN ORGANIZED HEALTH CARE EDUCATION/TRAINING PROGRAM

## 2018-04-02 PROCEDURE — 40000141 ZZH STATISTIC PERIPHERAL IV START W/O US GUIDANCE

## 2018-04-02 PROCEDURE — 94640 AIRWAY INHALATION TREATMENT: CPT

## 2018-04-02 PROCEDURE — 25000132 ZZH RX MED GY IP 250 OP 250 PS 637: Performed by: THORACIC SURGERY (CARDIOTHORACIC VASCULAR SURGERY)

## 2018-04-02 PROCEDURE — 25000125 ZZHC RX 250: Performed by: NURSE PRACTITIONER

## 2018-04-02 PROCEDURE — 36592 COLLECT BLOOD FROM PICC: CPT | Performed by: THORACIC SURGERY (CARDIOTHORACIC VASCULAR SURGERY)

## 2018-04-02 PROCEDURE — 86140 C-REACTIVE PROTEIN: CPT | Performed by: STUDENT IN AN ORGANIZED HEALTH CARE EDUCATION/TRAINING PROGRAM

## 2018-04-02 PROCEDURE — 25000125 ZZHC RX 250: Performed by: STUDENT IN AN ORGANIZED HEALTH CARE EDUCATION/TRAINING PROGRAM

## 2018-04-02 PROCEDURE — 40000275 ZZH STATISTIC RCP TIME EA 10 MIN

## 2018-04-02 PROCEDURE — 25000128 H RX IP 250 OP 636: Performed by: STUDENT IN AN ORGANIZED HEALTH CARE EDUCATION/TRAINING PROGRAM

## 2018-04-02 PROCEDURE — 25000128 H RX IP 250 OP 636: Performed by: NURSE PRACTITIONER

## 2018-04-02 PROCEDURE — 40000133 ZZH STATISTIC OT WARD VISIT: Performed by: OCCUPATIONAL THERAPIST

## 2018-04-02 PROCEDURE — 12000008 ZZH R&B INTERMEDIATE UMMC

## 2018-04-02 PROCEDURE — 25000132 ZZH RX MED GY IP 250 OP 250 PS 637: Performed by: PHYSICIAN ASSISTANT

## 2018-04-02 PROCEDURE — 85610 PROTHROMBIN TIME: CPT | Performed by: STUDENT IN AN ORGANIZED HEALTH CARE EDUCATION/TRAINING PROGRAM

## 2018-04-02 PROCEDURE — 25000125 ZZHC RX 250: Performed by: THORACIC SURGERY (CARDIOTHORACIC VASCULAR SURGERY)

## 2018-04-02 PROCEDURE — 27210437 ZZH NUTRITION PRODUCT SEMIELEM INTERMED LITER

## 2018-04-02 PROCEDURE — 83605 ASSAY OF LACTIC ACID: CPT | Performed by: THORACIC SURGERY (CARDIOTHORACIC VASCULAR SURGERY)

## 2018-04-02 PROCEDURE — 84134 ASSAY OF PREALBUMIN: CPT | Performed by: STUDENT IN AN ORGANIZED HEALTH CARE EDUCATION/TRAINING PROGRAM

## 2018-04-02 PROCEDURE — 80053 COMPREHEN METABOLIC PANEL: CPT | Performed by: STUDENT IN AN ORGANIZED HEALTH CARE EDUCATION/TRAINING PROGRAM

## 2018-04-02 PROCEDURE — 25000128 H RX IP 250 OP 636

## 2018-04-02 PROCEDURE — 25000128 H RX IP 250 OP 636: Performed by: PHYSICIAN ASSISTANT

## 2018-04-02 PROCEDURE — 83735 ASSAY OF MAGNESIUM: CPT | Performed by: STUDENT IN AN ORGANIZED HEALTH CARE EDUCATION/TRAINING PROGRAM

## 2018-04-02 PROCEDURE — 36592 COLLECT BLOOD FROM PICC: CPT | Performed by: STUDENT IN AN ORGANIZED HEALTH CARE EDUCATION/TRAINING PROGRAM

## 2018-04-02 PROCEDURE — 40000558 ZZH STATISTIC CVC DRESSING CHANGE

## 2018-04-02 PROCEDURE — 85027 COMPLETE CBC AUTOMATED: CPT | Performed by: STUDENT IN AN ORGANIZED HEALTH CARE EDUCATION/TRAINING PROGRAM

## 2018-04-02 RX ORDER — OXYCODONE HCL 5 MG/5 ML
5 SOLUTION, ORAL ORAL EVERY 4 HOURS PRN
Status: DISCONTINUED | OUTPATIENT
Start: 2018-04-02 | End: 2018-04-06 | Stop reason: HOSPADM

## 2018-04-02 RX ORDER — AMOXICILLIN AND CLAVULANATE POTASSIUM 400; 57 MG/5ML; MG/5ML
875 POWDER, FOR SUSPENSION ORAL 2 TIMES DAILY
Status: DISCONTINUED | OUTPATIENT
Start: 2018-04-02 | End: 2018-04-06 | Stop reason: HOSPADM

## 2018-04-02 RX ORDER — FLUCONAZOLE 40 MG/ML
200 POWDER, FOR SUSPENSION ORAL DAILY
Status: DISCONTINUED | OUTPATIENT
Start: 2018-04-02 | End: 2018-04-06 | Stop reason: HOSPADM

## 2018-04-02 RX ADMIN — SODIUM CHLORIDE, PRESERVATIVE FREE 5 ML: 5 INJECTION INTRAVENOUS at 12:22

## 2018-04-02 RX ADMIN — PANTOPRAZOLE SODIUM 40 MG: 40 INJECTION, POWDER, FOR SOLUTION INTRAVENOUS at 08:12

## 2018-04-02 RX ADMIN — PIPERACILLIN SODIUM AND TAZOBACTAM SODIUM 3.38 G: 3; .375 INJECTION, POWDER, LYOPHILIZED, FOR SOLUTION INTRAVENOUS at 01:47

## 2018-04-02 RX ADMIN — MULTIVITAMIN 15 ML: LIQUID ORAL at 12:22

## 2018-04-02 RX ADMIN — POTASSIUM PHOSPHATE, MONOBASIC AND POTASSIUM PHOSPHATE, DIBASIC 15 MMOL: 224; 236 INJECTION, SOLUTION INTRAVENOUS at 13:37

## 2018-04-02 RX ADMIN — Medication 10 MEQ: at 10:01

## 2018-04-02 RX ADMIN — AMOXICILLIN AND CLAVULANATE POTASSIUM 875 MG: 400; 57 POWDER, FOR SUSPENSION ORAL at 12:22

## 2018-04-02 RX ADMIN — SODIUM CHLORIDE SOLN NEBU 3% 3 ML: 3 NEBU SOLN at 09:14

## 2018-04-02 RX ADMIN — FLUCONAZOLE 200 MG: 40 POWDER, FOR SUSPENSION ORAL at 10:06

## 2018-04-02 RX ADMIN — PIPERACILLIN SODIUM AND TAZOBACTAM SODIUM 3.38 G: 3; .375 INJECTION, POWDER, LYOPHILIZED, FOR SOLUTION INTRAVENOUS at 06:58

## 2018-04-02 RX ADMIN — AMOXICILLIN AND CLAVULANATE POTASSIUM 875 MG: 400; 57 POWDER, FOR SUSPENSION ORAL at 21:54

## 2018-04-02 RX ADMIN — ENOXAPARIN SODIUM 40 MG: 40 INJECTION SUBCUTANEOUS at 08:12

## 2018-04-02 RX ADMIN — LEVALBUTEROL HYDROCHLORIDE 0.63 MG: 0.63 SOLUTION RESPIRATORY (INHALATION) at 09:14

## 2018-04-02 NOTE — PROGRESS NOTES
CLINICAL NUTRITION SERVICES - REASSESSMENT NOTE     Nutrition Prescription    RECOMMENDATIONS FOR MDs/PROVIDERS TO ORDER:  None at this time     Malnutrition Status:    Patient does not meet two of the above criteria necessary for diagnosing malnutrition but is at risk for malnutrition    Recommendations already ordered by Registered Dietitian (RD):  1. Start cycling TF: Increase rate by 10 mL Q4H as tolerated to goal rate 90 mL/hr.  Once tolerates goal rate x 4 hours, can turn off TF and begin 16 hour TF schedule from 4 PM - 8 AM (or other times per patient preference).  This will provide same nutrition provisions as current regimen.    2. CRP add-on today and weekly monitoring given use of immune-modulating TF formula    3. 60 mL Q1H free water flushes during 16-hour TF cycle (additional 960 mL/day to provide hydration; total free water from TF + flushes = 2069 mL/day)    4. Certavite (15 mL/day) to help meet micronutrient needs with potential for TF interruptions    Future/Additional Recommendations:  If loose stools persist, consider adding 2 pkts Nutrisource fiber daily to help thicken stools.     EVALUATION OF THE PROGRESS TOWARD GOALS   Diet: NPO    Nutrition Support (EN): Impact Peptide via J-tube @ goal 60 ml/hr (1440 ml/day) to provide 2160 kcals (28 kcal/kg/day), 135 g PRO (1.8 g/kg/day), 1109 ml free H2O, 92 g Fat (50% from MCTs), 202 g CHO and no Fiber daily.     Nutrition Support (PN): Was on CPN 3/19-4/1, weaned off once TF started advancing to goal rate. Goal PN regimen was 1440 mL/day with 310 g dex, 130 g AA, and 250 mL 20% IV lipids 5 days/week which provided 1931 kcal (25 kcal/kg), 1.8 g/kg PRO, GIR 2.8, and 18% kcal from fat.  Reached goal dextrose on 3/27.    Free Water: 30 mL Q4H (for FT patency)    Intake: TF started on 3/30 @ trophic rate via J-tube (jejunal extension added to G-tube on 3/30), started advancing by 20 mL Q4H as tolerated to goal rate on 3/31 (at goal rate by 4/1).  While on  PN/lipids the past week, no indication of any interruptions per review of progress notes; however, I/O document intakes mostly < or > goal ordered volumes, suspect some I/O documentation error? Patient says he is tolerating TF and denies any questions/concerns regarding TF at this time.       NEW FINDINGS   Weight: 77.6 kg on 4/1, down from admission but back near wt of 77.1 kg on 3/18 PTA  Labs: Phos 2.4 (L), has been intermittently low since 3/28  Meds: no IV fluids  GI: Loose stools, C.diff negative.  Writer asked if loose stools have been frequent/bothersome, pt denies.    MALNUTRITION  % Intake: Suspect no decreased intake noted  % Weight Loss: None noted  Subcutaneous Fat Loss: None observed  Muscle Loss: None observed  Fluid Accumulation/Edema: Trace right ankle per nursing documentation today, does not meet criteria  Malnutrition Diagnosis: Patient does not meet two of the above criteria necessary for diagnosing malnutrition but is at risk for malnutrition    Previous Goals   Total ave nutrition intakes (PN vs EN once approp) to provide minimum 25 kcal/kg/day and 1.2 g PRO/kg/day (per 77 kg).   Evaluation: Suspect Met    Previous Nutrition Diagnosis  Inadequate protein-energy intake related to slow advancement of CPN as evidenced by pt with refeeding syndrome and not yet at goal x 7.     Evaluation: Improving, updated    CURRENT NUTRITION DIAGNOSIS  Predicted inadequate nutrient intake (protein-energy) related to tolerating TF at goal rate but potential for TF interruptions and pt NPO    INTERVENTIONS  Implementation  Collaboration with other providers: Thoracic team request to start cycling TF  Enteral Nutrition - Modify rate and schedule  Feeding tube flush - see above  Multivitamin/mineral supplement therapy - see above  Nutrition education: discussed cycling TF with patient    Goals  Total avg nutritional intake to meet a minimum of 25 kcal/kg and 1.2 g PRO/kg daily (per dosing wt 77  kg).    Monitoring/Evaluation  Progress toward goals will be monitored and evaluated per protocol.     Sherri Bah RD, LD   7B RD Pager: [phone]

## 2018-04-02 NOTE — PROGRESS NOTES
THORACIC & FOREGUT SURGERY    S: NAEO. Pt denies pain. Tolerating tube feeds, loose stools have slowed in frequency and becoming more solid. C diff negative. No longer bothered by pharyngostomy tube.     O:  Vitals:    04/02/18 0400 04/02/18 0716 04/02/18 1145 04/02/18 1520   BP: 127/78 116/75  114/73   BP Location:  Right arm  Right arm   Pulse: 109 106  113   Resp: 16 16  16   Temp: 96.7  F (35.9  C) 96.7  F (35.9  C)  96.2  F (35.7  C)   TempSrc: Oral Oral  Oral   SpO2: 96% 96% 97% 95%   Weight:       Height:         A&Ox3, NAD  Breathing non-labored  Pharyngostomy in place, not coiled in throat. Output 100ml/24 h  RRR  Soft, NDNT. G tube to gravity, 1000 mL out/24 h. Albino drain output 0  Distal extremities warm    A/P: Michael Saldivar is a 56 yo male s/p OR on 3/19- L thoracotomy, primary repair of esophageal perforation, PEG tube placement, and chest tube placement x 4 (3 left, 1 right). Initially recovering appropriately from surgery, now complicated by presence of leak from esophageal repair.  Takeback to OR on 3/28 for pharyngostomy placement and attempted G to GJ conversion.  J extension placed via G tube on 3/30 by IR.    Plan for pt to discharge with tube feeds, pharyngostomy tube, and albino drain. Tube will be backed out slowly with serial scopes/swallow studies to eval healing of leak.     Have spoken with care coordination to begin dispo planning. Per OT, patient may d/c to home with home services and strong family support.     - Continue NPO, tube feeds at goal.   - Abx converted to augmentin and fluconazole suspension via J tubes  - PCA d/c'd, pt transitioned to APAP and oxy solutions via J tube PRN  - Lovenox    Patient discussed with Dr. Mark Newell MD   PGY-1 General Surgery

## 2018-04-02 NOTE — PLAN OF CARE
Problem: Patient Care Overview  Goal: Plan of Care/Patient Progress Review  Discharge Planner OT   Patient plan for discharge: home w./ home therapy.  Current status: Pt completed SLUMS, scored 18/30 correct, norm 27+/30 correct. OT educated pt on results w/in OT scope of practice. OT educated pt that if he were cleared to discharge home, OT recommending A w/ all med set up, finances and recommending pt stop driving until cleared by OP OT. Pt agreeable.   Barriers to return to prior living situation: medical status   Recommendations for discharge: see above, pt may be able to discharge home if family can A w/ med set up, driving, finances, pt only microwaves meals at home.   Rationale for recommendations: to increase ind in ADLS/IADLS       Entered by: Maddy Chris 04/02/2018 2:03 PM

## 2018-04-02 NOTE — PROGRESS NOTES
Care Coordinator- Discharge Planning     Admission Date/Time:  3/18/2018  Attending MD:  Keith Ybarra MD     Data  Date of initial CC assessment:  3/27/2018  Chart reviewed, discussed with interdisciplinary team.   Patient was admitted for:   1. Esophageal perforation         Assessment  Full assessment completed in previous note    Coordination of Care and Referrals: Home Infusion     Met with patient to discuss discharge planning.  Patient will be starting cyclic tube feedings this evening.  Encouraged patient to practice tube feeding skills with his bedside RN.  Patient reports that he lives alone, but has people that can help him post discharge.  He names his brother, Michael, and daughter, Juanis, as people that would be able to help with his medical cares.  He did go to PLC class to be taught about tube feeding cares.  Patient will also likely discharge with pharyngostomy tube.  Will have [facility] for nursing support.  RNCC to continue to follow and assist with discharge planning as needed.                Plan  Anticipated Discharge Date: 2-3 days  Anticipated Discharge Plan: Home with [facility] for enteral nutrition and home care support    Malini Parekh, GUNNER  [phone]

## 2018-04-02 NOTE — PLAN OF CARE
Problem: Patient Care Overview  Goal: Plan of Care/Patient Progress Review  Outcome: Improving  Picked pt up at 1200. HR tachy. Denies pain. G tube to gravity with green output. J tube clamped, meds given through. Cycled TF start this evening. P tube to LIS, minimal output, flushed per previous nurse. CT to water seal, no output. Large loose BM. Strict NPO phos replacement infusing. PCA d/c per previous nurse. Up with SBA.

## 2018-04-02 NOTE — PROGRESS NOTES
Home Infusion  Michael will be discharging within the next few days going home on cycled enteral therapy.   He has never done home enteral therapy before but he has been to the [facility] for initial teaching.     Met with Michael at bedside and provided him with information about FHI services.  Explained about administration method via the Arvin pump and plan for the feedings to be cycled to 16 hrs by discharge.   Informed him about supplies and delivery of supplies (first delivery will need to be to the hospital for him to take home), storage of formula, plan for SNV and 24/7 availability of FHI staff while on enteral therapy.   [facility] will be providing home nursing services and will contact Michael to set up visits.  Michael verbalized understanding of all information given.  He is willing and able to learn and manage home enteral therapy.   Questions answered.  As I will be out of town for 2 weeks my FHI Liaison replacement will f/u with Michael on day of discharge with final details and can be reached at my # below if needed..    Amanda Vela [facility] Liaison  [phone] pager

## 2018-04-02 NOTE — PLAN OF CARE
"Problem: Patient Care Overview  Goal: Plan of Care/Patient Progress Review  Outcome: No Change  Afeb,vitals stable, 02 sats 94-96% on 2LPM/nc, states pain is \"OK\", on dilaudid pca, didn't use any this shift, left chest incision dry, intact and steri stripped, lungs clear, diminished, pili soft with positive bowel sounds, gas and 2 loose stools, C. diff came back Neg, tube feeds at goal, iv at tko with iv abx given as ordered, up to bathroom with assist of 1, g-tube to gravity with mod amts out, CT to water seal, no output, pharyngostomy tube to lis, scant output, voiding in good amts, glucoses 140 and 132, offers no further c/o, appeared to rest/sleep between cares,       "

## 2018-04-02 NOTE — PLAN OF CARE
Problem: Patient Care Overview  Goal: Plan of Care/Patient Progress Review  Outcome: Improving  Pt walked in maynard x2-long distance and appeared to tolerate well. On return from second walk, HR was 131. After resting HR was 123 and when recheck later was 109. HR regular and O2 sats 94-96% on 2 L. L lung has fine crackles and R lung is clear with diminished sounds in middle and lower lobe. Pt had one diarrhea stool this evening and specimen was sent to lab for C. Diff. Results are still pending. TPN and lipids are discontinued and tube feeding is at goal rate. Pharyngostomy tube was irrigated with green/brown returns;~55cc out this shift. CT to H2O seal and 0 out. G-tube to gravity with 300cc dark green returns for this shift. Pain comfortably managed with Dilaudid PCA.

## 2018-04-03 LAB
ANION GAP SERPL CALCULATED.3IONS-SCNC: 10 MMOL/L (ref 3–14)
BUN SERPL-MCNC: 31 MG/DL (ref 7–30)
CALCIUM SERPL-MCNC: 8.7 MG/DL (ref 8.5–10.1)
CHLORIDE SERPL-SCNC: 117 MMOL/L (ref 94–109)
CO2 SERPL-SCNC: 19 MMOL/L (ref 20–32)
CREAT SERPL-MCNC: 0.69 MG/DL (ref 0.66–1.25)
ERYTHROCYTE [DISTWIDTH] IN BLOOD BY AUTOMATED COUNT: 22 % (ref 10–15)
GFR SERPL CREATININE-BSD FRML MDRD: >90 ML/MIN/1.7M2
GLUCOSE BLDC GLUCOMTR-MCNC: 119 MG/DL (ref 70–99)
GLUCOSE BLDC GLUCOMTR-MCNC: 144 MG/DL (ref 70–99)
GLUCOSE SERPL-MCNC: 130 MG/DL (ref 70–99)
HCT VFR BLD AUTO: 31.6 % (ref 40–53)
HGB BLD-MCNC: 8.6 G/DL (ref 13.3–17.7)
LACTATE BLD-SCNC: 1.2 MMOL/L (ref 0.4–1.9)
MAGNESIUM SERPL-MCNC: 2.4 MG/DL (ref 1.6–2.3)
MCH RBC QN AUTO: 19.9 PG (ref 26.5–33)
MCHC RBC AUTO-ENTMCNC: 27.2 G/DL (ref 31.5–36.5)
MCV RBC AUTO: 73 FL (ref 78–100)
PHOSPHATE SERPL-MCNC: 1.9 MG/DL (ref 2.5–4.5)
PHOSPHATE SERPL-MCNC: 2.6 MG/DL (ref 2.5–4.5)
PLATELET # BLD AUTO: 715 10E9/L (ref 150–450)
POTASSIUM SERPL-SCNC: 4 MMOL/L (ref 3.4–5.3)
RBC # BLD AUTO: 4.33 10E12/L (ref 4.4–5.9)
SODIUM SERPL-SCNC: 146 MMOL/L (ref 133–144)
WBC # BLD AUTO: 12.8 10E9/L (ref 4–11)

## 2018-04-03 PROCEDURE — 25000132 ZZH RX MED GY IP 250 OP 250 PS 637: Performed by: PHYSICIAN ASSISTANT

## 2018-04-03 PROCEDURE — 80048 BASIC METABOLIC PNL TOTAL CA: CPT | Performed by: STUDENT IN AN ORGANIZED HEALTH CARE EDUCATION/TRAINING PROGRAM

## 2018-04-03 PROCEDURE — 36592 COLLECT BLOOD FROM PICC: CPT | Performed by: THORACIC SURGERY (CARDIOTHORACIC VASCULAR SURGERY)

## 2018-04-03 PROCEDURE — 25000132 ZZH RX MED GY IP 250 OP 250 PS 637: Performed by: NURSE PRACTITIONER

## 2018-04-03 PROCEDURE — 12000008 ZZH R&B INTERMEDIATE UMMC

## 2018-04-03 PROCEDURE — 40000802 ZZH SITE CHECK

## 2018-04-03 PROCEDURE — 36415 COLL VENOUS BLD VENIPUNCTURE: CPT | Performed by: THORACIC SURGERY (CARDIOTHORACIC VASCULAR SURGERY)

## 2018-04-03 PROCEDURE — 83605 ASSAY OF LACTIC ACID: CPT | Performed by: THORACIC SURGERY (CARDIOTHORACIC VASCULAR SURGERY)

## 2018-04-03 PROCEDURE — 25000125 ZZHC RX 250: Performed by: NURSE PRACTITIONER

## 2018-04-03 PROCEDURE — 27210437 ZZH NUTRITION PRODUCT SEMIELEM INTERMED LITER

## 2018-04-03 PROCEDURE — 25000125 ZZHC RX 250: Performed by: STUDENT IN AN ORGANIZED HEALTH CARE EDUCATION/TRAINING PROGRAM

## 2018-04-03 PROCEDURE — 85027 COMPLETE CBC AUTOMATED: CPT | Performed by: STUDENT IN AN ORGANIZED HEALTH CARE EDUCATION/TRAINING PROGRAM

## 2018-04-03 PROCEDURE — 83735 ASSAY OF MAGNESIUM: CPT | Performed by: STUDENT IN AN ORGANIZED HEALTH CARE EDUCATION/TRAINING PROGRAM

## 2018-04-03 PROCEDURE — 25000128 H RX IP 250 OP 636: Performed by: NURSE PRACTITIONER

## 2018-04-03 PROCEDURE — 25000128 H RX IP 250 OP 636: Performed by: STUDENT IN AN ORGANIZED HEALTH CARE EDUCATION/TRAINING PROGRAM

## 2018-04-03 PROCEDURE — 00000146 ZZHCL STATISTIC GLUCOSE BY METER IP

## 2018-04-03 PROCEDURE — 84100 ASSAY OF PHOSPHORUS: CPT | Performed by: STUDENT IN AN ORGANIZED HEALTH CARE EDUCATION/TRAINING PROGRAM

## 2018-04-03 PROCEDURE — 25000128 H RX IP 250 OP 636: Performed by: PHYSICIAN ASSISTANT

## 2018-04-03 PROCEDURE — 36415 COLL VENOUS BLD VENIPUNCTURE: CPT | Performed by: STUDENT IN AN ORGANIZED HEALTH CARE EDUCATION/TRAINING PROGRAM

## 2018-04-03 PROCEDURE — 84100 ASSAY OF PHOSPHORUS: CPT | Performed by: THORACIC SURGERY (CARDIOTHORACIC VASCULAR SURGERY)

## 2018-04-03 RX ORDER — GUAR GUM
1 PACKET (EA) ORAL 2 TIMES DAILY
Status: DISCONTINUED | OUTPATIENT
Start: 2018-04-03 | End: 2018-04-06 | Stop reason: HOSPADM

## 2018-04-03 RX ORDER — LOPERAMIDE HYDROCHLORIDE 1 MG/5ML
2 SOLUTION ORAL 4 TIMES DAILY PRN
Status: DISCONTINUED | OUTPATIENT
Start: 2018-04-03 | End: 2018-04-05

## 2018-04-03 RX ADMIN — ENOXAPARIN SODIUM 40 MG: 40 INJECTION SUBCUTANEOUS at 08:24

## 2018-04-03 RX ADMIN — Medication 1 PACKET: at 19:15

## 2018-04-03 RX ADMIN — MULTIVITAMIN 15 ML: LIQUID ORAL at 08:22

## 2018-04-03 RX ADMIN — SODIUM CHLORIDE, PRESERVATIVE FREE 5 ML: 5 INJECTION INTRAVENOUS at 10:51

## 2018-04-03 RX ADMIN — POTASSIUM PHOSPHATE, MONOBASIC AND POTASSIUM PHOSPHATE, DIBASIC 20 MMOL: 224; 236 INJECTION, SOLUTION INTRAVENOUS at 12:43

## 2018-04-03 RX ADMIN — SODIUM CHLORIDE, PRESERVATIVE FREE 10 ML: 5 INJECTION INTRAVENOUS at 11:19

## 2018-04-03 RX ADMIN — FLUCONAZOLE 200 MG: 40 POWDER, FOR SUSPENSION ORAL at 08:22

## 2018-04-03 RX ADMIN — AMOXICILLIN AND CLAVULANATE POTASSIUM 875 MG: 400; 57 POWDER, FOR SUSPENSION ORAL at 19:15

## 2018-04-03 RX ADMIN — LOPERAMIDE HYDROCHLORIDE 2 MG: 1 SOLUTION ORAL at 13:32

## 2018-04-03 RX ADMIN — PANTOPRAZOLE SODIUM 40 MG: 40 INJECTION, POWDER, FOR SOLUTION INTRAVENOUS at 08:24

## 2018-04-03 RX ADMIN — POTASSIUM CHLORIDE 20 MEQ: 1.5 POWDER, FOR SOLUTION ORAL at 11:18

## 2018-04-03 RX ADMIN — POTASSIUM PHOSPHATE, MONOBASIC AND POTASSIUM PHOSPHATE, DIBASIC 10 MMOL: 224; 236 INJECTION, SOLUTION INTRAVENOUS at 22:41

## 2018-04-03 RX ADMIN — AMOXICILLIN AND CLAVULANATE POTASSIUM 875 MG: 400; 57 POWDER, FOR SUSPENSION ORAL at 08:23

## 2018-04-03 NOTE — PROGRESS NOTES
This is a recent snapshot of the patient's [facility] medical record.  For current drug dose and complete information and questions, call [phone] or In Basket pool, [facility] home infusion ([id number])  CSN Number:  [id number]

## 2018-04-03 NOTE — PLAN OF CARE
Problem: Patient Care Overview  Goal: Plan of Care/Patient Progress Review  Outcome: Improving  Frequently stooling, PRN loperimide administered per MAR.  Patient receiving phosphorous replacement per white port on triple lumen PICC, tachycardia triggered sepsis protocol, Lactate level = 1.2  Drain pleurovac changed to atrium, no output. 25mL emptied from g-tube gravity drainage. Tube feeds stopped at 8am per order, meds administered through J-tube. Pharyngostomy to low intermittent suction; flushed per orders, minimal output. NPO, encourage patient to perform all cares as discharge home is possible/planned for tomorrow. Replaced electrolytes per protocol.

## 2018-04-03 NOTE — PROGRESS NOTES
CLINICAL NUTRITION SERVICES - BRIEF NOTE  *See RD note on 4/2 for last nutrition reassessment details    Per Care Coordinator and review of chart, pt's loose stools continuing. C.Diff negative.  Patient says loose stools were worse this morning after increasing TF cycle rate overnight.      INTERVENTIONS  Implementation  Medical food supplement therapy: will add 1 pkt Nutrisource fiber BID to see if this helps thicken stools.  Each packet provides 3 g soluble fiber and 15 kcals.  Nutrition Education: discussed above plan with patient who is agreeable.    Monitoring/Evaluation  Progress toward goals will be monitored and evaluated per protocol.     Sherri Bah RD, LD   7B RD Pager: [phone]

## 2018-04-03 NOTE — PROGRESS NOTES
Care Coordinator- Discharge Planning     Admission Date/Time:  3/18/2018  Attending MD:  Keith Ybarra MD     Data  Date of initial CC assessment:  4/3/2018  Chart reviewed, discussed with interdisciplinary team.   Patient was admitted for:   1. Esophageal perforation         Assessment  Full assessment completed in previous note    Coordination of Care and Referrals: Provided patient/family with options for Home Care.    Per MD patient will be medically ready to discharge to home tomorrow 4/4.  Per Bryon Carl, patient will have J tube for tube feedings, G tube to gravity, pharyngostomy to low intermittent suction, and albino drain to mini atrium.  Met with patient to discuss discharge planning.  Explained all cares to patient and patient reported that he feels like he can manage.  Encouraged patient to practice cares.  Patient will need intermittent suction for the pharyngostomy tube at home.  [facility] has intermittent suction in stock, order was sent to Leonor at [facility](P: [phone], F: [phone]). Suction will be delivered later today or between 8a-12p tomorrow.  Patient asked that this writer update his daughter and brother.  Call made to patients Juanis tobin(P: [phone]) and left a vm. Call also made to patients angelerMichael(P: [phone]) and voice mail left.  Patient reports that his brother or daughter should be able to assist with transport to home.  Patient agreeable to transportation being set up with [facility] if family unable to transport. RNCC to continue to follow and assist with discharge planning as needed.                    Plan  Anticipated Discharge Date:  4/4/2018  Anticipated Discharge Plan:  Home with [facility] for tube feeding support and [facility] for nursing support.      Malini Parekh, GUNNER  [phone]

## 2018-04-03 NOTE — PLAN OF CARE
Problem: Patient Care Overview  Goal: Plan of Care/Patient Progress Review  Outcome: Improving  Pt walked in maynard x1 and sat in chair for approximately 3 hours. Given Tylenol x1 through g-tube with good relief--pt slept. Lungs have fine crackles in left lobe and right lobe clear although diminished in base. NPO. Pharyngostomy tube irrigated x1.

## 2018-04-03 NOTE — PLAN OF CARE
"Problem: Patient Care Overview  Goal: Plan of Care/Patient Progress Review  Outcome: Improving  /72 (BP Location: Right arm)  Pulse 98  Temp 97.3  F (36.3  C) (Oral)  Resp 16  Ht 1.676 m (5' 6\")  Wt 77.6 kg (171 lb)  SpO2 96%  BMI 27.6 kg/m2. Voiding adequately. Increased yellow secretion from the pharyngostomy tube during irrigation/remain on LIS. Stool X2. Preston tube under gravity. Tube feeding at 90ml/hr. Continue with plan of care.        "

## 2018-04-03 NOTE — PROGRESS NOTES
THORACIC & FOREGUT SURGERY    S: NAEO. Pt denies pain. Tolerating tube feeds, loose stools have slowed in frequency and becoming more solid. C diff negative. No longer bothered by pharyngostomy tube.  Taking over self cares in anticipation of dispo tomorrow.    O:  Vitals:    04/02/18 2231 04/03/18 0736 04/03/18 1008 04/03/18 1045   BP: 114/72 120/85 128/89 129/81   BP Location: Right arm Right arm Right arm    Pulse: 98 120 115 114   Resp: 16 16 22 20   Temp: 97.3  F (36.3  C) 96.3  F (35.7  C) 96.7  F (35.9  C) 97.1  F (36.2  C)   TempSrc: Oral Oral Axillary Oral   SpO2: 96% 94% 94% 96%   Weight:       Height:         A&O, NAD  Breathing non-labored  Pharyngostomy in place. Output 25ml/24 hrs  Soft, NDNT. G tube to gravity, 800 mL out/24 h. Preston drain output 0cc  Distal extremities warm    A/P: Michael Saldivar is a 58 yo male s/p OR on 3/19- L thoracotomy, primary repair of esophageal perforation, PEG tube placement, and chest tube placement x 4 (3 left, 1 right). Does have significant leak from esophageal repair.  Takeback to OR on 3/28 for pharyngostomy placement and attempted G to GJ conversion.  J extension placed via G tube on 3/30 by IR.    - Continue NPO, tube feeds at goal.   - Abx converted to augmentin and fluconazole suspension via J tubes  - PCA d/c'd, pt transitioned to APAP and oxy solutions via J tube PRN  - Lovenox    Patient discussed with Dr. Mark Carl, PA-C  P: [phone]

## 2018-04-04 ENCOUNTER — APPOINTMENT (OUTPATIENT)
Dept: OCCUPATIONAL THERAPY | Facility: CLINIC | Age: 58
End: 2018-04-04
Attending: THORACIC SURGERY (CARDIOTHORACIC VASCULAR SURGERY)
Payer: MEDICAID

## 2018-04-04 LAB
ANION GAP SERPL CALCULATED.3IONS-SCNC: 7 MMOL/L (ref 3–14)
BUN SERPL-MCNC: 30 MG/DL (ref 7–30)
CALCIUM SERPL-MCNC: 8.6 MG/DL (ref 8.5–10.1)
CHLORIDE SERPL-SCNC: 119 MMOL/L (ref 94–109)
CO2 SERPL-SCNC: 19 MMOL/L (ref 20–32)
CREAT SERPL-MCNC: 0.64 MG/DL (ref 0.66–1.25)
ERYTHROCYTE [DISTWIDTH] IN BLOOD BY AUTOMATED COUNT: 22.3 % (ref 10–15)
GFR SERPL CREATININE-BSD FRML MDRD: >90 ML/MIN/1.7M2
GLUCOSE BLDC GLUCOMTR-MCNC: 114 MG/DL (ref 70–99)
GLUCOSE BLDC GLUCOMTR-MCNC: 120 MG/DL (ref 70–99)
GLUCOSE BLDC GLUCOMTR-MCNC: 125 MG/DL (ref 70–99)
GLUCOSE BLDC GLUCOMTR-MCNC: 155 MG/DL (ref 70–99)
GLUCOSE SERPL-MCNC: 138 MG/DL (ref 70–99)
HCT VFR BLD AUTO: 32 % (ref 40–53)
HGB BLD-MCNC: 8.6 G/DL (ref 13.3–17.7)
MAGNESIUM SERPL-MCNC: 2.2 MG/DL (ref 1.6–2.3)
MCH RBC QN AUTO: 19.7 PG (ref 26.5–33)
MCHC RBC AUTO-ENTMCNC: 26.9 G/DL (ref 31.5–36.5)
MCV RBC AUTO: 73 FL (ref 78–100)
PHOSPHATE SERPL-MCNC: 2.7 MG/DL (ref 2.5–4.5)
PLATELET # BLD AUTO: 644 10E9/L (ref 150–450)
POTASSIUM SERPL-SCNC: 4 MMOL/L (ref 3.4–5.3)
RBC # BLD AUTO: 4.37 10E12/L (ref 4.4–5.9)
SODIUM SERPL-SCNC: 145 MMOL/L (ref 133–144)
WBC # BLD AUTO: 11.9 10E9/L (ref 4–11)

## 2018-04-04 PROCEDURE — 40000133 ZZH STATISTIC OT WARD VISIT: Performed by: OCCUPATIONAL THERAPIST

## 2018-04-04 PROCEDURE — 25000128 H RX IP 250 OP 636: Performed by: PHYSICIAN ASSISTANT

## 2018-04-04 PROCEDURE — 40000802 ZZH SITE CHECK

## 2018-04-04 PROCEDURE — 85027 COMPLETE CBC AUTOMATED: CPT | Performed by: STUDENT IN AN ORGANIZED HEALTH CARE EDUCATION/TRAINING PROGRAM

## 2018-04-04 PROCEDURE — 83735 ASSAY OF MAGNESIUM: CPT | Performed by: STUDENT IN AN ORGANIZED HEALTH CARE EDUCATION/TRAINING PROGRAM

## 2018-04-04 PROCEDURE — 25000132 ZZH RX MED GY IP 250 OP 250 PS 637: Performed by: NURSE PRACTITIONER

## 2018-04-04 PROCEDURE — 25000132 ZZH RX MED GY IP 250 OP 250 PS 637: Performed by: PHYSICIAN ASSISTANT

## 2018-04-04 PROCEDURE — 27210437 ZZH NUTRITION PRODUCT SEMIELEM INTERMED LITER

## 2018-04-04 PROCEDURE — 00000146 ZZHCL STATISTIC GLUCOSE BY METER IP

## 2018-04-04 PROCEDURE — 84100 ASSAY OF PHOSPHORUS: CPT | Performed by: STUDENT IN AN ORGANIZED HEALTH CARE EDUCATION/TRAINING PROGRAM

## 2018-04-04 PROCEDURE — 97535 SELF CARE MNGMENT TRAINING: CPT | Mod: GO | Performed by: OCCUPATIONAL THERAPIST

## 2018-04-04 PROCEDURE — 36592 COLLECT BLOOD FROM PICC: CPT | Performed by: STUDENT IN AN ORGANIZED HEALTH CARE EDUCATION/TRAINING PROGRAM

## 2018-04-04 PROCEDURE — 12000008 ZZH R&B INTERMEDIATE UMMC

## 2018-04-04 PROCEDURE — 25000125 ZZHC RX 250: Performed by: STUDENT IN AN ORGANIZED HEALTH CARE EDUCATION/TRAINING PROGRAM

## 2018-04-04 PROCEDURE — 25000128 H RX IP 250 OP 636: Performed by: STUDENT IN AN ORGANIZED HEALTH CARE EDUCATION/TRAINING PROGRAM

## 2018-04-04 PROCEDURE — 80048 BASIC METABOLIC PNL TOTAL CA: CPT | Performed by: STUDENT IN AN ORGANIZED HEALTH CARE EDUCATION/TRAINING PROGRAM

## 2018-04-04 PROCEDURE — 25000128 H RX IP 250 OP 636: Performed by: NURSE PRACTITIONER

## 2018-04-04 PROCEDURE — 25000125 ZZHC RX 250: Performed by: NURSE PRACTITIONER

## 2018-04-04 RX ORDER — LOPERAMIDE HYDROCHLORIDE 1 MG/5ML
2 SOLUTION ORAL 4 TIMES DAILY PRN
Qty: 236 ML | Refills: 1 | Status: SHIPPED | OUTPATIENT
Start: 2018-04-04 | End: 2018-04-06

## 2018-04-04 RX ORDER — AMOXICILLIN AND CLAVULANATE POTASSIUM 400; 57 MG/5ML; MG/5ML
875 POWDER, FOR SUSPENSION ORAL 2 TIMES DAILY
Qty: 300 ML | Refills: 0 | Status: SHIPPED | OUTPATIENT
Start: 2018-04-04 | End: 2018-04-06

## 2018-04-04 RX ORDER — OXYCODONE HCL 5 MG/5 ML
5 SOLUTION, ORAL ORAL EVERY 6 HOURS PRN
Qty: 70 ML | Refills: 0 | Status: SHIPPED | OUTPATIENT
Start: 2018-04-04 | End: 2018-04-06

## 2018-04-04 RX ORDER — GUAR GUM
1 PACKET (EA) ORAL 2 TIMES DAILY
Qty: 75 PACKET | Refills: 0 | Status: SHIPPED | OUTPATIENT
Start: 2018-04-04 | End: 2018-04-06

## 2018-04-04 RX ORDER — FLUCONAZOLE 40 MG/ML
200 POWDER, FOR SUSPENSION ORAL DAILY
Qty: 105 ML | Refills: 0 | Status: SHIPPED | OUTPATIENT
Start: 2018-04-05 | End: 2018-04-06

## 2018-04-04 RX ADMIN — FLUCONAZOLE 200 MG: 40 POWDER, FOR SUSPENSION ORAL at 07:45

## 2018-04-04 RX ADMIN — Medication 1 PACKET: at 20:05

## 2018-04-04 RX ADMIN — Medication 1 PACKET: at 10:26

## 2018-04-04 RX ADMIN — POTASSIUM PHOSPHATE, MONOBASIC AND POTASSIUM PHOSPHATE, DIBASIC 10 MMOL: 224; 236 INJECTION, SOLUTION INTRAVENOUS at 12:24

## 2018-04-04 RX ADMIN — MULTIVITAMIN 15 ML: LIQUID ORAL at 07:45

## 2018-04-04 RX ADMIN — PANTOPRAZOLE SODIUM 40 MG: 40 INJECTION, POWDER, FOR SOLUTION INTRAVENOUS at 07:46

## 2018-04-04 RX ADMIN — AMOXICILLIN AND CLAVULANATE POTASSIUM 875 MG: 400; 57 POWDER, FOR SUSPENSION ORAL at 20:04

## 2018-04-04 RX ADMIN — LOPERAMIDE HYDROCHLORIDE 2 MG: 1 SOLUTION ORAL at 10:26

## 2018-04-04 RX ADMIN — ENOXAPARIN SODIUM 40 MG: 40 INJECTION SUBCUTANEOUS at 09:19

## 2018-04-04 RX ADMIN — POTASSIUM CHLORIDE 20 MEQ: 1.5 POWDER, FOR SOLUTION ORAL at 10:26

## 2018-04-04 RX ADMIN — SODIUM CHLORIDE, PRESERVATIVE FREE 10 ML: 5 INJECTION INTRAVENOUS at 10:26

## 2018-04-04 RX ADMIN — AMOXICILLIN AND CLAVULANATE POTASSIUM 875 MG: 400; 57 POWDER, FOR SUSPENSION ORAL at 07:44

## 2018-04-04 NOTE — PLAN OF CARE
Problem: Patient Care Overview  Goal: Plan of Care/Patient Progress Review  Discharge Planner OT   Patient plan for discharge: rehab  Current status: Pt able to complete ADL tasks with SBA for A with lines and to problem solve more complex tasks. VSS during standing g/h routine SBA. Pt reported concern about managing lines at home Ind.   Barriers to return to prior living situation: line mgmt, possible cognitive deficits  Recommendations for discharge: TCU, if pt is able to manage lines Ind prior to discharge home may be an option.  Rationale for recommendations: to help ensure pt safety.        Entered by: Sim Bobo 04/04/2018 4:29 PM

## 2018-04-04 NOTE — PLAN OF CARE
Problem: Patient Care Overview  Goal: Plan of Care/Patient Progress Review  Outcome: Improving  Vitals:    04/03/18 1008 04/03/18 1045 04/03/18 1538 04/03/18 1700   BP: 128/89 129/81 127/73    BP Location: Right arm  Right arm    Pulse: 115 114 108    Resp: 22 20 20    Temp: 96.7  F (35.9  C) 97.1  F (36.2  C) 96.4  F (35.8  C)    TempSrc: Axillary Oral Oral    SpO2: 94% 96% 95%    Weight:    78.3 kg (172 lb 8.2 oz)   Height:         Pt is alert .standby assist.Pharyngostomy to suction.G-tube to gravity. Chest tube to water seal. Tube feeding running at 90 ml/hr.Educate the pt about the tube feeding.Phosphorous replacement running,.educate the pt about the cares.Possible discharge tomorrow.Will monitor..

## 2018-04-04 NOTE — PLAN OF CARE
"Problem: Patient Care Overview  Goal: Plan of Care/Patient Progress Review  Outcome: Improving  Afeb, vitals stable, 02 sats 95% on room air, states pain is \"OK\", declined need for pain meds, left chest incision dry, intact and steri stripped, lap sites dry and intact, belly round with positive bowel sounds gas and stool, lungs clear, diminished on left, CT to water seal, scant output, g-tube to gravity, mod amts of green drng out, pharyngostomy tube to suction, with mod amts out, swabbing mouth often, tube feeds at goal with free water, no c/o nausea, up to bathroom with sba, voiding with loose stools x3, glucoses 144 and 155,appeared to rest/sleep between cares, offers no further c/o, hopes for dc later today      "

## 2018-04-04 NOTE — PROGRESS NOTES
"  Care Coordinator- Discharge Planning     Admission Date/Time:  3/18/2018  Attending MD:  Keith Ybarra MD     Data  Date of initial CC assessment:  3/27/2018  Chart reviewed, discussed with interdisciplinary team.   Patient was admitted for:   1. Esophageal perforation         Assessment  Full assessment completed in previous note    Met with patient at bedside with bedside RN, Jililan, and Thoracic PA, Han Carl, to discuss discharge planning.  Patient practiced skills, giving medication/tube feedings, yesterday and reports it went \"ok\".  Patient is concerned with going home and having to manage cares independently and does not feel at this point he will be able to manage.  Patient does not have any family that can be there to assist regularly.  This writer called his brother, Michael, and daughter, Juanis, and have not heard back from them.  Due to patient being medically ready to discharge discussed TCU placement to continue practicing skills until he feels more comfortable managing on his own.  Patient agreeable to TCU referrals being made. Will continue to practice skills with bedside RN help as patients goal is to get home.             Plan  Anticipated Discharge Date:  Pending safe DC plan  Anticipated Discharge Plan:  TCU vs home with home care support    Malini Parekh, JANELLECC  [phone]  "

## 2018-04-04 NOTE — PROGRESS NOTES
THORACIC & FOREGUT SURGERY    S: NAEO. Has concerns about safety performing self cares in anticipation of dispo tomorrow.    O:  Vitals:    04/03/18 2236 04/04/18 0000 04/04/18 0300 04/04/18 0733   BP: 139/85 133/87 135/89 131/82   BP Location: Right arm   Right arm   Pulse: 120 111 112 108   Resp: 20 20 20 20   Temp: 96  F (35.6  C) 96.6  F (35.9  C)  95.8  F (35.4  C)   TempSrc: Axillary Oral  Oral   SpO2: 96% 95% 95% 95%   Weight:       Height:         A&O, NAD  Breathing non-labored  Pharyngostomy in place.    Soft, NDNT. G tube to gravity   Distal extremities warm    A/P: Michael Saldivar is a 58 yo male s/p OR on 3/19- L thoracotomy, primary repair of esophageal perforation & PEG tube placement. Does have significant leak from esophageal repair with adjacent cavity.  OR on 3/28 for pharyngostomy placement into cavity and attempted G to GJ conversion.  J extension placed via G tube on 3/30 by IR.    - Continue NPO, tube feeds at goal.   - Abx converted to augmentin and fluconazole suspension via J tubes  - PCA d/c'd, pt transitioned to APAP and oxy solutions via J tube PRN  - Lovenox  - Medically appropriate for dispo, but not comfortable with self cares.  TCU placement pending while pt continues to perform/learn self cares    Patient discussed with Dr. Mark Carl, PA-C  P: [phone]

## 2018-04-04 NOTE — PROGRESS NOTES
"Social Work: Assessment with Discharge Plan    Patient Name:  Michael Saldivar  :  1960  Age:  57 year old  MRN:  [id number]  Risk/Complexity Score:  Filed Complexity Screen Score: 3  Completed assessment with:  Chart review, pt, RNCALIXTO Nayak    Presenting Information   Reason for Referral:  Discharge plan, TCU placement  Date of Intake:  2018  Referral Source:  Physician and GUNNER Nayak  Decision Maker:  Pt at baseline  Alternate Decision Maker:  Pt verbalized that he would want his brother Michael to be his alternate decision maker. Sw explained NO policy would identify pt's daughter as his alternate decision maker.   Health Care Directive:  Provided education and Declined completing  Living Situation:  House alone with no stairs  Previous Functional Status:  Independent  Patient and family understanding of hospitalization:  Pt has a fairly good understanding of his hospitalization and has some understanding of the cares he needs.   Cultural/Language/Spiritual Considerations:  Pt is an english speaking male.   Adjustment to Illness:  Pt's adjustment is difficult to assess at times but pt presents and acknowledges being overwhelmed by his nursing cares and what he would need to be able to do at home.   Pt expressed being worried about his PICC line and seems to think he will have IV abx at d/c but per conversation with RNCC pt will not have IV abx at d/c. Pt discussed IV abx being a lot of his worry and reported that maybe within a couple of days he would be able to manage his tube feeds. Pt discussed worry about needing a \"vacuum\" machine for his pharyngostomy.   Pt reported that it was depressing to find out he can't eat for at least 6 weeks.     Physical Health  Reason for Admission:    1. Esophageal perforation      Services Needed/Recommended:  TCU    Mental Health/Chemical Dependency  Diagnosis:  None per chart review. Pt presented with flat affect and acknowledged being overwhelmed with his " "situation. Pt acknowledged feeling depressed when he found out he can't eat for at least 6 weeks.   Support/Services in Place:  Unknown  Services Needed/Recommended:  Pt would benefit from outpatient therapy to process everything he has been through.     Support System  Significant relationship at present time:  Pt's brother and pt's daughter both of whom live in [place]  Family of origin is available for support:  Yes but local family members work and have families they are busy with. Pt reported \"I'm doing fine by myself, it's been that way for years\".   Pt reported having a sister in [place] and sister in [place].  Other support available:  Unknown  Gaps in support system:  Pt does not have much support at home to help with his current cares and may be in need of a TCU stay.  Patient is caregiver to:  None     Provider Information   Primary Care Physician:  Jemal Michel   [phone]   Clinic:  [facility] [address]  / [facility] [address]      :  Unknown    Financial   Income Source:  Pt is a   Financial Concerns:  None identified  Insurance:    Payor/Plan Subscriber Name Rel Member # Group #   MEDICAID [place] - MED[place]* MJ VELASCO  [id number]       [address] [address]       Discharge Plan   Patient and family discharge goal:  Potentially TCU if he is not able to comfortable learn how to handle his cares in the next couple days. Pt is agreeable to TCU referrals being made in case he isn't able to learn his cares and feel comfortable going home right away.  Pt is uncertain at what point he would be comfortable going home and with what cares.   Pt reported he would like to go home from the hospital but doesn't feel he can handle his cares.   Provided education on discharge plan:  YES. Pt was informed of limited TCU options for pharyngostomy.   Patient agreeable to discharge plan:  YES  A list of Medicare Certified Facilities was provided to the patient and/or family to " encourage patient choice. Patient's choices for facility are:  Yes. Pt is agreeable to referrals being made to the following:  - [facility] and [facility] (via liaison Lea [phone], f. [phone] whom does admissions for [place] and [facility] [facility])- left vm inquiring about ability to manage pharyngostomy, waiting to hear back. Lea called back reporting that [facility] is not accepting admissions but she will review pt's information for [place], referral faxed and waiting to hear back. Lea called back reporting that none of the [facility] facilities (3 of them) can manage pt's cares.  - [facility] (p. [phone], f.[phone], has access to Epic) - spoke with Radha in admissions and there will be a bed available later today and she will review pt in Epic, waiting to hear back.   - [facility]- referral initiated via page to Adriane with admissions, waiting to hear back. Jason called later reporting that pt needs to be able to be off of suction for at least 2 hours to be managed on TCU. Ashok also has concerns about pt's cognition and ability to eventually learn the needed cares. Ashok reported he will continue to follow pt and see what OT is saying and if pt can be off of suction for more than an hour.     Pt inquired about the [facility] or [facility] being an options and sw explained swing bed option but that [facility] doesn't pay for this service.   Will [facility] provide Skilled rehabilitation or complex medical:  YES   General information regarding anticipated insurance coverage and possible out of pocket cost was discussed. Patient and patient's family are aware patient may incur the cost of transportation to the facility, pending insurance payment: YES  Barriers to discharge:  Bed availability/acceptance, [facility] was just notified today (day after pt is medically ready for d/c) that pt now needs TCU    Discharge Recommendations   Anticipated Disposition:  TCU vs  home  Transportation Needs:  Other:  Unknown  Name of Transportation Company and Phone:  [facility] if needed- [phone]    Additional comments   Per RNCC Malini pt already has a home suction machine, [facility] is on board for tube feeds, and [facility] is in place for home care.     Mary Anne Fair, ISIAH, MSW  7B   [phone] (pager) [phone]  4/4/2018

## 2018-04-04 NOTE — PLAN OF CARE
Problem: Patient Care Overview  Goal: Plan of Care/Patient Progress Review  Outcome: No Change  Patient apprehensive about potential discharge, teaching in process regarding patient assuming care for his drains and tubes.  Gastrostomy tube to gravity drainage, chest drain to gravity drainage, meds administered via j-tube, triple lumen PICC flushing x3, pharyngostomy drain with moderate output; likely due to patient's frequent use of oral swabs. Phosphorus and potassium replaced per protocol.  Rechecks ordered for AM.  Loose stools; responding well to immodium.  Needs encouragement to toilet independently. Denies pain, NPO. Social work looking into possible rehab placement.

## 2018-04-04 NOTE — PROGRESS NOTES
PHARYNGOSTOMY CARE     There is a pharyngostomy tube entering the skin on the side of the neck and entering the back of the mouth and then the esophagus to descend into the infection cavity next to your esophagus.     1. The pharyngostomy tube is to be kept to low intermittent suction while in bed at night and when lying in bed (or up in a chair) during the day. The pharyngostomy tube can be clamped for short periods less than one hour, or preferably put to a leg bag or gravity drainage for therapies.    2. Though the tube is secured with a skin suture, keep a cath secure device on the pharyngostomy tube to prevent incidental removal.    5. Flush the tube to prevent clogging every 8 hours with 10ml of saline.

## 2018-04-05 ENCOUNTER — APPOINTMENT (OUTPATIENT)
Dept: OCCUPATIONAL THERAPY | Facility: CLINIC | Age: 58
End: 2018-04-05
Attending: THORACIC SURGERY (CARDIOTHORACIC VASCULAR SURGERY)
Payer: MEDICAID

## 2018-04-05 LAB
ANION GAP SERPL CALCULATED.3IONS-SCNC: 8 MMOL/L (ref 3–14)
BUN SERPL-MCNC: 33 MG/DL (ref 7–30)
CALCIUM SERPL-MCNC: 8.9 MG/DL (ref 8.5–10.1)
CHLORIDE SERPL-SCNC: 118 MMOL/L (ref 94–109)
CO2 SERPL-SCNC: 19 MMOL/L (ref 20–32)
CREAT SERPL-MCNC: 0.69 MG/DL (ref 0.66–1.25)
ERYTHROCYTE [DISTWIDTH] IN BLOOD BY AUTOMATED COUNT: 21.8 % (ref 10–15)
GFR SERPL CREATININE-BSD FRML MDRD: >90 ML/MIN/1.7M2
GLUCOSE BLDC GLUCOMTR-MCNC: 145 MG/DL (ref 70–99)
GLUCOSE BLDC GLUCOMTR-MCNC: 147 MG/DL (ref 70–99)
GLUCOSE SERPL-MCNC: 133 MG/DL (ref 70–99)
HCT VFR BLD AUTO: 32.5 % (ref 40–53)
HGB BLD-MCNC: 8.7 G/DL (ref 13.3–17.7)
MAGNESIUM SERPL-MCNC: 2 MG/DL (ref 1.6–2.3)
MCH RBC QN AUTO: 19.6 PG (ref 26.5–33)
MCHC RBC AUTO-ENTMCNC: 26.8 G/DL (ref 31.5–36.5)
MCV RBC AUTO: 73 FL (ref 78–100)
PHOSPHATE SERPL-MCNC: 2.5 MG/DL (ref 2.5–4.5)
PLATELET # BLD AUTO: 631 10E9/L (ref 150–450)
POTASSIUM SERPL-SCNC: 3.7 MMOL/L (ref 3.4–5.3)
RBC # BLD AUTO: 4.43 10E12/L (ref 4.4–5.9)
SODIUM SERPL-SCNC: 145 MMOL/L (ref 133–144)
WBC # BLD AUTO: 11.1 10E9/L (ref 4–11)

## 2018-04-05 PROCEDURE — 40000133 ZZH STATISTIC OT WARD VISIT: Performed by: OCCUPATIONAL THERAPIST

## 2018-04-05 PROCEDURE — 36592 COLLECT BLOOD FROM PICC: CPT | Performed by: STUDENT IN AN ORGANIZED HEALTH CARE EDUCATION/TRAINING PROGRAM

## 2018-04-05 PROCEDURE — 84100 ASSAY OF PHOSPHORUS: CPT | Performed by: STUDENT IN AN ORGANIZED HEALTH CARE EDUCATION/TRAINING PROGRAM

## 2018-04-05 PROCEDURE — 97530 THERAPEUTIC ACTIVITIES: CPT | Mod: GO | Performed by: OCCUPATIONAL THERAPIST

## 2018-04-05 PROCEDURE — 25000128 H RX IP 250 OP 636: Performed by: NURSE PRACTITIONER

## 2018-04-05 PROCEDURE — 25000128 H RX IP 250 OP 636: Performed by: PHYSICIAN ASSISTANT

## 2018-04-05 PROCEDURE — 25000132 ZZH RX MED GY IP 250 OP 250 PS 637: Performed by: PHYSICIAN ASSISTANT

## 2018-04-05 PROCEDURE — 25000125 ZZHC RX 250: Performed by: NURSE PRACTITIONER

## 2018-04-05 PROCEDURE — 80048 BASIC METABOLIC PNL TOTAL CA: CPT | Performed by: STUDENT IN AN ORGANIZED HEALTH CARE EDUCATION/TRAINING PROGRAM

## 2018-04-05 PROCEDURE — 25000132 ZZH RX MED GY IP 250 OP 250 PS 637: Performed by: NURSE PRACTITIONER

## 2018-04-05 PROCEDURE — 85027 COMPLETE CBC AUTOMATED: CPT | Performed by: STUDENT IN AN ORGANIZED HEALTH CARE EDUCATION/TRAINING PROGRAM

## 2018-04-05 PROCEDURE — 83735 ASSAY OF MAGNESIUM: CPT | Performed by: STUDENT IN AN ORGANIZED HEALTH CARE EDUCATION/TRAINING PROGRAM

## 2018-04-05 PROCEDURE — 00000146 ZZHCL STATISTIC GLUCOSE BY METER IP

## 2018-04-05 PROCEDURE — 25000128 H RX IP 250 OP 636: Performed by: STUDENT IN AN ORGANIZED HEALTH CARE EDUCATION/TRAINING PROGRAM

## 2018-04-05 PROCEDURE — 25000132 ZZH RX MED GY IP 250 OP 250 PS 637: Performed by: THORACIC SURGERY (CARDIOTHORACIC VASCULAR SURGERY)

## 2018-04-05 PROCEDURE — 12000008 ZZH R&B INTERMEDIATE UMMC

## 2018-04-05 RX ORDER — LOPERAMIDE HYDROCHLORIDE 1 MG/5ML
2 SOLUTION ORAL 3 TIMES DAILY
Status: DISCONTINUED | OUTPATIENT
Start: 2018-04-05 | End: 2018-04-05

## 2018-04-05 RX ORDER — LOPERAMIDE HYDROCHLORIDE 1 MG/5ML
4 SOLUTION ORAL 4 TIMES DAILY
Status: DISCONTINUED | OUTPATIENT
Start: 2018-04-05 | End: 2018-04-06 | Stop reason: HOSPADM

## 2018-04-05 RX ORDER — LOPERAMIDE HYDROCHLORIDE 1 MG/5ML
4 SOLUTION ORAL 4 TIMES DAILY PRN
Status: DISCONTINUED | OUTPATIENT
Start: 2018-04-05 | End: 2018-04-05

## 2018-04-05 RX ADMIN — AMOXICILLIN AND CLAVULANATE POTASSIUM 875 MG: 400; 57 POWDER, FOR SUSPENSION ORAL at 08:21

## 2018-04-05 RX ADMIN — Medication 2 G: at 09:41

## 2018-04-05 RX ADMIN — AMOXICILLIN AND CLAVULANATE POTASSIUM 875 MG: 400; 57 POWDER, FOR SUSPENSION ORAL at 20:27

## 2018-04-05 RX ADMIN — MULTIVITAMIN 15 ML: LIQUID ORAL at 08:22

## 2018-04-05 RX ADMIN — SODIUM CHLORIDE, PRESERVATIVE FREE 10 ML: 5 INJECTION INTRAVENOUS at 12:08

## 2018-04-05 RX ADMIN — LOPERAMIDE HYDROCHLORIDE 4 MG: 1 SOLUTION ORAL at 17:24

## 2018-04-05 RX ADMIN — FLUCONAZOLE 200 MG: 40 POWDER, FOR SUSPENSION ORAL at 08:22

## 2018-04-05 RX ADMIN — ENOXAPARIN SODIUM 40 MG: 40 INJECTION SUBCUTANEOUS at 08:22

## 2018-04-05 RX ADMIN — PANTOPRAZOLE SODIUM 40 MG: 40 TABLET, DELAYED RELEASE ORAL at 08:22

## 2018-04-05 RX ADMIN — LOPERAMIDE HYDROCHLORIDE 4 MG: 1 SOLUTION ORAL at 20:27

## 2018-04-05 RX ADMIN — LOPERAMIDE HYDROCHLORIDE 4 MG: 1 SOLUTION ORAL at 12:08

## 2018-04-05 RX ADMIN — POTASSIUM CHLORIDE 20 MEQ: 1.5 POWDER, FOR SOLUTION ORAL at 09:41

## 2018-04-05 RX ADMIN — POTASSIUM PHOSPHATE, MONOBASIC AND POTASSIUM PHOSPHATE, DIBASIC 10 MMOL: 224; 236 INJECTION, SOLUTION INTRAVENOUS at 12:08

## 2018-04-05 RX ADMIN — Medication 1 PACKET: at 20:31

## 2018-04-05 RX ADMIN — SODIUM CHLORIDE, PRESERVATIVE FREE 5 ML: 5 INJECTION INTRAVENOUS at 07:34

## 2018-04-05 RX ADMIN — Medication 1 PACKET: at 09:40

## 2018-04-05 RX ADMIN — LOPERAMIDE HYDROCHLORIDE 2 MG: 1 SOLUTION ORAL at 08:20

## 2018-04-05 NOTE — PROGRESS NOTES
THORACIC & FOREGUT SURGERY    S: NAEO. Pain well controlled. No nausea or emesis. Having multiple soft BMs. Ambulating.     O:  Vitals:    04/04/18 1658 04/05/18 0000 04/05/18 0449 04/05/18 0745   BP: 130/83 132/88 131/80 128/84   BP Location: Right arm  Right arm Right arm   Pulse: 105 112 112 110   Resp: 20 20 18 18   Temp: 95.9  F (35.5  C) 97.3  F (36.3  C) 96.1  F (35.6  C) 96.9  F (36.1  C)   TempSrc: Oral Oral Oral Oral   SpO2: 96% 95% 95% 97%   Weight:       Height:         A&O, NAD  Breathing non-labored  Pharyngostomy in place.    Soft, NDNT. G tube to gravity   Distal extremities warm    Pharyngostomy tube 300 cc   Chest tube: 20  G tube 150    A/P: Michael Saldivar is a 58 yo male s/p OR on 3/19- L thoracotomy, primary repair of esophageal perforation & PEG tube placement. Does have significant leak from esophageal repair with adjacent cavity.  OR on 3/28 for pharyngostomy placement into cavity and attempted G to GJ conversion.  J extension placed via G tube on 3/30 by IR.     - Continue NPO, tube feeds at goal.   - Continuing fiber, adding imodium. On PPI 40 mg daily  - Abx converted to augmentin and fluconazole suspension via J tubes  - APAP and oxy solutions via J tube PRN  - Lovenox  - Medically appropriate for dispo, but not comfortable with self cares. TCU placement pending while pt continues to perform/learn self cares    Patient to be discussed with Dr. Mark Yung MD PGY-3  P: [phone]

## 2018-04-05 NOTE — PLAN OF CARE
Problem: Patient Care Overview  Goal: Plan of Care/Patient Progress Review  Outcome: No Change  Afeb, vitals stable, 02 sats 95% on room air, denies pain, abd soft, round with positive bowel sounds, lungs clear, diminished, tube feeds at goal with free water, g-tube to gravity with mod output, CT to water seal, small amt out, pharn tube to lis, up to bathroom with SBA, voiding, possible dc today? TCU?

## 2018-04-05 NOTE — PROGRESS NOTES
Social Work Services Progress Note    Hospital Day: 19  Date of Initial Social Work Evaluation:  4/4/18- please see for details  Collaborated with:  Chart review, nursing facilities, Thoracic team    Data:  Pt is a 57 year old male admitted with esophageal perforation and underwent a thoracotomy, repair of perforation, PEG tube placement and chest tube placement.   Pt will have pharyngostomy to suction, GJ tube feeds, and chest drain at d/c. Pt was initially planning to d/c to home with home care but now needs TCU as he doesn't yet feel able to manage his current cares at home.   TCU referrals have been made and pt was medically ready for d/c since Tuesday.     Per Thoracic team pt can have pharyngostomy off of suction for 3 hours.     Intervention:  Madison spoke with Radha in admissions at [facility] (p. [phone], f. [phone])- clinically pt can be accepted but there are no TCU beds open until next week. Radha reported that there may be a shared LTC bed pt could go to but she needs to ensure the LTC is able to manage the pharyngostomy, waiting to hear back. Radha later reported that the LTC DON does not feel comfortable managing pharyngostomy so this is not an option for pt.     Madison spoke with Adriane with [facility] admissions and informed her that pt can be off of suction for 3 hours (previously a concern) and explained that pt needs practice learning his nursing cares. Adriane reported she doesn't believe there would be a bed available for pt today but she will review and update madison, waiting to hear back. Adriane called back at 2:45 pm reporting that pt is accepted but there is not a bed available today but there may be tomorrow.   Adriane requested that in pt's d/c orders it state that pt can have pharyngostomy off of suction for 3 hours (especially because pt's drive home is 3 hours long and there is no suction device to accommodate this).     Assessment:  See bedside RN, PT/OT, medical  team notes    Plan:    Anticipated Disposition:  Facility:  TCU, location to be determined    Barriers to d/c plan:  Bed availability/acceptance    Follow Up:  madison GONZALEZ will continue to follow    ISIAH Sweet, MSW  7B   [phone] (pager) [phone]  4/5/2018

## 2018-04-05 NOTE — PLAN OF CARE
Problem: Patient Care Overview  Goal: Plan of Care/Patient Progress Review  Outcome: No Change  Patient VSS, slight apprehension verbalized at the idea/mention of caring for his own drains/tubes/lines. Encouraged ambulation, but pt declined. Potassium, phosphorous and magnesium replaced.  Loperimide changed to scheduled (versus PRN). Denies pain, possible discharge to TCU pending placement.

## 2018-04-05 NOTE — PLAN OF CARE
Problem: Patient Care Overview  Goal: Plan of Care/Patient Progress Review  Outcome: No Change  HR remains tachy. Denies pain. G to gravity with green drainage. TF started, tolerating well. P tube to LIS, pt ind hooking back up. Drain to atrium, minimal output. Pt denies loose stools this afternoon. Up with SBA. Flat affect. Plan to d/c to TCU.

## 2018-04-06 ENCOUNTER — APPOINTMENT (OUTPATIENT)
Dept: OCCUPATIONAL THERAPY | Facility: CLINIC | Age: 58
End: 2018-04-06
Attending: THORACIC SURGERY (CARDIOTHORACIC VASCULAR SURGERY)
Payer: MEDICAID

## 2018-04-06 ENCOUNTER — HOSPITAL ENCOUNTER (INPATIENT)
Facility: SKILLED NURSING FACILITY | Age: 58
LOS: 14 days | Discharge: HOME | End: 2018-04-20
Attending: INTERNAL MEDICINE | Admitting: INTERNAL MEDICINE
Payer: MEDICAID

## 2018-04-06 VITALS
WEIGHT: 164.6 LBS | SYSTOLIC BLOOD PRESSURE: 125 MMHG | HEIGHT: 66 IN | TEMPERATURE: 95.3 F | HEART RATE: 110 BPM | OXYGEN SATURATION: 96 % | DIASTOLIC BLOOD PRESSURE: 78 MMHG | RESPIRATION RATE: 16 BRPM | BODY MASS INDEX: 26.45 KG/M2

## 2018-04-06 PROBLEM — K22.3 PERFORATION OF ESOPHAGUS: Status: ACTIVE | Noted: 2018-04-06

## 2018-04-06 LAB
ANION GAP SERPL CALCULATED.3IONS-SCNC: 10 MMOL/L (ref 3–14)
BUN SERPL-MCNC: 30 MG/DL (ref 7–30)
CALCIUM SERPL-MCNC: 9.1 MG/DL (ref 8.5–10.1)
CHLORIDE SERPL-SCNC: 115 MMOL/L (ref 94–109)
CO2 SERPL-SCNC: 21 MMOL/L (ref 20–32)
CREAT SERPL-MCNC: 0.63 MG/DL (ref 0.66–1.25)
ERYTHROCYTE [DISTWIDTH] IN BLOOD BY AUTOMATED COUNT: 21.3 % (ref 10–15)
GFR SERPL CREATININE-BSD FRML MDRD: >90 ML/MIN/1.7M2
GLUCOSE SERPL-MCNC: 130 MG/DL (ref 70–99)
HCT VFR BLD AUTO: 30.8 % (ref 40–53)
HGB BLD-MCNC: 8.6 G/DL (ref 13.3–17.7)
MAGNESIUM SERPL-MCNC: 2.2 MG/DL (ref 1.6–2.3)
MCH RBC QN AUTO: 20.2 PG (ref 26.5–33)
MCHC RBC AUTO-ENTMCNC: 27.9 G/DL (ref 31.5–36.5)
MCV RBC AUTO: 73 FL (ref 78–100)
PHOSPHATE SERPL-MCNC: 2.6 MG/DL (ref 2.5–4.5)
PLATELET # BLD AUTO: 552 10E9/L (ref 150–450)
POTASSIUM SERPL-SCNC: 3.6 MMOL/L (ref 3.4–5.3)
RBC # BLD AUTO: 4.25 10E12/L (ref 4.4–5.9)
SODIUM SERPL-SCNC: 146 MMOL/L (ref 133–144)
WBC # BLD AUTO: 11.2 10E9/L (ref 4–11)

## 2018-04-06 PROCEDURE — 84100 ASSAY OF PHOSPHORUS: CPT | Performed by: STUDENT IN AN ORGANIZED HEALTH CARE EDUCATION/TRAINING PROGRAM

## 2018-04-06 PROCEDURE — 25000132 ZZH RX MED GY IP 250 OP 250 PS 637: Performed by: PHYSICIAN ASSISTANT

## 2018-04-06 PROCEDURE — 25000132 ZZH RX MED GY IP 250 OP 250 PS 637: Performed by: THORACIC SURGERY (CARDIOTHORACIC VASCULAR SURGERY)

## 2018-04-06 PROCEDURE — 25000128 H RX IP 250 OP 636: Performed by: PHYSICIAN ASSISTANT

## 2018-04-06 PROCEDURE — 83735 ASSAY OF MAGNESIUM: CPT | Performed by: STUDENT IN AN ORGANIZED HEALTH CARE EDUCATION/TRAINING PROGRAM

## 2018-04-06 PROCEDURE — 12000022 ZZH R&B SNF

## 2018-04-06 PROCEDURE — 25000128 H RX IP 250 OP 636: Performed by: NURSE PRACTITIONER

## 2018-04-06 PROCEDURE — 97530 THERAPEUTIC ACTIVITIES: CPT | Mod: GO | Performed by: OCCUPATIONAL THERAPIST

## 2018-04-06 PROCEDURE — 25000125 ZZHC RX 250: Performed by: NURSE PRACTITIONER

## 2018-04-06 PROCEDURE — 97535 SELF CARE MNGMENT TRAINING: CPT | Mod: GO | Performed by: OCCUPATIONAL THERAPIST

## 2018-04-06 PROCEDURE — 40000133 ZZH STATISTIC OT WARD VISIT: Performed by: OCCUPATIONAL THERAPIST

## 2018-04-06 PROCEDURE — 40000802 ZZH SITE CHECK

## 2018-04-06 PROCEDURE — 00000146 ZZHCL STATISTIC GLUCOSE BY METER IP

## 2018-04-06 PROCEDURE — 25000132 ZZH RX MED GY IP 250 OP 250 PS 637: Performed by: NURSE PRACTITIONER

## 2018-04-06 PROCEDURE — 85027 COMPLETE CBC AUTOMATED: CPT | Performed by: STUDENT IN AN ORGANIZED HEALTH CARE EDUCATION/TRAINING PROGRAM

## 2018-04-06 PROCEDURE — 80048 BASIC METABOLIC PNL TOTAL CA: CPT | Performed by: STUDENT IN AN ORGANIZED HEALTH CARE EDUCATION/TRAINING PROGRAM

## 2018-04-06 PROCEDURE — 36592 COLLECT BLOOD FROM PICC: CPT | Performed by: STUDENT IN AN ORGANIZED HEALTH CARE EDUCATION/TRAINING PROGRAM

## 2018-04-06 PROCEDURE — 25000128 H RX IP 250 OP 636: Performed by: STUDENT IN AN ORGANIZED HEALTH CARE EDUCATION/TRAINING PROGRAM

## 2018-04-06 RX ORDER — AMOXICILLIN AND CLAVULANATE POTASSIUM 400; 57 MG/5ML; MG/5ML
875 POWDER, FOR SUSPENSION ORAL 2 TIMES DAILY
Qty: 300 ML | Refills: 0 | Status: ON HOLD | DISCHARGE
Start: 2018-04-06 | End: 2018-06-07

## 2018-04-06 RX ORDER — MAGNESIUM HYDROXIDE 1200 MG/15ML
LIQUID ORAL
Status: DISCONTINUED
Start: 2018-04-06 | End: 2018-04-08 | Stop reason: HOSPADM

## 2018-04-06 RX ORDER — LOPERAMIDE HYDROCHLORIDE 1 MG/5ML
2 SOLUTION ORAL 4 TIMES DAILY PRN
Qty: 236 ML | Refills: 1 | Status: ON HOLD | DISCHARGE
Start: 2018-04-06 | End: 2018-06-07

## 2018-04-06 RX ORDER — GUAR GUM
1 PACKET (EA) ORAL 2 TIMES DAILY
Status: DISCONTINUED | OUTPATIENT
Start: 2018-04-06 | End: 2018-04-08

## 2018-04-06 RX ORDER — AMOXICILLIN AND CLAVULANATE POTASSIUM 400; 57 MG/5ML; MG/5ML
875 POWDER, FOR SUSPENSION ORAL 2 TIMES DAILY
Status: DISCONTINUED | OUTPATIENT
Start: 2018-04-06 | End: 2018-04-20 | Stop reason: HOSPADM

## 2018-04-06 RX ORDER — LOPERAMIDE HYDROCHLORIDE 1 MG/5ML
4 SOLUTION ORAL 4 TIMES DAILY
Qty: 200 ML | Status: ON HOLD | DISCHARGE
Start: 2018-04-06 | End: 2018-06-07

## 2018-04-06 RX ORDER — LOPERAMIDE HYDROCHLORIDE 1 MG/5ML
4 SOLUTION ORAL 4 TIMES DAILY
Status: DISCONTINUED | OUTPATIENT
Start: 2018-04-06 | End: 2018-04-08

## 2018-04-06 RX ORDER — OXYCODONE HCL 5 MG/5 ML
5 SOLUTION, ORAL ORAL EVERY 6 HOURS PRN
Qty: 70 ML | Refills: 0 | Status: ON HOLD | DISCHARGE
Start: 2018-04-06 | End: 2018-04-18

## 2018-04-06 RX ORDER — FLUCONAZOLE 40 MG/ML
200 POWDER, FOR SUSPENSION ORAL DAILY
Status: DISCONTINUED | OUTPATIENT
Start: 2018-04-07 | End: 2018-04-20 | Stop reason: HOSPADM

## 2018-04-06 RX ORDER — GUAR GUM
1 PACKET (EA) ORAL 2 TIMES DAILY
Qty: 75 PACKET | Refills: 0 | Status: ON HOLD | DISCHARGE
Start: 2018-04-06 | End: 2018-06-07

## 2018-04-06 RX ORDER — OXYCODONE HCL 5 MG/5 ML
5 SOLUTION, ORAL ORAL EVERY 6 HOURS PRN
Status: DISCONTINUED | OUTPATIENT
Start: 2018-04-06 | End: 2018-04-16

## 2018-04-06 RX ORDER — FLUCONAZOLE 40 MG/ML
200 POWDER, FOR SUSPENSION ORAL DAILY
Qty: 105 ML | Refills: 0 | Status: ON HOLD | DISCHARGE
Start: 2018-04-06 | End: 2018-06-07

## 2018-04-06 RX ORDER — NALOXONE HYDROCHLORIDE 0.4 MG/ML
.1-.4 INJECTION, SOLUTION INTRAMUSCULAR; INTRAVENOUS; SUBCUTANEOUS
Status: DISCONTINUED | OUTPATIENT
Start: 2018-04-06 | End: 2018-04-20 | Stop reason: HOSPADM

## 2018-04-06 RX ADMIN — MULTIVITAMIN 15 ML: LIQUID ORAL at 08:53

## 2018-04-06 RX ADMIN — POTASSIUM CHLORIDE 20 MEQ: 1.5 POWDER, FOR SOLUTION ORAL at 12:11

## 2018-04-06 RX ADMIN — FLUCONAZOLE 200 MG: 40 POWDER, FOR SUSPENSION ORAL at 08:53

## 2018-04-06 RX ADMIN — SODIUM CHLORIDE, PRESERVATIVE FREE 5 ML: 5 INJECTION INTRAVENOUS at 12:20

## 2018-04-06 RX ADMIN — AMOXICILLIN AND CLAVULANATE POTASSIUM 875 MG: 400; 57 POWDER, FOR SUSPENSION ORAL at 21:04

## 2018-04-06 RX ADMIN — Medication 1 PACKET: at 08:54

## 2018-04-06 RX ADMIN — ENOXAPARIN SODIUM 40 MG: 40 INJECTION SUBCUTANEOUS at 08:53

## 2018-04-06 RX ADMIN — PANTOPRAZOLE SODIUM 40 MG: 40 TABLET, DELAYED RELEASE ORAL at 08:53

## 2018-04-06 RX ADMIN — LOPERAMIDE HYDROCHLORIDE 4 MG: 1 SOLUTION ORAL at 17:51

## 2018-04-06 RX ADMIN — LOPERAMIDE HYDROCHLORIDE 4 MG: 1 SOLUTION ORAL at 12:10

## 2018-04-06 RX ADMIN — POTASSIUM PHOSPHATE, MONOBASIC AND POTASSIUM PHOSPHATE, DIBASIC 10 MMOL: 224; 236 INJECTION, SOLUTION INTRAVENOUS at 12:10

## 2018-04-06 RX ADMIN — LOPERAMIDE HYDROCHLORIDE 4 MG: 1 SOLUTION ORAL at 21:04

## 2018-04-06 RX ADMIN — LOPERAMIDE HYDROCHLORIDE 4 MG: 1 SOLUTION ORAL at 08:53

## 2018-04-06 RX ADMIN — AMOXICILLIN AND CLAVULANATE POTASSIUM 875 MG: 400; 57 POWDER, FOR SUSPENSION ORAL at 08:53

## 2018-04-06 ASSESSMENT — ACTIVITIES OF DAILY LIVING (ADL)
RETIRED_EATING: 0-->INDEPENDENT
SWALLOWING: 0-->SWALLOWS FOODS/LIQUIDS WITHOUT DIFFICULTY
WHICH_OF_THE_ABOVE_FUNCTIONAL_RISKS_HAD_A_RECENT_ONSET_OR_CHANGE?: AMBULATION;TRANSFERRING;BATHING
RETIRED_COMMUNICATION: 0-->UNDERSTANDS/COMMUNICATES WITHOUT DIFFICULTY
COGNITION: 0 - NO COGNITION ISSUES REPORTED
BATHING: 0-->INDEPENDENT
FALL_HISTORY_WITHIN_LAST_SIX_MONTHS: NO
TRANSFERRING: 0-->INDEPENDENT
TOILETING: 0-->INDEPENDENT
AMBULATION: 0-->INDEPENDENT
DRESS: 0-->INDEPENDENT

## 2018-04-06 NOTE — PROGRESS NOTES
"Social Work Services Progress Note    Hospital Day: 20  Date of Initial Social Work Evaluation:  4/4/18- please see for details  Collaborated with:  Chart review, team rounds, Thoracic team,  TCU admissions    Data:  Pt is a 57 year old male admitted with esophageal perforation and underwent a thoracotomy, repair of perforation, PEG tube placement and chest tube placement.   Pt will have pharyngostomy to suction, GJ tube feeds, and chest drain at d/c. Pt was initially planning to d/c to home with home care but now needs TCU as he doesn't yet feel able to manage his current cares at home.   TCU referrals have been made and pt was medically ready for d/c since Tuesday. Pt has been accepted at  TCU.      Per Thoracic team pt can have pharyngostomy off of suction for 3 hours.     Intervention:  Madison met with pt in pt's room to check in and discuss d/c planning. Pt was asleep upon arrival but was able to be awoken. When asked if pt recalled previous conversation with madison he reported \"I guess\" and did remember talking about TCU and specific locations.   Sw provided update on referrals made and that [facility] facilities that were chosen are not able to manage pt's cares. Pt was informed of acceptance at  TCU and reported he doesn't have \"much say\". Pt reported that he doesn't feel able to manage his cares at home and feels if were to go home he would \"mess\" it up. Pt did not at this time want sw to update anyone.     Madison spoke with Adriane with  TCU admissions and she asked that transport be arranged for pt for 4pm today.     W/c transport arranged via [employer] ([phone]) for today at 4:15pm.     Assessment:  See bedside RN, PT/OT, medical team notes    Plan:    Anticipated Disposition:  Facility:   TCU    Barriers to d/c plan:  NA    Follow Up:  madison GONZALEZ will continue to follow    ISIAH Sweet, MSW  7B   [phone] (pager) [phone]  4/6/2018          "

## 2018-04-06 NOTE — DISCHARGE SUMMARY
NAME: Michael Saldivar   MRN: [id number]   : 1960     DATE OF ADMISSION: 3/18/2018     PRE/POSTOPERATIVE DIAGNOSES: Esophageal perforation    PROCEDURES PERFORMED: 3/18/18  EGD, PEG tube placement, NG tube placement, Left thoracotomy, Repair of esophageal perforation, Right chest tube placement, L chest tube placement x 3    ADDITIONAL PROCEDURES PERFORMED:  3/28/18  -Persistent leak seen from esophagus leading into L pleural cavity  EGD, Pharyngostomy Tube Placement, Gastroplexy, Feeding Tube Exchange (attempted conversion from G to GJ)    ADDITIONAL PROCEDURES PERFORMED:  3/30  Interventional radiology: Jejunal limb added to Gastrostomy tube    PATHOLOGY RESULTS: None     CULTURE RESULTS: None    INTRAOPERATIVE COMPLICATIONS: None     POSTOPERATIVE COMPLICATIONS: None     DRAINS/TUBES PRESENT AT DISCHARGE:   GJ tube, pharyngostomy, and chest tube (instructions below)    -OK for pharyngostomy to be off of suction for up to 3 hrs for transport.    DATE OF DISCHARGE:  2018     HOSPITAL COURSE: Michael Saldivar is a 57 year old male who on 3/18/2018 presented with [place] esophageal perforation.  He underwent the above-named procedures.  He tolerated the operation well and postoperatively was transferred to the general post-surgical unit.  The remainder of his course was essentially uncomplicated.  Prior to discharge, his pain was controlled well.  He was unable to perform ADLs and self cares given his multiple tubes and drains.  On 2018, he was discharged to [facility] in stable condition.    DISCHARGE EXAM:   A&O, NAD  Resp non-labored  Distal extremities warm    Incisions healing well, all drains sutured in place well    DISCHARGE INSTRUCTIONS:    Discharge Procedure Orders  Reason for your hospital stay   Order Comments: Esophageal perforation     Adult [facility] Follow-up and recommended labs and tests   Order Comments: Follow up with Dr. Ybarra in two weeks with a repeat upper  "endoscopy.     Appointments on [facility] and/or [facility] (with [facility] or [facility] provider or service). Call [phone] if you haven't heard regarding these appointments within 7 days of discharge.     Monitor and record   Order Comments: Pharyngostomy (neck) tube output    Gastrostomy (stomach) tube output     Discharge Instructions   Order Comments: THORACIC SURGERY DISCHARGE INSTRUCTIONS      If your plans upon discharge include prolonged periods of sitting (i.e a lengthy car or plane ride), it is highly beneficial to get up and walk at least once per hour to help prevent swelling and blood clots.     Take incentive spirometer home for continued frequent use    Activity as tolerated, no strenous activity until seen in follow-up, No lifting greater than 20 pounds until seen in follow up.       Call for fever greater than 101.5, chills, increased size of incision, red skin around incision, vision changes, muscle strength changes, sensation changes, shortness of breath, or other concerns.    No driving while taking narcotic pain medication.    Transition to ibuprofen or tylenol/acetaminophen for pain control. Do not take tylenol/acetaminophen and acetaminophen containing narcotic (e.g., percocet or vicodin) at the same time. If you have known ulcer problems, or kidney trouble (elevated creatinine) do not take the ibuprofen.    In emergencies, call 911    For other Questions or Concerns;  A.) During weekday working hours (Monday through Friday 8am to 4:30pm)  call [phone] and ask to speak to a clinical nurse specialist.    B.) At nights (after 4:30pm), on weekends, or if urgent call [phone] and  tell the  \"I would like to page job code [phone], the thoracic surgery  fellow on call, please.\"     General info for SNF   Order Comments: Length of Stay Estimate: Short Term Care: Estimated # of Days <30  Condition at Discharge: Improving  Level of care:skilled   Rehabilitation Potential: " Excellent  Admission H&P remains valid and up-to-date: Yes  Recent Chemotherapy: N/A  Use Nursing Home Standing Orders: Yes     Mantoux instructions   Order Comments: Give two-step Mantoux (PPD) Per Facility Policy Yes     Glucose monitor nursing POCT   Order Comments: Before meals and at bedtime     Intake and output   Order Comments: Every shift     Daily weights   Order Comments: Call Provider for weight gain of more than 2 pounds per day or 5 pounds per week.     Follow Up and recommended labs and tests   Order Comments: Follow up in the OR with Dr. Keith Ybarra in 2 weeks for repeat EGD     Additional Discharge Instructions   Order Comments: THORACIC SURGERY DISCHARGE INSTRUCTIONS    DIET: NPO due to esophageal perforation    If your plans upon discharge include prolonged periods of sitting (i.e a lengthy car or plane ride), it is highly beneficial to get up and walk at least once per hour to help prevent swelling and blood clots.     Take incentive spirometer home for continued frequent use    Activity as tolerated, no strenous activity until seen in follow-up     Stay hydrated. Take over the counter fiber (metamucil or benefiber) and stool softeners (Miralax, docusate or senna) if becoming constipated.     Call for fever greater than 101.5, chills, increased size of incision, red skin around incision, vision changes, muscle strength changes, sensation changes, shortness of breath, or other concerns.    No driving while taking narcotic pain medication.    Transition to ibuprofen or tylenol/acetaminophen for pain control. Do not take tylenol/acetaminophen and acetaminophen containing narcotic (e.g., percocet or vicodin) at the same time. If you have known ulcer problems, or kidney trouble (elevated creatinine) do not take the ibuprofen.    In emergencies, call 911    For other Questions or Concerns;  A.) During weekday working hours (Monday through Friday 8am to 4:30pm)  call [phone] and ask to  "speak to a clinical nurse specialist.    LEXA) At nights (after 4:30pm), on weekends, or if urgent call [phone] and  tell the  \"I would like to page job code [phone], the thoracic surgery  fellow on call, please.\"     Tubes   Order Comments: PHARYNGOSTOMY CARE      There is a pharyngostomy tube entering the skin on the side of the neck and entering the back of the mouth and then the esophagus to descend into the infection cavity next to your esophagus.      1. The pharyngostomy tube is to be kept to low intermittent suction while in bed at night and when lying in bed (or up in a chair) during the day. The pharyngostomy tube can be clamped for short periods less than one hour, or preferably put to a leg bag or gravity drainage for therapies.     2. Though the tube is secured with a skin suture, keep a cath secure device on the pharyngostomy tube to prevent incidental removal.     5. Flush the tube to prevent clogging every 8 hours with 10ml of saline.     Tubes   Order Comments: G TUBE INSTRUCTIONS: To gravity drainage at all times to prevent reflux of gastric contents into esophageal perforation  _____________________________________  J TUBE INSTRUCTIONS: for tube feeds    Flush your feeding tube with 30cc of water;  1. After medication administration through the feeding tube  2. Before and after tube feeds  3. Every 8 hours while awake if you have not used the tube during that time      -If possible take medications by mouth or as solution via j tube (Do not put crushed pills through the tube if possible)    -Change the gauze around your tube daily or more often if soiled    -KEEP A FLEXI-TRACK (or other tube retention device) on your tube at all times to prevent incidental removal.     Tubes   Order Comments: CHEST TUBE INSTRUCTIONS    Chest Tube: To water seal, please record drainage daily until seen in follow up to assist with the decision for removal    DO NOT CLAMP OR PINCH YOUR CHEST TUBE.  ENSURE " THAT THE TUBE IS NOT KINKED AFTER REPOSITIONING, SITTING, OR LYING DOWN.      You should change your chest tube dressing every 24-48 hours     Keep Flexi-track on your chest tube at all times for additional securement.  There should be a small amount of slack between the Flexi-track and insertion site.  This will help to ensure the tube is not inadvertently removed.     Nutrition Services Adult IP Consult   Order Comments: Reason:  Tube feeds     Physical Therapy Adult Consult   Order Comments: Evaluate and treat as clinically indicated.    Reason:  S/p thoracic surgery, deconditioning     Occupational Therapy Adult Consult   Order Comments: Evaluate and treat as clinically indicated.    Reason:  S/p thoracic surgery, deconditioning     Adult Formula Drip Feeding   Order Comments: Cycling TF: Impact peptide @ 90 mL/hr.  16 hour TF schedule from 4 PM - 8 AM (or other times per patient preference).        - 60 mL Q1H free water flushes during 16-hour TF cycle (additional 960 mL/day to provide hydration; total free water from TF + flushes = 2069 mL/day)     - Certavite (15 mL/day) to help meet micronutrient needs with potential for TF interruptions     Diet   Order Comments: Follow this diet upon discharge: Orders Placed This Encounter    Nothing by mouth.        Adult Formula Drip Feeding: Specified Time Impact Peptide 1.5; Jejunostomy; Goal Rate: 90; mL/hr; From: 4:00 PM; 8:00 AM; Medication - Tube Feeding Flush Frequency: At least 15-30 mL water before and after medication administration and with tube c...   Order Specific Question Answer Comments   Is discharge order? Yes          DISCHARGE MEDICATIONS:   Current Discharge Medication List      START taking these medications    Details   !! loperamide (IMODIUM) 1 MG/5ML liquid 20 mLs (4 mg) by Per J Tube route 4 times daily  Qty: 200 mL    Associated Diagnoses: Bowel habit changes      pantoprazole (PROTONIX) SUSP suspension 20 mLs (40 mg) by Per J Tube route daily     Associated Diagnoses: Esophageal perforation      acetaminophen (TYLENOL) 32 mg/mL solution 20.3 mLs (650 mg) by Per J Tube route every 6 hours as needed for mild pain or fever  Qty: 473 mL, Refills: 0    Associated Diagnoses: Esophageal perforation      amoxicillin-clavulanate (AUGMENTIN) 400-57 MG/5ML suspension 10.9 mLs (875 mg) by Per J Tube route 2 times daily  Qty: 300 mL, Refills: 0    Associated Diagnoses: Esophageal perforation      fiber modular (NUTRISOURCE FIBER) packet 1 packet by Per J Tube route 2 times daily  Qty: 75 packet, Refills: 0    Associated Diagnoses: Esophageal perforation      fluconazole (DIFLUCAN) 40 MG/ML suspension 5 mLs (200 mg) by Per J Tube route daily  Qty: 105 mL, Refills: 0    Associated Diagnoses: Esophageal perforation      !! loperamide (IMODIUM) 1 MG/5ML liquid 10 mLs (2 mg) by Per J Tube route 4 times daily as needed for diarrhea  Qty: 236 mL, Refills: 1    Associated Diagnoses: Esophageal perforation      multivitamins with minerals (CERTAVITE/CEROVITE) LIQD liquid 15 mLs by Per Feeding Tube route daily  Qty: 2 Bottle, Refills: 0    Associated Diagnoses: Esophageal perforation      oxyCODONE (ROXICODONE) 5 MG/5ML solution 5 mLs (5 mg) by Per J Tube route every 6 hours as needed for moderate to severe pain  Qty: 70 mL, Refills: 0    Associated Diagnoses: Esophageal perforation      !! order for DME Equipment being ordered: Other: suction and cannister for pharyngostomy tube  Treatment Diagnosis: Esophageal perforation  Qty: 7 Container, Refills: 0    Associated Diagnoses: Esophageal perforation      !! order for DME Equipment being ordered:   [facility] Suction Machine  Suction Canister-2  Suction Connect Tube-2  5 in 1 Connector-2  Bacteria Filter-2    Treatment Diagnosis: Esophogeal Perforation  Qty: 1 Month, Refills: 0    Associated Diagnoses: Esophageal perforation       !! - Potential duplicate medications found. Please discuss with provider.

## 2018-04-06 NOTE — PLAN OF CARE
Problem: Patient Care Overview  Goal: Plan of Care/Patient Progress Review  Outcome: Improving  Vitals:    04/05/18 1711 04/05/18 2249 04/06/18 0750 04/06/18 1229   BP:  132/85 123/81 128/80   BP Location:  Right arm Right arm Right arm   Pulse:       Resp:  16 16 16   Temp:  96.1  F (35.6  C) 97  F (36.1  C) 95.2  F (35.1  C)   TempSrc:  Oral Axillary Axillary   SpO2:  95% 97% 97%   Weight: 74.7 kg (164 lb 9.6 oz)      Height:       Pt denied pain had multiple loose stools mot saved voiding spontaneously with out difficulty Tube feeding off since 9am.  GT to gravity with moderate light tan. JT clamped after meds. Phos 2.6 and K 3.6 replaced per protocol.  All tubes sites clean dry and intact and well secured.. Pt aware of transfer to  TCU  Report given to the receiving site. Waiting for transport to be here at 4:15pm  Continue to follow up per plan of care.   PICC line triple lumen Hep locked. Pt left to  TCU as planned accompanied by [facility] attendant,

## 2018-04-06 NOTE — PLAN OF CARE
Problem: Patient Care Overview  Goal: Plan of Care/Patient Progress Review  AVSS ex tachycardic, on RA. Denies pain. G-tube to gravity. TF @ goal of 90 via J-tube. Pharyngostomy tube to LIS with moderate amount of output. plueravac in place in old MALINA site. meds through J-tube. x2 loose stools, given imodium and supplemental fiber. Voiding spontaneously. SBA to bathroom. NPO. PIV-SL. A/Ox4, calling appropriately and making needs known. Will continue to monitor and follow POC. Awaiting TCU placement.

## 2018-04-06 NOTE — PLAN OF CARE
Problem: Patient Care Overview  Goal: Plan of Care/Patient Progress Review  Discharge Planner OT   Patient plan for discharge: TCU  Current status: Pt completing bed mobility mod I and ambulating ~500 ft mod I holding onto IV pole.  Pt verbalizes understanding of thoracotomy precautions.  Barriers to return to prior living situation: Comfort with new lines  Recommendations for discharge: TCU  Rationale for recommendations: Pt will benefit from skilled OT services to increase activity tolerance and to continue to monitor cognition and provide strategies       Entered by: Pantera Gay 04/05/2018 8:34 PM

## 2018-04-06 NOTE — PLAN OF CARE
Problem: Patient Care Overview  Goal: Plan of Care/Patient Progress Review  Discharge Planner OT   Patient plan for discharge: TCU  Current status: Pt ambulating ~500 ft with SBA while holding onto IV pole, Pt able to ascend/descend 8 stairs with SBA for line management.  Pt donning and doffing undergarments with SBA using foot to contralateral knee technique.  Pt able to complete simulated medication management task without any verbal cues required.  Pt with no errors while completing med management.  Barriers to return to prior living situation: Line management  Recommendations for discharge: TCU  Rationale for recommendations: Pt will benefit from skilled OT services to increase independence with ADL and activity tolerance       Entered by: Pantera Gay 04/06/2018 4:01 PM         Occupational Therapy Discharge Summary    Reason for therapy discharge:    Discharged to transitional care facility.    Progress towards therapy goal(s). See goals on Care Plan in [facility] electronic health record for goal details.  Goals partially met.  Barriers to achieving goals:   discharge from facility.    Therapy recommendation(s):    Continued therapy is recommended.  Rationale/Recommendations:  Pt will benefit from continued skilled OT services to increase independence with ADL.

## 2018-04-06 NOTE — PHARMACY
Medication reviewed for administration by feeding tube and for potential food/drug interactions.     Recommendation: No changes are needed at this time.      Pharmacy will continue to follow as new medications are ordered.

## 2018-04-06 NOTE — PLAN OF CARE
Patient is a 57 year old male admitted to room 415 via wheelchair.  Patient is alert and oriented X 3. See Epic for VS and assessment.  Patient is able to transfer Independently using iv pole. Patient was settled into their room, shown call light, tv, mealtimes etc. Oriented to unit. Will continue monitoring pain level and VS. Notifying MD with any concerns.  Follow MD orders for cares and medications.    Level of Schooling:high school  Ethnicity:  Marital Status:single  Dentures: No  Hearing Aid: No  Smoker:  No  Glasses: No  Occupation: construction  Falls 0-1 mo: no 2-6 mo: no   Advanced Care Directive Referral to Social Work?Yes

## 2018-04-06 NOTE — PROGRESS NOTES
Social Work Services Discharge Note      Patient Name:  Michael Saldivar     Anticipated Discharge Date:  4/6/18    Discharge Disposition:   TCU:   TCU   [address] 4th floor  [phone]     Pre-Admission Screening (PAS) online form has been completed.  The Level of Care (LOC) is:  Determined  Confirmation Code is:  [id number]  Patient/caregiver informed of referral to [facility] for Pre-Admission Screening for skilled nursing facility (SNF) placement and to expect a phone call post discharge from SNF.     Additional Services/Equipment Arranged:  W/c transport arranged via [employer] ([phone]) for today at 4:15pm.      Patient / Family response to discharge plan:  Pt is agreeable and was ok with sw updating his daughter Juanis     Persons notified of above discharge plan:  Pt, pt's daughter Juanis via  ([phone]), bedside nurse, charge nurse, NST, receiving facility, Thoracic team    Staff Discharge Instructions:  Please fax discharge orders and signed hard scripts for any controlled substances.  Please print a packet and send with patient.     CTS Handoff completed:  YES    Medicare Notice of Rights provided to the patient/family:  ISIAH Figueroa, MSW  7B   [phone] (pager) [phone]  4/6/2018

## 2018-04-07 LAB
GLUCOSE BLDC GLUCOMTR-MCNC: 113 MG/DL (ref 70–99)
GLUCOSE BLDC GLUCOMTR-MCNC: 125 MG/DL (ref 70–99)

## 2018-04-07 PROCEDURE — 25000125 ZZHC RX 250: Performed by: PHYSICIAN ASSISTANT

## 2018-04-07 PROCEDURE — 99207 ZZC APP CREDIT; MD BILLING SHARED VISIT: CPT | Performed by: NURSE PRACTITIONER

## 2018-04-07 PROCEDURE — 25000128 H RX IP 250 OP 636: Performed by: PHYSICIAN ASSISTANT

## 2018-04-07 PROCEDURE — 25000132 ZZH RX MED GY IP 250 OP 250 PS 637: Performed by: PHYSICIAN ASSISTANT

## 2018-04-07 PROCEDURE — 99207 ZZC DOWN CODE DUE TO INITIAL EXAM: CPT | Performed by: INTERNAL MEDICINE

## 2018-04-07 PROCEDURE — 25000128 H RX IP 250 OP 636: Performed by: NURSE PRACTITIONER

## 2018-04-07 PROCEDURE — 00000146 ZZHCL STATISTIC GLUCOSE BY METER IP

## 2018-04-07 PROCEDURE — 12000022 ZZH R&B SNF

## 2018-04-07 PROCEDURE — 99304 1ST NF CARE SF/LOW MDM 25: CPT | Performed by: INTERNAL MEDICINE

## 2018-04-07 RX ORDER — HEPARIN SODIUM,PORCINE 10 UNIT/ML
5-10 VIAL (ML) INTRAVENOUS EVERY 24 HOURS
Status: DISCONTINUED | OUTPATIENT
Start: 2018-04-07 | End: 2018-04-16

## 2018-04-07 RX ORDER — LIDOCAINE 40 MG/G
CREAM TOPICAL
Status: DISCONTINUED | OUTPATIENT
Start: 2018-04-07 | End: 2018-04-20 | Stop reason: HOSPADM

## 2018-04-07 RX ORDER — HEPARIN SODIUM,PORCINE 10 UNIT/ML
5-10 VIAL (ML) INTRAVENOUS
Status: DISCONTINUED | OUTPATIENT
Start: 2018-04-07 | End: 2018-04-16

## 2018-04-07 RX ADMIN — SODIUM CHLORIDE, PRESERVATIVE FREE 5 ML: 5 INJECTION INTRAVENOUS at 08:30

## 2018-04-07 RX ADMIN — AMOXICILLIN AND CLAVULANATE POTASSIUM 875 MG: 400; 57 POWDER, FOR SUSPENSION ORAL at 08:32

## 2018-04-07 RX ADMIN — Medication 1 PACKET: at 13:46

## 2018-04-07 RX ADMIN — Medication 1 PACKET: at 22:04

## 2018-04-07 RX ADMIN — ENOXAPARIN SODIUM 40 MG: 40 INJECTION SUBCUTANEOUS at 08:28

## 2018-04-07 RX ADMIN — LOPERAMIDE HYDROCHLORIDE 4 MG: 1 SOLUTION ORAL at 16:58

## 2018-04-07 RX ADMIN — LOPERAMIDE HYDROCHLORIDE 4 MG: 1 SOLUTION ORAL at 13:46

## 2018-04-07 RX ADMIN — FLUCONAZOLE 200 MG: 40 POWDER, FOR SUSPENSION ORAL at 08:32

## 2018-04-07 RX ADMIN — LOPERAMIDE HYDROCHLORIDE 4 MG: 1 SOLUTION ORAL at 08:32

## 2018-04-07 RX ADMIN — PANTOPRAZOLE SODIUM 40 MG: 40 TABLET, DELAYED RELEASE ORAL at 08:32

## 2018-04-07 RX ADMIN — Medication 5 UNITS: at 08:26

## 2018-04-07 RX ADMIN — MULTIVITAMIN 15 ML: LIQUID ORAL at 08:32

## 2018-04-07 RX ADMIN — LOPERAMIDE HYDROCHLORIDE 4 MG: 1 SOLUTION ORAL at 21:56

## 2018-04-07 RX ADMIN — AMOXICILLIN AND CLAVULANATE POTASSIUM 875 MG: 400; 57 POWDER, FOR SUSPENSION ORAL at 21:56

## 2018-04-07 NOTE — H&P
[facility]  HISTORY AND PHYSICAL   Chief Complaint     Deconditioning    History of Present Illness       Michael Saldivar is a 58 y/o male with hx of GERD admitted with Boerhaave esophageal perforation likely related to forceful vomiting in the context of viral illness who is  S/p left thoracotomy with repair of esophageal perforation  c/b persistent leak from esophagus to L pleural cavity s/p pharyngostomy tube placement is being admitted to  TCU on 04/06  for further drain, nutrition cares.      Currently, reports doing well.  Reports chest pain around tube side. Pain is toleratble and controlled with mediations.  Reports having loose stools. No abdominal pain. No fever, chills, no nausea, vomitingDenies any head pain, abdominal pain. No lightheadedness or dizziness.            Past Medical History     Past Medical History:   Diagnosis Date     GERD (gastroesophageal reflux disease)          Past Surgical History     Past Surgical History:   Procedure Laterality Date     ENDOSCOPIC INSERTION TUBE GASTROSTOMY N/A 3/18/2018    Procedure: ENDOSCOPIC INSERTION TUBE GASTROSTOMY;  COMPINED ESOPHAGOSCOPY, GASTROSCOPY, DUODENOSCOPY, PEG-TUBE INSERTION, RIGHT CHEST TUBE INSERTION, POSSIBLE NASOGASTRIC TUBE, POSSIBLE THORACOTOMY. ;  Surgeon: Keith Ybarra MD;  Location[facility]     ENDOSCOPIC INSERTION TUBE GASTROSTOMY N/A 3/28/2018    Procedure: ENDOSCOPIC INSERTION TUBE GASTROSTOMY;;  Surgeon: Maria Del Rosario Flores MD;  [facility]     ESOPHAGOSCOPY, GASTROSCOPY, DUODENOSCOPY (EGD), COMBINED N/A 3/18/2018    Procedure: COMBINED ESOPHAGOSCOPY, GASTROSCOPY, DUODENOSCOPY (EGD);  COMPINED ESOPHAGOSCOPY, GASTROSCOPY, DUODENOSCOPY, PEG-TUBE INSERTION, RIGHT CHEST TUBE INSERTION, POSSIBLE NASOGASTRIC TUBE, POSSIBLE THORACOTOMY. ;  Surgeon: Keith Ybarra MD;  [facility]     ESOPHAGOSCOPY, GASTROSCOPY, DUODENOSCOPY (EGD), COMBINED N/A 3/28/2018    Procedure: COMBINED ESOPHAGOSCOPY, GASTROSCOPY,  DUODENOSCOPY (EGD);  Esophagogastroduodenoscopy, Pharyngostomy Tube Placement, Gastroplexy, Feeding Tube Exchange;  Surgeon: Maria Del Rosario Flores MD;  Location[facility] OR     HAND SURGERY       PHARYNGOSTOMY N/A 3/28/2018    Procedure: PHARYNGOSTOMY;;  Surgeon: Maria Del Rosario Flores MD;  [facility]     THORACOTOMY Left 3/18/2018    Procedure: THORACOTOMY;  Esophogastroduodenoscopy, PEG-TUBE INSERTION, Left THORACOTOMY, Chest tube insertion;  Surgeon: Keith Ybarra MD;  Location[facility] OR        Social History     Social History     Social History     Marital status: Single     Spouse name: N/A     Number of children: N/A     Years of education: N/A     Occupational History     Not on file.     Social History Main Topics     Smoking status: Former Smoker     Packs/day: 1.50     Years: 36.00     Types: Cigarettes     Quit date: 3/18/2013     Smokeless tobacco: Never Used     Alcohol use No     Drug use: No     Sexual activity: Not on file     Other Topics Concern     Not on file     Social History Narrative            Family History     No family history on file.         Medications     Reviewed and medication reconciliation done.  Current Facility-Administered Medications   Medication     lidocaine 1 % 1 mL     lidocaine (LMX4) kit     sodium chloride (PF) 0.9% PF flush 10-20 mL     heparin lock flush 10 UNIT/ML injection 5-10 mL     heparin lock flush 10 UNIT/ML injection 5-10 mL     acetaminophen (TYLENOL) solution 650 mg     amoxicillin-clavulanate (AUGMENTIN) 400-57 MG/5ML suspension 875 mg     fiber modular (NUTRISOURCE FIBER) packet 1 packet     fluconazole (DIFLUCAN) suspension 200 mg     loperamide (IMODIUM) liquid 4 mg     multivitamins with minerals (CERTAVITE/CEROVITE) liquid 15 mL     oxyCODONE (ROXICODONE) solution 5 mg     pantoprazole (PROTONIX) suspension 40 mg     enoxaparin (LOVENOX) injection 40 mg     naloxone (NARCAN) injection 0.1-0.4 mg     medication instructions     tuberculin injection 5 Units      "[START ON 4/9/2018] - Skin Test Reading -          Review of Systems     10 point  review of systems negative, except for what is mentioned above      Physical Exam     General:   Vital signs:    Blood pressure 127/65, pulse 76, temperature 96.8  F (36  C), temperature source Oral, resp. rate 16, weight 74.4 kg (164 lb), SpO2 96 %.  Estimated body mass index is 26.47 kg/(m^2) as calculated from the following:    Height as of 3/19/18: 1.676 m (5' 6\").    Weight as of this encounter: 74.4 kg (164 lb).      Intake/Output Summary (Last 24 hours) at 04/07/18 1116  Last data filed at 04/07/18 0707   Gross per 24 hour   Intake              270 ml   Output             1075 ml   Net             -805 ml      HEENT: No icterus, no pallor. Left sided neck: Pharyngostomy tube in place. Site stable.   Cardiovascular: S1, S2 normal.   Respiratory: B/L CTA  Abdomen: Soft, NT, BS+. GJ site stable.   Neurology: Alert, awake, and oriented  Extremities: No pretibial edema.               Laboratory/Imaging Studies     I have reviewed  laboratory and imaging studies in the Epic. Pertinent findings are as below    CMP  Recent Labs  Lab 04/06/18  0709 04/05/18  0739 04/04/18  0807 04/03/18  1957 04/03/18  0932 04/02/18  0829   * 145* 145*  --  146* 142   POTASSIUM 3.6 3.7 4.0  --  4.0 4.0   CHLORIDE 115* 118* 119*  --  117* 113*   CO2 21 19* 19*  --  19* 24   ANIONGAP 10 8 7  --  10 6   * 133* 138*  --  130* 137*   BUN 30 33* 30  --  31* 28   CR 0.63* 0.69 0.64*  --  0.69 0.67   GFRESTIMATED >90 >90 >90  --  >90 >90   GFRESTBLACK >90 >90 >90  --  >90 >90   MU 9.1 8.9 8.6  --  8.7 8.6   MAG 2.2 2.0 2.2  --  2.4* 2.4*   PHOS 2.6 2.5 2.7 2.6 1.9* 2.4*   PROTTOTAL  --   --   --   --   --  7.0   ALBUMIN  --   --   --   --   --  2.2*   BILITOTAL  --   --   --   --   --  0.4   ALKPHOS  --   --   --   --   --  197*   AST  --   --   --   --   --  22   ALT  --   --   --   --   --  66     CBC  Recent Labs  Lab 04/06/18  0709 " 04/05/18  0739 04/04/18  0807 04/03/18  0932   WBC 11.2* 11.1* 11.9* 12.8*   RBC 4.25* 4.43 4.37* 4.33*   HGB 8.6* 8.7* 8.6* 8.6*   HCT 30.8* 32.5* 32.0* 31.6*   MCV 73* 73* 73* 73*   MCH 20.2* 19.6* 19.7* 19.9*   MCHC 27.9* 26.8* 26.9* 27.2*   RDW 21.3* 21.8* 22.3* 22.0*   * 631* 644* 715*     INR  Recent Labs  Lab 04/02/18  0829   INR 1.17*         Impression/Plan       58 y/o male with hx of GERD admitted with Boerhaave esophageal perforation likely related to forceful vomiting in the context of viral illness who is  S/p left thoracotomy with repair of esophageal perforation  c/b persistent leak from esophagus to L pleural cavity s/p pharyngostomy tube placement is being admitted to [facility] on 04/06  for further drain, nutrition cares.      # Boerhaave esophageal perforation s/p left thoracotomy, chest tube placement x 3, pleurovac drain placement with repair of esophageal perforation (3/18). Empirically treated with fluconazole, zosyn. Transitioned to fluconazole, levaquin (4/2 - present). Has triple lumen PICC. Post-op course c/b persistent leak from esophagus to L pleural cavity. S/p pharyngostomy tube placement, gastroplexy (3/28). Requiring NPO status. S/p PEG 3/18 with IR able to add jejunal limb 3/30. Chest tubes removed 3/20 x 1, 3/21 x 2.   - Continue NPO with TF via J tube.   - G tube to gravity at all times.   - Output monitoring of 19 fr pleurovac drain.   - Pharyngostomy tube to LIS with flushes TID, output monitoring.   - F/u with Dr. Ybarra in two weeks with a repeat upper endoscopy.   - Continue Augmentin and Fluconazole at least until seen by Thoracic surgery in 2 weeks for repeat EGD (abx duration TBD pending results of EGD).   - No PT/OT needs.      # Diarrhea. Likely r/t TFs. C-diff 4/1 neg. Continue fiber BID.      # Thrombocytosis. First noted 3/30. Peaked 715 and down trending. Most likely reactive from inflammation above. Infection seems to be well controlled (WBC peaked 18.3<11.2,  Crp 37)     # Hypernatremia. Persistent since 4/3. Most likely r/t free water deficit (on TFs, NPO). Receiving 60cc q 1 hour x 16 hours daily with free water   - Trend M, Th. If with escalation, consider increased FWF.      # GERD: On Pantoprazole. Continue       # Diet and/or tube feedings: NPO, TFs  # Lines, tubes, drains: Pharyngostomy tube, G tube.   # DVT/GI prophylaxis: Lovenox ppx; GI - Pantoprazole.   # Indications for psychotropic medications: None.   Code status discussed on admission: Full Code  Vaccination status: No prior pneumonia vaccination however <65 without medical conditions prompting early vaccination; UTD.        Discussed with Luz Elena Jenkins NP

## 2018-04-07 NOTE — PLAN OF CARE
Problem: Goal Outcome Summary  Goal: Goal Outcome Summary  Pt is alert and oriented, quiet. Independent in room per report from 7B. NPO. Pt c/o dry mouth, uses swabs at bedside. Denies any shortness of breath or chest pain. Denies any concerns with bowel/bladder. Pt could not remember when last BM was, however he states he has been having loose stools. On immodium 4x daily. Skin- 2 small areas to left upper chest covered with small primapore. Pt states they are old chest tube sites.Has left posterior chest incision that is CDI with steri strips. Open to air. Chest tube to left side to water seal. Dressing applied. Pharyngostomy to low intermittent suction. Site is open to air. Flushed with saline. GJ tube, G tube is to gravity drainage. Meds through J tube. Impact peptide tube feeding started late at 2230. Did not come up from the kitchen until that time. Runs for 16 hours at 90 ml/hr. Denies pain. Has TL picc (non-valved) CDI.

## 2018-04-07 NOTE — PLAN OF CARE
Problem: Goal Outcome Summary  Goal: Goal Outcome Summary  RN: No new problems, denies pain. Chest tube without drainage since begin of shift per level on drainage container.  Continues with water seal drainage. No resp distress. Continues NPO.  Continues with diarrhea and scheduled imodium.

## 2018-04-07 NOTE — PLAN OF CARE
Problem: Goal Outcome Summary  Goal: Goal Outcome Summary  Outcome: No Change  RN: Pt new admit yesterday. Alert and oriented. Denies pain or any discomfort.  NPO maintained. Tolerating TF and hourly H20 cycle of 60 ml via J-tube. Please note that TF was not started until around 10 pm instead of 4 pm d/t Formula was not available. L Pharyngostomy tube in place connected to low intermittent suction.- Irrigated with 10 ml of NS at midnight. G-tube open to gravity with very dark green drainage. R chest tube with no air leak noted. See I&O for amount. SBA to BR with TF pole needs some assistance managing drains. Appear to be sleeping/resting between cares. Continue with plan of care.

## 2018-04-07 NOTE — PROGRESS NOTES
[facility]  Transitional Care Unit  Extended Progress Note           Assessment and Plan:     Michael Saldivar is a 56 y/o male admitted with Boerhaave esophageal perforation likely related to forceful vomiting in the context of viral illness. S/p left thoracotomy with repair of esophageal perforation. Post-op course c/b persistent leak from esophagus to L pleural cavity s/p pharyngostomy tube placement. Transferred to TCU 4/6 for further drain, nutrition cares.     Boerhaave esophageal perforation s/p left thoracotomy, chest tube placement x 3, pleurovac drain placement with repair of esophageal perforation (3/18). Empirically treated with fluconazole, zosyn. Transitioned to fluconazole, augmentin (4/2 - present). Has triple lumen PICC. Post-op course c/b persistent leak from esophagus to L pleural cavity. S/p pharyngostomy tube placement, gastroplexy (3/28). Requiring NPO status. S/p PEG 3/18 with IR able to add jejunal limb 3/30. Chest tubes removed 3/20 x 1, 3/21 x 2.   - Continue NPO with TF via J tube.   - G tube to gravity at all times.   - Output monitoring of 19 fr pleurovac drain.   - Pharyngostomy tube to LIS with flushes TID, output monitoring.   - F/u with Dr. Ybarra in two weeks with a repeat upper endoscopy.   - Continue Augmentin and Fluconazole at least until seen by Thoracic surgery in 2 weeks for repeat EGD (abx duration TBD pending results of EGD).   - No PT/OT needs.     Diarrhea. Likely r/t TFs. C-diff 4/1 neg. Continue fiber BID.     Thrombocytosis. First noted 3/30. Peaked 715 and down trending. Most likely reactive from inflammation above. Infection seems to be well controlled (WBC peaked 18.3<11.2, Crp 37)    Hypernatremia. Persistent since 4/3. Most likely r/t free water deficit (on TFs, NPO). Receiving 60cc q 1 hour x 16 hours daily with free water   - Trend M, Th. If with escalation, consider increased FWF.     GERD. Continues protonix.     Discussed with Dr. Gross,  MD    Diet and/or tube feedings: NPO, TFs  Lines, tubes, drains: Pharyngostomy tube, G tube.   DVT/GI prophylaxis: Lovenox ppx; GI - protonix  Indications for psychotropic medications: None.   Code status discussed on admission: Full Code  Vaccination status: No prior pneumonia vaccination however <65 without medical conditions prompting early vaccination; UTD.          Consults:     Nutrition  Pharmacy         History of Present Illness:     Michael Saldivar is a 56 y/o male admitted with Boerhaave esophageal perforation likely related to forceful vomiting in the context of viral illness. S/p left thoracotomy with repair of esophageal perforation. Post-op course c/b persistent leak from esophagus to L pleural cavity s/p pharyngostomy tube placement. Transferred to TCU 4/6 for further drain, nutrition cares.     States left sided chest pain noted. Tolerable. Slowly improving. Eludes pain is r/t past chest tubes that was in place and is not new.   Wishes he could eat. Doing cold water sponges to the mouth.   Denies any head pain, abdominal pain. Tolerating TFs however with diarrhea. States the loose stools started when TFs initiated. Has had a negative c-diff check.   Mentions he is mentally handling his medical course. Denies feeling depressed.          Physical Exam:     /65 (BP Location: Right arm)  Pulse 76  Temp 96.8  F (36  C) (Oral)  Resp 16  Wt 74.4 kg (164 lb)  SpO2 96%  BMI 26.47 kg/m2    Vitals are reviewed. Stable.     GENERAL: NAD. No toxic appearance.   HEENT: Anicteric sclera. PERRLa intact. Mucous membranes moist. No thrush. Normal neck movements. Pharngostomy tube in place on left. Insertion site stable. LIS noted.    CV: RRR. S1, S2. No murmurs appreciated.   RESPIRATORY: Effort normal at rest and with talking activity. Lungs clear. On RA.   GI: Abdomen soft and non distended with normoactive bowel sounds present in all quadrants. No tenderness. PEG-J site stable.   NEUROLOGICAL: Mood  seems flat/down. A&Ox 3. Facial symmetry intact. No confusion.   MUSC: Joint appearance without acute swelling, warmth, redness. No muscle rigidity.   EXTREMITIES: No peripheral edema. Good distal perfusion.   SKIN: No jaundice. Left chest with multiple dressing in place. Has linear incision that is healing well. No sig discoloration, mass production.          Past Medical History:     Past Medical History:   Diagnosis Date     GERD (gastroesophageal reflux disease)              Past Surgical History:      Past Surgical History:   Procedure Laterality Date     ENDOSCOPIC INSERTION TUBE GASTROSTOMY N/A 3/18/2018    Procedure: ENDOSCOPIC INSERTION TUBE GASTROSTOMY;  COMPINED ESOPHAGOSCOPY, GASTROSCOPY, DUODENOSCOPY, PEG-TUBE INSERTION, RIGHT CHEST TUBE INSERTION, POSSIBLE NASOGASTRIC TUBE, POSSIBLE THORACOTOMY. ;  Surgeon: Keith Ybarra MD;  [facility]     ENDOSCOPIC INSERTION TUBE GASTROSTOMY N/A 3/28/2018    Procedure: ENDOSCOPIC INSERTION TUBE GASTROSTOMY;;  Surgeon: Maria Del Rosario Flores MD;  [facility]     ESOPHAGOSCOPY, GASTROSCOPY, DUODENOSCOPY (EGD), COMBINED N/A 3/18/2018    Procedure: COMBINED ESOPHAGOSCOPY, GASTROSCOPY, DUODENOSCOPY (EGD);  COMPINED ESOPHAGOSCOPY, GASTROSCOPY, DUODENOSCOPY, PEG-TUBE INSERTION, RIGHT CHEST TUBE INSERTION, POSSIBLE NASOGASTRIC TUBE, POSSIBLE THORACOTOMY. ;  Surgeon: Keith Ybarra MD;  [facility]     ESOPHAGOSCOPY, GASTROSCOPY, DUODENOSCOPY (EGD), COMBINED N/A 3/28/2018    Procedure: COMBINED ESOPHAGOSCOPY, GASTROSCOPY, DUODENOSCOPY (EGD);  Esophagogastroduodenoscopy, Pharyngostomy Tube Placement, Gastroplexy, Feeding Tube Exchange;  Surgeon: Maria Del Rosario Flores MD;  Location: [facility] OR     HAND SURGERY       PHARYNGOSTOMY N/A 3/28/2018    Procedure: PHARYNGOSTOMY;;  Surgeon: Maria Del Rosario Flores MD;  [facility]     THORACOTOMY Left 3/18/2018    Procedure: THORACOTOMY;  Esophogastroduodenoscopy, PEG-TUBE INSERTION, Left THORACOTOMY, Chest tube insertion;  Surgeon:  Keith Yabrra MD;  Location:  OR             Family History:   No family history on file.   Family Status   Relation Status     Brother Alive            Social History:     Social History   Substance Use Topics     Smoking status: Former Smoker     Packs/day: 1.50     Years: 36.00     Types: Cigarettes     Quit date: 3/18/2013     Smokeless tobacco: Never Used     Alcohol use No      Social History     Social History Narrative     Living situation prior to admission: Lives in [place], [place].          Medications:     Current Facility-Administered Medications on File Prior to Encounter:  [DISCONTINUED] loperamide (IMODIUM) liquid 4 mg   [DISCONTINUED] pantoprazole (PROTONIX) suspension 40 mg   [DISCONTINUED] fiber modular (NUTRISOURCE FIBER) packet 1 packet   [DISCONTINUED] oxyCODONE (ROXICODONE) solution 5 mg   [DISCONTINUED] fluconazole (DIFLUCAN) suspension 200 mg   [DISCONTINUED] multivitamins with minerals (CERTAVITE/CEROVITE) liquid 15 mL   [DISCONTINUED] amoxicillin-clavulanate (AUGMENTIN) 400-57 MG/5ML suspension 875 mg   [DISCONTINUED] acetaminophen (TYLENOL) solution 650 mg   [DISCONTINUED] enoxaparin (LOVENOX) injection 40 mg   [DISCONTINUED] dextrose 10 % 1,000 mL infusion   [DISCONTINUED] naloxone (NARCAN) injection 0.1-0.4 mg   [DISCONTINUED] ondansetron (ZOFRAN-ODT) ODT tab 4 mg   [DISCONTINUED] ondansetron (ZOFRAN) injection 4 mg   [DISCONTINUED] prochlorperazine (COMPAZINE) injection 10 mg   [DISCONTINUED] prochlorperazine (COMPAZINE) tablet 10 mg   [DISCONTINUED] metoclopramide (REGLAN) tablet 10 mg   [DISCONTINUED] metoclopramide (REGLAN) injection 10 mg   [DISCONTINUED] lidocaine (LMX4) kit   [DISCONTINUED] potassium chloride SA (K-DUR/KLOR-CON M) CR tablet 20-40 mEq   [DISCONTINUED] potassium chloride (KLOR-CON) Packet 20-40 mEq   [DISCONTINUED] potassium chloride 10 mEq in 100 mL intermittent infusion with 10 mg lidocaine   [DISCONTINUED] potassium chloride 20 mEq in 50 mL intermittent  infusion   [DISCONTINUED] magnesium sulfate 2 g in NS intermittent infusion ([facility] or [facility] Cmpd)   [DISCONTINUED] magnesium sulfate 4 g in 100 mL sterile water (premade)   [DISCONTINUED] potassium phosphate 10 mmol in D5W 250 mL intermittent infusion   [DISCONTINUED] potassium phosphate 15 mmol in D5W 250 mL intermittent infusion   [DISCONTINUED] potassium phosphate 20 mmol in D5W 500 mL intermittent infusion   [DISCONTINUED] potassium phosphate 20 mmol in D5W 250 mL intermittent infusion   [DISCONTINUED] potassium phosphate 25 mmol in D5W 500 mL intermittent infusion   [DISCONTINUED] glucose 40 % gel 15-30 g   [DISCONTINUED] dextrose 50 % injection 25-50 mL   [DISCONTINUED] glucagon injection 1 mg   [DISCONTINUED] sodium chloride (PF) 0.9% PF flush 10-20 mL   [DISCONTINUED] heparin lock flush 10 UNIT/ML injection 5-10 mL   [DISCONTINUED] heparin lock flush 10 UNIT/ML injection 5-10 mL   [DISCONTINUED] dextrose 10 % 1,000 mL infusion     Current Outpatient Prescriptions on File Prior to Encounter:  loperamide (IMODIUM) 1 MG/5ML liquid 20 mLs (4 mg) by Per J Tube route 4 times daily   pantoprazole (PROTONIX) SUSP suspension 20 mLs (40 mg) by Per J Tube route daily   acetaminophen (TYLENOL) 32 mg/mL solution 20.3 mLs (650 mg) by Per J Tube route every 6 hours as needed for mild pain or fever   amoxicillin-clavulanate (AUGMENTIN) 400-57 MG/5ML suspension 10.9 mLs (875 mg) by Per J Tube route 2 times daily   fiber modular (NUTRISOURCE FIBER) packet 1 packet by Per J Tube route 2 times daily   fluconazole (DIFLUCAN) 40 MG/ML suspension 5 mLs (200 mg) by Per J Tube route daily   loperamide (IMODIUM) 1 MG/5ML liquid 10 mLs (2 mg) by Per J Tube route 4 times daily as needed for diarrhea   multivitamins with minerals (CERTAVITE/CEROVITE) LIQD liquid 15 mLs by Per Feeding Tube route daily   oxyCODONE (ROXICODONE) 5 MG/5ML solution 5 mLs (5 mg) by Per J Tube route every 6 hours as needed for moderate to severe pain   order  for DME Equipment being ordered: Other: suction and cannister for pharyngostomy tubeTreatment Diagnosis: Esophageal perforation   order for DME Equipment being ordered[place] [facility] [place] Canister-2Suction Connect Tube-25 in 1 Connector-2Bacteria Filter-2Treatment Diagnosis: Esophogeal Perforation            Allergies:   No Known Allergies          Labs:     Lab Results   Component Value Date    WBC 11.2 (H) 04/06/2018    WBC 11.1 (H) 04/05/2018    WBC 11.9 (H) 04/04/2018    HGB 8.6 (L) 04/06/2018    HGB 8.7 (L) 04/05/2018    HGB 8.6 (L) 04/04/2018    HCT 30.8 (L) 04/06/2018    HCT 32.5 (L) 04/05/2018    HCT 32.0 (L) 04/04/2018     (H) 04/06/2018     (H) 04/05/2018     (H) 04/04/2018     (H) 04/06/2018     (H) 04/05/2018     (H) 04/04/2018    POTASSIUM 3.6 04/06/2018    POTASSIUM 3.7 04/05/2018    POTASSIUM 4.0 04/04/2018    CHLORIDE 115 (H) 04/06/2018    CHLORIDE 118 (H) 04/05/2018    CHLORIDE 119 (H) 04/04/2018    CO2 21 04/06/2018    CO2 19 (L) 04/05/2018    CO2 19 (L) 04/04/2018    BUN 30 04/06/2018    BUN 33 (H) 04/05/2018    BUN 30 04/04/2018    CR 0.63 (L) 04/06/2018    CR 0.69 04/05/2018    CR 0.64 (L) 04/04/2018     (H) 04/06/2018     (H) 04/05/2018     (H) 04/04/2018    TROPI 0.036 03/18/2018    AST 22 04/02/2018    AST 59 (H) 03/26/2018    AST 25 03/20/2018    ALT 66 04/02/2018     (H) 03/26/2018    ALT 17 03/20/2018    ALKPHOS 197 (H) 04/02/2018    ALKPHOS 179 (H) 03/26/2018    ALKPHOS 56 03/20/2018    BILITOTAL 0.4 04/02/2018    BILITOTAL 0.5 03/26/2018    BILITOTAL 0.2 03/20/2018    INR 1.17 (H) 04/02/2018    INR 1.16 (H) 03/26/2018    INR 1.37 (H) 03/20/2018      Luz Elena Jenkins DNP, CNP  [facility]

## 2018-04-07 NOTE — PLAN OF CARE
Problem: Individualization  Goal: Patient Preferences  PT: Evaluation orders received and appreciated. Per discussion with interdisciplinary team and chart review pt does not have skilled PT needs at this time. Pt IND with bed mobility, transfers, and gait. Discussed this with pt as well. PT will not follow pt during stay

## 2018-04-07 NOTE — PROGRESS NOTES
CLINICAL NUTRITION SERVICES - ASSESSMENT NOTE     Nutrition Prescription    RECOMMENDATIONS FOR MDs/PROVIDERS TO ORDER:  1. Recommend monitor hydration status/labs and adjust free water flushes as needed. Current TF + free water flushes provide 2069 mL/day meeting 100% estimated hydration needs.     Malnutrition Status:    Unable to determine due to unable to complete physical assessment.     Recommendations already ordered by Registered Dietitian (RD):  1. Continue enteral feeds via J-tube as ordered: Impact Peptide 1.5 @ 90 mL/hr x 16 hours (4PM-8AM) to meet 100% estimated nutrition needs. Provisions provided below.     2. Continue free water flushes as ordered at this time (60 mL q1h during 16-hr TF cycle). TF + flushes = 100% hydration needs. However, recommend monitor as expect some fluid losses 2/2 G-tube to gravity with drainage and if loose stools continue.     3. Continue 15 mL Certavite daily to meet micronutrient needs.     4. Continue Nutrisource Fiber 2 pckts daily to provide 6 g fiber daily     Future/Additional Recommendations:  1. Recommend CRP weekly monitoring given use of immune-modulating TF formula.   2. If loose stools persists, may consider increasing Nutrisource Fiber to 1 pckt TID for a total of 3 pckts daily to provide 9 g fiber/day.      REASON FOR ASSESSMENT  Michael Saldivar is a/an 57 year old male assessed by the dietitian for Provider Order - Registered Dietitian to Assess and Order TF per Medical Nutrition Therapy Protocol    NUTRITION HISTORY  PMH only significant for GERD. Per chart review, pt admitted to hospital 2/2 esophageal perforation after left chest tube placement now s/p L thoracotomy, primary repair of esophageal perforation and PEG tube placement on 3/19 and s/p pharyngostomy placement and attempted G to GJ-tube conversion on 3/28 with J-tube extension placed on 3/30 by IR.     Per RD notes during hospitalization, pt NPO due to esophageal perforation and started on TPN  "until able to have PEGJ tube placement. Pt started on enteral feeds 3/30 with most recent regimen of EN via J-tube of Impact Peptide 1.5 @ 90 mL/hr x 16 hrs (4pm-8am) to provide 1440 mL (19 mL/kg), 2160 kcals (29 kcal/kg), 135 g pro (1.8 g/kg), 1109 free water, 92 g Fat (50% from MCTs), 202 g CHO and no Fiber daily. Free water flushes of 60 mL q1h during 16-hr cycle to provide a total of 2069 mL/day combined with TF. Per notes, pt was tolerating enteral regimen well with the exception of c/o loose stools. At this time, pt was started on imodium with added fiber to TF in attempts to bulk stool.     CURRENT NUTRITION ORDERS  Diet: NPO  Nutrition Support: EN via J-tube of Impact Peptide 1.5 @ 90 mL/hr x 16 hrs (4pm-8am) to provide 1440 mL (19 mL/kg), 2160 kcals (29 kcal/kg), 135 g pro (1.8 g/kg), 1109 free water, 92 g Fat (50% from MCTs), 202 g CHO and no Fiber daily.  -Addition of 1 pckt Nutrisource Fiber BID provides 6 g fiber/day.   -Free Water Flushes: 60 mL q 1 hr during 16 hr cycle (TF + flushes = 2069 mL/day to meet 100% estimated hydration needs)  -Per RN notes, pt tolerating tube feeds.   -G-tube currently open to gravity with dark green drainage.     LABS  Labs reviewed  Na 146 (H)     MEDICATIONS  Medications reviewed  Nutrisource Fiber 2 pckts daily  Imodium   Certavite 15 mL daily     ANTHROPOMETRICS  Height: 0 cm (Data Unavailable) (Per RD note 3/19/2018: 167.6 cm (5'6\")  Most Recent Weight: 74.4 kg (164 lb)    IBW: 64.5  kg (142 lb)  BMI: Overweight BMI 25-29.9  Weight History: Per RD note on hospital admission, pt denied any unintentional wt loss PTA. Per review of weight records from hospitalization, weight trending down to lowest weight of 74.7 kg on 4/05. Per RD notes during hospitalization, suspect pt was weight was up on admission as weight was trending ~77 kg PTA.     04/05/18 1711 74.7 kg (164 lb 9.6 oz) GS     04/03/18 1700 78.3 kg (172 lb 8.2 oz) EK     04/01/18 1900 77.6 kg (171 lb) EK     " 03/31/18 1523 78 kg (171 lb 14.4 oz) MALINA     03/30/18 1747 79.2 kg (174 lb 8 oz) HB     03/27/18 2100 79.5 kg (175 lb 4.3 oz) EW     03/27/18 0039 79.3 kg (174 lb 13.2 oz) KJ     03/25/18 0500 80.7 kg (177 lb 14.6 oz) MA     03/24/18 0711 81.2 kg (179 lb 1.6 oz) SJ     03/23/18 0523 83.4 kg (183 lb 14.4 oz) HJ     03/21/18 0652 83.6 kg (184 lb 6.4 oz) HJ     03/18/18 1500 79.5 kg (175 lb 4.3 oz) JH     Dosing Weight: 74 kg (admit wt)    ASSESSED NUTRITION NEEDS  Estimated Energy Needs: 2193-9257 kcals/day (25 - 30 kcals/kg)  Justification: Maintenance and Post-op  Estimated Protein Needs: + grams protein/day (1.2 - 1.5+ grams of pro/kg)  Justification: Hypercatabolism with critical illness and Post-op   Estimated Fluid Needs: (1 mL/kcal) for Maintenance or Per provider pending fluid status    PHYSICAL FINDINGS  See malnutrition section below.    MALNUTRITION  % Intake: Decreased intake does not meet criteria (TF to meet nutrition needs)  % Weight Loss: > 2% in 1 week (severe)  Subcutaneous Fat Loss: Unable to assess  Muscle Loss: Unable to assess  Fluid Accumulation/Edema: None noted  Malnutrition Diagnosis: Unable to determine due to unable to complete physical assessment.     NUTRITION DIAGNOSIS  Predicted inadequate nutrient intake (protein-energy) related to tolerating TF at goal rate but potential for TF interruptions and pt NPO with reliance on TF to meet 100% assessed needs.     INTERVENTIONS  Implementation  Nutrition Education: Unable to complete due to pt unavailable and/or sleeping on attempts to visit.   Enteral Nutrition - Continue as ordered   Feeding tube flush - Continue as ordered   Multivitamin/mineral supplement therapy - Continue as ordered   Modulars - Continue nutrisource fiber BID daily as ordered     Goals  Total avg nutritional intake to meet a minimum of 25 kcal/kg and 1.2 g PRO/kg daily (per dosing wt 74 kg).     Monitoring/Evaluation  Progress toward goals will be monitored and  evaluated per protocol.    Debra Castellanos RD, LD  Weekend/On-call Pager: [phone]

## 2018-04-08 LAB — GLUCOSE BLDC GLUCOMTR-MCNC: 135 MG/DL (ref 70–99)

## 2018-04-08 PROCEDURE — 25000132 ZZH RX MED GY IP 250 OP 250 PS 637: Performed by: PHYSICIAN ASSISTANT

## 2018-04-08 PROCEDURE — 00000146 ZZHCL STATISTIC GLUCOSE BY METER IP

## 2018-04-08 PROCEDURE — 25000132 ZZH RX MED GY IP 250 OP 250 PS 637: Performed by: INTERNAL MEDICINE

## 2018-04-08 PROCEDURE — 25000128 H RX IP 250 OP 636: Performed by: NURSE PRACTITIONER

## 2018-04-08 PROCEDURE — 12000022 ZZH R&B SNF

## 2018-04-08 PROCEDURE — 25000128 H RX IP 250 OP 636: Performed by: INTERNAL MEDICINE

## 2018-04-08 RX ORDER — LOPERAMIDE HYDROCHLORIDE 1 MG/5ML
4 SOLUTION ORAL 4 TIMES DAILY
Status: DISCONTINUED | OUTPATIENT
Start: 2018-04-08 | End: 2018-04-16

## 2018-04-08 RX ORDER — GUAR GUM
1 PACKET (EA) ORAL 2 TIMES DAILY
Status: DISCONTINUED | OUTPATIENT
Start: 2018-04-08 | End: 2018-04-09

## 2018-04-08 RX ADMIN — PANTOPRAZOLE SODIUM 40 MG: 40 TABLET, DELAYED RELEASE ORAL at 08:49

## 2018-04-08 RX ADMIN — FLUCONAZOLE 200 MG: 40 POWDER, FOR SUSPENSION ORAL at 08:49

## 2018-04-08 RX ADMIN — Medication 1 PACKET: at 08:53

## 2018-04-08 RX ADMIN — Medication 1 PACKET: at 08:46

## 2018-04-08 RX ADMIN — SODIUM CHLORIDE, PRESERVATIVE FREE 5 ML: 5 INJECTION INTRAVENOUS at 08:52

## 2018-04-08 RX ADMIN — LOPERAMIDE HYDROCHLORIDE 4 MG: 1 SOLUTION ORAL at 17:07

## 2018-04-08 RX ADMIN — LOPERAMIDE HYDROCHLORIDE 4 MG: 1 SOLUTION ORAL at 08:49

## 2018-04-08 RX ADMIN — LOPERAMIDE HYDROCHLORIDE 4 MG: 1 SOLUTION ORAL at 22:21

## 2018-04-08 RX ADMIN — AMOXICILLIN AND CLAVULANATE POTASSIUM 875 MG: 400; 57 POWDER, FOR SUSPENSION ORAL at 22:00

## 2018-04-08 RX ADMIN — ENOXAPARIN SODIUM 40 MG: 40 INJECTION SUBCUTANEOUS at 08:48

## 2018-04-08 RX ADMIN — Medication 1 PACKET: at 22:00

## 2018-04-08 RX ADMIN — AMOXICILLIN AND CLAVULANATE POTASSIUM 875 MG: 400; 57 POWDER, FOR SUSPENSION ORAL at 08:48

## 2018-04-08 RX ADMIN — MULTIVITAMIN 15 ML: LIQUID ORAL at 08:50

## 2018-04-08 NOTE — PLAN OF CARE
Problem: Goal Outcome Summary  Goal: Goal Outcome Summary  Outcome: No Change  Alert and oriented x 4. Denied having pain and discomfort. No SOB or respiratory distress noted.  Changed chest tube site small dry brown drainage noted no output. TF is running at 90ml/hr 500 cc green out put from G tube bag. Ambulated to bathroom with standby assist. intermittent suction from pharyngostomy tube, light brown -green 60cc drainage noted and cleaned the tube site small amount of dry green drainage noted, pt none compliant of NPO order he asks for water more often.

## 2018-04-08 NOTE — PHARMACY
I have reviewed this patient's TCU admission History & Physical for medication related changes/recommendations identified by the admitting provider.  The following recommendations/changes have been identified:   Pt is on Augmentin and not on Levaquin--NP will change the note.      These modifications/changes have been put into effect or appropriately addressed by the provider.    Sandee Peralta, LibbyD

## 2018-04-08 NOTE — PROGRESS NOTES
Writer introduced self and explained role.   provided patient with printout of orders and initial care plan goals which were given on 4/8 for patient for review.  Writer explained this is an overview and available to answer questions as able.  If further questions, then writer can direct patient to the nurse, provider, therapy staff or interdisciplinary team member as needed.  Patient didn't have any questions at this time.  Copies of orders and care plan placed in chart.  Writer also told patient that the unit  would meet in the coming days to complete an assessment.4

## 2018-04-08 NOTE — PLAN OF CARE
Problem: Goal Outcome Summary  Goal: Goal Outcome Summary  Outcome: No Change  RN: Pt has no c/o pain or discomfort this shift. Tolerating TF and hourly H20 flushes via J-tube. New bag hung at 0200 and cycle TF should be done at 0800. SBA to BR with TF pole. Needs some assistance in managing multiple drains/lines safely. Pharyngostomy tube intact on L neck and connected to low intermittent suction. Irrigated with 10 ml of NS at 0200. Water-seal Chest tube in place, did not see any change is the amount in the canister. G-tube to gravity drainage with 550 ml of dark green output. Appear to be sleeping/resting between cares. Using call light appropriately. Continue with plan of care.

## 2018-04-08 NOTE — H&P
H&P by Juwan Gross MD was reviewed.    Luz Elena Jenkins DNP, CNP  [facility]

## 2018-04-08 NOTE — PHARMACY-MEDICATION REGIMEN REVIEW
Pharmacy Medication Regimen Review  Michael Saldivar is a 57 year old male who is currently in the Transitional Care Unit.    Assessment: Upon review of the medications and patient chart the following irregularities were found: Medications without appropriate indications/durations: Augmentin and Fluconazole do not have stop times. Duration will be determined by EGD findings in 2 weeks.     Plan:   Continue current medications.   Lovenox--plts monitored QM&TH  Augmentin/Flucon--Scr monitored QM&TH     Attending provider will be sent moise ferrari for review.  If there are any emergent issues noted above, pharmacist will contact provider directly by phone.      Pharmacy will periodically review the resident's medication regimen for any PRN medications not administered in > 72 hours and discontinue them. The pharmacist will discuss gradual dose reductions of psychopharmacologic medications with interdisciplinary team on a regular basis.    Please contact pharmacy if the above does not answer specific medication questions/concerns.    Background:  A pharmacist has reviewed all medications and pertinent medical history today.  Medications were reviewed for appropriate use and any irregularities found are listed with recommendations.      Current Facility-Administered Medications:      fiber modular (NUTRISOURCE FIBER) packet 1 packet, 1 packet, Per J Tube, BID, Juwan Gross MD     loperamide (IMODIUM) liquid 4 mg, 4 mg, Per J Tube, 4x Daily, Juwan Gross MD     [START ON 4/9/2018] multivitamins with minerals (CERTAVITE/CEROVITE) liquid 15 mL, 15 mL, Per Feeding Tube, Daily, Juwan Gross MD     [START ON 4/9/2018] pantoprazole (PROTONIX) suspension 40 mg, 40 mg, Per J Tube, Daily, Juwan Gross MD     [START ON 4/9/2018] enoxaparin (LOVENOX) injection 40 mg, 40 mg, Subcutaneous, Q24H, Juwan Gross MD     lidocaine 1 % 1 mL, 1 mL, Other, Q1H PRN, Luz Elena Jenkins APRN CNP     lidocaine (LMX4) kit, , Topical, Q1H PRN,  Luz Elena Jenkins APRN CNP     sodium chloride (PF) 0.9% PF flush 10-20 mL, 10-20 mL, Intracatheter, Q1H PRN, Luz Elena Jenkins APRN CNP     heparin lock flush 10 UNIT/ML injection 5-10 mL, 5-10 mL, Intracatheter, Q24H, Luz Elena Jenkins APRN CNP, 5 mL at 04/07/18 0830     heparin lock flush 10 UNIT/ML injection 5-10 mL, 5-10 mL, Intracatheter, Q1H PRN, Luz Elena Jenkins APRN CNP     acetaminophen (TYLENOL) solution 650 mg, 650 mg, Per J Tube, Q6H PRN, Lisa Gross PA     amoxicillin-clavulanate (AUGMENTIN) 400-57 MG/5ML suspension 875 mg, 875 mg, Per J Tube, BID, Lisa Gross PA, 875 mg at 04/07/18 2156     fluconazole (DIFLUCAN) suspension 200 mg, 200 mg, Per J Tube, Daily, Lisa Gross PA, 200 mg at 04/07/18 0832     oxyCODONE (ROXICODONE) solution 5 mg, 5 mg, Per J Tube, Q6H PRN, Lisa Gross PA     naloxone (NARCAN) injection 0.1-0.4 mg, 0.1-0.4 mg, Intravenous, Q2 Min PRN, Juwan Gross MD     medication instructions, , Does not apply, Continuous PRN, Lisa Gross PA     tuberculin injection 5 Units, 5 Units, Intradermal, Q21 Days, Lisa Gross PA, 5 Units at 04/07/18 0826     [START ON 4/9/2018] - Skin Test Reading -, , Does not apply, Q21 Days, Lisa Gross PA  No current outpatient prescriptions on file.   PMH: GERD, esophageal perforation due to forceful vomiting in the setting of viral illness.     Sandee Peralta, Luly

## 2018-04-08 NOTE — PLAN OF CARE
Problem: Goal Outcome Summary  Goal: Goal Outcome Summary  RN: Denies pain.  Makes needs known.  NPO, no output in Chest tube container. Room change r/t facility need. Ambulated in [place] with staff SBA to assist with tubes and drains. No resp distress.  Continues with diarrhea with scheduled imodium, provider updated.

## 2018-04-08 NOTE — PROGRESS NOTES
SPIRITUAL HEALTH SERVICES  SPIRITUAL ASSESSMENT Progress Note  [facility] ([facility]) TCU     REFERRAL SOURCE: Initial Visit    Met with Michael who said he was doing okay and declined a further visit today.    PLAN: No plans for follow up but will be available should [facility].    Biju Mancilla   Intern  Pager [phone]

## 2018-04-09 ENCOUNTER — APPOINTMENT (OUTPATIENT)
Dept: LAB | Facility: CLINIC | Age: 58
End: 2018-04-09
Attending: INTERNAL MEDICINE
Payer: MEDICAID

## 2018-04-09 LAB
ANION GAP SERPL CALCULATED.3IONS-SCNC: 7 MMOL/L (ref 3–14)
BASOPHILS # BLD AUTO: 0.1 10E9/L (ref 0–0.2)
BASOPHILS NFR BLD AUTO: 0.5 %
BUN SERPL-MCNC: 28 MG/DL (ref 7–30)
CALCIUM SERPL-MCNC: 8.5 MG/DL (ref 8.5–10.1)
CHLORIDE SERPL-SCNC: 106 MMOL/L (ref 94–109)
CO2 SERPL-SCNC: 25 MMOL/L (ref 20–32)
CREAT SERPL-MCNC: 0.61 MG/DL (ref 0.66–1.25)
DIFFERENTIAL METHOD BLD: ABNORMAL
EOSINOPHIL # BLD AUTO: 0.3 10E9/L (ref 0–0.7)
EOSINOPHIL NFR BLD AUTO: 2.5 %
ERYTHROCYTE [DISTWIDTH] IN BLOOD BY AUTOMATED COUNT: 20 % (ref 10–15)
GFR SERPL CREATININE-BSD FRML MDRD: >90 ML/MIN/1.7M2
GLUCOSE SERPL-MCNC: 129 MG/DL (ref 70–99)
HCT VFR BLD AUTO: 29.8 % (ref 40–53)
HGB BLD-MCNC: 8.3 G/DL (ref 13.3–17.7)
IMM GRANULOCYTES # BLD: 0 10E9/L (ref 0–0.4)
IMM GRANULOCYTES NFR BLD: 0.3 %
LACTATE BLD-SCNC: 0.9 MMOL/L (ref 0.7–2)
LYMPHOCYTES # BLD AUTO: 2.2 10E9/L (ref 0.8–5.3)
LYMPHOCYTES NFR BLD AUTO: 18.1 %
MAGNESIUM SERPL-MCNC: 2.1 MG/DL (ref 1.6–2.3)
MCH RBC QN AUTO: 20 PG (ref 26.5–33)
MCHC RBC AUTO-ENTMCNC: 27.9 G/DL (ref 31.5–36.5)
MCV RBC AUTO: 72 FL (ref 78–100)
MONOCYTES # BLD AUTO: 0.9 10E9/L (ref 0–1.3)
MONOCYTES NFR BLD AUTO: 7.1 %
NEUTROPHILS # BLD AUTO: 8.6 10E9/L (ref 1.6–8.3)
NEUTROPHILS NFR BLD AUTO: 71.5 %
NRBC # BLD AUTO: 0.1 10*3/UL
NRBC BLD AUTO-RTO: 1 /100
PHOSPHATE SERPL-MCNC: 2.1 MG/DL (ref 2.5–4.5)
PLATELET # BLD AUTO: 459 10E9/L (ref 150–450)
POTASSIUM SERPL-SCNC: 3.5 MMOL/L (ref 3.4–5.3)
RBC # BLD AUTO: 4.14 10E12/L (ref 4.4–5.9)
SODIUM SERPL-SCNC: 138 MMOL/L (ref 133–144)
WBC # BLD AUTO: 12.1 10E9/L (ref 4–11)

## 2018-04-09 PROCEDURE — 25000132 ZZH RX MED GY IP 250 OP 250 PS 637: Performed by: PHYSICIAN ASSISTANT

## 2018-04-09 PROCEDURE — 25000128 H RX IP 250 OP 636: Performed by: NURSE PRACTITIONER

## 2018-04-09 PROCEDURE — 83735 ASSAY OF MAGNESIUM: CPT | Performed by: PHYSICIAN ASSISTANT

## 2018-04-09 PROCEDURE — 12000022 ZZH R&B SNF

## 2018-04-09 PROCEDURE — 85025 COMPLETE CBC W/AUTO DIFF WBC: CPT | Performed by: PHYSICIAN ASSISTANT

## 2018-04-09 PROCEDURE — 25000128 H RX IP 250 OP 636: Performed by: INTERNAL MEDICINE

## 2018-04-09 PROCEDURE — 84100 ASSAY OF PHOSPHORUS: CPT | Performed by: PHYSICIAN ASSISTANT

## 2018-04-09 PROCEDURE — 36592 COLLECT BLOOD FROM PICC: CPT | Performed by: PHYSICIAN ASSISTANT

## 2018-04-09 PROCEDURE — 25000132 ZZH RX MED GY IP 250 OP 250 PS 637: Performed by: INTERNAL MEDICINE

## 2018-04-09 PROCEDURE — 36592 COLLECT BLOOD FROM PICC: CPT | Performed by: INTERNAL MEDICINE

## 2018-04-09 PROCEDURE — 80048 BASIC METABOLIC PNL TOTAL CA: CPT | Performed by: PHYSICIAN ASSISTANT

## 2018-04-09 PROCEDURE — 83605 ASSAY OF LACTIC ACID: CPT | Performed by: INTERNAL MEDICINE

## 2018-04-09 RX ORDER — GUAR GUM
2 PACKET (EA) ORAL 2 TIMES DAILY
Status: DISCONTINUED | OUTPATIENT
Start: 2018-04-09 | End: 2018-04-20 | Stop reason: HOSPADM

## 2018-04-09 RX ADMIN — LOPERAMIDE HYDROCHLORIDE 4 MG: 1 SOLUTION ORAL at 13:43

## 2018-04-09 RX ADMIN — MULTIVITAMIN 15 ML: LIQUID ORAL at 09:58

## 2018-04-09 RX ADMIN — ENOXAPARIN SODIUM 40 MG: 40 INJECTION SUBCUTANEOUS at 09:58

## 2018-04-09 RX ADMIN — LOPERAMIDE HYDROCHLORIDE 4 MG: 1 SOLUTION ORAL at 20:39

## 2018-04-09 RX ADMIN — SODIUM CHLORIDE, PRESERVATIVE FREE 5 ML: 5 INJECTION INTRAVENOUS at 06:28

## 2018-04-09 RX ADMIN — Medication 1 PACKET: at 09:59

## 2018-04-09 RX ADMIN — LOPERAMIDE HYDROCHLORIDE 4 MG: 1 SOLUTION ORAL at 09:58

## 2018-04-09 RX ADMIN — PANTOPRAZOLE SODIUM 40 MG: 40 TABLET, DELAYED RELEASE ORAL at 10:03

## 2018-04-09 RX ADMIN — FLUCONAZOLE 200 MG: 40 POWDER, FOR SUSPENSION ORAL at 09:58

## 2018-04-09 RX ADMIN — AMOXICILLIN AND CLAVULANATE POTASSIUM 875 MG: 400; 57 POWDER, FOR SUSPENSION ORAL at 20:39

## 2018-04-09 RX ADMIN — Medication 2 PACKET: at 20:39

## 2018-04-09 RX ADMIN — LOPERAMIDE HYDROCHLORIDE 4 MG: 1 SOLUTION ORAL at 16:40

## 2018-04-09 RX ADMIN — SODIUM CHLORIDE, PRESERVATIVE FREE 5 ML: 5 INJECTION INTRAVENOUS at 09:44

## 2018-04-09 RX ADMIN — SODIUM CHLORIDE, PRESERVATIVE FREE 5 ML: 5 INJECTION INTRAVENOUS at 10:00

## 2018-04-09 RX ADMIN — AMOXICILLIN AND CLAVULANATE POTASSIUM 875 MG: 400; 57 POWDER, FOR SUSPENSION ORAL at 09:58

## 2018-04-09 NOTE — PROGRESS NOTES
Social Work: Initial Assessment with Discharge Plan    Patient Name: Michael Saldivar  : 1960  Age: 57 year old  MRN: [id number]  Completed assessment with: Pt  Admitted to TCU: 2018    Presenting Information   Date of SW assessment: 2018  Health Care Directive: Patient considering completing  Primary Health Care Agent: default to next of kin.   Secondary Health Care Agent: ROWDY  Living Situation: Pt lives alone in [facility]   Previous Functional Status: Pt was independent and working prior to this hospitalization.   DME available: Kg   Patient and family understanding of hospitalization: Pt stated that he is here to have his cares manage and is hopeful that he can probably return home if he can manage all his cares.   Cultural/Language/Spiritual Considerations: English speaking.   Abuse concerns: No concerns   BIMS: Pt scored 15 on BIMS indicating cognitively intact.  PHQ-9: Pt scored 02 on PHQ-9 indicating minimal depressive symptoms.   PAS: confirmation number- [id number]  Has there been a level II screen?  No  Were there any recommendations in the screen? N/A  If yes, will the recommendations we incorporated into the Plan of Care?  N/A  Physical Health  Reason for admission: Perforation of esophagus    Provider Information   Primary Care Physician:Jemal Michel   : ROWDY    Mental Health:   Diagnosis: No current concerns   Current Support/Services: NA  Previous Services: NA  Services Needed/Recommended: NA    Substance Use:  Diagnosis: No current concerns   Current Support/Services: NA  Previous Services: NA  Services Needed/Recommended: NA    Support System  Marital Status: Single  Family support: Pt has his brother Michael as a support and pt's DTR who both live in [place].   Other support available: Other family, but not readily able to take care of the pt.   Gaps in support system: Pt is alone, and does not have a lot of hands on support.     Community Resources  Current  in home services: NA  Previous services: NA    Financial/Employment/Education  Employment Status: Employed- [employer].   Income Source: Employment   Education: High school   Financial Concerns:  No concerns   Insurance: [place]      Discharge Plan   Patient and family discharge goal: Pt's goal is to return home but states that there are a lot of things that need to be confirmed first.   Provided Education on discharge plan: YES  Patient agreeable to discharge plan:  YES    Barriers to discharge: Medical clearance and safe d/c plans.      Discharge Recommendations   Disposition: Home if the pt can manage his cares.   Transportation Needs: Will need assistance/ or brother may be able to transport home.   Name of Transportation Company and Phone: [employer].     Additional comments   Pt was given care plan goals and orders by coverage SW please see her note for details. SW will continue to follow and assist as needed.    MARY Muro  [facility]   P: [phone]  Pgr: [phone]               04/09/18 1000   Living Arrangements   Lives With alone   Living Arrangements house   Able to Return to Prior Living Arrangements yes   Home Safety   Patient Feels Safe Living in Home? yes   Discharge Planning   Expected Discharge Date 04/24/18  (TBD on cares)   Anticipated Discharge Disposition home   Concerns To Be Addressed adjustment to diagnosis/illness concerns   Discharge Needs Assessment   Patient/family verbalizes understanding of discharge plan recommendations? Yes   Transportation Available family or friend will provide;Medicaid transportation   Abuse Risk Screen   QUESTION TO PATIENT:  Has a member of your family or a partner(now or in the past) intimidated, hurt, manipulated, or controlled you in any way? no   QUESTION TO PATIENT: Do you feel safe going back to the place where you are living? yes   OBSERVATION: Is there reason to believe there has been maltreatment of a  vulnerable adult (ie. Physical/Sexual/Emotional abuse, self neglect, lack of adequate food, shelter, medical care, or financial exploitation)? no   Mental Health Suicide Risk   Have you ever thought about hurting yourself now or in the past? no   Coping/Stress   Major Change/Loss/Stressor illness

## 2018-04-09 NOTE — PLAN OF CARE
Problem: Goal Outcome Summary  Goal: Goal Outcome Summary  Outcome: Improving  Patient is A&O x 3, able to make needs known.Denies pain or SOB.. On continous suctioning at pharyngo tube, irrigated  at 1837. Transfers with assist of one using a walker. Tube feeding infusing as per order. Tube are patent. GT drain output is 540 cc.No increase on chest tube caninsta output. Dressings changed . PICC patent with flushes and dressing changed. Loose stools x 4 thi shift. Nursing will continue to monito.

## 2018-04-09 NOTE — PLAN OF CARE
Problem: Goal Outcome Summary  Goal: Goal Outcome Summary  Outcome: No Change  RN: Pt has no c/o pain or discomfort so far this shift. SBA to BR with TF pole. Steady gait but needs assistance managing multiple lines/tubes safely. Had loose stool x 1 so far tonight. Had 3 on evenings. On scheduled Imodium. Cdiff negative on 4/1. Will continue to monitor. TF infusing at 90 ml/hr with hourly H20 flush of 60 ml via J-tube. Tolerating well and will be completed around 0800. Water seal Chest tube in place with minimal drainage per marked level. G-tube is open to gravity with a drainage bag. Dark green output noted. Pharyngostomy tube on his left neck is intact and attached to low intermittent suction per order. Irrigated with 10 ml NS at 0100. Light green output noted in the cannister. Please see I & O for amounts. Appear to be sleeping/resting between cares. Using call light appropriately. Continue with plan of care.

## 2018-04-09 NOTE — PLAN OF CARE
Problem: Goal Outcome Summary  Goal: Goal Outcome Summary  RN: Pt triggered septic protocol, Vital signs taken per protocol and awaiting lactic acid draw from lab, provider updated, will continue to monitor, see VS flow sheet. Pt denies pain and and other adverse sx.     Lactic acid level WDL, no further problems.     Pt not receiving therapies. Ambula zunilda in [place] with walker with staff,no unsteadiness noted.  Off of suction for short time to ambulate in [place].  Staff needed toarry chest tube drainage container and g tube bag.

## 2018-04-09 NOTE — PROGRESS NOTES
Nutrition Brief Note    Per discussion with RN, pt continues to c/o watery, loose stools. He also c/o having a very dry throat. Will adjust free water to have pt receive water throughout the day to continue meeting 100% of hydration needs. Will also increase fiber to help bulk stools.    Implementations:  - Water flushes: 60 mL before/after cycled TF and 150 mL q 4 hours  - NutriSource Fiber 2 pkts BID      Angela Pelletier RD, LD  Unit Pager: [phone]

## 2018-04-09 NOTE — PROGRESS NOTES
"   04/09/18 1300   General Information   Patient Profile Review See Profile for full history and prior level of function   Daily Contact with Relatives or Friends Phone call;Visit   Community Involvement   Community Involvement Currently employed  (construction)   Drives Yes   Spiritual Practice Not connected/interested   Hobbies/Interests   Cards (has cards in room. )   Word Puzzles (has puzzle books in room)   Music   Music Preferences Rock   Media   TV / Movies TV;Movie list   Sports / Physical Activities   Outdoor Activities Lawn / yard care   Sports Fan Baseball;Football   Impression   Open to Socializing with Others Reluctant  (patient reports \" I'm not a people person.\")   Barriers to Leisure No barriers / independent   Patient, family and / or staff in agreement with Plan of Care Yes   Treatment Plan   Independent Activities watches t.v.   Type of Intervention Independent with activity   Assessment Assessment completed. Recreational services educated patient on resources/activites provided on unit. Pt. reports no intervention from recreational services needed at this time. Pt. is aware to notify staff of any needs and will utilize leisure rooms ad nidhi during stay on unit.      "

## 2018-04-10 PROCEDURE — 25000128 H RX IP 250 OP 636: Performed by: INTERNAL MEDICINE

## 2018-04-10 PROCEDURE — 25000132 ZZH RX MED GY IP 250 OP 250 PS 637: Performed by: INTERNAL MEDICINE

## 2018-04-10 PROCEDURE — 25000132 ZZH RX MED GY IP 250 OP 250 PS 637: Performed by: PHYSICIAN ASSISTANT

## 2018-04-10 PROCEDURE — 25000128 H RX IP 250 OP 636: Performed by: NURSE PRACTITIONER

## 2018-04-10 PROCEDURE — 12000022 ZZH R&B SNF

## 2018-04-10 RX ADMIN — FLUCONAZOLE 200 MG: 40 POWDER, FOR SUSPENSION ORAL at 09:44

## 2018-04-10 RX ADMIN — PANTOPRAZOLE SODIUM 40 MG: 40 TABLET, DELAYED RELEASE ORAL at 09:43

## 2018-04-10 RX ADMIN — ENOXAPARIN SODIUM 40 MG: 40 INJECTION SUBCUTANEOUS at 09:42

## 2018-04-10 RX ADMIN — Medication 2 PACKET: at 09:44

## 2018-04-10 RX ADMIN — LOPERAMIDE HYDROCHLORIDE 4 MG: 1 SOLUTION ORAL at 09:43

## 2018-04-10 RX ADMIN — SODIUM CHLORIDE, PRESERVATIVE FREE 5 ML: 5 INJECTION INTRAVENOUS at 09:45

## 2018-04-10 RX ADMIN — AMOXICILLIN AND CLAVULANATE POTASSIUM 875 MG: 400; 57 POWDER, FOR SUSPENSION ORAL at 09:43

## 2018-04-10 RX ADMIN — Medication 2 PACKET: at 20:38

## 2018-04-10 RX ADMIN — MULTIVITAMIN 15 ML: LIQUID ORAL at 09:44

## 2018-04-10 RX ADMIN — LOPERAMIDE HYDROCHLORIDE 4 MG: 1 SOLUTION ORAL at 17:14

## 2018-04-10 RX ADMIN — LOPERAMIDE HYDROCHLORIDE 4 MG: 1 SOLUTION ORAL at 14:02

## 2018-04-10 RX ADMIN — AMOXICILLIN AND CLAVULANATE POTASSIUM 875 MG: 400; 57 POWDER, FOR SUSPENSION ORAL at 20:38

## 2018-04-10 RX ADMIN — LOPERAMIDE HYDROCHLORIDE 4 MG: 1 SOLUTION ORAL at 20:38

## 2018-04-10 NOTE — PLAN OF CARE
Problem: Goal Outcome Summary  Goal: Goal Outcome Summary  RN: Denies pain.  Quiet. Alert and oriented. Pharyngostomy continues to suction. Pt ambulating in halls with staff SBA and holding tubes and bags, drainage containers. Continues with watery diarrhea. NPO with cycled tube feed. Chest tube drainage continues at 200 cc level, no output, no resp distress.

## 2018-04-10 NOTE — PLAN OF CARE
Problem: Goal Outcome Summary  Goal: Goal Outcome Summary  Outcome: No Change  RN: Alert and oriented with flat affect. Pt has no c/o pain or discomfort so far this shift. SBA to BR with TF pole. Steady gait but needs assistance managing multiple lines/tubes safely. Had yellow watery stool x 1 so far tonight. Cdiff neg 4/1. On going issue and on scheduled Imodium. TF H20 flushes and fiber increased by nutrition. TF infusing at 90 ml/hr with 150 ml H20 flushes q 4H via Jtube. New bag hung at 0300 and should be done at 0800. Tolerating well. Water seal Chest tube in place with minimal serous drainage per marked level. G-tube is open to gravity with a drainage bag. Dark green output noted. Pharyngostomy tube on his left neck is intact and attached to low intermittent suction per order. Irrigated with 10 ml NS at 0300. Thick yellow drainage noted along the tube which becomes lighter in the cannister. Please see I & O for amounts. Appear to be sleeping/resting between cares. Using call light appropriately. Continue with plan of care.

## 2018-04-10 NOTE — PLAN OF CARE
Problem: TCU Patient Goals  Goal: TCU Plan of Care  Outcome: No Change  VSS. Awake, alert and oriented. Denies any pain. Not in respiratory distress. Chest tube dressings changed, site intact, secured properly. Ambulatory with A-1 to carry chest tube and other drainage. Gastrostomy tube to gravity drain with 450mls greenish output this shift. With on going feeding to jejunostomy tube Impact Peptide 1.5 started at 4pm until 0800, running at 90 mls/hr flushed set up 150 mls every 4 hrs. Pharyngostomy site intact cleanse with microklenz, secured with device, with 50 mls output this shift. Had 1 BM loose this shift per pt.  Vital signs:  Temp: 98.9  F (37.2  C) Temp src: Oral BP: 127/78 Pulse: 98   Resp: 18 SpO2: 96 % O2 Device: None (Room air)

## 2018-04-10 NOTE — PROGRESS NOTES
Nutrition Brief Note    RN reports pt continues to c/o dry/sore throat. Suspect s/p esophageal perforation repair may be attributing to dry/sore throat and flushes may not provide relief.      Lytes are WNL - pt is likely not dehydrated but may increase flushes slightly.     Implementations:  - Increase water flushes to 180 mL q 4 hours. Continue 60 mL before/after cycled TF.  (Flushes + TF = 2309 mL; meeting 104% upper end hydration needs). Pt is also receiving 2 pkts Fiber BID that needs to be mixed with H2O.    Future Recommendations:  - Continue to monitor lytes and adjust flushes as needed.  - Consider allowing pt to have minimal ice chips or suck on hard candy pending SLP/MD discretion if continues to c/o throat irritation.      Angela Pelletier RD, LD  Unit Pager: [phone]

## 2018-04-11 PROCEDURE — 99309 SBSQ NF CARE MODERATE MDM 30: CPT | Performed by: PHYSICIAN ASSISTANT

## 2018-04-11 PROCEDURE — 25000128 H RX IP 250 OP 636: Performed by: INTERNAL MEDICINE

## 2018-04-11 PROCEDURE — 99207 ZZC CDG-MDM COMPONENT: MEETS MODERATE - UP CODED: CPT | Performed by: PHYSICIAN ASSISTANT

## 2018-04-11 PROCEDURE — 27210437 ZZH NUTRITION PRODUCT SEMIELEM INTERMED LITER

## 2018-04-11 PROCEDURE — 25000128 H RX IP 250 OP 636: Performed by: NURSE PRACTITIONER

## 2018-04-11 PROCEDURE — 12000022 ZZH R&B SNF

## 2018-04-11 PROCEDURE — 25000132 ZZH RX MED GY IP 250 OP 250 PS 637: Performed by: PHYSICIAN ASSISTANT

## 2018-04-11 PROCEDURE — 25000132 ZZH RX MED GY IP 250 OP 250 PS 637: Performed by: INTERNAL MEDICINE

## 2018-04-11 RX ADMIN — PANTOPRAZOLE SODIUM 40 MG: 40 TABLET, DELAYED RELEASE ORAL at 08:36

## 2018-04-11 RX ADMIN — Medication 2 PACKET: at 20:55

## 2018-04-11 RX ADMIN — SODIUM CHLORIDE, PRESERVATIVE FREE 5 ML: 5 INJECTION INTRAVENOUS at 08:38

## 2018-04-11 RX ADMIN — FLUCONAZOLE 200 MG: 40 POWDER, FOR SUSPENSION ORAL at 08:35

## 2018-04-11 RX ADMIN — LOPERAMIDE HYDROCHLORIDE 4 MG: 1 SOLUTION ORAL at 13:46

## 2018-04-11 RX ADMIN — AMOXICILLIN AND CLAVULANATE POTASSIUM 875 MG: 400; 57 POWDER, FOR SUSPENSION ORAL at 08:35

## 2018-04-11 RX ADMIN — AMOXICILLIN AND CLAVULANATE POTASSIUM 875 MG: 400; 57 POWDER, FOR SUSPENSION ORAL at 20:55

## 2018-04-11 RX ADMIN — LOPERAMIDE HYDROCHLORIDE 4 MG: 1 SOLUTION ORAL at 08:36

## 2018-04-11 RX ADMIN — LOPERAMIDE HYDROCHLORIDE 4 MG: 1 SOLUTION ORAL at 18:21

## 2018-04-11 RX ADMIN — ENOXAPARIN SODIUM 40 MG: 40 INJECTION SUBCUTANEOUS at 08:36

## 2018-04-11 RX ADMIN — Medication 2 PACKET: at 08:42

## 2018-04-11 RX ADMIN — MULTIVITAMIN 15 ML: LIQUID ORAL at 08:36

## 2018-04-11 RX ADMIN — LOPERAMIDE HYDROCHLORIDE 4 MG: 1 SOLUTION ORAL at 20:55

## 2018-04-11 NOTE — UTILIZATION REVIEW
Pain Interview  Admission    1. Have you had pain or hurting at any time in the last 5 days?  No

## 2018-04-11 NOTE — PLAN OF CARE
Problem: Goal Outcome Summary  Goal: Goal Outcome Summary  Patient alert and oriented x 4. Pharyngostomy irrigated with sterile NS- no resistance noted, attached to intermittent wall suction, unclamped. GT drain to gravity, JT- TF running @ 90 ml/hr infusing and water flushes- patient tolerating well. Patient L chest tube to water seal drain-intact no respiratory distress noted.Patient up to the BR with assist of 1 staff, patient hold on to IV pole during ambulation. Patient uses call light appropriately. Patient stated had a little pain but when this RN offered pain medication patient stated no more pain. Patient sleeps in between care. Will continue to monitor patient's status.

## 2018-04-11 NOTE — PLAN OF CARE
Problem: TCU Patient Goals  Goal: TCU Plan of Care  Outcome: No Change  VSS. Alert and orientedx4. Pleasant and cooperative. Ambulated in the hallway with SBA for chest tube/drainage/TF support. Pt denies any pain. No SOB noted. Chest tube dressing changed with 0 output. GJT site dressing changed. Gastrostomy tube to gravity drain with greenish 300 mls output. On going feeding to jejunostomy tube at 90 mls/hr, flushed se up to 180 mls every 4 hrs started around 4pm until 8am.  Pharyngostomy site intact secured properly, with light yellow 25 mls output to intermittent low suction, irrigated around 1800 with 10mls NS. Had 2 loose yellowish stool. Voided freely. Will cont to monitor pt.  Vital signs:  Temp: 98.5  F (36.9  C) Temp src: Oral BP: 119/75 Pulse: 100   Resp: 16 SpO2: 95 % O2 Device: None (Room air)

## 2018-04-11 NOTE — PLAN OF CARE
Problem: Goal Outcome Summary  Goal: Goal Outcome Summary  Outcome: No Change  Patient is alert x 4 and he uses his call light and directs his cares. Patient is very quiet and he answers questions but doesn't initiate any conversation. Patient didn't want to go to the flower arrangement this afternoon. Patient denies any pain. VSS. Weight done. Patient is NPO and use pink swabs for his mouth. Patient is up to the bathroom with help with the many tubes. Patient tolerated his medications down his JT with no c/o of nausea. No output from chest tube. Pharyngostomy tube irrigated this am with sterile NS without any problems. GT open output is greenish to salmeron bag. Continue with present plan of cares.

## 2018-04-11 NOTE — PROGRESS NOTES
[facility]  Transitional Care Unit Progress Note  Michael Saldivar  [id number]  April 11, 2018         Assessment & Plan:   Michael Saldivar is a 56 y/o male admitted with Boerhaave esophageal perforation likely related to forceful vomiting in the context of viral illness. S/p left thoracotomy with repair of esophageal perforation. Post-op course c/b persistent leak from esophagus to L pleural cavity s/p pharyngostomy tube placement. Transferred to TCU 4/6 for further drain, nutrition cares.      Boerhaave esophageal perforation s/p left thoracotomy, chest tube placement x 3, pleurovac drain placement with repair of esophageal perforation (3/18). Empirically treated with fluconazole, zosyn. Transitioned to fluconazole, augmentin (4/2 - present). Has triple lumen PICC. Post-op course c/b persistent leak from esophagus to L pleural cavity. S/p pharyngostomy tube placement, gastroplexy (3/28). Requiring NPO status. S/p PEG 3/18 with IR able to add jejunal limb 3/30. Chest tubes removed 3/20 x 1, 3/21 x 2. Afebrile. VSS. WBC wnl.  - FEN: NPO, TFs via J tube   Abx: cont augmentin and fluconazole until seen by thoracic surgery   - G tube to vent at all times  - Monitor output from 19fr pleurovac drain   - Pharyngostomy tube to LIS with flushes TID, output monitoring  - Continue Augmentin and Fluconazole at least until EGD, final abx plan to determined after  - Needs EGD with Dr Ybarra, originally planned for sometime next week, but d/t surgeon illness - may need to be delayed. Thoracic working on arranging.      Diarrhea. Ongoing. Likely TF induced. CDiff neg 4/1. Continue fiber BID.      Thrombocytosis. Improving. Likely reactive from inflammation, infection appears well controlled. Plt 459 (552). Trend CBC qM/Th.      Hypernatremia, improving. Most likely r/t free water deficit (on TFs, NPO). Free water flushes were increased on TCU admission, Na 138 on 4/9. Cont FWF as ordered. Trend BMP M/Th,  adjust FWF as indicated.      GERD. Symptoms stable. Cont PPI.     Diet and/or tube feedings: NPO, TFs  Lines, tubes, drains: Pharyngostomy tube, G tube.   DVT/GI prophylaxis: Lovenox ppx; GI - protonix  Indications for psychotropic medications: None.   Code status discussed on admission: Full Code  Vaccination status: No prior pneumonia vaccination however <65 without medical conditions prompting early vaccination; UTD.          Consults:   Nutrition         Discharge Planning:   No therapy needs. Will stay at TCU pending plan for drains.         Interval History:   Timothy is doing well today. He denies any acute concerns. Up and moving around without issue. Still having loose stools. Not watery or foul smelling. Denies any chest pain, sob, or dyspnea. No fevers or chills. Understanding of plan for EGD hopefully sometime next week.           Physical Exam:   Vitals were reviewed  Blood pressure 119/75, pulse 100, temperature 98.5  F (36.9  C), temperature source Oral, resp. rate 16, weight 74.4 kg (164 lb 1.6 oz), SpO2 95 %.  General:alert, NAD, resting in bed, pleasant  HEENT: MMM. Pharyngostomy tube on L to LIS.   Cardiovascular: RRR  Lungs:CTAB, effort normal on RA  Abdomen: normoactive BS, soft with no distention and no tenderness. PEG-J in place.   Vascular: no peripheral edema, distal pulses palpable  Neurologic: AAO X 3, CN 2-12 grossly intact, no focal deficits  Skin: L chest with multiple dressing in place, appear c/d/i      Zunilda Lazar  Internal Medicine [facility] Hospitalist  [phone]

## 2018-04-12 ENCOUNTER — APPOINTMENT (OUTPATIENT)
Dept: LAB | Facility: CLINIC | Age: 58
End: 2018-04-12
Attending: INTERNAL MEDICINE
Payer: MEDICAID

## 2018-04-12 LAB
ANION GAP SERPL CALCULATED.3IONS-SCNC: 8 MMOL/L (ref 3–14)
ANISOCYTOSIS BLD QL SMEAR: SLIGHT
BASOPHILS # BLD AUTO: 0.1 10E9/L (ref 0–0.2)
BASOPHILS NFR BLD AUTO: 0.8 %
BUN SERPL-MCNC: 28 MG/DL (ref 7–30)
CALCIUM SERPL-MCNC: 8.9 MG/DL (ref 8.5–10.1)
CHLORIDE SERPL-SCNC: 107 MMOL/L (ref 94–109)
CO2 SERPL-SCNC: 26 MMOL/L (ref 20–32)
CREAT SERPL-MCNC: 0.6 MG/DL (ref 0.66–1.25)
DIFFERENTIAL METHOD BLD: ABNORMAL
EOSINOPHIL # BLD AUTO: 0.4 10E9/L (ref 0–0.7)
EOSINOPHIL NFR BLD AUTO: 3.5 %
ERYTHROCYTE [DISTWIDTH] IN BLOOD BY AUTOMATED COUNT: 19.7 % (ref 10–15)
GFR SERPL CREATININE-BSD FRML MDRD: >90 ML/MIN/1.7M2
GLUCOSE SERPL-MCNC: 114 MG/DL (ref 70–99)
HCT VFR BLD AUTO: 33.1 % (ref 40–53)
HGB BLD-MCNC: 9 G/DL (ref 13.3–17.7)
IMM GRANULOCYTES # BLD: 0 10E9/L (ref 0–0.4)
IMM GRANULOCYTES NFR BLD: 0.3 %
LYMPHOCYTES # BLD AUTO: 2 10E9/L (ref 0.8–5.3)
LYMPHOCYTES NFR BLD AUTO: 16 %
MAGNESIUM SERPL-MCNC: 2.3 MG/DL (ref 1.6–2.3)
MCH RBC QN AUTO: 19.6 PG (ref 26.5–33)
MCHC RBC AUTO-ENTMCNC: 27.2 G/DL (ref 31.5–36.5)
MCV RBC AUTO: 72 FL (ref 78–100)
MICROCYTES BLD QL SMEAR: PRESENT
MONOCYTES # BLD AUTO: 0.9 10E9/L (ref 0–1.3)
MONOCYTES NFR BLD AUTO: 7.6 %
NEUTROPHILS # BLD AUTO: 8.7 10E9/L (ref 1.6–8.3)
NEUTROPHILS NFR BLD AUTO: 71.8 %
NRBC # BLD AUTO: 0 10*3/UL
NRBC BLD AUTO-RTO: 0 /100
PHOSPHATE SERPL-MCNC: 2.4 MG/DL (ref 2.5–4.5)
PLATELET # BLD AUTO: 468 10E9/L (ref 150–450)
PLATELET # BLD EST: ABNORMAL 10*3/UL
POIKILOCYTOSIS BLD QL SMEAR: SLIGHT
POLYCHROMASIA BLD QL SMEAR: SLIGHT
POTASSIUM SERPL-SCNC: 3.9 MMOL/L (ref 3.4–5.3)
RBC # BLD AUTO: 4.6 10E12/L (ref 4.4–5.9)
RBC INCLUSIONS BLD: SLIGHT
SODIUM SERPL-SCNC: 141 MMOL/L (ref 133–144)
WBC # BLD AUTO: 12.2 10E9/L (ref 4–11)

## 2018-04-12 PROCEDURE — 25000132 ZZH RX MED GY IP 250 OP 250 PS 637: Performed by: PHYSICIAN ASSISTANT

## 2018-04-12 PROCEDURE — 36592 COLLECT BLOOD FROM PICC: CPT | Performed by: PHYSICIAN ASSISTANT

## 2018-04-12 PROCEDURE — 25000128 H RX IP 250 OP 636: Performed by: INTERNAL MEDICINE

## 2018-04-12 PROCEDURE — 84100 ASSAY OF PHOSPHORUS: CPT | Performed by: PHYSICIAN ASSISTANT

## 2018-04-12 PROCEDURE — 83735 ASSAY OF MAGNESIUM: CPT | Performed by: PHYSICIAN ASSISTANT

## 2018-04-12 PROCEDURE — 85025 COMPLETE CBC W/AUTO DIFF WBC: CPT | Performed by: PHYSICIAN ASSISTANT

## 2018-04-12 PROCEDURE — 12000022 ZZH R&B SNF

## 2018-04-12 PROCEDURE — 25000128 H RX IP 250 OP 636: Performed by: NURSE PRACTITIONER

## 2018-04-12 PROCEDURE — 27210437 ZZH NUTRITION PRODUCT SEMIELEM INTERMED LITER

## 2018-04-12 PROCEDURE — 80048 BASIC METABOLIC PNL TOTAL CA: CPT | Performed by: PHYSICIAN ASSISTANT

## 2018-04-12 PROCEDURE — 25000132 ZZH RX MED GY IP 250 OP 250 PS 637: Performed by: INTERNAL MEDICINE

## 2018-04-12 RX ADMIN — LOPERAMIDE HYDROCHLORIDE 4 MG: 1 SOLUTION ORAL at 13:51

## 2018-04-12 RX ADMIN — LOPERAMIDE HYDROCHLORIDE 4 MG: 1 SOLUTION ORAL at 10:15

## 2018-04-12 RX ADMIN — LOPERAMIDE HYDROCHLORIDE 4 MG: 1 SOLUTION ORAL at 17:16

## 2018-04-12 RX ADMIN — PANTOPRAZOLE SODIUM 40 MG: 40 TABLET, DELAYED RELEASE ORAL at 10:12

## 2018-04-12 RX ADMIN — Medication 2 PACKET: at 10:18

## 2018-04-12 RX ADMIN — AMOXICILLIN AND CLAVULANATE POTASSIUM 875 MG: 400; 57 POWDER, FOR SUSPENSION ORAL at 10:14

## 2018-04-12 RX ADMIN — LOPERAMIDE HYDROCHLORIDE 4 MG: 1 SOLUTION ORAL at 20:05

## 2018-04-12 RX ADMIN — ENOXAPARIN SODIUM 40 MG: 40 INJECTION SUBCUTANEOUS at 10:13

## 2018-04-12 RX ADMIN — MULTIVITAMIN 15 ML: LIQUID ORAL at 10:17

## 2018-04-12 RX ADMIN — Medication 2 PACKET: at 20:06

## 2018-04-12 RX ADMIN — SODIUM CHLORIDE, PRESERVATIVE FREE 5 ML: 5 INJECTION INTRAVENOUS at 10:16

## 2018-04-12 RX ADMIN — SODIUM CHLORIDE, PRESERVATIVE FREE 5 ML: 5 INJECTION INTRAVENOUS at 06:32

## 2018-04-12 RX ADMIN — FLUCONAZOLE 200 MG: 40 POWDER, FOR SUSPENSION ORAL at 10:15

## 2018-04-12 RX ADMIN — AMOXICILLIN AND CLAVULANATE POTASSIUM 875 MG: 400; 57 POWDER, FOR SUSPENSION ORAL at 20:06

## 2018-04-12 NOTE — PLAN OF CARE
Problem: Goal Outcome Summary  Goal: Goal Outcome Summary  Outcome: No Change  Pt slept well, denies pain and discomfort. No SOB noted or reported. NSG to continue monitoring.

## 2018-04-12 NOTE — PLAN OF CARE
Problem: Goal Outcome Summary  Goal: Goal Outcome Summary  Outcome: No Change  Patient alert and oriented,denies pain.G-tube to gravity drainage,350 cc greenish OP.J-tube tube feed infusing @ 90 cc/hour with water flushes of 180 cc every 4 hours,site dressing changed.Pharyngostomy tube irrigated with 10 cc saline per orders.Chest tube site dressing changed,no redness noted.Patient denies SOB.Flat affect,SBA with a walker to bathroom.All medications given via J-tube.

## 2018-04-12 NOTE — PLAN OF CARE
Problem: Goal Outcome Summary  Goal: Goal Outcome Summary  RN: Denies pain.  Pleasant, makes needs known. No new problems. No resp distress.

## 2018-04-13 PROCEDURE — 12000022 ZZH R&B SNF

## 2018-04-13 PROCEDURE — 25000132 ZZH RX MED GY IP 250 OP 250 PS 637: Performed by: PHYSICIAN ASSISTANT

## 2018-04-13 PROCEDURE — 27210437 ZZH NUTRITION PRODUCT SEMIELEM INTERMED LITER

## 2018-04-13 PROCEDURE — 25000132 ZZH RX MED GY IP 250 OP 250 PS 637: Performed by: INTERNAL MEDICINE

## 2018-04-13 PROCEDURE — 25000128 H RX IP 250 OP 636: Performed by: INTERNAL MEDICINE

## 2018-04-13 PROCEDURE — 99309 SBSQ NF CARE MODERATE MDM 30: CPT | Performed by: PHYSICIAN ASSISTANT

## 2018-04-13 PROCEDURE — 25000128 H RX IP 250 OP 636: Performed by: NURSE PRACTITIONER

## 2018-04-13 PROCEDURE — 99207 ZZC CDG-MDM COMPONENT: MEETS MODERATE - UP CODED: CPT | Performed by: PHYSICIAN ASSISTANT

## 2018-04-13 RX ORDER — SALIVA STIMULANT COMB. NO.3
1 SPRAY, NON-AEROSOL (ML) MUCOUS MEMBRANE 4 TIMES DAILY
Status: DISCONTINUED | OUTPATIENT
Start: 2018-04-13 | End: 2018-04-15

## 2018-04-13 RX ADMIN — LOPERAMIDE HYDROCHLORIDE 4 MG: 1 SOLUTION ORAL at 21:49

## 2018-04-13 RX ADMIN — Medication 2 PACKET: at 21:49

## 2018-04-13 RX ADMIN — FLUCONAZOLE 200 MG: 40 POWDER, FOR SUSPENSION ORAL at 09:49

## 2018-04-13 RX ADMIN — SODIUM CHLORIDE, PRESERVATIVE FREE 5 ML: 5 INJECTION INTRAVENOUS at 09:54

## 2018-04-13 RX ADMIN — AMOXICILLIN AND CLAVULANATE POTASSIUM 875 MG: 400; 57 POWDER, FOR SUSPENSION ORAL at 09:47

## 2018-04-13 RX ADMIN — SODIUM CHLORIDE, PRESERVATIVE FREE 5 ML: 5 INJECTION INTRAVENOUS at 09:53

## 2018-04-13 RX ADMIN — LOPERAMIDE HYDROCHLORIDE 4 MG: 1 SOLUTION ORAL at 16:44

## 2018-04-13 RX ADMIN — LOPERAMIDE HYDROCHLORIDE 4 MG: 1 SOLUTION ORAL at 09:49

## 2018-04-13 RX ADMIN — PANTOPRAZOLE SODIUM 40 MG: 40 TABLET, DELAYED RELEASE ORAL at 09:48

## 2018-04-13 RX ADMIN — SODIUM CHLORIDE, PRESERVATIVE FREE 5 ML: 5 INJECTION INTRAVENOUS at 09:50

## 2018-04-13 RX ADMIN — Medication 1 SPRAY: at 09:55

## 2018-04-13 RX ADMIN — AMOXICILLIN AND CLAVULANATE POTASSIUM 875 MG: 400; 57 POWDER, FOR SUSPENSION ORAL at 21:49

## 2018-04-13 RX ADMIN — Medication 1 SPRAY: at 13:33

## 2018-04-13 RX ADMIN — Medication 2 PACKET: at 09:56

## 2018-04-13 RX ADMIN — ENOXAPARIN SODIUM 40 MG: 40 INJECTION SUBCUTANEOUS at 09:46

## 2018-04-13 RX ADMIN — MULTIVITAMIN 15 ML: LIQUID ORAL at 09:54

## 2018-04-13 RX ADMIN — LOPERAMIDE HYDROCHLORIDE 4 MG: 1 SOLUTION ORAL at 13:33

## 2018-04-13 NOTE — PROGRESS NOTES
CLINICAL NUTRITION SERVICES - REASSESSMENT NOTE     Nutrition Prescription    RECOMMENDATIONS FOR MDs/PROVIDERS TO ORDER:  None at this time    Malnutrition Status:     Patient does not meet two of the above criteria necessary for diagnosing malnutrition    Recommendations already ordered by Registered Dietitian (RD):  None at this time    Future/Additional Recommendations:  None at this time.      EVALUATION OF THE PROGRESS TOWARD GOALS   Diet: NPO  Nutrition Support: EN via J-tube of Impact Peptide 1.5 @ 90 mL/hr x 16 hrs (4pm-8am) to provide 1440 mL (19 mL/kg), 2160 kcals (29 kcal/kg), 135 g pro (1.8 g/kg), 1109 free water, 92 g Fat (50% from MCTs), 202 g CHO and no Fiber daily.- 100% of needs  - Addition of 2 pkts Fiber BID for 12 g fiber  - Free Water Flushes: 180 mL q 4 hr during + 60 mL before/after TF  - Certavite 15ml  - Per RN notes, pt tolerating tube feeds.   - G-tube currently open to gravity with dark green drainage.        NEW FINDINGS   - EN: 4/10 - RD increased water flushes to 180 mL q 4 hours. Continue 60 mL before/after cycled TF.  (Flushes + TF = 2309 mL; meeting 104% upper end hydration needs). Pt is also receiving 2 pkts Fiber BID that needs to be mixed with H2O.    - GI: Ongoing. Likely TF and abx induced. CDiff neg 4/1. Continue fiber BID.     - Wt: Pt weight has been stable for the past week but is down 4.8 kg (~6%)  from a month ago. Pt wasn't sure of his  UBW and guessed it was 91 kg (200 lb)- suspect that this in inaccurate.    04/13/18 74.7 kg      04/05/18 1711 74.7 kg (164 lb 9.6 oz) GS      04/03/18 1700 78.3 kg (172 lb 8.2 oz) EK      04/01/18 1900 77.6 kg (171 lb) EK      03/31/18 1523 78 kg (171 lb 14.4 oz) MALINA      03/30/18 1747 79.2 kg (174 lb 8 oz) HB      03/27/18 2100 79.5 kg (175 lb 4.3 oz) EW      03/27/18 0039 79.3 kg (174 lb 13.2 oz) KJ      03/25/18 0500 80.7 kg (177 lb 14.6 oz) MA      03/24/18 0711 81.2 kg (179 lb 1.6 oz) SJ      03/23/18 0523 83.4 kg (183 lb 14.4 oz)        03/21/18 0652 83.6 kg (184 lb 6.4 oz)       03/18/18 1500 79.5 kg (175 lb 4.3 oz)        - Labs: Cr- 0.60 (L) - (could indicate LBM wasting), BUN- 28, Phos- 2.4 (L)- trending up, TG (3/19) -29 mg/dL    -BGM: 114, 129, 135, 125, 113  - k+,Na, mg- WNL    MALNUTRITION  % Intake: No decreased intake noted  % Weight Loss: > 2% in 1 week (severe)  Subcutaneous Fat Loss: None observed  Muscle Loss: None observed  Fluid Accumulation/Edema: None noted  Malnutrition Diagnosis: Patient does not meet two of the above criteria necessary for diagnosing malnutrition    Previous Goals   Total avg nutritional intake to meet a minimum of 25 kcal/kg and 1.2 g PRO/kg daily (per dosing wt 74 kg).  Evaluation: Met    Previous Nutrition Diagnosis  Predicted inadequate nutrient intake (protein-energy) related to tolerating TF at goal rate but potential for TF interruptions and pt NPO with reliance on TF to meet 100% assessed needs.   Evaluation: No change    CURRENT NUTRITION DIAGNOSIS  Predicted inadequate nutrient intake (protein-energy) related to tolerating TF at goal rate but potential for TF interruptions and pt NPO with reliance on TF to meet 100% assessed needs.     INTERVENTIONS  Implementation  None at this time    Goals  Total avg nutritional intake to meet a minimum of 25 kcal/kg and 1.2 g PRO/kg daily (per dosing wt 74 kg).    Monitoring/Evaluation  Progress toward goals will be monitored and evaluated per protocol.    Zeus Manning (Dietetic Intern)

## 2018-04-13 NOTE — PLAN OF CARE
Problem: Goal Outcome Summary  Goal: Goal Outcome Summary  RN: Denies pain.  NPO, cycled tube feed.  Continues with watery diarrhea.  Up with staff only as staff needed to carry collection chambers. New order for biotene.  Continue mouth cares. No resp distress.

## 2018-04-13 NOTE — PLAN OF CARE
Problem: Goal Outcome Summary  Goal: Goal Outcome Summary  Outcome: No Change  Patient alert and oriented,denies pain.Patient is NPO,tube feed infusing via J-tube @ 90 cc/hour,water flushes every 4 hours.G-tube is vented,draining greenish drainage.Chest tube intact,site dressing changed,scabbed @ insertion site.Patient able to walk to bathroom with staff due to many tubings.

## 2018-04-13 NOTE — PLAN OF CARE
Problem: Goal Outcome Summary  Goal: Goal Outcome Summary  Outcome: No Change  Patient ambulated to bathroom with supervision, denies pain, no sign of respiratory distress noted. Tolerating tube feeding at 90 ml per hr via J tube, he has G tube open to gravity bag. Pharyngostomy tube irrigated and patient tolerated, chest tube dressing intact, patient has call light within reach and he is able to make needs known.

## 2018-04-13 NOTE — PROGRESS NOTES
[facility]  Transitional Care Unit Progress Note  Michael Saldivar  [id number]  April 13, 2018         Assessment & Plan:   Michael Saldivar is a 58 y/o male admitted with Boerhaave esophageal perforation likely related to forceful vomiting in the context of viral illness. S/p left thoracotomy with repair of esophageal perforation. Post-op course c/b persistent leak from esophagus to L pleural cavity s/p pharyngostomy tube placement. Transferred to TCU 4/6 for further drain, nutrition cares.       Boerhaave esophageal perforation s/p left thoracotomy, chest tube placement x 3, pleurovac drain placement with repair of esophageal perforation (3/18). Empirically treated with fluconazole, zosyn. Transitioned to fluconazole, augmentin (4/2 - present). Has triple lumen PICC. Post-op course c/b persistent leak from esophagus to L pleural cavity. S/p pharyngostomy tube placement, gastroplexy (3/28). Requiring NPO status. S/p PEG 3/18 with IR able to add jejunal limb 3/30. Chest tubes removed 3/20 x 1, 3/21 x 2. Afebrile. VSS. WBC stable ~11-12.   - FEN: NPO, TFs via J tube  - Abx: cont augmentin and fluconazole at least until EGD on 4/20  - G tube to vent at all times  - Monitor output from 19fr pleurovac drain   - Pharyngostomy tube to LIS with flushes TID, monitor output  - Follow up for EGD with Dr Ybarra as scheduled 4/20/18      Diarrhea. Ongoing. Likely TF and abx induced. CDiff neg 4/1. Continue fiber BID.       Thrombocytosis. Improving. Likely reactive from inflammation, infection appears well controlled. Plt 468 (459). Trend CBC qM/Th.       GERD. Symptoms stable. Cont PPI.     Dry mouth. In setting of being strict NPO. No oral lesions or e/o thrush seen on exam today. Start biotene spray qid.     Hypernatremia, resolved. Most likely r/t free water deficit (on TFs, NPO). Free water flushes were increased on TCU admission, Na 138 and 141 this week. Cont FWF as ordered. Trend BMP M/Th, adjust  FWF as indicated.      Diet and/or tube feedings: NPO, TFs  Lines, tubes, drains: Pharyngostomy tube, G tube.   DVT/GI prophylaxis: Lovenox ppx; GI - protonix  Indications for psychotropic medications: None.   Code status discussed on admission: Full Code  Vaccination status: No prior pneumonia vaccination however <65 without medical conditions prompting early vaccination; UTD.          Consults:   None         Discharge Planning:   EGD planned for 4/20, here pending drains        Interval History:   Timothy is doing well today. He has no new pain or issues with drains. He reports that the R side of his mouth hurts when it gets dry, he is open to trying some biotene spray. He reports ongoing loose stools, unchanged from previous. No abdominal pain. No chest pain, sob, or dyspnea. No fevers or chills.          Physical Exam:   Vitals were reviewed  Blood pressure 122/77, pulse 96, temperature 97  F (36.1  C), temperature source Oral, resp. rate 16, weight 74.7 kg (164 lb 11.2 oz), SpO2 94 %.  General:alert, NAD, pleasant and cooperative, resting in bed, NAD  HEENT: MM slightly dry, no oral lesions seen on exam, no e/o thrush  Neck: L sided pharyngostomy with clean insertion site, to LIS  Cardiovascular: RRR  Lungs:CTAB anterolaterally  Abdomen: normoactive BS, soft with no distention and no tenderness, GJ c/d/i  Vascular: no peripheral edema, distal pulses palpable  Neurologic: AAO X 3, CN 2-12 grossly intact, no focal deficits  Skin: L chest with multiple dressings in place, c/d/i      Zunilda Lazar  Internal Medicine [facility] Hospitalist  [phone]

## 2018-04-14 PROCEDURE — 27210437 ZZH NUTRITION PRODUCT SEMIELEM INTERMED LITER

## 2018-04-14 PROCEDURE — 25000128 H RX IP 250 OP 636: Performed by: INTERNAL MEDICINE

## 2018-04-14 PROCEDURE — 25000128 H RX IP 250 OP 636: Performed by: NURSE PRACTITIONER

## 2018-04-14 PROCEDURE — 25000132 ZZH RX MED GY IP 250 OP 250 PS 637: Performed by: PHYSICIAN ASSISTANT

## 2018-04-14 PROCEDURE — 25000132 ZZH RX MED GY IP 250 OP 250 PS 637: Performed by: INTERNAL MEDICINE

## 2018-04-14 PROCEDURE — 12000022 ZZH R&B SNF

## 2018-04-14 RX ADMIN — LOPERAMIDE HYDROCHLORIDE 4 MG: 1 SOLUTION ORAL at 09:30

## 2018-04-14 RX ADMIN — AMOXICILLIN AND CLAVULANATE POTASSIUM 875 MG: 400; 57 POWDER, FOR SUSPENSION ORAL at 21:19

## 2018-04-14 RX ADMIN — MULTIVITAMIN 15 ML: LIQUID ORAL at 09:31

## 2018-04-14 RX ADMIN — Medication 2 PACKET: at 09:33

## 2018-04-14 RX ADMIN — Medication 2 PACKET: at 21:19

## 2018-04-14 RX ADMIN — PANTOPRAZOLE SODIUM 40 MG: 40 TABLET, DELAYED RELEASE ORAL at 09:30

## 2018-04-14 RX ADMIN — FLUCONAZOLE 200 MG: 40 POWDER, FOR SUSPENSION ORAL at 09:30

## 2018-04-14 RX ADMIN — Medication 1 SPRAY: at 09:31

## 2018-04-14 RX ADMIN — ENOXAPARIN SODIUM 40 MG: 40 INJECTION SUBCUTANEOUS at 09:30

## 2018-04-14 RX ADMIN — LOPERAMIDE HYDROCHLORIDE 4 MG: 1 SOLUTION ORAL at 12:24

## 2018-04-14 RX ADMIN — AMOXICILLIN AND CLAVULANATE POTASSIUM 875 MG: 400; 57 POWDER, FOR SUSPENSION ORAL at 09:30

## 2018-04-14 RX ADMIN — LOPERAMIDE HYDROCHLORIDE 4 MG: 1 SOLUTION ORAL at 21:19

## 2018-04-14 RX ADMIN — LOPERAMIDE HYDROCHLORIDE 4 MG: 1 SOLUTION ORAL at 17:24

## 2018-04-14 RX ADMIN — SODIUM CHLORIDE, PRESERVATIVE FREE 5 ML: 5 INJECTION INTRAVENOUS at 09:29

## 2018-04-14 NOTE — PLAN OF CARE
Problem: Goal Outcome Summary  Goal: Goal Outcome Summary  RN: NPO, denies pain, no resp distress. No problems with drains. No output in chest tube per usual.

## 2018-04-14 NOTE — PROGRESS NOTES
Pt denies pain, SOB.  He is quiet, reserved. Tube feeding running without problems. Pt states he has had at least 3 loose stools today. Pt does not like biotene and prefers to swab his mouth with water. CT without any drainage.

## 2018-04-14 NOTE — PLAN OF CARE
Problem: Goal Outcome Summary  Goal: Goal Outcome Summary  Outcome: No Change  Patient appeared to be sleeping most of this shift, awake at 04:45 and ambulated to bathroom with assist of one person, patient denies pain and no sign of respiratory distress noted. Chest tube patent, Pharyngostomy tube irrigated with 10 ml NACl, patient tolerating cycle tube feeding at 90 ml per hr, ice chips to keep mouth moist. Continue plan of care

## 2018-04-15 PROCEDURE — 25000132 ZZH RX MED GY IP 250 OP 250 PS 637: Performed by: PHYSICIAN ASSISTANT

## 2018-04-15 PROCEDURE — 25000128 H RX IP 250 OP 636: Performed by: INTERNAL MEDICINE

## 2018-04-15 PROCEDURE — 25000132 ZZH RX MED GY IP 250 OP 250 PS 637: Performed by: INTERNAL MEDICINE

## 2018-04-15 PROCEDURE — 27210437 ZZH NUTRITION PRODUCT SEMIELEM INTERMED LITER

## 2018-04-15 PROCEDURE — 25000128 H RX IP 250 OP 636: Performed by: NURSE PRACTITIONER

## 2018-04-15 PROCEDURE — 12000022 ZZH R&B SNF

## 2018-04-15 RX ORDER — MAGNESIUM HYDROXIDE 1200 MG/15ML
LIQUID ORAL
Status: DISPENSED
Start: 2018-04-15 | End: 2018-04-15

## 2018-04-15 RX ADMIN — Medication 2 PACKET: at 21:15

## 2018-04-15 RX ADMIN — LOPERAMIDE HYDROCHLORIDE 4 MG: 1 SOLUTION ORAL at 21:15

## 2018-04-15 RX ADMIN — LOPERAMIDE HYDROCHLORIDE 4 MG: 1 SOLUTION ORAL at 08:14

## 2018-04-15 RX ADMIN — SODIUM CHLORIDE, PRESERVATIVE FREE 5 ML: 5 INJECTION INTRAVENOUS at 08:14

## 2018-04-15 RX ADMIN — MULTIVITAMIN 15 ML: LIQUID ORAL at 08:14

## 2018-04-15 RX ADMIN — AMOXICILLIN AND CLAVULANATE POTASSIUM 875 MG: 400; 57 POWDER, FOR SUSPENSION ORAL at 08:14

## 2018-04-15 RX ADMIN — AMOXICILLIN AND CLAVULANATE POTASSIUM 875 MG: 400; 57 POWDER, FOR SUSPENSION ORAL at 21:15

## 2018-04-15 RX ADMIN — Medication 2 PACKET: at 08:23

## 2018-04-15 RX ADMIN — FLUCONAZOLE 200 MG: 40 POWDER, FOR SUSPENSION ORAL at 08:14

## 2018-04-15 RX ADMIN — PANTOPRAZOLE SODIUM 40 MG: 40 TABLET, DELAYED RELEASE ORAL at 08:14

## 2018-04-15 RX ADMIN — LOPERAMIDE HYDROCHLORIDE 4 MG: 1 SOLUTION ORAL at 14:02

## 2018-04-15 RX ADMIN — LOPERAMIDE HYDROCHLORIDE 4 MG: 1 SOLUTION ORAL at 17:59

## 2018-04-15 RX ADMIN — ENOXAPARIN SODIUM 40 MG: 40 INJECTION SUBCUTANEOUS at 08:14

## 2018-04-15 NOTE — PLAN OF CARE
Problem: Goal Outcome Summary  Goal: Goal Outcome Summary  RN: Denies pain, no new problems. Drains without problems.  No resp distress, chest tube without drainage per usual. Up with staff assist to carry bags and drainage container. Ambulating in [place]nard with staff and no problems.

## 2018-04-15 NOTE — PLAN OF CARE
Problem: Goal Outcome Summary  Goal: Goal Outcome Summary  Patient alert and oriented. TF running @ 90 ml/hr and water flushes- patient tolerating well via JT. Pharyngostomy tube to low intermittent wall suction, L chest tube and Gastric tube to gravity drainage bag intact and patent. Patient no complaints of pain and discomfort so far. No respiratory distress noted. Will continue to monitor patient's status.

## 2018-04-15 NOTE — PROGRESS NOTES
Pt without SOB or pain.  No CT output this shift. Little out of the pharyngostomy tube. Pt keeps mouth moisturized with water and a swab. Has had at least 1 loose stool this shift and states he had 3 on day shift.

## 2018-04-16 ENCOUNTER — APPOINTMENT (OUTPATIENT)
Dept: LAB | Facility: CLINIC | Age: 58
End: 2018-04-16
Attending: INTERNAL MEDICINE
Payer: MEDICAID

## 2018-04-16 LAB
ANION GAP SERPL CALCULATED.3IONS-SCNC: 6 MMOL/L (ref 3–14)
ANISOCYTOSIS BLD QL SMEAR: SLIGHT
BASOPHILS # BLD AUTO: 0.1 10E9/L (ref 0–0.2)
BASOPHILS NFR BLD AUTO: 1 %
BUN SERPL-MCNC: 25 MG/DL (ref 7–30)
C DIFF TOX B STL QL: NEGATIVE
CALCIUM SERPL-MCNC: 8.5 MG/DL (ref 8.5–10.1)
CHLORIDE SERPL-SCNC: 103 MMOL/L (ref 94–109)
CO2 SERPL-SCNC: 29 MMOL/L (ref 20–32)
CREAT SERPL-MCNC: 0.6 MG/DL (ref 0.66–1.25)
DIFFERENTIAL METHOD BLD: ABNORMAL
EOSINOPHIL # BLD AUTO: 0.4 10E9/L (ref 0–0.7)
EOSINOPHIL NFR BLD AUTO: 3.5 %
ERYTHROCYTE [DISTWIDTH] IN BLOOD BY AUTOMATED COUNT: 19.1 % (ref 10–15)
GFR SERPL CREATININE-BSD FRML MDRD: >90 ML/MIN/1.7M2
GLUCOSE SERPL-MCNC: 102 MG/DL (ref 70–99)
HCT VFR BLD AUTO: 32.1 % (ref 40–53)
HGB BLD-MCNC: 8.6 G/DL (ref 13.3–17.7)
IMM GRANULOCYTES # BLD: 0 10E9/L (ref 0–0.4)
IMM GRANULOCYTES NFR BLD: 0.4 %
LYMPHOCYTES # BLD AUTO: 2.6 10E9/L (ref 0.8–5.3)
LYMPHOCYTES NFR BLD AUTO: 25 %
MAGNESIUM SERPL-MCNC: 2.2 MG/DL (ref 1.6–2.3)
MCH RBC QN AUTO: 19.2 PG (ref 26.5–33)
MCHC RBC AUTO-ENTMCNC: 27.3 G/DL (ref 31.5–36.5)
MCV RBC AUTO: 72 FL (ref 78–100)
MICROCYTES BLD QL SMEAR: PRESENT
MONOCYTES # BLD AUTO: 0.9 10E9/L (ref 0–1.3)
MONOCYTES NFR BLD AUTO: 9.1 %
NEUTROPHILS # BLD AUTO: 6.2 10E9/L (ref 1.6–8.3)
NEUTROPHILS NFR BLD AUTO: 61 %
NRBC # BLD AUTO: 0 10*3/UL
NRBC BLD AUTO-RTO: 0 /100
PHOSPHATE SERPL-MCNC: 2.5 MG/DL (ref 2.5–4.5)
PLATELET # BLD AUTO: 390 10E9/L (ref 150–450)
PLATELET # BLD EST: ABNORMAL 10*3/UL
POIKILOCYTOSIS BLD QL SMEAR: SLIGHT
POTASSIUM SERPL-SCNC: 3.9 MMOL/L (ref 3.4–5.3)
RBC # BLD AUTO: 4.49 10E12/L (ref 4.4–5.9)
RBC INCLUSIONS BLD: SLIGHT
SODIUM SERPL-SCNC: 138 MMOL/L (ref 133–144)
SPECIMEN SOURCE: NORMAL
WBC # BLD AUTO: 10.2 10E9/L (ref 4–11)

## 2018-04-16 PROCEDURE — 84100 ASSAY OF PHOSPHORUS: CPT | Performed by: PHYSICIAN ASSISTANT

## 2018-04-16 PROCEDURE — 12000022 ZZH R&B SNF

## 2018-04-16 PROCEDURE — 99207 ZZC CDG-MDM COMPONENT: MEETS LOW - DOWN CODED: CPT | Performed by: PHYSICIAN ASSISTANT

## 2018-04-16 PROCEDURE — 25000132 ZZH RX MED GY IP 250 OP 250 PS 637: Performed by: INTERNAL MEDICINE

## 2018-04-16 PROCEDURE — 27210437 ZZH NUTRITION PRODUCT SEMIELEM INTERMED LITER

## 2018-04-16 PROCEDURE — 25000132 ZZH RX MED GY IP 250 OP 250 PS 637: Performed by: PHYSICIAN ASSISTANT

## 2018-04-16 PROCEDURE — 80048 BASIC METABOLIC PNL TOTAL CA: CPT | Performed by: PHYSICIAN ASSISTANT

## 2018-04-16 PROCEDURE — 25000128 H RX IP 250 OP 636: Performed by: NURSE PRACTITIONER

## 2018-04-16 PROCEDURE — 85025 COMPLETE CBC W/AUTO DIFF WBC: CPT | Performed by: PHYSICIAN ASSISTANT

## 2018-04-16 PROCEDURE — 36592 COLLECT BLOOD FROM PICC: CPT | Performed by: PHYSICIAN ASSISTANT

## 2018-04-16 PROCEDURE — 87493 C DIFF AMPLIFIED PROBE: CPT | Performed by: PHYSICIAN ASSISTANT

## 2018-04-16 PROCEDURE — 83735 ASSAY OF MAGNESIUM: CPT | Performed by: PHYSICIAN ASSISTANT

## 2018-04-16 PROCEDURE — 99309 SBSQ NF CARE MODERATE MDM 30: CPT | Performed by: PHYSICIAN ASSISTANT

## 2018-04-16 PROCEDURE — 25000128 H RX IP 250 OP 636: Performed by: INTERNAL MEDICINE

## 2018-04-16 RX ORDER — LOPERAMIDE HYDROCHLORIDE 1 MG/5ML
4 SOLUTION ORAL 4 TIMES DAILY
Status: DISCONTINUED | OUTPATIENT
Start: 2018-04-16 | End: 2018-04-20 | Stop reason: HOSPADM

## 2018-04-16 RX ORDER — DIPHENOXYLATE HCL/ATROPINE 2.5-.025/5
5 LIQUID (ML) ORAL 4 TIMES DAILY PRN
Status: DISCONTINUED | OUTPATIENT
Start: 2018-04-16 | End: 2018-04-20 | Stop reason: HOSPADM

## 2018-04-16 RX ORDER — LOPERAMIDE HYDROCHLORIDE 1 MG/5ML
4 SOLUTION ORAL
Status: DISCONTINUED | OUTPATIENT
Start: 2018-04-16 | End: 2018-04-16

## 2018-04-16 RX ADMIN — ENOXAPARIN SODIUM 40 MG: 40 INJECTION SUBCUTANEOUS at 08:14

## 2018-04-16 RX ADMIN — LOPERAMIDE HYDROCHLORIDE 4 MG: 1 SOLUTION ORAL at 22:15

## 2018-04-16 RX ADMIN — AMOXICILLIN AND CLAVULANATE POTASSIUM 875 MG: 400; 57 POWDER, FOR SUSPENSION ORAL at 08:13

## 2018-04-16 RX ADMIN — Medication 2 PACKET: at 22:15

## 2018-04-16 RX ADMIN — LOPERAMIDE HYDROCHLORIDE 4 MG: 1 SOLUTION ORAL at 08:13

## 2018-04-16 RX ADMIN — SODIUM CHLORIDE, PRESERVATIVE FREE 5 ML: 5 INJECTION INTRAVENOUS at 05:41

## 2018-04-16 RX ADMIN — MULTIVITAMIN 15 ML: LIQUID ORAL at 08:14

## 2018-04-16 RX ADMIN — SODIUM CHLORIDE, PRESERVATIVE FREE 5 ML: 5 INJECTION INTRAVENOUS at 08:15

## 2018-04-16 RX ADMIN — AMOXICILLIN AND CLAVULANATE POTASSIUM 875 MG: 400; 57 POWDER, FOR SUSPENSION ORAL at 22:15

## 2018-04-16 RX ADMIN — FLUCONAZOLE 200 MG: 40 POWDER, FOR SUSPENSION ORAL at 08:14

## 2018-04-16 RX ADMIN — LOPERAMIDE HYDROCHLORIDE 4 MG: 1 SOLUTION ORAL at 17:19

## 2018-04-16 RX ADMIN — PANTOPRAZOLE SODIUM 40 MG: 40 TABLET, DELAYED RELEASE ORAL at 08:13

## 2018-04-16 RX ADMIN — Medication 2 PACKET: at 08:16

## 2018-04-16 NOTE — PROGRESS NOTES
Denies pain and SOB. Up with assist to ambulate in the maynard. Independent in positioning. No output from chest tube. G tube has green drg. Pharyngostomy tube output 150 since last marked.

## 2018-04-16 NOTE — PLAN OF CARE
Problem: Goal Outcome Summary  Goal: Goal Outcome Summary  Patient alert and oriented x 4. All tubes (pharyngostomy, chest tube, GT anf JT) intact. Patient no complaints of chest pain, dizziness, SOB or any N/V noted. Patient slept most of the shift. TF via JT running without problem-patient tolerating well. Patient uses call light appropriately. Will continue with current plan of care.

## 2018-04-16 NOTE — PROGRESS NOTES
TCU Care Coordinator Progress Note    Admission date: 4/6/2018    Data: Michael Saldivar is a 56 yo male on TCU for complex medical cares following hospitalization for esophageal perforation and placement of multiple drains and tubes.    Intervention: Met with patient to introduce the role of Care Coordinator and to begin discussion of anticipated DC planning needs.     Assessment: Patient had been considering going home to try to manage all of his tube and drain needs but reconsidered once he had learning sessions on his cares and realized it was too much to manage on his own. He lives alone. He currently has GJ tube with G port to gravity drainage and J port for cycled enteral feedings, pharyngostomy to low intermittent suction, chest tube with closed drainage system. Not sure how long all these tubes and drains will be in place, has next EGD scheduled 4/20/2018. Before plan was changed to TCU placement the patient was being set up with [facility] for tube feeding, [facility] for home care, and [facility] for pharyngostomy suction. Unsure at this time which services he may still need upon DC from TCU.    Plan: Will continue to follow DC planning needs throughout TCU stay and put appropriate services in place as DC plan becomes more clear. Patient is expecting he will remain on TCU for an extended period.    Ender Lim RN, BSN, Patient Care Management Coordinator  [facility]  [address], [address] [place] [address]  [email or web address]  [email or web address]   Desk: [phone] TCU Main [phone] Fax [phone] Pager [phone]

## 2018-04-16 NOTE — PLAN OF CARE
Problem: Goal Outcome Summary  Goal: Goal Outcome Summary  Up in room with assist of one to manage all of his tubes. All tubes (pharyngostomy, J/G and chest) stable. Stabilizer for pharyngostomy tube changed as it fell off when patient up to BR. Denies any pain. Three liquid BMs this shift. Specimen sent for C-diff.

## 2018-04-16 NOTE — PROGRESS NOTES
Transitional Care Unit  Progress Note          Assessment & Plan:   Michael Saldivar is a 57 year old male who was admitted to [facility] on 3/18/18 with Boerhaave esophageal perforation attributed to forceful vomiting in the context of viral illness. S/p left thoracotomy with repair of esophageal perforation. Post-op course c/b persistent leak from esophagus to L pleural cavity s/p pharyngostomy tube placement. Transferred to TCU 4/6 for drain cares and nutritional support.       Boerhaave Esophageal Perforation - S/p left thoracotomy, chest tube placement (x 3 on L, x 1 on R), PEG placement, and repair of esophageal perforation on 3/18. Post-op course c/b persistent leak from esophagus to L pleural cavity. S/p pharyngostomy tube placement (remains in place) and gastroplexy on 3/28. IR added jejunal limb to G tube on 3/30. Chest tubes removed 3/20 x 1 and 3/21 x 2 with one remaining. Empirically treated with Fluconazole 3/18-present, Zosyn 3/18-4/2, and Augmentin 4/2-present. Afebrile w/o leukocytosis.  - Strict NPO with TFs via J tube  - Continue Augmentin and Fluconazole. End date TBD by Thoracic Surgery.  - G tube to gravity drainage at all times  - Chest tube to water seal. Monitor and record drainage. Output 150 cc on 4/15.  - Pharyngostomy tube to LIS with flushes TID. Monitor and record drainage. Output 500 cc on 4/15.  - Pain control with Tylenol PRN. Discontinue Oxycodone as not requiring.  - Follow up for EGD with Dr. Ybarra as scheduled 4/20/18      Loose Stools - Ongoing. Likely due to TF and antibioitics. C.diff PCR negative x 2, last 4/16.   - Start Lomotil PRN  - Continue fiber BID and Imodium QID.     Acute on ? Chronic Anemia - Hgb 11.7 on hospital admit, unknown baseline. Hgb stable ~8-9 currently. No s/s of bleeding. Likely due to surgeries, infection, and inflammation.   - Trend CBC q M/Th. Further evaluation in outpatient setting if fails to improve.    GERD - Stable. Continue daily  PPI.    Resolved Issues  Leukocytosis - Likely secondary to infection and inflammation. Occurred 3/24-4/12. WBC wnl on 4/16. Trend CBC q M/Th.  Hypernatremia - Occurred 4/3-4/6. Likely due to free water deficit. Resolved with increased FWF. Trend BMP M/Th.  Thrombocytosis - Likely reactive from inflammation. Occurred 3/30-4/12. Platelets wnl on 4/16. Trend CBC q M/Th.      Diet and/or tube feedings: NPO, cycled TF via J tube  Lines, tubes, drains: GJ tube, Pharyngostomy tube, Chest Tube  DVT/GI prophylaxis: Lovenox, PPI  Indications for psychotropic medications: No diagnosis  Pneumococcal Vaccination Status: Not due for vaccination until >65   Code status discussed on admission: Full Code    Laury Gross PA-C  Hospitalist Service  [phone]         Consults:   Nutrition         Discharge Planning:   Repeat EGD on 4/20 with hospital admission vs return to TCU pending findings. Requires TCU until drains removed.        Interval History:   Doing well today. Mild discomfort near pharyngostomy tube insertion site which is well controlled on current regimen. Multiple loose watery stools daily. Denies fevers, chills, headaches, dizziness, URI symptoms, chest pain, SOB, n/v, abdominal pain, constipation, dysuria, peripheral edema, or skin rashes.         Physical Exam:   Blood pressure 128/77, pulse 89, temperature 97.1  F (36.2  C), temperature source Oral, resp. rate 16, weight 75.3 kg (166 lb), SpO2 96 %.  GENERAL: Awake. Sitting up in bed. Non-toxic appearing. NAD.  HEENT: NC/AT. Anicteric sclera. Mucous membranes moist.  Neck: Left pharyngostomy tube is c/d/i and remains to LIS.  CV: RRR. S1,S2. No appreciable murmurs, rubs, or gallops. Chest tube is c/d/i with serous output.  RESPIRATORY: Normal respiratory effort on RA. Lungs CTAB without wheezes, rales, or rhonchi.   GI: Soft, non-tender, and non-distended with bowel sounds present. G tube to gravity drainage with green output. J tube is c/d/i.  EXTREMITIES: No  peripheral edema. Warm & well perfused.  NEUROLOGIC: Alert and orientated x 3. Moves all extremities. No focal deficits.   SKIN: No jaundice or rashes.

## 2018-04-17 PROCEDURE — 25000132 ZZH RX MED GY IP 250 OP 250 PS 637: Performed by: PHYSICIAN ASSISTANT

## 2018-04-17 PROCEDURE — 25000128 H RX IP 250 OP 636: Performed by: INTERNAL MEDICINE

## 2018-04-17 PROCEDURE — 25000132 ZZH RX MED GY IP 250 OP 250 PS 637: Performed by: INTERNAL MEDICINE

## 2018-04-17 PROCEDURE — 12000022 ZZH R&B SNF

## 2018-04-17 PROCEDURE — 27210437 ZZH NUTRITION PRODUCT SEMIELEM INTERMED LITER

## 2018-04-17 RX ADMIN — LOPERAMIDE HYDROCHLORIDE 4 MG: 1 SOLUTION ORAL at 17:48

## 2018-04-17 RX ADMIN — MULTIVITAMIN 15 ML: LIQUID ORAL at 08:34

## 2018-04-17 RX ADMIN — ENOXAPARIN SODIUM 40 MG: 40 INJECTION SUBCUTANEOUS at 08:31

## 2018-04-17 RX ADMIN — LOPERAMIDE HYDROCHLORIDE 4 MG: 1 SOLUTION ORAL at 08:33

## 2018-04-17 RX ADMIN — LOPERAMIDE HYDROCHLORIDE 4 MG: 1 SOLUTION ORAL at 13:04

## 2018-04-17 RX ADMIN — AMOXICILLIN AND CLAVULANATE POTASSIUM 875 MG: 400; 57 POWDER, FOR SUSPENSION ORAL at 08:29

## 2018-04-17 RX ADMIN — PANTOPRAZOLE SODIUM 40 MG: 40 TABLET, DELAYED RELEASE ORAL at 08:35

## 2018-04-17 RX ADMIN — LOPERAMIDE HYDROCHLORIDE 4 MG: 1 SOLUTION ORAL at 21:42

## 2018-04-17 RX ADMIN — Medication 2 PACKET: at 08:32

## 2018-04-17 RX ADMIN — FLUCONAZOLE 200 MG: 40 POWDER, FOR SUSPENSION ORAL at 08:33

## 2018-04-17 RX ADMIN — AMOXICILLIN AND CLAVULANATE POTASSIUM 875 MG: 400; 57 POWDER, FOR SUSPENSION ORAL at 21:43

## 2018-04-17 RX ADMIN — Medication 2 PACKET: at 21:43

## 2018-04-17 NOTE — PLAN OF CARE
Problem: Goal Outcome Summary  Goal: Goal Outcome Summary  Outcome: No Change  Alert and oriented x 4. Denied having pain and discomfort. Standby assist. Ambulated to bathroom x 2. TF is running at 90cc/hr. G tube connected to salmeron bag. Flushed pharyngostomy tube with 10cc NS. 20cc output from chest tube. And 50cc green output from pharyngostomy suction tube. Changed chest tube site dressing no drainage noted.

## 2018-04-17 NOTE — PLAN OF CARE
Pt is alert and oriented, denied pain, SOB, or changes in sensation, pharyngostomy tube irrigated with 10cc output, GJ tube dressing CDI, 300ml green drainage from G tube gravity drainage, feeding through J tube running at 90 ml/hr till 8am, chest tube dressing CDI 0 output, continent with 2 loose stools reported during the night, no further care concerns at this time continue with POC.

## 2018-04-17 NOTE — PLAN OF CARE
Problem: Goal Outcome Summary  Goal: Goal Outcome Summary  Outcome: Improving  Patient is A&O x 3, denies pain. VS'S. NPO. Chest tube patent with no increase in output. Gastric tube patent with 400 cc. J tube patent with flushes. Pt reports loose stools x 3 this shift. Transfers with assist of one with tube. Pharyngeal tube irrigated and on intermittent low suction, canistert replaced with new one. Will continue to monitor.

## 2018-04-18 ENCOUNTER — APPOINTMENT (OUTPATIENT)
Dept: LAB | Facility: CLINIC | Age: 58
End: 2018-04-18
Attending: INTERNAL MEDICINE
Payer: MEDICAID

## 2018-04-18 PROCEDURE — 99309 SBSQ NF CARE MODERATE MDM 30: CPT | Performed by: PHYSICIAN ASSISTANT

## 2018-04-18 PROCEDURE — 25000132 ZZH RX MED GY IP 250 OP 250 PS 637: Performed by: INTERNAL MEDICINE

## 2018-04-18 PROCEDURE — 27210437 ZZH NUTRITION PRODUCT SEMIELEM INTERMED LITER

## 2018-04-18 PROCEDURE — 99207 ZZC CDG-MDM COMPONENT: MEETS LOW - DOWN CODED: CPT | Performed by: PHYSICIAN ASSISTANT

## 2018-04-18 PROCEDURE — 25000125 ZZHC RX 250

## 2018-04-18 PROCEDURE — 25000132 ZZH RX MED GY IP 250 OP 250 PS 637: Performed by: PHYSICIAN ASSISTANT

## 2018-04-18 PROCEDURE — 25000128 H RX IP 250 OP 636: Performed by: INTERNAL MEDICINE

## 2018-04-18 PROCEDURE — 12000022 ZZH R&B SNF

## 2018-04-18 RX ORDER — LIDOCAINE 40 MG/G
CREAM TOPICAL
Status: CANCELLED | OUTPATIENT
Start: 2018-04-18

## 2018-04-18 RX ORDER — LOPERAMIDE HYDROCHLORIDE 1 MG/5ML
4 SOLUTION ORAL 4 TIMES DAILY
Status: CANCELLED | OUTPATIENT
Start: 2018-04-18

## 2018-04-18 RX ORDER — NALOXONE HYDROCHLORIDE 0.4 MG/ML
.1-.4 INJECTION, SOLUTION INTRAMUSCULAR; INTRAVENOUS; SUBCUTANEOUS
Status: CANCELLED | OUTPATIENT
Start: 2018-04-18

## 2018-04-18 RX ORDER — GUAR GUM
2 PACKET (EA) ORAL 2 TIMES DAILY
Status: CANCELLED | OUTPATIENT
Start: 2018-04-18

## 2018-04-18 RX ORDER — FLUCONAZOLE 40 MG/ML
200 POWDER, FOR SUSPENSION ORAL DAILY
Status: CANCELLED | OUTPATIENT
Start: 2018-04-19

## 2018-04-18 RX ORDER — DIPHENOXYLATE HCL/ATROPINE 2.5-.025/5
5 LIQUID (ML) ORAL 4 TIMES DAILY PRN
Status: CANCELLED | OUTPATIENT
Start: 2018-04-18

## 2018-04-18 RX ORDER — AMOXICILLIN AND CLAVULANATE POTASSIUM 400; 57 MG/5ML; MG/5ML
875 POWDER, FOR SUSPENSION ORAL 2 TIMES DAILY
Status: CANCELLED | OUTPATIENT
Start: 2018-04-18

## 2018-04-18 RX ORDER — LIDOCAINE HYDROCHLORIDE 10 MG/ML
INJECTION, SOLUTION EPIDURAL; INFILTRATION; INTRACAUDAL; PERINEURAL
Status: COMPLETED
Start: 2018-04-18 | End: 2018-04-18

## 2018-04-18 RX ADMIN — LOPERAMIDE HYDROCHLORIDE 4 MG: 1 SOLUTION ORAL at 08:37

## 2018-04-18 RX ADMIN — Medication 2 PACKET: at 08:37

## 2018-04-18 RX ADMIN — FLUCONAZOLE 200 MG: 40 POWDER, FOR SUSPENSION ORAL at 08:37

## 2018-04-18 RX ADMIN — AMOXICILLIN AND CLAVULANATE POTASSIUM 875 MG: 400; 57 POWDER, FOR SUSPENSION ORAL at 08:37

## 2018-04-18 RX ADMIN — AMOXICILLIN AND CLAVULANATE POTASSIUM 875 MG: 400; 57 POWDER, FOR SUSPENSION ORAL at 21:06

## 2018-04-18 RX ADMIN — MULTIVITAMIN 15 ML: LIQUID ORAL at 08:37

## 2018-04-18 RX ADMIN — LIDOCAINE HYDROCHLORIDE 20 MG: 10 INJECTION, SOLUTION EPIDURAL; INFILTRATION; INTRACAUDAL; PERINEURAL at 13:05

## 2018-04-18 RX ADMIN — LOPERAMIDE HYDROCHLORIDE 4 MG: 1 SOLUTION ORAL at 16:49

## 2018-04-18 RX ADMIN — PANTOPRAZOLE SODIUM 40 MG: 40 TABLET, DELAYED RELEASE ORAL at 08:37

## 2018-04-18 RX ADMIN — Medication 2 PACKET: at 21:06

## 2018-04-18 RX ADMIN — ENOXAPARIN SODIUM 40 MG: 40 INJECTION SUBCUTANEOUS at 08:37

## 2018-04-18 RX ADMIN — LOPERAMIDE HYDROCHLORIDE 4 MG: 1 SOLUTION ORAL at 21:05

## 2018-04-18 RX ADMIN — LOPERAMIDE HYDROCHLORIDE 4 MG: 1 SOLUTION ORAL at 13:06

## 2018-04-18 NOTE — PLAN OF CARE
Problem: Goal Outcome Summary  Goal: Goal Outcome Summary  Patient alert and oriented 4. Denies pain and discomfort all shift. Pharyngostomy tube intact with stabilizer on, but suture not attach to skin anymore. L chest tube intact- no respiratory distress noted. GT to gravity and TF running @ 90 ml/hr infusing without problem via JT. Patient uses call light appropriately. Will continue with current plan of care.

## 2018-04-18 NOTE — PROGRESS NOTES
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 S/p left thoracotomy with repair of esophageal perforation. Post-op course c/b persistent leak from esophagus to L pleural cavity s/p pharyngostomy tube placement. Transferred to TCU 4/6 for drain cares and nutritional support.       Boerhaave Esophageal Perforation - S/p left thoracotomy, chest tube placement (x 3 on L, x 1 on R), PEG placement, and repair of esophageal perforation on 3/18. Post-op course c/b persistent leak from esophagus to L pleural cavity. S/p pharyngostomy tube placement (remains in place) and gastroplexy on 3/28. IR added jejunal limb to G tube on 3/30. Chest tubes removed 3/20 x 1 and 3/21 x 2 with one remaining. Empirically treated with Fluconazole 3/18-present, Zosyn 3/18-4/2, and Augmentin 4/2-present. Afebrile w/o leukocytosis. Pharyngostomy tube accidentally pulled back and suture dislodged 4/18 with CXR showing tube in stable position from 3/30 and new suture was placed by Thoracic Surgery.  - Strict NPO with TFs via J tube  - Continue Augmentin and Fluconazole. End date TBD by Thoracic Surgery.  - G tube to gravity drainage at all times  - Chest tube to water seal. Monitor and record drainage. Output 30 cc on 4/17.  - Pharyngostomy tube to LIS with flushes TID. Monitor and record drainage. Output 10 cc on 4/17 - suspect inaccurate.  - Pain control with Tylenol PRN.   - Repeat EGD 4/20. Anticipate return to TCU following procedure. Chart has been pended. Hibiclens the evening of 4/19, stop TFs at MN on 4/20, and hold Lovenox 4/20 (ordered).       Loose Stools - Likely due to TF and antibioitics. C.diff PCR negative x 2, last 4/16. Having ~1-3 loose stools daily.  - Continue fiber BID, Imodium QID, and Lomotil PRN.     Acute on ? Chronic Anemia  - Hgb 11.7 on hospital admit, unknown baseline. Hgb stable ~8-9 currently. No s/s of bleeding. Likely due to surgeries, infection, and inflammation.   - Trend CBC q Monday. Further evaluation in outpatient setting if fails to improve.    GERD - Stable. Continue daily PPI.    Resolved Issues  Leukocytosis - Likely secondary to infection and inflammation. Occurred 3/24-4/12. WBC wnl on 4/16. Trend CBC q Monday.  Hypernatremia - Occurred 4/3-4/6. Likely due to free water deficit in setting of NPO status with TFs. Resolved with increased FWF. Trend BMP M/Th.   Thrombocytosis - Likely reactive from inflammation, infection, and surgery. Occurred 3/30-4/12. Platelets wnl on 4/16. Trend CBC q Monday.      Diet and/or tube feedings: NPO, cycled TF via J tube  Lines, tubes, drains: GJ tube, Pharyngostomy tube, Chest Tube  DVT/GI prophylaxis: Lovenox, PPI  Indications for psychotropic medications: No diagnosis  Pneumococcal Vaccination Status: Not due for vaccination until >65   Code status discussed on admission: Full Code    Laury Gross PA-C  Hospitalist Service  [phone]         Consults:   Nutrition         Discharge Planning:   Requires TCU until drains removed.        Interval History:   Doing well today. Pharyngostomy tube slightly pulled back and suture dislodged after tube was accidentally tugged by patient during trip to bathroom. CXR revealed tube tip was unchanged in position and remains in medial left basilar space. Thoracic Surgery evaluated patient and placed new suture this afternoon. Otherwise, he is doing well. Ongoing loose stools which are unchanged. Denies fevers, chest pain, SOB, n/v, abdominal pain, or dysuria.         Physical Exam:   Blood pressure 117/74, pulse 84, temperature 96.6  F (35.9  C), temperature source Axillary, resp. rate 16, weight 74.4 kg (164 lb 1.6 oz), SpO2 95 %.  GENERAL: Awake. Sitting up in bed. Non-toxic appearing. NAD.  HEENT: NC/AT. Anicteric sclera. Mucous membranes  moist.  Neck: Left pharyngostomy tube is c/d/i with straw colored output.  CV: RRR. S1,S2. No appreciable murmurs, rubs, or gallops.  RESPIRATORY: Normal respiratory effort on RA. Lungs CTAB without wheezes, rales, or rhonchi.  Chest tube is c/d/i with serous output.  GI: Soft, non-tender, and non-distended with bowel sounds present. G tube to gravity drainage with green output. J tube is c/d/i.  EXTREMITIES: No peripheral edema. Warm & well perfused.  NEUROLOGIC: Alert and orientated x 3. Moves all extremities. No focal deficits.   SKIN: No jaundice or rashes.

## 2018-04-18 NOTE — PLAN OF CARE
Problem: Goal Outcome Summary  Goal: Goal Outcome Summary  Pt denied pain during shift. Denied SOB, denied difficulty breathing. Denied numbness or tingling. Pharyngostomy tube sutures reinforced by thoracic surgery post XR Chest 2 view (position of tube unchanged at start of shift). Sutures now intact, with securement device in place for additional support. Check-ins provided by staff, pt using call light for assistance. TF stopped at start of shift. Enteric isolation canceled due to neg C.diff culture. I/O recorded. Drains patent. Call light within reach. Continue with POC.

## 2018-04-18 NOTE — PLAN OF CARE
Problem: Goal Outcome Summary  Goal: Goal Outcome Summary  Outcome: No Change  Alert and oriented x 4. Denied having pain and discomfort. Changed chest tube site dressing no drainage noted. 50cc output. Pharyngostomy tube is out about 3cc, cleaned the tube site greenish dry drainage noted. No drainage noted from suction canister. Changed JG tube site dressing scant dry brown drainage noted. TF is running at 90cc/hr.

## 2018-04-19 ENCOUNTER — ANESTHESIA EVENT (OUTPATIENT)
Dept: SURGERY | Facility: CLINIC | Age: 58
End: 2018-04-19
Payer: MEDICAID

## 2018-04-19 ENCOUNTER — APPOINTMENT (OUTPATIENT)
Dept: LAB | Facility: CLINIC | Age: 58
End: 2018-04-19
Attending: INTERNAL MEDICINE
Payer: MEDICAID

## 2018-04-19 LAB
ANION GAP SERPL CALCULATED.3IONS-SCNC: 10 MMOL/L (ref 3–14)
BUN SERPL-MCNC: 25 MG/DL (ref 7–30)
CALCIUM SERPL-MCNC: 8.7 MG/DL (ref 8.5–10.1)
CHLORIDE SERPL-SCNC: 105 MMOL/L (ref 94–109)
CO2 SERPL-SCNC: 25 MMOL/L (ref 20–32)
CREAT SERPL-MCNC: 0.57 MG/DL (ref 0.66–1.25)
GFR SERPL CREATININE-BSD FRML MDRD: >90 ML/MIN/1.7M2
GLUCOSE SERPL-MCNC: 131 MG/DL (ref 70–99)
MAGNESIUM SERPL-MCNC: 2.6 MG/DL (ref 1.6–2.3)
PHOSPHATE SERPL-MCNC: 2.8 MG/DL (ref 2.5–4.5)
POTASSIUM SERPL-SCNC: 3.8 MMOL/L (ref 3.4–5.3)
SODIUM SERPL-SCNC: 140 MMOL/L (ref 133–144)

## 2018-04-19 PROCEDURE — 25000132 ZZH RX MED GY IP 250 OP 250 PS 637: Performed by: PHYSICIAN ASSISTANT

## 2018-04-19 PROCEDURE — 80048 BASIC METABOLIC PNL TOTAL CA: CPT | Performed by: PHYSICIAN ASSISTANT

## 2018-04-19 PROCEDURE — 25000132 ZZH RX MED GY IP 250 OP 250 PS 637: Performed by: INTERNAL MEDICINE

## 2018-04-19 PROCEDURE — 36415 COLL VENOUS BLD VENIPUNCTURE: CPT | Performed by: PHYSICIAN ASSISTANT

## 2018-04-19 PROCEDURE — 84100 ASSAY OF PHOSPHORUS: CPT | Performed by: PHYSICIAN ASSISTANT

## 2018-04-19 PROCEDURE — 25000128 H RX IP 250 OP 636: Performed by: INTERNAL MEDICINE

## 2018-04-19 PROCEDURE — 12000022 ZZH R&B SNF

## 2018-04-19 PROCEDURE — 83735 ASSAY OF MAGNESIUM: CPT | Performed by: PHYSICIAN ASSISTANT

## 2018-04-19 PROCEDURE — 27210437 ZZH NUTRITION PRODUCT SEMIELEM INTERMED LITER

## 2018-04-19 RX ADMIN — AMOXICILLIN AND CLAVULANATE POTASSIUM 875 MG: 400; 57 POWDER, FOR SUSPENSION ORAL at 21:37

## 2018-04-19 RX ADMIN — LOPERAMIDE HYDROCHLORIDE 4 MG: 1 SOLUTION ORAL at 12:45

## 2018-04-19 RX ADMIN — Medication 2 PACKET: at 21:37

## 2018-04-19 RX ADMIN — Medication 2 PACKET: at 08:20

## 2018-04-19 RX ADMIN — FLUCONAZOLE 200 MG: 40 POWDER, FOR SUSPENSION ORAL at 08:20

## 2018-04-19 RX ADMIN — LOPERAMIDE HYDROCHLORIDE 4 MG: 1 SOLUTION ORAL at 21:37

## 2018-04-19 RX ADMIN — ANTISEPTIC SURGICAL SCRUB: 0.04 SOLUTION TOPICAL at 21:37

## 2018-04-19 RX ADMIN — LOPERAMIDE HYDROCHLORIDE 4 MG: 1 SOLUTION ORAL at 17:37

## 2018-04-19 RX ADMIN — MULTIVITAMIN 15 ML: LIQUID ORAL at 08:20

## 2018-04-19 RX ADMIN — PANTOPRAZOLE SODIUM 40 MG: 40 TABLET, DELAYED RELEASE ORAL at 08:20

## 2018-04-19 RX ADMIN — ENOXAPARIN SODIUM 40 MG: 40 INJECTION SUBCUTANEOUS at 08:20

## 2018-04-19 RX ADMIN — AMOXICILLIN AND CLAVULANATE POTASSIUM 875 MG: 400; 57 POWDER, FOR SUSPENSION ORAL at 08:20

## 2018-04-19 RX ADMIN — LOPERAMIDE HYDROCHLORIDE 4 MG: 1 SOLUTION ORAL at 08:20

## 2018-04-19 NOTE — PROGRESS NOTES
Writer received a call from the pt's Niece Sarah Beth [phone] and talked about how the pt was approved for MA. Sarah Beth also stated that the pt needs to choose between a [employer] Supplement or [employer] supplement. The pt has been approved back dated to March 1st 2018.     Sarah Beth was also asking about the pt's Helicopter ride from [place] to the [facility] and what the cost of it? Pt is very nervous about this and asked the writer to give some insight on this. Sarah Beth asked the writer to meet with the pt to discuss his supplement plans and to give her a call back asap. SW will continue to follow and assist as needed.    MARY Muro   P: [phone]  Pgr: [phone]

## 2018-04-19 NOTE — PLAN OF CARE
Problem: Goal Outcome Summary  Goal: Goal Outcome Summary  Outcome: No Change  Alert and oriented x 4. Flat affect. TF is running at 90cc/hr. G tube open to gravity. Pharyngostomy tube intact and 80cc clear drainage noted, flushed with 10ccNS. Ambulated to bathroom x 3. Loose stool x 2. Changed chest tube site dressing no drainage noted. Chest tube out put  was 30cc serosanguinous. Cleaned pharyngostomy tube site dry greenish drainage noted.

## 2018-04-19 NOTE — ANESTHESIA PREPROCEDURE EVALUATION
Anesthesia Evaluation     . Pt has had prior anesthetic. Type: MAC and General           ROS/MED HX    ENT/Pulmonary:  - neg pulmonary ROS     Neurologic:  - neg neurologic ROS     Cardiovascular:  - neg cardiovascular ROS       METS/Exercise Tolerance:     Hematologic:  - neg hematologic  ROS       Musculoskeletal:  - neg musculoskeletal ROS       GI/Hepatic:     (+) GERD Other GI/Hepatic perforated esophagus 3/18.  EGD with pharyngostomy placement 3/28.      Renal/Genitourinary:  - ROS Renal section negative       Endo:  - neg endo ROS       Psychiatric:  - neg psychiatric ROS       Infectious Disease:  - neg infectious disease ROS       Malignancy:      - no malignancy   Other:    - neg other ROS               Procedure: Procedure(s):  Esophagogastroduodenoscopy With Esophageal Stent Placement, Possible Pharyngostomy Placement  - Wound Class:    - Wound Class:     HPI: Michael Saldivar is a 57 year old male with PMHx of GERD who is undergoing above procedure for ongoing management of esophageal perforation. 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 Post-op course c/b persistent leak from esophagus to L pleural cavity. S/p pharyngostomy tube placement (remains in place) and gastroplexy on 3/28. IR added jejunal limb to G tube on 3/30. Chest tubes removed 3/20 x 1 and 3/21 x 2 with one remaining. The patient has also been on empiric antibiotics.    PMHx/PSHx:  Past Medical History:   Diagnosis Date     GERD (gastroesophageal reflux disease)        Past Surgical History:   Procedure Laterality Date     ENDOSCOPIC INSERTION TUBE GASTROSTOMY N/A 3/18/2018    Procedure: ENDOSCOPIC INSERTION TUBE GASTROSTOMY;  COMPINED ESOPHAGOSCOPY, GASTROSCOPY, DUODENOSCOPY, PEG-TUBE INSERTION, RIGHT CHEST TUBE INSERTION, POSSIBLE NASOGASTRIC TUBE, POSSIBLE THORACOTOMY. ;  Surgeon: Keith Ybarra MD;  Location: [facility] OR     ENDOSCOPIC INSERTION TUBE GASTROSTOMY N/A 3/28/2018    Procedure: ENDOSCOPIC  INSERTION TUBE GASTROSTOMY;;  Surgeon: Maria Del Rosario Flores MD;  Location: [facility] OR     ESOPHAGOSCOPY, GASTROSCOPY, DUODENOSCOPY (EGD), COMBINED N/A 3/18/2018    Procedure: COMBINED ESOPHAGOSCOPY, GASTROSCOPY, DUODENOSCOPY (EGD);  COMPINED ESOPHAGOSCOPY, GASTROSCOPY, DUODENOSCOPY, PEG-TUBE INSERTION, RIGHT CHEST TUBE INSERTION, POSSIBLE NASOGASTRIC TUBE, POSSIBLE THORACOTOMY. ;  Surgeon: Keith Ybarra MD;  Location: [facility] OR     ESOPHAGOSCOPY, GASTROSCOPY, DUODENOSCOPY (EGD), COMBINED N/A 3/28/2018    Procedure: COMBINED ESOPHAGOSCOPY, GASTROSCOPY, DUODENOSCOPY (EGD);  Esophagogastroduodenoscopy, Pharyngostomy Tube Placement, Gastroplexy, Feeding Tube Exchange;  Surgeon: Maria Del Rosario Flores MD;  Location: [facility] OR     HAND SURGERY       PHARYNGOSTOMY N/A 3/28/2018    Procedure: PHARYNGOSTOMY;;  Surgeon: Maria Del Rosario Flores MD;  Location: [facility] OR     THORACOTOMY Left 3/18/2018    Procedure: THORACOTOMY;  Esophogastroduodenoscopy, PEG-TUBE INSERTION, Left THORACOTOMY, Chest tube insertion;  Surgeon: Keith Ybarra MD;  Location: [facility] OR         Current Facility-Administered Medications on File Prior to Encounter:  - Skin Test Reading -   acetaminophen (TYLENOL) solution 650 mg   amoxicillin-clavulanate (AUGMENTIN) 400-57 MG/5ML suspension 875 mg   chlorhexidine (HIBICLENS) 4 % liquid   diphenoxylate-atropine (LOMOTIL) liquid 5 mL   enoxaparin (LOVENOX) injection 40 mg   fiber modular (NUTRISOURCE FIBER) packet 2 packet   fluconazole (DIFLUCAN) suspension 200 mg   HOLD MEDICATION   lidocaine (LMX4) kit   [COMPLETED] lidocaine (PF) (XYLOCAINE) 1 % injection   lidocaine 1 % 1 mL   loperamide (IMODIUM) liquid 4 mg   medication instructions   multivitamins with minerals (CERTAVITE/CEROVITE) liquid 15 mL   naloxone (NARCAN) injection 0.1-0.4 mg   pantoprazole (PROTONIX) suspension 40 mg   tuberculin injection 5 Units   [DISCONTINUED] enoxaparin (LOVENOX) injection 40 mg     Current Outpatient Prescriptions on File Prior to  Encounter:  acetaminophen (TYLENOL) 32 mg/mL solution 20.3 mLs (650 mg) by Per J Tube route every 6 hours as needed for mild pain or fever   amoxicillin-clavulanate (AUGMENTIN) 400-57 MG/5ML suspension 10.9 mLs (875 mg) by Per J Tube route 2 times daily   fiber modular (NUTRISOURCE FIBER) packet 1 packet by Per J Tube route 2 times daily   fluconazole (DIFLUCAN) 40 MG/ML suspension 5 mLs (200 mg) by Per J Tube route daily   loperamide (IMODIUM) 1 MG/5ML liquid 20 mLs (4 mg) by Per J Tube route 4 times daily   loperamide (IMODIUM) 1 MG/5ML liquid 10 mLs (2 mg) by Per J Tube route 4 times daily as needed for diarrhea   multivitamins with minerals (CERTAVITE/CEROVITE) LIQD liquid 15 mLs by Per Feeding Tube route daily   order for DME Equipment being ordered: [facility] Suction MachineSuction Canister-2Suction Connect Tube-25 in 1 Connector-2Bacteria Filter-2Treatment Diagnosis: Esophogeal Perforation   order for DME Equipment being ordered: Other: suction and cannister for pharyngostomy tubeTreatment Diagnosis: Esophageal perforation   pantoprazole (PROTONIX) SUSP suspension 20 mLs (40 mg) by Per J Tube route daily       Social Hx:   Social History   Substance Use Topics     Smoking status: Former Smoker     Packs/day: 1.50     Years: 36.00     Types: Cigarettes     Quit date: 3/18/2013     Smokeless tobacco: Never Used     Alcohol use No       Allergies: No Known Allergies      NPO Status: Per ASA Guidelines    Labs:    Blood Bank:  Lab Results   Component Value Date    ABO O 03/26/2018    RH Pos 03/26/2018    AS Neg 03/26/2018     BMP:  Recent Labs   Lab Test  04/19/18   0603   NA  140   POTASSIUM  3.8   CHLORIDE  105   CO2  25   BUN  25   CR  0.57*   GLC  131*   MU  8.7     CBC:   Recent Labs   Lab Test  04/16/18   0548   WBC  10.2   RBC  4.49   HGB  8.6*   HCT  32.1*   MCV  72*   MCH  19.2*   MCHC  27.3*   RDW  19.1*   PLT  390     Coags:  Recent Labs   Lab Test  04/02/18   0829   INR  1.17*             Physical  Exam  Normal systems: cardiovascular and pulmonary    Airway   Mallampati: II  TM distance: >3 FB  Neck ROM: full    Dental   (+) missing  Comment: Poor dentition, multiple missing teeth    Cardiovascular       Pulmonary                     Anesthesia Plan      History & Physical Review  History and physical reviewed and following examination; no interval change.    ASA Status:  3 .    NPO Status:  > 8 hours    Plan for General, ETT and RSI with Intravenous induction. Maintenance will be Balanced.    PONV prophylaxis:  Ondansetron (or other 5HT-3) and Dexamethasone or Solumedrol       Postoperative Care  Postoperative pain management:  Multi-modal analgesia.      Consents  Anesthetic plan, risks, benefits and alternatives discussed with:  Patient.  Use of blood products discussed: Yes.   Use of blood products discussed with Patient.  Consented to blood products.  .        To be discussed with staff.   - ASA 3  - GETA and RSI with standard ASA monitors, IV induction, balanced anesthetic  - PIV x 2  - Antibiotics per surgery  - PONV prophylaxis    Jose C Putnam          I have seen and evaluated the patient and agree with the above assessment and plan by Dr. Putnam.  Leilani Pabon  April 20, 2018              .

## 2018-04-19 NOTE — PLAN OF CARE
Problem: Goal Outcome Summary  Goal: Goal Outcome Summary  Outcome: No Change  Alert and oriented. Pharyngostomy tube patent, slight drainage at site, connected to intermittent suction. G tube draining dark green fluid. J tube patent with tube feeding running at 95cc/hr. NPO. No pain. Cointinent of bowel and bladder, 2 loose stools on 4/18/2018. Passing gas. Voiding without difficulty in bathroom. Independent in room. No nausea or vomiting. Will continue to monitor.

## 2018-04-19 NOTE — PLAN OF CARE
Problem: Goal Outcome Summary  Goal: Goal Outcome Summary  Outcome: No Change  Alert and oriented x4. No complain of pain/discomfort. Denies pain. Pharyngostomy tube flushed with 10 cc of NS. Gtube open to gravity, green drainage noted in bag. Chest tube dressing CDI. Continue to monitor. /85 (BP Location: Right arm)  Pulse 93  Temp 98.1  F (36.7  C) (Oral)  Resp 16  Wt 74.4 kg (164 lb 1.6 oz)  SpO2 96%  BMI 26.49 kg/m2.

## 2018-04-20 ENCOUNTER — HOSPITAL ENCOUNTER (OUTPATIENT)
Facility: CLINIC | Age: 58
Discharge: NURSING, REHAB OR LONG-TERM CARE | End: 2018-04-20
Attending: THORACIC SURGERY (CARDIOTHORACIC VASCULAR SURGERY) | Admitting: THORACIC SURGERY (CARDIOTHORACIC VASCULAR SURGERY)
Payer: MEDICAID

## 2018-04-20 ENCOUNTER — ANESTHESIA (OUTPATIENT)
Dept: SURGERY | Facility: CLINIC | Age: 58
End: 2018-04-20
Payer: MEDICAID

## 2018-04-20 ENCOUNTER — APPOINTMENT (OUTPATIENT)
Dept: GENERAL RADIOLOGY | Facility: CLINIC | Age: 58
End: 2018-04-20
Attending: THORACIC SURGERY (CARDIOTHORACIC VASCULAR SURGERY)
Payer: MEDICAID

## 2018-04-20 ENCOUNTER — HOSPITAL ENCOUNTER (INPATIENT)
Facility: SKILLED NURSING FACILITY | Age: 58
LOS: 21 days | Discharge: STILL ADMITTED OR ADMINISTRATIVE | End: 2018-05-11
Attending: INTERNAL MEDICINE | Admitting: INTERNAL MEDICINE
Payer: MEDICAID

## 2018-04-20 VITALS
OXYGEN SATURATION: 95 % | BODY MASS INDEX: 26.39 KG/M2 | RESPIRATION RATE: 18 BRPM | TEMPERATURE: 96.7 F | SYSTOLIC BLOOD PRESSURE: 117 MMHG | DIASTOLIC BLOOD PRESSURE: 74 MMHG | WEIGHT: 163.5 LBS | HEART RATE: 97 BPM

## 2018-04-20 VITALS
RESPIRATION RATE: 15 BRPM | TEMPERATURE: 98.6 F | SYSTOLIC BLOOD PRESSURE: 122 MMHG | OXYGEN SATURATION: 96 % | DIASTOLIC BLOOD PRESSURE: 74 MMHG

## 2018-04-20 DIAGNOSIS — K22.3 ESOPHAGEAL PERFORATION: ICD-10-CM

## 2018-04-20 DIAGNOSIS — K22.3 ESOPHAGEAL PERFORATION: Primary | ICD-10-CM

## 2018-04-20 LAB
GLUCOSE BLDC GLUCOMTR-MCNC: 118 MG/DL (ref 70–99)
GLUCOSE BLDC GLUCOMTR-MCNC: 127 MG/DL (ref 70–99)

## 2018-04-20 PROCEDURE — 37000009 ZZH ANESTHESIA TECHNICAL FEE, EACH ADDTL 15 MIN: Performed by: THORACIC SURGERY (CARDIOTHORACIC VASCULAR SURGERY)

## 2018-04-20 PROCEDURE — 25000132 ZZH RX MED GY IP 250 OP 250 PS 637: Performed by: PHYSICIAN ASSISTANT

## 2018-04-20 PROCEDURE — 25000132 ZZH RX MED GY IP 250 OP 250 PS 637: Performed by: STUDENT IN AN ORGANIZED HEALTH CARE EDUCATION/TRAINING PROGRAM

## 2018-04-20 PROCEDURE — C9399 UNCLASSIFIED DRUGS OR BIOLOG: HCPCS | Performed by: STUDENT IN AN ORGANIZED HEALTH CARE EDUCATION/TRAINING PROGRAM

## 2018-04-20 PROCEDURE — 12000022 ZZH R&B SNF

## 2018-04-20 PROCEDURE — 71000014 ZZH RECOVERY PHASE 1 LEVEL 2 FIRST HR: Performed by: THORACIC SURGERY (CARDIOTHORACIC VASCULAR SURGERY)

## 2018-04-20 PROCEDURE — 71000015 ZZH RECOVERY PHASE 1 LEVEL 2 EA ADDTL HR: Performed by: THORACIC SURGERY (CARDIOTHORACIC VASCULAR SURGERY)

## 2018-04-20 PROCEDURE — 25000128 H RX IP 250 OP 636: Performed by: ANESTHESIOLOGY

## 2018-04-20 PROCEDURE — 27210437 ZZH NUTRITION PRODUCT SEMIELEM INTERMED LITER

## 2018-04-20 PROCEDURE — 71000027 ZZH RECOVERY PHASE 2 EACH 15 MINS: Performed by: THORACIC SURGERY (CARDIOTHORACIC VASCULAR SURGERY)

## 2018-04-20 PROCEDURE — 37000008 ZZH ANESTHESIA TECHNICAL FEE, 1ST 30 MIN: Performed by: THORACIC SURGERY (CARDIOTHORACIC VASCULAR SURGERY)

## 2018-04-20 PROCEDURE — 40000277 XR SURGERY CARM FLUORO LESS THAN 5 MIN W STILLS: Mod: TC

## 2018-04-20 PROCEDURE — C1769 GUIDE WIRE: HCPCS | Performed by: THORACIC SURGERY (CARDIOTHORACIC VASCULAR SURGERY)

## 2018-04-20 PROCEDURE — 25000566 ZZH SEVOFLURANE, EA 15 MIN: Performed by: THORACIC SURGERY (CARDIOTHORACIC VASCULAR SURGERY)

## 2018-04-20 PROCEDURE — 36000055 ZZH SURGERY LEVEL 2 W FLUORO 1ST 30 MIN - UMMC: Performed by: THORACIC SURGERY (CARDIOTHORACIC VASCULAR SURGERY)

## 2018-04-20 PROCEDURE — 25000125 ZZHC RX 250: Performed by: STUDENT IN AN ORGANIZED HEALTH CARE EDUCATION/TRAINING PROGRAM

## 2018-04-20 PROCEDURE — 25500064 ZZH RX 255 OP 636: Performed by: THORACIC SURGERY (CARDIOTHORACIC VASCULAR SURGERY)

## 2018-04-20 PROCEDURE — 00000146 ZZHCL STATISTIC GLUCOSE BY METER IP

## 2018-04-20 PROCEDURE — 25000128 H RX IP 250 OP 636: Performed by: STUDENT IN AN ORGANIZED HEALTH CARE EDUCATION/TRAINING PROGRAM

## 2018-04-20 PROCEDURE — C1874 STENT, COATED/COV W/DEL SYS: HCPCS | Performed by: THORACIC SURGERY (CARDIOTHORACIC VASCULAR SURGERY)

## 2018-04-20 PROCEDURE — 27210794 ZZH OR GENERAL SUPPLY STERILE: Performed by: THORACIC SURGERY (CARDIOTHORACIC VASCULAR SURGERY)

## 2018-04-20 PROCEDURE — 40000170 ZZH STATISTIC PRE-PROCEDURE ASSESSMENT II: Performed by: THORACIC SURGERY (CARDIOTHORACIC VASCULAR SURGERY)

## 2018-04-20 PROCEDURE — 36000053 ZZH SURGERY LEVEL 2 EA 15 ADDTL MIN - UMMC: Performed by: THORACIC SURGERY (CARDIOTHORACIC VASCULAR SURGERY)

## 2018-04-20 PROCEDURE — 82962 GLUCOSE BLOOD TEST: CPT

## 2018-04-20 DEVICE — STENT ESOPHAGEAL ENDOMAXX 23X150MM MAXX-2315
Type: IMPLANTABLE DEVICE | Site: ESOPHAGUS | Status: NON-FUNCTIONAL
Removed: 2018-05-23

## 2018-04-20 RX ORDER — GUAR GUM
2 PACKET (EA) ORAL 2 TIMES DAILY
Status: DISCONTINUED | OUTPATIENT
Start: 2018-04-20 | End: 2018-05-02

## 2018-04-20 RX ORDER — NALOXONE HYDROCHLORIDE 0.4 MG/ML
.1-.4 INJECTION, SOLUTION INTRAMUSCULAR; INTRAVENOUS; SUBCUTANEOUS
Status: DISCONTINUED | OUTPATIENT
Start: 2018-04-20 | End: 2018-05-11 | Stop reason: HOSPADM

## 2018-04-20 RX ORDER — SODIUM CHLORIDE, SODIUM LACTATE, POTASSIUM CHLORIDE, CALCIUM CHLORIDE 600; 310; 30; 20 MG/100ML; MG/100ML; MG/100ML; MG/100ML
INJECTION, SOLUTION INTRAVENOUS CONTINUOUS PRN
Status: DISCONTINUED | OUTPATIENT
Start: 2018-04-20 | End: 2018-04-20

## 2018-04-20 RX ORDER — ONDANSETRON 4 MG/1
4 TABLET, ORALLY DISINTEGRATING ORAL EVERY 30 MIN PRN
Status: DISCONTINUED | OUTPATIENT
Start: 2018-04-20 | End: 2018-04-20 | Stop reason: HOSPADM

## 2018-04-20 RX ORDER — HYDROMORPHONE HYDROCHLORIDE 1 MG/ML
.3-.5 INJECTION, SOLUTION INTRAMUSCULAR; INTRAVENOUS; SUBCUTANEOUS EVERY 5 MIN PRN
Status: DISCONTINUED | OUTPATIENT
Start: 2018-04-20 | End: 2018-04-20 | Stop reason: HOSPADM

## 2018-04-20 RX ORDER — SODIUM CHLORIDE, SODIUM LACTATE, POTASSIUM CHLORIDE, CALCIUM CHLORIDE 600; 310; 30; 20 MG/100ML; MG/100ML; MG/100ML; MG/100ML
INJECTION, SOLUTION INTRAVENOUS CONTINUOUS
Status: DISCONTINUED | OUTPATIENT
Start: 2018-04-20 | End: 2018-04-20 | Stop reason: HOSPADM

## 2018-04-20 RX ORDER — PROPOFOL 10 MG/ML
INJECTION, EMULSION INTRAVENOUS PRN
Status: DISCONTINUED | OUTPATIENT
Start: 2018-04-20 | End: 2018-04-20

## 2018-04-20 RX ORDER — LIDOCAINE HYDROCHLORIDE 20 MG/ML
INJECTION, SOLUTION INFILTRATION; PERINEURAL PRN
Status: DISCONTINUED | OUTPATIENT
Start: 2018-04-20 | End: 2018-04-20

## 2018-04-20 RX ORDER — FENTANYL CITRATE 50 UG/ML
INJECTION, SOLUTION INTRAMUSCULAR; INTRAVENOUS PRN
Status: DISCONTINUED | OUTPATIENT
Start: 2018-04-20 | End: 2018-04-20

## 2018-04-20 RX ORDER — DIPHENOXYLATE HCL/ATROPINE 2.5-.025/5
5 LIQUID (ML) ORAL 4 TIMES DAILY PRN
Status: DISCONTINUED | OUTPATIENT
Start: 2018-04-20 | End: 2018-04-20

## 2018-04-20 RX ORDER — DIPHENOXYLATE HCL/ATROPINE 2.5-.025/5
5 LIQUID (ML) ORAL 4 TIMES DAILY PRN
Status: DISCONTINUED | OUTPATIENT
Start: 2018-04-20 | End: 2018-05-11 | Stop reason: HOSPADM

## 2018-04-20 RX ORDER — FLUCONAZOLE 40 MG/ML
200 POWDER, FOR SUSPENSION ORAL DAILY
Status: DISCONTINUED | OUTPATIENT
Start: 2018-04-20 | End: 2018-05-11 | Stop reason: HOSPADM

## 2018-04-20 RX ORDER — NALOXONE HYDROCHLORIDE 0.4 MG/ML
.1-.4 INJECTION, SOLUTION INTRAMUSCULAR; INTRAVENOUS; SUBCUTANEOUS
Status: DISCONTINUED | OUTPATIENT
Start: 2018-04-20 | End: 2018-04-20 | Stop reason: HOSPADM

## 2018-04-20 RX ORDER — AMOXICILLIN AND CLAVULANATE POTASSIUM 400; 57 MG/5ML; MG/5ML
875 POWDER, FOR SUSPENSION ORAL 2 TIMES DAILY
Status: DISCONTINUED | OUTPATIENT
Start: 2018-04-20 | End: 2018-05-11 | Stop reason: HOSPADM

## 2018-04-20 RX ORDER — ACETAMINOPHEN 325 MG/1
975 TABLET ORAL ONCE
Status: DISCONTINUED | OUTPATIENT
Start: 2018-04-20 | End: 2018-04-20

## 2018-04-20 RX ORDER — ONDANSETRON 2 MG/ML
4 INJECTION INTRAMUSCULAR; INTRAVENOUS EVERY 30 MIN PRN
Status: DISCONTINUED | OUTPATIENT
Start: 2018-04-20 | End: 2018-04-20 | Stop reason: HOSPADM

## 2018-04-20 RX ORDER — FENTANYL CITRATE 50 UG/ML
25-50 INJECTION, SOLUTION INTRAMUSCULAR; INTRAVENOUS EVERY 5 MIN PRN
Status: DISCONTINUED | OUTPATIENT
Start: 2018-04-20 | End: 2018-04-20 | Stop reason: HOSPADM

## 2018-04-20 RX ORDER — CEFAZOLIN SODIUM 2 G/100ML
2 INJECTION, SOLUTION INTRAVENOUS
Status: COMPLETED | OUTPATIENT
Start: 2018-04-20 | End: 2018-04-20

## 2018-04-20 RX ORDER — LIDOCAINE 40 MG/G
CREAM TOPICAL
Status: DISCONTINUED | OUTPATIENT
Start: 2018-04-20 | End: 2018-05-11 | Stop reason: HOSPADM

## 2018-04-20 RX ORDER — CEFAZOLIN SODIUM 1 G/3ML
1 INJECTION, POWDER, FOR SOLUTION INTRAMUSCULAR; INTRAVENOUS SEE ADMIN INSTRUCTIONS
Status: DISCONTINUED | OUTPATIENT
Start: 2018-04-20 | End: 2018-04-20 | Stop reason: HOSPADM

## 2018-04-20 RX ORDER — LOPERAMIDE HYDROCHLORIDE 1 MG/5ML
4 SOLUTION ORAL 4 TIMES DAILY
Status: DISCONTINUED | OUTPATIENT
Start: 2018-04-20 | End: 2018-05-11 | Stop reason: HOSPADM

## 2018-04-20 RX ORDER — ONDANSETRON 2 MG/ML
INJECTION INTRAMUSCULAR; INTRAVENOUS PRN
Status: DISCONTINUED | OUTPATIENT
Start: 2018-04-20 | End: 2018-04-20

## 2018-04-20 RX ADMIN — SUGAMMADEX 200 MG: 100 INJECTION, SOLUTION INTRAVENOUS at 08:13

## 2018-04-20 RX ADMIN — ONDANSETRON 4 MG: 2 INJECTION INTRAMUSCULAR; INTRAVENOUS at 08:12

## 2018-04-20 RX ADMIN — ACETAMINOPHEN 650 MG: 325 SOLUTION ORAL at 19:20

## 2018-04-20 RX ADMIN — AMOXICILLIN AND CLAVULANATE POTASSIUM 875 MG: 400; 57 POWDER, FOR SUSPENSION ORAL at 22:12

## 2018-04-20 RX ADMIN — MULTIVITAMIN 15 ML: LIQUID ORAL at 14:09

## 2018-04-20 RX ADMIN — PANTOPRAZOLE SODIUM 40 MG: 40 TABLET, DELAYED RELEASE ORAL at 14:08

## 2018-04-20 RX ADMIN — FENTANYL CITRATE 50 MCG: 50 INJECTION, SOLUTION INTRAMUSCULAR; INTRAVENOUS at 08:07

## 2018-04-20 RX ADMIN — FENTANYL CITRATE 50 MCG: 50 INJECTION, SOLUTION INTRAMUSCULAR; INTRAVENOUS at 07:43

## 2018-04-20 RX ADMIN — AMOXICILLIN AND CLAVULANATE POTASSIUM 875 MG: 400; 57 POWDER, FOR SUSPENSION ORAL at 14:09

## 2018-04-20 RX ADMIN — LOPERAMIDE HYDROCHLORIDE 4 MG: 1 SOLUTION ORAL at 14:09

## 2018-04-20 RX ADMIN — ROCURONIUM BROMIDE 30 MG: 10 INJECTION INTRAVENOUS at 07:53

## 2018-04-20 RX ADMIN — LIDOCAINE HYDROCHLORIDE 40 MG: 20 INJECTION, SOLUTION INFILTRATION; PERINEURAL at 07:43

## 2018-04-20 RX ADMIN — CEFAZOLIN SODIUM 2 G: 2 INJECTION, SOLUTION INTRAVENOUS at 07:51

## 2018-04-20 RX ADMIN — MIDAZOLAM 2 MG: 1 INJECTION INTRAMUSCULAR; INTRAVENOUS at 07:36

## 2018-04-20 RX ADMIN — FLUCONAZOLE 200 MG: 40 POWDER, FOR SUSPENSION ORAL at 14:20

## 2018-04-20 RX ADMIN — LOPERAMIDE HYDROCHLORIDE 4 MG: 1 SOLUTION ORAL at 22:11

## 2018-04-20 RX ADMIN — Medication 40 MG: at 07:43

## 2018-04-20 RX ADMIN — SODIUM CHLORIDE, POTASSIUM CHLORIDE, SODIUM LACTATE AND CALCIUM CHLORIDE: 600; 310; 30; 20 INJECTION, SOLUTION INTRAVENOUS at 07:36

## 2018-04-20 RX ADMIN — FENTANYL CITRATE 25 MCG: 50 INJECTION INTRAMUSCULAR; INTRAVENOUS at 08:49

## 2018-04-20 RX ADMIN — LOPERAMIDE HYDROCHLORIDE 4 MG: 1 SOLUTION ORAL at 17:00

## 2018-04-20 RX ADMIN — Medication 2 PACKET: at 22:12

## 2018-04-20 RX ADMIN — PROCHLORPERAZINE EDISYLATE 5 MG: 5 INJECTION INTRAMUSCULAR; INTRAVENOUS at 09:45

## 2018-04-20 RX ADMIN — PROPOFOL 90 MG: 10 INJECTION, EMULSION INTRAVENOUS at 07:43

## 2018-04-20 RX ADMIN — Medication 2 PACKET: at 14:09

## 2018-04-20 RX ADMIN — ONDANSETRON 4 MG: 2 INJECTION INTRAMUSCULAR; INTRAVENOUS at 09:06

## 2018-04-20 RX ADMIN — FENTANYL CITRATE 25 MCG: 50 INJECTION INTRAMUSCULAR; INTRAVENOUS at 10:05

## 2018-04-20 RX ADMIN — ACETAMINOPHEN 975 MG: 325 SOLUTION ORAL at 06:56

## 2018-04-20 RX ADMIN — FENTANYL CITRATE 25 MCG: 50 INJECTION INTRAMUSCULAR; INTRAVENOUS at 09:00

## 2018-04-20 NOTE — DISCHARGE INSTRUCTIONS
[facility]  Same-Day Surgery   Adult Discharge Orders & Instructions     For 24 hours after surgery    1. Get plenty of rest.  A responsible adult must stay with you for at least 24 hours after you leave the hospital.   2. Do not drive or use heavy equipment.  If you have weakness or tingling, don't drive or use heavy equipment until this feeling goes away.  3. Do not drink alcohol.  4. Avoid strenuous or risky activities.  Ask for help when climbing stairs.   5. You may feel lightheaded.  IF so, sit for a few minutes before standing.  Have someone help you get up.   6. If you have nausea (feel sick to your stomach): Drink only clear liquids such as apple juice, ginger ale, broth or 7-Up.  Rest may also help.  Be sure to drink enough fluids.  Move to a regular diet as you feel able.  7. You may have a slight fever. Call the doctor if your fever is over 100 F (37.7 C) (taken under the tongue) or lasts longer than 24 hours.  8. You may have a dry mouth, a sore throat, muscle aches or trouble sleeping.  These should go away after 24 hours.  9. Do not make important or legal decisions.   Call your doctor for any of the followin.  Signs of infection (fever, growing tenderness at the surgery site, a large amount of drainage or bleeding, severe pain, foul-smelling drainage, redness, swelling).    2. It has been over 8 to 10 hours since surgery and you are still not able to urinate (pass water).    3.  Headache for over 24 hours.    To contact a doctor, call Dr. Luisito Pichardo @ [phone]--Thoracic surgery clinic or:        [phone] and ask for the resident on call for thoracic surgery (answered 24 hours a day)      Emergency Department:    [facility]: [phone]       (TTY for hearing impaired: [phone])

## 2018-04-20 NOTE — OR NURSING
Pt's VSS et pain relatively controlled.. Nausia has been the biggest issue. Zofran et compazine has helped but not completely dissipated..   NPO.. Mouth swabs done. Anesthesia aware et sign out given. Ready for 3C

## 2018-04-20 NOTE — PROGRESS NOTES
Social Work: Initial Assessment with Discharge Plan     Patient Name: Michael Saldivar  : 1960  Age: 57 year old  MRN: [id number]  Completed assessment with: Pt  Admitted to TCU: 2018     Presenting Information   Date of SW assessment: 2018  Health Care Directive: Patient considering completing  Primary Health Care Agent: default to next of kin.   Secondary Health Care Agent: NA  Living Situation: Pt lives alone in [facility]   Previous Functional Status: Pt was independent and working prior to this hospitalization.   DME available: Kg   Patient and family understanding of hospitalization: Pt returned to TCU after procedure today.   Cultural/Language/Spiritual Considerations: English speaking.   Abuse concerns: No concerns   BIMS: Pt scored 15 on BIMS indicating cognitively intact.  PHQ-9: Pt scored 02 on PHQ-9 indicating minimal depressive symptoms.   PAS: confirmation number- [id number]  Has there been a level II screen?  No  Were there any recommendations in the screen? N/A  If yes, will the recommendations we incorporated into the Plan of Care?  N/A  Physical Health  Reason for admission: Perforation of esophagus     Provider Information   Primary Care Physician:Jemal Michel   : [facility]     Mental Health:   Diagnosis: No current concerns   Current Support/Services: NA  Previous Services: NA  Services Needed/Recommended: NA     Substance Use:  Diagnosis: No current concerns   Current Support/Services: NA  Previous Services: NA  Services Needed/Recommended: NA     Support System  Marital Status: Single  Family support: Pt has his brother Michael as a support and pt's DTR who both live in [place].   Other support available: Other family, but not readily able to take care of the pt.   Gaps in support system: Pt is alone, and does not have a lot of hands on support.      Community Resources  Current in home services: NA  Previous services:  NA     Financial/Employment/Education  Employment Status: Employed- [employer].   Income Source: Employment   Education: High school   Financial Concerns:  No concerns   Insurance: MA        Discharge Plan   Patient and family discharge goal: Pt's goal is to return home but states that there are a lot of things that need to be confirmed first.   Provided Education on discharge plan: YES  Patient agreeable to discharge plan:  YES     Barriers to discharge: Medical clearance and safe d/c plans.       Discharge Recommendations   Disposition: Home if the pt can manage his cares.   Transportation Needs: Will need assistance/ or brother may be able to transport home.   Name [facility] Transportation Company and Phone[facility].           MARY Muro   P: [phone]  Pgr: [phone]

## 2018-04-20 NOTE — PHARMACY
Pharmacy Tube Feeding Consult    Medication reviewed for administration by feeding tube and for potential food/drug interactions.    Recommendation: No changes are needed at this time.     Pharmacy will continue to follow as new medications are ordered.    Tracy Arellano, PharmD, BCPS

## 2018-04-20 NOTE — ANESTHESIA CARE TRANSFER NOTE
Patient: Michael Saldivar    Procedure(s):  Esophagogastroduodenoscopy With Esophageal Stent Placement - Wound Class: II-Clean Contaminated    Diagnosis: Esophageal Perforation   Diagnosis Additional Information: No value filed.    Anesthesia Type:   General, ETT, RSI     Note:  Airway :Face Mask  Patient transferred to:PACU  Handoff Report: Identifed the Patient, Identified the Reponsible Provider, Reviewed the pertinent medical history, Discussed the surgical course, Reviewed Intra-OP anesthesia mangement and issues during anesthesia, Set expectations for post-procedure period and Allowed opportunity for questions and acknowledgement of understanding      Vitals: (Last set prior to Anesthesia Care Transfer)    CRNA VITALS  4/20/2018 0753 - 4/20/2018 0833      4/20/2018             EKG: Bundle branch block                Electronically Signed By: Jose C Putnam MD  April 20, 2018  8:33 AM

## 2018-04-20 NOTE — BRIEF OP NOTE
[facility], [place]    Brief Operative Note     Pre-operative diagnosis: Esophageal Perforation   Post-operative diagnosis * No post-op diagnosis entered *  Procedure: Procedure(s):  Esophagogastroduodenoscopy With Esophageal Stent Placement - Wound Class: II-Clean Contaminated  Surgeon: Surgeon(s) and Role:     * Luisito Pichardo MD - Primary  Han Carl PA-C - assisting     Anesthesia: General   Estimated blood loss: None  Drains:  pre-existing L pleural albino drain  Specimens: * No specimens in log *  Findings:   Substantially smaller L cavity in communication with L albino drain on flouro  Complications: None.  Implants: 23 x 150 endomaxx esophageal stent    Han Carl PA-C  P: [phone]

## 2018-04-20 NOTE — OR NURSING
Dr Oliva notified that patient had taken his lovenox yesterday am at Rehab.  This information was also textmessaged to Dr Pichardo. Handoff given to Grace Juarez aat 0726

## 2018-04-20 NOTE — IP AVS SNAPSHOT
Post Anesthesia Care Unit [address] [address]    Phone:  [phone]                                       After Visit Summary   4/20/2018    Michael Saldivar    MRN: [id number]           After Visit Summary Signature Page     I have received my discharge instructions, and my questions have been answered. I have discussed any challenges I see with this plan with the nurse or doctor.    ..........................................................................................................................................  Patient/Patient Representative Signature      ..........................................................................................................................................  Patient Representative Print Name and Relationship to Patient    ..................................................               ................................................  Date                                            Time    ..........................................................................................................................................  Reviewed by Signature/Title    ...................................................              ..............................................  Date                                                            Time

## 2018-04-20 NOTE — OP NOTE
Procedure Date: 2018      DATE OF SURGERY:  2018      PREOPERATIVE DIAGNOSIS:  Esophageal perforation with chronic contained leak.      PROCEDURE PERFORMED:   1.  Esophagogastroscopy.   2.  Esophageal stent placement (23 x 150 mm, fully covered).   3.  Fluoroscopy with interpretation of images.      SURGEON:  Mai Gardner MD (present for the entire procedure).      FIRST ASSISTANT:  FRANCIS Lyons (dictating Shree Carl the first assistant for credentialing purposes, not for billing purposes).      ANESTHESIA:  General.      ESTIMATED BLOOD LOSS:  Zero.      FINDINGS:  There was still a small contained cavity, but it was quite small with contrast injection.  For this reason, I decided to just remove the pharyngostomy tube and to stent the area, since there was a distal stricture, which I believe might have been contributing to the persistence of this cavity.      DESCRIPTION OF PROCEDURE:  With  the patient in supine position, general anesthesia performed, an esophagogastroscopy with the above-mentioned findings.  Since I decided to place a stent, we removed the pharyngostomy tube then passed a wire and under fluoroscopic guidance deployed a stent.  We deployed it in such a way that it was about 3 or 4 cm distal to the leak.      We then verified endoscopically that the stent was properly positioning.         MAI GARDNER MD             D: 2018   T: 2018   MT: FILI      Name:     MJ VELASCO   MRN:      [id number]        Account:        [id number]   :      1960           Procedure Date: 2018      Document: [id number]       cc: [facility] Surgery Billing

## 2018-04-20 NOTE — PLAN OF CARE
Problem: Goal Outcome Summary  Goal: Goal Outcome Summary  Outcome: No Change  Patient alert and oriented,NPO,J-tube intact and flushes okay,G-tube to drainage.Esoph.tube removed today.SBA /supervision to bathroom.Denies pain.

## 2018-04-20 NOTE — ANESTHESIA POSTPROCEDURE EVALUATION
Patient: Michael Saldivar    Procedure(s):  Esophagogastroduodenoscopy With Esophageal Stent Placement - Wound Class: II-Clean Contaminated    Diagnosis:Esophageal Perforation   Diagnosis Additional Information: No value filed.    Anesthesia Type:  General, ETT, RSI    Note:  Anesthesia Post Evaluation    Patient location during evaluation: PACU  Patient participation: Able to fully participate in evaluation  Level of consciousness: awake  Pain management: adequate  Airway patency: patent  Cardiovascular status: acceptable  Respiratory status: acceptable  Hydration status: acceptable  PONV: controlled             Last vitals:  Vitals:    04/20/18 0845 04/20/18 0900 04/20/18 0915   BP: 147/83 139/81 139/81   Resp: 16 14 16   Temp: 36.9  C (98.4  F) 36.8  C (98.2  F) 36.8  C (98.2  F)   SpO2: 93% 94% 95%         Electronically Signed By: Leilani Pabon MD  April 20, 2018  9:34 AM

## 2018-04-20 NOTE — PLAN OF CARE
Problem: Goal Outcome Summary  Goal: Goal Outcome Summary  Pt alert and oriented. Independent in room, calls for assistance managing tubes to the BR. Denies pain. Denies any shortness of breath/chest pain. C/o loose stools. Scheduled immodium given. Tube feeding running per order, started at 1600. Order to hold at midnight for EGD tomorrow. Hibiclens bath given. G tube to gravity drainage. Pharyngostomy to low intermittent suction, irrigated at 2200. Chest tube dressing changed, no drainage. Continue POC.

## 2018-04-20 NOTE — PLAN OF CARE
Problem: Goal Outcome Summary  Goal: Goal Outcome Summary  Outcome: No Change  Alert and oriented x4. No complaints of pain overnight. Reports no new acute concerns during encounters. Appears to be sleeping off and on. TF stopped at midnight in anticipation for an EGD scheduled for this morning. Chest tube to water seal, intact and with no noted leaks-no extra output noted this shift. Pharyngostomy tube to low intermittent  suction, irrigated with NS as per orders. Patient picked up at 0500 by HE via stretcher. Continent of bowel and bladder. Patient in no distress prior to leaving the unit.Blood pressure 117/74, pulse 97, temperature 96.7  F (35.9  C), temperature source Oral, resp. rate 18, SpO2 95 %@RA.

## 2018-04-20 NOTE — IP AVS SNAPSHOT
MRN:[id number]                      After Visit Summary   4/20/2018    Michael Saldivar    MRN: [id number]           Thank you!     Thank you for choosing [facility] for your care. Our goal is always to provide you with excellent care. Hearing back from our patients is one way we can continue to improve our services. Please take a few minutes to complete the written survey that you may receive in the mail after you visit with us. Thank you!        Patient Information     Date Of Birth          1960        About your hospital stay     You were admitted on:  April 20, 2018 You last received care in the:  [facility]est[facility]    You were discharged on:  April 20, 2018       Who to Call     For medical emergencies, please call 911.  For non-urgent questions about your medical care, please call your primary care provider or clinic, [phone]  For questions related to your surgery, please call your surgery clinic        Attending Provider     Provider Specialty    Keith Ybarra MD Thoracic Surgery (Cardiothoracic Vascular Surgery)       Primary Care Provider Office Phone # Fax #    Jemal Michel [phone]      After Care Instructions     Discharge Instructions       DIET: NPO, will reevaluate for potential liquid diet upon follow up    Sleep with the head of your bed elevated to help prevent reflux that may be caused by your new esophageal stent    Activity as tolerated    Stay hydrated. Take over the counter fiber (metamucil or benefiber) and stool softeners (Miralax, docusate or senna) if becoming constipated.     Call for fever greater than 101.5, chills, increased size of incision, red skin around incision, vision changes, muscle strength changes, sensation changes, shortness of breath, or other concerns.    No driving while taking narcotic pain medication.    Transition to ibuprofen or tylenol/acetaminophen for pain control. Do not take  "tylenol/acetaminophen and acetaminophen containing narcotic (e.g., percocet or vicodin) at the same time. If you have known ulcer problems, or kidney trouble (elevated creatinine) do not take the ibuprofen.    In emergencies, call 911    For other Questions or Concerns;   A.) During weekday working hours (Monday through Friday 8am to 4:30pm)   call [phone] and ask to speak to a clinical nurse specialist.     B.) At nights (after 4:30pm), on weekends, or if urgent call [phone] and   tell the  \"I would like to page job code [phone], the thoracic surgery   fellow on call, please.\"                  Follow-up Appointments     Follow Up ([facility])       Follow up with Dr. Keith Ybarra on Tuesday, 4/24, prior to which an esophogram should be obtained                  Future tests that were ordered for you     XR Esophagram                 Further instructions from your care team       [facility]   Adult Discharge Orders & Instructions     For 24 hours after surgery    1. Get plenty of rest.  A responsible adult must stay with you for at least 24 hours after you leave the hospital.   2. Do not drive or use heavy equipment.  If you have weakness or tingling, don't drive or use heavy equipment until this feeling goes away.  3. Do not drink alcohol.  4. Avoid strenuous or risky activities.  Ask for help when climbing stairs.   5. You may feel lightheaded.  IF so, sit for a few minutes before standing.  Have someone help you get up.   6. If you have nausea (feel sick to your stomach): Drink only clear liquids such as apple juice, ginger ale, broth or 7-Up.  Rest may also help.  Be sure to drink enough fluids.  Move to a regular diet as you feel able.  7. You may have a slight fever. Call the doctor if your fever is over 100 F (37.7 C) (taken under the tongue) or lasts longer than 24 hours.  8. You may have a dry mouth, a sore throat, muscle aches or " "trouble sleeping.  These should go away after 24 hours.  9. Do not make important or legal decisions.   Call your doctor for any of the followin.  Signs of infection (fever, growing tenderness at the surgery site, a large amount of drainage or bleeding, severe pain, foul-smelling drainage, redness, swelling).    2. It has been over 8 to 10 hours since surgery and you are still not able to urinate (pass water).    3.  Headache for over 24 hours.    To contact a doctor, call Dr. Luisito Pichardo @ [phone]--Thoracic surgery clinic or:        [phone] and ask for the resident on call for thoracic surgery (answered 24 hours a day)      Emergency Department:    [facility]: [phone]       (TTY for hearing impaired: [phone])              Pending Results     No orders found from 2018 to 2018.            Admission Information     Date & Time Provider Department Dept. Phone    2018 Keith Ybarra MD Post Anesthesia Care Unit [facility] [phone]      Your Vitals Were     Blood Pressure Temperature Respirations Pulse Oximetry          126/71 98.6  F (37  C) 14 95%        MyChart Information     [employer] lets you send messages to your doctor, view your test results, renew your prescriptions, schedule appointments and more. To sign up, go to [email or web address] . Click on \"Log in\" on the left side of the screen, which will take you to the Welcome page. Then click on \"Sign up Now\" on the right side of the page.     You will be asked to enter the access code listed below, as well as some personal information. Please follow the directions to create your username and password.     Your access code is: [email or web address]  Expires: 2018 12:07 PM     Your access code will  in 90 days. If you need help or a new code, please call your [place] clinic or [phone].        Care EveryWhere ID     This is your Care EveryWhere ID. This could be used by other organizations to access " your [facility] medical records  [id number]        Equal Access to Services     BETTY ZHANG : Hadii sunny ku hadalexandrao Solichaali, waaxda luqadaha, qaybta kaalmada bertrand, lexie martelljimluisana lindquist. So [facility] [phone].    ATENCIÓN: Si habla español, tiene a patel disposición servicios gratuitos de asistencia lingüística. Llame al [phone].    We comply with applicable federal civil rights laws and [place] laws. We do not discriminate on the basis of race, color, national origin, age, disability, sex, sexual orientation, or gender identity.               Review of your medicines      CONTINUE these medicines which have NOT CHANGED        Dose / Directions    acetaminophen 32 mg/mL solution   Commonly known as:  TYLENOL   Used for:  Esophageal perforation        Dose:  650 mg   20.3 mLs (650 mg) by Per J Tube route every 6 hours as needed for mild pain or fever   Quantity:  473 mL   Refills:  0       amoxicillin-clavulanate 400-57 MG/5ML suspension   Commonly known as:  AUGMENTIN   Indication:  esophageal perforation   Used for:  Esophageal perforation        Dose:  875 mg   10.9 mLs (875 mg) by Per J Tube route 2 times daily   Quantity:  300 mL   Refills:  0       fiber modular packet   Used for:  Esophageal perforation        Dose:  1 packet   1 packet by Per J Tube route 2 times daily   Quantity:  75 packet   Refills:  0       fluconazole 40 MG/ML suspension   Commonly known as:  DIFLUCAN   Indication:  esophageal perforation   Used for:  Esophageal perforation        Dose:  200 mg   5 mLs (200 mg) by Per J Tube route daily   Quantity:  105 mL   Refills:  0       * loperamide 1 MG/5ML liquid   Commonly known as:  IMODIUM   Used for:  Bowel habit changes        Dose:  4 mg   20 mLs (4 mg) by Per J Tube route 4 times daily   Quantity:  200 mL   Refills:  0       * loperamide 1 MG/5ML liquid   Commonly known as:  IMODIUM   Used for:  Esophageal perforation        Dose:  2 mg   10 mLs (2 mg) by Per J  Tube route 4 times daily as needed for diarrhea   Quantity:  236 mL   Refills:  1       multivitamins with minerals Liqd liquid   Used for:  Esophageal perforation        Dose:  15 mL   15 mLs by Per Feeding Tube route daily   Quantity:  2 Bottle   Refills:  0       order for DME   Used for:  Esophageal perforation        Equipment being ordered:  [facility] Suction [place] Suction Canister-2 Suction Connect Tube-2 5 in 1 Connector-2 Bacteria Filter-2  Treatment Diagnosis: Esophogeal Perforation   Quantity:  1 Month   Refills:  0       order for DME   Used for:  Esophageal perforation        Equipment being ordered: Other: suction and cannister for pharyngostomy tube Treatment Diagnosis: Esophageal perforation   Quantity:  7 Container   Refills:  0       pantoprazole Susp suspension   Commonly known as:  PROTONIX   Used for:  Esophageal perforation        Dose:  40 mg   20 mLs (40 mg) by Per J Tube route daily   Refills:  0       * Notice:  This list has 2 medication(s) that are the same as other medications prescribed for you. Read the directions carefully, and ask your doctor or other care provider to review them with you.             Protect others around you: Learn how to safely use, store and throw away your medicines at www.disposemymeds.org.             Medication List: This is a list of all your medications and when to take them. Check marks below indicate your daily home schedule. Keep this list as a reference.      Medications           Morning Afternoon Evening Bedtime As Needed    acetaminophen 32 mg/mL solution   Commonly known as:  TYLENOL   20.3 mLs (650 mg) by Per J Tube route every 6 hours as needed for mild pain or fever   Last time this was given:  975 mg on 4/20/2018  6:56 AM                                amoxicillin-clavulanate 400-57 MG/5ML suspension   Commonly known as:  AUGMENTIN   10.9 mLs (875 mg) by Per J Tube route 2 times daily                                fiber modular packet   1 packet  by Per J Tube route 2 times daily                                fluconazole 40 MG/ML suspension   Commonly known as:  DIFLUCAN   5 mLs (200 mg) by Per J Tube route daily                                * loperamide 1 MG/5ML liquid   Commonly known as:  IMODIUM   20 mLs (4 mg) by Per J Tube route 4 times daily                                * loperamide 1 MG/5ML liquid   Commonly known as:  IMODIUM   10 mLs (2 mg) by Per J Tube route 4 times daily as needed for diarrhea                                multivitamins with minerals Liqd liquid   15 mLs by Per Feeding Tube route daily                                order for DME   Equipment being ordered:  [facility] Suction Machine Suction Canister-2 Suction Connect Tube-2 5 in 1 Connector-2 Bacteria Filter-2  Treatment Diagnosis: Esophogeal Perforation                                order for DME   Equipment being ordered: Other: suction and cannister for pharyngostomy tube Treatment Diagnosis: Esophageal perforation                                pantoprazole Susp suspension   Commonly known as:  PROTONIX   20 mLs (40 mg) by Per J Tube route daily                                * Notice:  This list has 2 medication(s) that are the same as other medications prescribed for you. Read the directions carefully, and ask your doctor or other care provider to review them with you.

## 2018-04-20 NOTE — PROGRESS NOTES
[facility]  Transitional Care Unit  Progress Note          Assessment & Plan:     Michael Saldivar is a 57 year old male who was admitted to [facility] on 3/18/18 with Boerhaave esophageal perforation attributed to forceful vomiting in the context of viral illness. S/p left thoracotomy with repair of esophageal perforation. Post-op course c/b persistent leak from esophagus to L pleural cavity s/p pharyngostomy tube placement. Transferred to TCU 4/6 for drain cares and nutritional support.       Boerhaave Esophageal Perforation - S/p left thoracotomy, chest tube placement (x 3 on L, x 1 on R), PEG placement, and repair of esophageal perforation on 3/18. Post-op course c/b persistent leak from esophagus to L pleural cavity. S/p pharyngostomy tube placement (remains in place) and gastroplexy on 3/28. IR added jejunal limb to G tube on 3/30. Chest tubes removed 3/20 x 1 and 3/21 x 2 with albino drain remaining. Empirically treated with Fluconazole 3/18-present, Zosyn 3/18-4/2, and Augmentin 4/2-present.     At TCU, afebrile w/o leukocytosis. Pharyngostomy tube accidentally pulled back and suture dislodged 4/18 with CXR showing tube in stable position from 3/30 and new suture was placed by Thoracic Surgery. F/u EGD 4/20 with small persistent cavity felt like r/t distal stricture. S/p pharngostomy removed, esophageal stent placed.   - Post EGD nausea. S/p zofran/compazine and now resolved.   - Escalated post EGD/stent chest pain. Daily improvements. Tylenol prn.   - Paged thoracic surgery 4/21 to discuss post EGD recommendations (diet, f/u, anticoagulants)  - Continue strict NPO with TFs via J tube for now  - Continue Augmentin and Fluconazole. End date TBD by Thoracic Surgery.  - G tube to gravity drainage at all times  - Albino drain to water seal. Monitor and record drainage. No abrupt changes.   - Repeat EGD 4/20. Anticipate return to TCU following procedure. Chart has been pended.  Hibiclens the evening of 4/19, stop TFs at [place] on 4/20, and hold Lovenox 4/20 (ordered).       Loose Stools - Likely due to TF and antibioitics. C.diff PCR negative x 2, last 4/16. Having ~1-3 loose stools daily.  - Continue fiber BID, Imodium QID, and Lomotil PRN.      Acute on ? Chronic Anemia - Hgb 11.7 on hospital admit, unknown baseline. Hgb stable ~8-9 currently. No s/s of bleeding. Likely due to surgeries, infection, and inflammation.   - Trend CBC q Monday. Further evaluation in outpatient setting if fails to improve.     GERD - Stable. Continue daily PPI.     Resolved Issues  Leukocytosis - Likely secondary to infection and inflammation. Occurred 3/24-4/12. WBC wnl on 4/16. Trend CBC q Monday.  Hypernatremia - Occurred 4/3-4/6. Likely due to free water deficit in setting of NPO status with TFs. Resolved with increased FWF. Trend BMP M/Th.   Thrombocytosis - Likely reactive from inflammation, infection, and surgery. Occurred 3/30-4/12. Platelets wnl on 4/16. Trend CBC q Monday.       Diet and/or tube feedings: NPO, cycled TF via J tube  Lines, tubes, drains: GJ tube, Chest Tube  DVT/GI prophylaxis:  PPI. Lovenox on hold x 72 hours post stent (4/20-4/22)  Indications for psychotropic medications: No diagnosis  Pneumococcal Vaccination Status: Not due for vaccination until >65   Code status discussed on admission: Full Code          Consults:     Nutrition         Discharge Planning:     At TCU until drains removed.         Interval History:     States he had sig chest pain (points to right side) following the EGD. States sig improvement this morning compared to right after procedure on [place]. Pain worsened with deep breaths.   Mentions also nausea with emesis post EGD. States this is gone this morning.   States he did not see a provider after his EGD therefore also does not know what the next plans are.   Denies any breathing changes. No cough. No change to stool patterns. Feeding tube stable.           Physical Exam:     /80 (BP Location: Right arm)  Pulse 96  Temp 96.6  F (35.9  C) (Oral)  Resp 16  SpO2 95%    Vitals are reviewed.     GENERAL: NAD. Sitting on edge of bed.   HEENT: Anicteric sclera. PERRLa intact. Mucous membranes moist. No thrush. Left pharyngostomy site with adhesive cover. No leaking or surrounding redness.   CV: RRR. S1, S2. No murmurs appreciated.   RESPIRATORY: Effort normal at rest and with talking activity. Lungs Clear. On Ra.   GI: Abdomen soft and non distended with normoactive bowel sounds present in all quadrants. No tenderness. PEG/J site stable.   NEUROLOGICAL: Mood seems down. A&Ox 3. Facial symmetry intact. No confusion.   MUSC: Hand grasp equal bilaterally. Joint appearance without acute swelling, warmth, redness.   EXTREMITIES: No peripheral edema. Good distal perfusion.   SKIN: No jaundice. GJ site stable. Pharyngostomy previous site stable. [place]/chest tube site stable.     I have reviewed patient's laboratory and imaging studies in the Epic. Pertinent findings are discussed above.    Luz Elena Jenkins, SIXTO, CNP  [facility]

## 2018-04-21 PROCEDURE — 25000132 ZZH RX MED GY IP 250 OP 250 PS 637: Performed by: PHYSICIAN ASSISTANT

## 2018-04-21 PROCEDURE — 99309 SBSQ NF CARE MODERATE MDM 30: CPT | Performed by: NURSE PRACTITIONER

## 2018-04-21 PROCEDURE — 25000128 H RX IP 250 OP 636: Performed by: PHYSICIAN ASSISTANT

## 2018-04-21 PROCEDURE — 27210437 ZZH NUTRITION PRODUCT SEMIELEM INTERMED LITER

## 2018-04-21 PROCEDURE — 99207 ZZC CDG-MDM COMPONENT: MEETS MODERATE - UP CODED: CPT | Performed by: NURSE PRACTITIONER

## 2018-04-21 PROCEDURE — 12000022 ZZH R&B SNF

## 2018-04-21 RX ORDER — ONDANSETRON 4 MG/1
4 TABLET, ORALLY DISINTEGRATING ORAL EVERY 6 HOURS PRN
Status: DISCONTINUED | OUTPATIENT
Start: 2018-04-21 | End: 2018-05-11 | Stop reason: HOSPADM

## 2018-04-21 RX ADMIN — ACETAMINOPHEN 650 MG: 325 SOLUTION ORAL at 20:54

## 2018-04-21 RX ADMIN — PANTOPRAZOLE SODIUM 40 MG: 40 TABLET, DELAYED RELEASE ORAL at 09:53

## 2018-04-21 RX ADMIN — ENOXAPARIN SODIUM 40 MG: 40 INJECTION SUBCUTANEOUS at 08:09

## 2018-04-21 RX ADMIN — FLUCONAZOLE 200 MG: 40 POWDER, FOR SUSPENSION ORAL at 09:53

## 2018-04-21 RX ADMIN — Medication 2 PACKET: at 20:51

## 2018-04-21 RX ADMIN — AMOXICILLIN AND CLAVULANATE POTASSIUM 875 MG: 400; 57 POWDER, FOR SUSPENSION ORAL at 20:52

## 2018-04-21 RX ADMIN — LOPERAMIDE HYDROCHLORIDE 4 MG: 1 SOLUTION ORAL at 08:09

## 2018-04-21 RX ADMIN — AMOXICILLIN AND CLAVULANATE POTASSIUM 875 MG: 400; 57 POWDER, FOR SUSPENSION ORAL at 08:09

## 2018-04-21 RX ADMIN — LOPERAMIDE HYDROCHLORIDE 4 MG: 1 SOLUTION ORAL at 20:52

## 2018-04-21 RX ADMIN — LOPERAMIDE HYDROCHLORIDE 4 MG: 1 SOLUTION ORAL at 16:26

## 2018-04-21 RX ADMIN — MULTIVITAMIN 15 ML: LIQUID ORAL at 08:09

## 2018-04-21 RX ADMIN — Medication 2 PACKET: at 08:12

## 2018-04-21 NOTE — PLAN OF CARE
Problem: Goal Outcome Summary  Goal: Goal Outcome Summary  Outcome: No Change  Alert and oriented x4. Around 2000 pt was c/o pain and discomfort on chest more like pressure. Gave tylenol and it was effective. Changed chest tube site dressing no drainage noted. Changed GJ tube site dressing small dry blood noted. TF is running at 90cc with 180 flush Q4hrs. Ambulated on hallway x 1 with standby assist. Cleaned and applied bacitracin around old  tube site.

## 2018-04-21 NOTE — PLAN OF CARE
Problem: Goal Outcome Summary  Goal: Goal Outcome Summary  Outcome: No Change  Pt.A&Ox4. Pharyngostomy tube removed yesterday. Pt.c/o some chest discomfort from procedure yesterday - had esophageal stent placement. Refused any interventions @ this time. No c/o's SOB. Has GJ tube with GT to gravity drainage and JT with cycled TF - to be off @ 0800 / NPO. Pt.ambulates indep.to BR but needs help managing tubes / lines. CT (mini [facility]) intact - maribel last marked 04/18 @ approx.260mls and currently remains @ that level.

## 2018-04-21 NOTE — PLAN OF CARE
Problem: Goal Outcome Summary  Goal: Goal Outcome Summary  Outcome: No Change  Patient alert and oriented,denies pain.J-tube irrigated and clamped.G-tube putting out green drainage,150 cc output this shift.Chest tube intact,drainage remains @ last ankush of 260 cc.Imodium liquid scheduled @ 1300 held per patient request,no diarrhea this shift.Pleasant and able to verbalize needs.Denies headache & nausea.

## 2018-04-22 PROCEDURE — 27210437 ZZH NUTRITION PRODUCT SEMIELEM INTERMED LITER

## 2018-04-22 PROCEDURE — 25000128 H RX IP 250 OP 636: Performed by: PHYSICIAN ASSISTANT

## 2018-04-22 PROCEDURE — 12000022 ZZH R&B SNF

## 2018-04-22 PROCEDURE — 25000132 ZZH RX MED GY IP 250 OP 250 PS 637: Performed by: PHYSICIAN ASSISTANT

## 2018-04-22 RX ADMIN — ENOXAPARIN SODIUM 40 MG: 40 INJECTION SUBCUTANEOUS at 08:11

## 2018-04-22 RX ADMIN — AMOXICILLIN AND CLAVULANATE POTASSIUM 875 MG: 400; 57 POWDER, FOR SUSPENSION ORAL at 20:40

## 2018-04-22 RX ADMIN — Medication 2 PACKET: at 08:14

## 2018-04-22 RX ADMIN — LOPERAMIDE HYDROCHLORIDE 4 MG: 1 SOLUTION ORAL at 20:38

## 2018-04-22 RX ADMIN — AMOXICILLIN AND CLAVULANATE POTASSIUM 875 MG: 400; 57 POWDER, FOR SUSPENSION ORAL at 10:05

## 2018-04-22 RX ADMIN — FLUCONAZOLE 200 MG: 40 POWDER, FOR SUSPENSION ORAL at 08:11

## 2018-04-22 RX ADMIN — ACETAMINOPHEN 650 MG: 325 SOLUTION ORAL at 20:38

## 2018-04-22 RX ADMIN — LOPERAMIDE HYDROCHLORIDE 4 MG: 1 SOLUTION ORAL at 08:11

## 2018-04-22 RX ADMIN — MULTIVITAMIN 15 ML: LIQUID ORAL at 08:11

## 2018-04-22 RX ADMIN — LOPERAMIDE HYDROCHLORIDE 4 MG: 1 SOLUTION ORAL at 16:42

## 2018-04-22 RX ADMIN — PANTOPRAZOLE SODIUM 40 MG: 40 TABLET, DELAYED RELEASE ORAL at 08:11

## 2018-04-22 RX ADMIN — Medication 2 PACKET: at 20:39

## 2018-04-22 RX ADMIN — LOPERAMIDE HYDROCHLORIDE 4 MG: 1 SOLUTION ORAL at 12:14

## 2018-04-22 RX ADMIN — ACETAMINOPHEN 650 MG: 325 SOLUTION ORAL at 10:05

## 2018-04-22 ASSESSMENT — ACTIVITIES OF DAILY LIVING (ADL)
RETIRED_COMMUNICATION: 0-->UNDERSTANDS/COMMUNICATES WITHOUT DIFFICULTY
FALL_HISTORY_WITHIN_LAST_SIX_MONTHS: NO
TOILETING: 0-->INDEPENDENT
WHICH_OF_THE_ABOVE_FUNCTIONAL_RISKS_HAD_A_RECENT_ONSET_OR_CHANGE?: AMBULATION
PRIOR_FUNCTIONAL_LEVEL_COMMENT: INDEPENDENT
COGNITION: 0 - NO COGNITION ISSUES REPORTED
SWALLOWING: 0-->SWALLOWS FOODS/LIQUIDS WITHOUT DIFFICULTY
DRESS: 0-->INDEPENDENT
RETIRED_EATING: 0-->INDEPENDENT
BATHING: 0-->INDEPENDENT
AMBULATION: 0-->INDEPENDENT
TRANSFERRING: 0-->INDEPENDENT

## 2018-04-22 NOTE — PLAN OF CARE
Problem: Goal Outcome Summary  Goal: Goal Outcome Summary  Outcome: No Change  Patient alert and oriented,G-tube dressing changed,j-tube flushed every 4 hours,TF start @ 1600.Chest tube site dressing C/D/I.Tylenol  Given x 1 per patient request.Pleasant.

## 2018-04-22 NOTE — PLAN OF CARE
Problem: Goal Outcome Summary  Goal: Goal Outcome Summary  Outcome: No Change  No new issues or concerns. Appears to sleep well between cares. Ambulates indep.to BR - needs assist managing tubes/lines. Denies pain, discomfort, CP and SOB. GT to gravity drainage. JT with cycled TF - off @ 0800. Chest tube intact.

## 2018-04-22 NOTE — PLAN OF CARE
Problem: Goal Outcome Summary  Goal: Goal Outcome Summary  Outcome: No Change  Alert and oriented x 4. C/o pain on chest area gave tylenol at bed time. Changed chest tube site dressing no drainage noted. Cleaned old tube site area dry small greenish drainage noted and applied bacitracin. Changed GJ tube site dressing scant brown drainage noted. TF is running at 90cc/hr with 180cc water flush Q4hrs. Loose stool x 2.

## 2018-04-23 ENCOUNTER — APPOINTMENT (OUTPATIENT)
Dept: LAB | Facility: CLINIC | Age: 58
End: 2018-04-23
Attending: INTERNAL MEDICINE
Payer: MEDICAID

## 2018-04-23 LAB
ANION GAP SERPL CALCULATED.3IONS-SCNC: 9 MMOL/L (ref 3–14)
BASOPHILS # BLD AUTO: 0.1 10E9/L (ref 0–0.2)
BASOPHILS NFR BLD AUTO: 0.8 %
BUN SERPL-MCNC: 21 MG/DL (ref 7–30)
CALCIUM SERPL-MCNC: 9 MG/DL (ref 8.5–10.1)
CHLORIDE SERPL-SCNC: 103 MMOL/L (ref 94–109)
CO2 SERPL-SCNC: 27 MMOL/L (ref 20–32)
CREAT SERPL-MCNC: 0.54 MG/DL (ref 0.66–1.25)
DIFFERENTIAL METHOD BLD: ABNORMAL
EOSINOPHIL # BLD AUTO: 0.6 10E9/L (ref 0–0.7)
EOSINOPHIL NFR BLD AUTO: 5.2 %
ERYTHROCYTE [DISTWIDTH] IN BLOOD BY AUTOMATED COUNT: 18.9 % (ref 10–15)
GFR SERPL CREATININE-BSD FRML MDRD: >90 ML/MIN/1.7M2
GLUCOSE BLDC GLUCOMTR-MCNC: 140 MG/DL (ref 70–99)
GLUCOSE SERPL-MCNC: 123 MG/DL (ref 70–99)
HCT VFR BLD AUTO: 34.7 % (ref 40–53)
HGB BLD-MCNC: 9.6 G/DL (ref 13.3–17.7)
IMM GRANULOCYTES # BLD: 0 10E9/L (ref 0–0.4)
IMM GRANULOCYTES NFR BLD: 0.4 %
LYMPHOCYTES # BLD AUTO: 2.3 10E9/L (ref 0.8–5.3)
LYMPHOCYTES NFR BLD AUTO: 20.2 %
MAGNESIUM SERPL-MCNC: 2.4 MG/DL (ref 1.6–2.3)
MCH RBC QN AUTO: 19.6 PG (ref 26.5–33)
MCHC RBC AUTO-ENTMCNC: 27.7 G/DL (ref 31.5–36.5)
MCV RBC AUTO: 71 FL (ref 78–100)
MONOCYTES # BLD AUTO: 0.9 10E9/L (ref 0–1.3)
MONOCYTES NFR BLD AUTO: 8.3 %
NEUTROPHILS # BLD AUTO: 7.3 10E9/L (ref 1.6–8.3)
NEUTROPHILS NFR BLD AUTO: 65.1 %
NRBC # BLD AUTO: 0 10*3/UL
NRBC BLD AUTO-RTO: 0 /100
PHOSPHATE SERPL-MCNC: 2.5 MG/DL (ref 2.5–4.5)
PLATELET # BLD AUTO: 435 10E9/L (ref 150–450)
POTASSIUM SERPL-SCNC: 4 MMOL/L (ref 3.4–5.3)
RBC # BLD AUTO: 4.9 10E12/L (ref 4.4–5.9)
SODIUM SERPL-SCNC: 139 MMOL/L (ref 133–144)
WBC # BLD AUTO: 11.1 10E9/L (ref 4–11)

## 2018-04-23 PROCEDURE — 25000128 H RX IP 250 OP 636: Performed by: PHYSICIAN ASSISTANT

## 2018-04-23 PROCEDURE — 84100 ASSAY OF PHOSPHORUS: CPT | Performed by: PHYSICIAN ASSISTANT

## 2018-04-23 PROCEDURE — 36415 COLL VENOUS BLD VENIPUNCTURE: CPT | Performed by: PHYSICIAN ASSISTANT

## 2018-04-23 PROCEDURE — 25000132 ZZH RX MED GY IP 250 OP 250 PS 637: Performed by: PHYSICIAN ASSISTANT

## 2018-04-23 PROCEDURE — 85025 COMPLETE CBC W/AUTO DIFF WBC: CPT | Performed by: PHYSICIAN ASSISTANT

## 2018-04-23 PROCEDURE — 80048 BASIC METABOLIC PNL TOTAL CA: CPT | Performed by: PHYSICIAN ASSISTANT

## 2018-04-23 PROCEDURE — 12000022 ZZH R&B SNF

## 2018-04-23 PROCEDURE — 83735 ASSAY OF MAGNESIUM: CPT | Performed by: PHYSICIAN ASSISTANT

## 2018-04-23 PROCEDURE — 00000146 ZZHCL STATISTIC GLUCOSE BY METER IP

## 2018-04-23 RX ADMIN — FLUCONAZOLE 200 MG: 40 POWDER, FOR SUSPENSION ORAL at 08:48

## 2018-04-23 RX ADMIN — LOPERAMIDE HYDROCHLORIDE 4 MG: 1 SOLUTION ORAL at 13:52

## 2018-04-23 RX ADMIN — Medication 2 PACKET: at 22:38

## 2018-04-23 RX ADMIN — MULTIVITAMIN 15 ML: LIQUID ORAL at 08:48

## 2018-04-23 RX ADMIN — Medication 2 PACKET: at 08:46

## 2018-04-23 RX ADMIN — ACETAMINOPHEN 650 MG: 325 SOLUTION ORAL at 13:53

## 2018-04-23 RX ADMIN — AMOXICILLIN AND CLAVULANATE POTASSIUM 875 MG: 400; 57 POWDER, FOR SUSPENSION ORAL at 22:37

## 2018-04-23 RX ADMIN — PANTOPRAZOLE SODIUM 40 MG: 40 TABLET, DELAYED RELEASE ORAL at 08:48

## 2018-04-23 RX ADMIN — AMOXICILLIN AND CLAVULANATE POTASSIUM 875 MG: 400; 57 POWDER, FOR SUSPENSION ORAL at 08:48

## 2018-04-23 RX ADMIN — LOPERAMIDE HYDROCHLORIDE 4 MG: 1 SOLUTION ORAL at 16:31

## 2018-04-23 RX ADMIN — LOPERAMIDE HYDROCHLORIDE 4 MG: 1 SOLUTION ORAL at 08:47

## 2018-04-23 RX ADMIN — ENOXAPARIN SODIUM 40 MG: 40 INJECTION SUBCUTANEOUS at 08:48

## 2018-04-23 RX ADMIN — LOPERAMIDE HYDROCHLORIDE 4 MG: 1 SOLUTION ORAL at 22:39

## 2018-04-23 NOTE — PLAN OF CARE
Problem: Goal Outcome Summary  Goal: Goal Outcome Summary  Outcome: No Change  Pt.A&Ox4. Denies pain, discomfort, CP and SOB. Indep.to BR but calls for staff assist with managing tubes/lines. Has GJ tube with GT to gravity and JT with cycled TF - off @ 0800. NPO. Chest tube intact.

## 2018-04-23 NOTE — PROGRESS NOTES
CLINICAL NUTRITION SERVICES - REASSESSMENT NOTE     Nutrition Prescription    RECOMMENDATIONS FOR MDs/PROVIDERS TO ORDER:  None    Malnutrition Status:    Patient does not meet two of the criteria necessary for diagnosing malnutrition    Recommendations already ordered by Registered Dietitian (RD):  None at this time    Future/Additional Recommendations:  Monitor for diet advancement per MD     EVALUATION OF THE PROGRESS TOWARD GOALS   Diet: NPO  Nutrition Support:  EN via J-tube of Impact Peptide 1.5 @ 90 mL/hr x 16 hrs (4pm-8am) to provide 1440 mL (19 mL/kg), 2160 kcal's (29 kcal/kg), 135 g pro (1.8 g/kg), 1109 free water, 92 g Fat (50% from MCTs), 202 g CHO and no Fiber daily.- 100% of needs  - Addition of 2 pkts Fiber BID for 12 g fiber  - Free Water Flushes: 180 mL q 4 hr during + 60 mL before/after TF  - Certavite 15ml  - Per RN notes, pt tolerating tube feeds.   - G-tube currently open to gravity with dark green drainage.   Intake: RN flow sheet has limited documentation. Pt reports that he has been getting his TF without interruptions.       NEW FINDINGS   - GI: Pt had EGD on 4/20- S/p pharyngostomy removed, esophageal stent placed. Pt reports ongoing loose stools-  Likely due to TF and antibiotics. C.diff PCR negative x 2, last 4/16. Having ~1-3 loose stool daily- pt is getting fiber and imodium    -EN: G tube to gravity, still producing green/brown drainage. Average output for last 4 days is 950 ml/day    -Wt: pt weight has been stable since admission on 4/05  Wt Readings from Last 6 Encounters:   04/23/18 74.8 kg (164 lb 12.8 oz)   04/19/18 74.2 kg (163 lb 8 oz)   04/16/18 75.1 kg   04/14/18 75.2 kg   04/11/18 74.6 kg   04/06/18 74.4 kg   04/05/18 74.7 kg (164 lb 9.6 oz)       MALNUTRITION  % Intake: No decreased intake noted  % Weight Loss: None noted  Subcutaneous Fat Loss: None observed  Muscle Loss: None observed  Fluid Accumulation/Edema: None noted  Malnutrition Diagnosis: Patient does not meet two  of the criteria necessary for diagnosing malnutrition    Previous Goals   Total avg nutritional intake to meet a minimum of 25 kcal/kg and 1.2 g PRO/kg daily (per dosing wt 74 kg).  Evaluation: Met    Previous Nutrition Diagnosis  Predicted inadequate nutrient intake (protein-energy) related to tolerating TF at goal rate but potential for TF interruptions and pt NPO with reliance on TF to meet 100% assessed needs.   Evaluation: No change    CURRENT NUTRITION DIAGNOSIS  Predicted inadequate nutrient intake (protein-energy) related to tolerating TF at goal rate but potential for TF interruptions and pt NPO with reliance on TF to meet 100% assessed needs.     INTERVENTIONS  Implementation  None    Goals  Total avg nutritional intake to meet a minimum of 25 kcal/kg and 1.2 g PRO/kg daily (per dosing wt 74 kg).    Monitoring/Evaluation  Progress toward goals will be monitored and evaluated per protocol.    Zeus Manning (Dietetic Intern)

## 2018-04-23 NOTE — PLAN OF CARE
Problem: Goal Outcome Summary  Goal: Goal Outcome Summary  Outcome: No Change  Alert and oriented x 4. Ambulated on hallway x 2 by himself. Changed chest tube site dressing no drainage noted. TF is running at 90ml/hr. C/o pain gave tylenol at bed time.  No drainage from chest tube. Cleaned and applied bacitracin on old tube site. Had loose stool x 2. Voided x 2.

## 2018-04-23 NOTE — PLAN OF CARE
"Problem: Goal Outcome Summary  Goal: Goal Outcome Summary  Outcome: Improving  Patient is alert x 4 and he can direct his cares. Patient c/o of pressure pain chest area. Patient states\" Since they took out the tube (pharengomoty) I feel like I have to belch and it hurts.\" I did medicated the patient with tylenol down his FT and he stated \" that the pain was gone.\" I encouraged him to ask for it every 6 hours if he needs it and I wrote it on the board.VSS and weight completed.      "

## 2018-04-24 PROCEDURE — 25000128 H RX IP 250 OP 636: Performed by: PHYSICIAN ASSISTANT

## 2018-04-24 PROCEDURE — 27210437 ZZH NUTRITION PRODUCT SEMIELEM INTERMED LITER

## 2018-04-24 PROCEDURE — 12000022 ZZH R&B SNF

## 2018-04-24 PROCEDURE — 25000132 ZZH RX MED GY IP 250 OP 250 PS 637: Performed by: PHYSICIAN ASSISTANT

## 2018-04-24 RX ADMIN — LOPERAMIDE HYDROCHLORIDE 4 MG: 1 SOLUTION ORAL at 09:36

## 2018-04-24 RX ADMIN — Medication 2 PACKET: at 21:21

## 2018-04-24 RX ADMIN — ENOXAPARIN SODIUM 40 MG: 40 INJECTION SUBCUTANEOUS at 09:37

## 2018-04-24 RX ADMIN — Medication 2 PACKET: at 09:35

## 2018-04-24 RX ADMIN — LOPERAMIDE HYDROCHLORIDE 4 MG: 1 SOLUTION ORAL at 21:20

## 2018-04-24 RX ADMIN — LOPERAMIDE HYDROCHLORIDE 4 MG: 1 SOLUTION ORAL at 16:45

## 2018-04-24 RX ADMIN — MULTIVITAMIN 15 ML: LIQUID ORAL at 09:37

## 2018-04-24 RX ADMIN — AMOXICILLIN AND CLAVULANATE POTASSIUM 875 MG: 400; 57 POWDER, FOR SUSPENSION ORAL at 09:37

## 2018-04-24 RX ADMIN — PANTOPRAZOLE SODIUM 40 MG: 40 TABLET, DELAYED RELEASE ORAL at 09:37

## 2018-04-24 RX ADMIN — LOPERAMIDE HYDROCHLORIDE 4 MG: 1 SOLUTION ORAL at 13:27

## 2018-04-24 RX ADMIN — AMOXICILLIN AND CLAVULANATE POTASSIUM 875 MG: 400; 57 POWDER, FOR SUSPENSION ORAL at 21:20

## 2018-04-24 RX ADMIN — FLUCONAZOLE 200 MG: 40 POWDER, FOR SUSPENSION ORAL at 09:37

## 2018-04-24 NOTE — PLAN OF CARE
Problem: Goal Outcome Summary  Goal: Goal Outcome Summary  Outcome: No Change  RN: Pt AA&Ox3; able to make needs known and uses call light appropriately. TF started at 90 mL/h with no s/s of complications. G-tube to gravity, chest tube patent, dsgs CDI. . Esophageal tube removal site HARJIT, CDI, a little swollen; pt denied any pain and SOB. Con't POC.

## 2018-04-24 NOTE — PLAN OF CARE
Problem: Goal Outcome Summary  Goal: Goal Outcome Summary  Outcome: Improving  Patient is alert x 4 and he can direct his cares. Patient is talking more with cares. Patient denied any need for pain medication this shift. VSS. Weight up this am.Chest tube patent and no drainage. I did changed the stabilizer on the chest tube this am and changed the dressing. Patient continues to be NPO and he received all his medications via his feeding tube. Patient uses ice chips and water to moisten his pink swabs he uses to moisten his mouth. GT to straight drainage. Continue with present plan of cares.

## 2018-04-24 NOTE — PLAN OF CARE
Problem: Goal Outcome Summary  Goal: Goal Outcome Summary  Outcome: No Change  Patient with no acute concerns overnight. Mostly asleep throughout the night in between cares. No complaints of pain/acute concerns. TF as per orders, patient is tolerating without complaints. Chest tube to water seal, no air leaks. Calls for assistance, continue with POC.

## 2018-04-25 PROCEDURE — 12000022 ZZH R&B SNF

## 2018-04-25 PROCEDURE — 25000132 ZZH RX MED GY IP 250 OP 250 PS 637: Performed by: PHYSICIAN ASSISTANT

## 2018-04-25 PROCEDURE — 25000128 H RX IP 250 OP 636: Performed by: PHYSICIAN ASSISTANT

## 2018-04-25 RX ADMIN — Medication 1 PACKET: at 21:21

## 2018-04-25 RX ADMIN — LOPERAMIDE HYDROCHLORIDE 4 MG: 1 SOLUTION ORAL at 17:05

## 2018-04-25 RX ADMIN — AMOXICILLIN AND CLAVULANATE POTASSIUM 875 MG: 400; 57 POWDER, FOR SUSPENSION ORAL at 09:05

## 2018-04-25 RX ADMIN — FLUCONAZOLE 200 MG: 40 POWDER, FOR SUSPENSION ORAL at 09:06

## 2018-04-25 RX ADMIN — Medication 2 PACKET: at 09:07

## 2018-04-25 RX ADMIN — PANTOPRAZOLE SODIUM 40 MG: 40 TABLET, DELAYED RELEASE ORAL at 09:06

## 2018-04-25 RX ADMIN — LOPERAMIDE HYDROCHLORIDE 4 MG: 1 SOLUTION ORAL at 21:21

## 2018-04-25 RX ADMIN — ENOXAPARIN SODIUM 40 MG: 40 INJECTION SUBCUTANEOUS at 09:09

## 2018-04-25 RX ADMIN — MULTIVITAMIN 15 ML: LIQUID ORAL at 09:08

## 2018-04-25 RX ADMIN — AMOXICILLIN AND CLAVULANATE POTASSIUM 875 MG: 400; 57 POWDER, FOR SUSPENSION ORAL at 21:21

## 2018-04-25 RX ADMIN — LOPERAMIDE HYDROCHLORIDE 4 MG: 1 SOLUTION ORAL at 09:09

## 2018-04-25 RX ADMIN — LOPERAMIDE HYDROCHLORIDE 4 MG: 1 SOLUTION ORAL at 12:25

## 2018-04-25 NOTE — PLAN OF CARE
Alert and oriented x 4. Calm & quiet. Has no c/o pain or discomfort. Chest tube intact, no leak noted, dressing CDI & tube stabilizer in place. Pt remains NPO ice chips. Cycled TF, Impact peptide 1.5  90 ml/hr with 180 H2O q 4 hrs, running via J tube. All meds given via J tube. G tube to gravity drainage. Continues to have loose stool, imodium & fiber given per order. Call within reach.

## 2018-04-25 NOTE — PLAN OF CARE
Problem: Goal Outcome Summary  Goal: Goal Outcome Summary  Outcome: No Change  Pt alert and able to communicate needs. No drainage noted from chest tube. 50 mL output from G tube to gravity. Continues to be NPO with ice chips. Calm and cooperative with cares.

## 2018-04-25 NOTE — PLAN OF CARE
Problem: Goal Outcome Summary  Goal: Goal Outcome Summary  Outcome: Improving  Alert and oriented x4. No complaints of pain/new concerns. Cycled TF as per orders at 90 ml/h and water flushes at 180 ml q 4 hours, patient is tolerating without complaints. G-tube to gravity. Independent in the room, calls for assistance with managing tubes while ambulating. Call light in reach. Continue with POC.

## 2018-04-26 ENCOUNTER — APPOINTMENT (OUTPATIENT)
Dept: LAB | Facility: CLINIC | Age: 58
End: 2018-04-26
Attending: INTERNAL MEDICINE
Payer: MEDICAID

## 2018-04-26 LAB
ALBUMIN SERPL-MCNC: 2.8 G/DL (ref 3.4–5)
ALP SERPL-CCNC: 158 U/L (ref 40–150)
ALT SERPL W P-5'-P-CCNC: 155 U/L (ref 0–70)
ANION GAP SERPL CALCULATED.3IONS-SCNC: 10 MMOL/L (ref 3–14)
AST SERPL W P-5'-P-CCNC: 69 U/L (ref 0–45)
BASOPHILS # BLD AUTO: 0.1 10E9/L (ref 0–0.2)
BASOPHILS NFR BLD AUTO: 0.4 %
BILIRUB DIRECT SERPL-MCNC: <0.1 MG/DL (ref 0–0.2)
BILIRUB SERPL-MCNC: 0.4 MG/DL (ref 0.2–1.3)
BUN SERPL-MCNC: 23 MG/DL (ref 7–30)
CALCIUM SERPL-MCNC: 8.9 MG/DL (ref 8.5–10.1)
CHLORIDE SERPL-SCNC: 104 MMOL/L (ref 94–109)
CO2 SERPL-SCNC: 24 MMOL/L (ref 20–32)
CREAT SERPL-MCNC: 0.56 MG/DL (ref 0.66–1.25)
CRP SERPL-MCNC: 6.9 MG/L (ref 0–8)
DIFFERENTIAL METHOD BLD: ABNORMAL
EOSINOPHIL # BLD AUTO: 0.5 10E9/L (ref 0–0.7)
EOSINOPHIL NFR BLD AUTO: 3.6 %
ERYTHROCYTE [DISTWIDTH] IN BLOOD BY AUTOMATED COUNT: 18.6 % (ref 10–15)
GFR SERPL CREATININE-BSD FRML MDRD: >90 ML/MIN/1.7M2
GLUCOSE SERPL-MCNC: 136 MG/DL (ref 70–99)
HCT VFR BLD AUTO: 35.3 % (ref 40–53)
HGB BLD-MCNC: 9.7 G/DL (ref 13.3–17.7)
IMM GRANULOCYTES # BLD: 0 10E9/L (ref 0–0.4)
IMM GRANULOCYTES NFR BLD: 0.3 %
LIPASE SERPL-CCNC: 220 U/L (ref 73–393)
LYMPHOCYTES # BLD AUTO: 2.5 10E9/L (ref 0.8–5.3)
LYMPHOCYTES NFR BLD AUTO: 18.7 %
MAGNESIUM SERPL-MCNC: 2.3 MG/DL (ref 1.6–2.3)
MCH RBC QN AUTO: 19.1 PG (ref 26.5–33)
MCHC RBC AUTO-ENTMCNC: 27.5 G/DL (ref 31.5–36.5)
MCV RBC AUTO: 70 FL (ref 78–100)
MONOCYTES # BLD AUTO: 1.1 10E9/L (ref 0–1.3)
MONOCYTES NFR BLD AUTO: 7.9 %
NEUTROPHILS # BLD AUTO: 9.3 10E9/L (ref 1.6–8.3)
NEUTROPHILS NFR BLD AUTO: 69.1 %
PHOSPHATE SERPL-MCNC: 2.4 MG/DL (ref 2.5–4.5)
PLATELET # BLD AUTO: 462 10E9/L (ref 150–450)
POTASSIUM SERPL-SCNC: 4 MMOL/L (ref 3.4–5.3)
PROT SERPL-MCNC: 7.4 G/DL (ref 6.8–8.8)
RBC # BLD AUTO: 5.08 10E12/L (ref 4.4–5.9)
SODIUM SERPL-SCNC: 138 MMOL/L (ref 133–144)
WBC # BLD AUTO: 13.3 10E9/L (ref 4–11)

## 2018-04-26 PROCEDURE — 99309 SBSQ NF CARE MODERATE MDM 30: CPT | Performed by: PHYSICIAN ASSISTANT

## 2018-04-26 PROCEDURE — 80076 HEPATIC FUNCTION PANEL: CPT | Performed by: PHYSICIAN ASSISTANT

## 2018-04-26 PROCEDURE — 83690 ASSAY OF LIPASE: CPT | Performed by: PHYSICIAN ASSISTANT

## 2018-04-26 PROCEDURE — 86140 C-REACTIVE PROTEIN: CPT | Performed by: PHYSICIAN ASSISTANT

## 2018-04-26 PROCEDURE — 83735 ASSAY OF MAGNESIUM: CPT | Performed by: PHYSICIAN ASSISTANT

## 2018-04-26 PROCEDURE — 80048 BASIC METABOLIC PNL TOTAL CA: CPT | Performed by: PHYSICIAN ASSISTANT

## 2018-04-26 PROCEDURE — 85004 AUTOMATED DIFF WBC COUNT: CPT | Performed by: PHYSICIAN ASSISTANT

## 2018-04-26 PROCEDURE — 84100 ASSAY OF PHOSPHORUS: CPT | Performed by: PHYSICIAN ASSISTANT

## 2018-04-26 PROCEDURE — 25000128 H RX IP 250 OP 636: Performed by: PHYSICIAN ASSISTANT

## 2018-04-26 PROCEDURE — 36415 COLL VENOUS BLD VENIPUNCTURE: CPT | Performed by: PHYSICIAN ASSISTANT

## 2018-04-26 PROCEDURE — 25000132 ZZH RX MED GY IP 250 OP 250 PS 637: Performed by: PHYSICIAN ASSISTANT

## 2018-04-26 PROCEDURE — 12000022 ZZH R&B SNF

## 2018-04-26 PROCEDURE — 85027 COMPLETE CBC AUTOMATED: CPT | Performed by: PHYSICIAN ASSISTANT

## 2018-04-26 RX ORDER — OXYCODONE HCL 5 MG/5 ML
2.5-5 SOLUTION, ORAL ORAL EVERY 8 HOURS PRN
Status: DISCONTINUED | OUTPATIENT
Start: 2018-04-26 | End: 2018-04-27

## 2018-04-26 RX ADMIN — Medication 2 PACKET: at 09:48

## 2018-04-26 RX ADMIN — LOPERAMIDE HYDROCHLORIDE 4 MG: 1 SOLUTION ORAL at 09:47

## 2018-04-26 RX ADMIN — Medication 2 PACKET: at 21:34

## 2018-04-26 RX ADMIN — PANTOPRAZOLE SODIUM 40 MG: 40 TABLET, DELAYED RELEASE ORAL at 09:44

## 2018-04-26 RX ADMIN — FLUCONAZOLE 200 MG: 40 POWDER, FOR SUSPENSION ORAL at 09:45

## 2018-04-26 RX ADMIN — AMOXICILLIN AND CLAVULANATE POTASSIUM 875 MG: 400; 57 POWDER, FOR SUSPENSION ORAL at 09:45

## 2018-04-26 RX ADMIN — LOPERAMIDE HYDROCHLORIDE 4 MG: 1 SOLUTION ORAL at 21:35

## 2018-04-26 RX ADMIN — MULTIVITAMIN 15 ML: LIQUID ORAL at 09:47

## 2018-04-26 RX ADMIN — LOPERAMIDE HYDROCHLORIDE 4 MG: 1 SOLUTION ORAL at 13:44

## 2018-04-26 RX ADMIN — AMOXICILLIN AND CLAVULANATE POTASSIUM 875 MG: 400; 57 POWDER, FOR SUSPENSION ORAL at 21:34

## 2018-04-26 RX ADMIN — ENOXAPARIN SODIUM 40 MG: 40 INJECTION SUBCUTANEOUS at 09:46

## 2018-04-26 RX ADMIN — LOPERAMIDE HYDROCHLORIDE 4 MG: 1 SOLUTION ORAL at 16:53

## 2018-04-26 NOTE — PLAN OF CARE
Problem: Goal Outcome Summary  Goal: Goal Outcome Summary  Outcome: No Change  No new issues or concerns. Appears to be sleeping well. Ambulates indep.to BR - calls for staff assist with managing tubes / lines. Denies pain or discomfort. No c/o's CP / SOB. GT remains to gravity drainage. JT with cycled TF - off @ 0800. CT intact / patent - minimal output.

## 2018-04-26 NOTE — PLAN OF CARE
Alert and oriented x 4. Able to make needs known. Calm & quiet. Chest tube dressing changed per protocol, no leak noted, & tube stabilizer in place. Pt remains NPO ice chips. Cycled TF, Impact peptide 1.5  90 ml/hr with 180 H2O q 4 hrs, running via J tube. All meds given via J tube. G tube to gravity drainage, greenish output. Has no c/o pain or discomfort. Call within reach.

## 2018-04-26 NOTE — PROGRESS NOTES
[facility]  Internal Medicine Progress Note    TCU Day # 6    Assessment & Plan: Michael Saldivar is a 57 year old man with a history of GERD who was admitted to [facility] 3/18-4/6/18 for Boerhaave esophageal perforation attributed to forceful vomiting in the context of viral illness - underwent left thoracotomy w/ repair of esophageal perforation but post op course was c/b persistent leak from esophagus to left pleural cavity requiring pharyngostomy tube placement and gastroplexy on 3/28. Transferred to  TCU for ongoing drain/chest tube cares and nutritional support.     #Boerhaave Esophageal Perforation. S/p left thoracotomy, chest tube placement (x3 on left, x1 on right), PEG placement, and repair of esophageal perforation on 3/18/18 by Dr. Ybarra. Post op course c/b persistent leak from esophagus to left pleural cavity. S/p pharyngostomy tube placement (removed 4/20/18) and gastoplexy on 3/28/18. IR added jejunal limb to G tube on 3/30/18. Still has 1 chest tube on left to water seal w/ no output since 4/23. F/u EGD 4/20 showed small persistent cavity likely related to distal stricture; esophageal stent was placed. Empirically treated w/ fluconazole 3/18-present, Zosyn 3/18-4/2, and Augmentin 4/2-present. Is afebrile but reporting new pain in epigastrium. Labs checked w/ WBC count 13.3 (although has been ranging 11-12 and CRP low at 6.9), lipase wnls, LFTs abnormal as below (could be 2/2 fluconazole; not checked since 4/2). Pain could be d/t recent stent placement.   - Remains NPO. G tube to gravity drainage. J tube for feeds/meds.   - Continue left chest tube to water seal. Outputs are being recorded in the room but none since 4/23.   - Continue Augmentin and fluconazole w/ stop date TBD as below.   - CXR to eval stent placement and ensure no migration.   - Discussed w/ thoracic surgery FRANCIS Carl today. Ordered XR esophagram for Monday 4/30 in anticipation of clinic visit w/ Dr. Ybarra on  5/1. Ability to start PO intake would be dependent on this. Also will re-assess empiric antibiotic/antifungal coverage at that visit (discussed that fluconazole may be causing LFT abnormalities; could consider switch to micafungin if significant worsening). Anticipate return to OR in a couple of weeks.     #Transaminitis. Alk phos 158 from 197 on 4/2, 179 on 3/26, and normal prior to that.  from 66 on 4/2, 112 on 3/26, and normal prior. AST 69 from 22 on 4/2, 59 on 3/26, and normal prior. T bili wnl. Started fluconazole on 3/18. Reporting pain that he feels is similar to past gallbladder problem, but this was reportedly relieved w/ omeprazole, and current pain is epigastric rather than RUQ.   - RUQ US today.   - Continue fluconazole for now as above but needs close monitoring of LFTs - next ordered for tomorrow.   - 2 g Tylenol limit for now. Low dose oxycodone started if something more needed but has not been requiring pain meds.     #Chronic Loose Stools. Likely d/t TFs and antibiotics. C diff PCR neg x 2, last on 4/16. Having 1-3 loose stools daily.   - Continue fiber BID, Imodium QID, Lomotil PRN.     #GERD. Continue daily PPI.     #Acute, Possible Chronic, Normocytic Anemia. Hgb 11.7 on hospital admit w/ unknown baseline prior to that. Most recently 9.6. Acute drop was likely d/t surgery/infection/inflammation.   - Trending weekly hgb w/o further w/u needed currently.     CODE: full  DVT: Lovenox for prophylaxis   Diet: NPO, cycled TFs via J tube  Indications for Psychotropic Medications: N/A  Disposition: Remains at [facility] d/t drain, chest tube, tube feeds     Ely Monroy PA-C  Hospitalist Service  Pager: [phone]  ________________________________________________________________    Subjective & Interval History:  Is noticing a new pain in his abdomen. States that it feels similar to past gallbladder problem but points to the epigastrium. States that omeprazole would help this type of problem before,  but denies heartburn/reflux now. Not a severe pain but is bothersome; tried some Tylenol. No f/c. Loose stools at baseline on TFs. No chest pain or shortness of breath.     Last 24 hour care team notes reviewed.   ROS: 4 point ROS (including Respiratory, CV, GI and ) was performed and negative unless otherwise noted in HPI.     Medications: Reviewed in EPIC.    Physical Exam:    Blood pressure 117/69, pulse 86, temperature 97.5  F (36.4  C), temperature source Oral, resp. rate 16, weight 74.3 kg (163 lb 11.2 oz), SpO2 94 %.    GENERAL: Alert and oriented x 3. Sitting up in bed, appears comfortable. Conversant.   HEENT: Anicteric sclera. Mucous membranes moist.   CV: RRR. S1, S2. No murmurs appreciated.   RESPIRATORY: Effort normal on room air. Lungs CTAB with no wheezing, rales, rhonchi.   GI: Abdomen soft and non distended, bowel sounds present. Points to epigastrium as area of pain but no tenderness elicited w/ palpation. Negative Nassar's sign.   NEUROLOGICAL: No focal deficits. Moves all extremities.    EXTREMITIES: No peripheral edema. Intact bilateral pedal pulses.   SKIN: No jaundice. No rashes.     Lines/Tubes/Drains:  GJ tube, chest tube, Preston drain

## 2018-04-27 ENCOUNTER — APPOINTMENT (OUTPATIENT)
Dept: LAB | Facility: CLINIC | Age: 58
End: 2018-04-27
Attending: INTERNAL MEDICINE
Payer: MEDICAID

## 2018-04-27 LAB
ALBUMIN SERPL-MCNC: 2.9 G/DL (ref 3.4–5)
ALP SERPL-CCNC: 159 U/L (ref 40–150)
ALT SERPL W P-5'-P-CCNC: 140 U/L (ref 0–70)
AST SERPL W P-5'-P-CCNC: 45 U/L (ref 0–45)
BILIRUB DIRECT SERPL-MCNC: 0.1 MG/DL (ref 0–0.2)
BILIRUB SERPL-MCNC: 0.5 MG/DL (ref 0.2–1.3)
PROT SERPL-MCNC: 7.5 G/DL (ref 6.8–8.8)

## 2018-04-27 PROCEDURE — 25000128 H RX IP 250 OP 636: Performed by: PHYSICIAN ASSISTANT

## 2018-04-27 PROCEDURE — 25000132 ZZH RX MED GY IP 250 OP 250 PS 637: Performed by: PHYSICIAN ASSISTANT

## 2018-04-27 PROCEDURE — 12000022 ZZH R&B SNF

## 2018-04-27 PROCEDURE — 36415 COLL VENOUS BLD VENIPUNCTURE: CPT | Performed by: PHYSICIAN ASSISTANT

## 2018-04-27 PROCEDURE — 80076 HEPATIC FUNCTION PANEL: CPT | Performed by: PHYSICIAN ASSISTANT

## 2018-04-27 RX ADMIN — Medication 2 PACKET: at 20:33

## 2018-04-27 RX ADMIN — PANTOPRAZOLE SODIUM 40 MG: 40 TABLET, DELAYED RELEASE ORAL at 08:11

## 2018-04-27 RX ADMIN — FLUCONAZOLE 200 MG: 40 POWDER, FOR SUSPENSION ORAL at 08:11

## 2018-04-27 RX ADMIN — LOPERAMIDE HYDROCHLORIDE 4 MG: 1 SOLUTION ORAL at 08:11

## 2018-04-27 RX ADMIN — Medication 2 PACKET: at 10:12

## 2018-04-27 RX ADMIN — MULTIVITAMIN 15 ML: LIQUID ORAL at 08:11

## 2018-04-27 RX ADMIN — LOPERAMIDE HYDROCHLORIDE 4 MG: 1 SOLUTION ORAL at 13:31

## 2018-04-27 RX ADMIN — AMOXICILLIN AND CLAVULANATE POTASSIUM 875 MG: 400; 57 POWDER, FOR SUSPENSION ORAL at 20:32

## 2018-04-27 RX ADMIN — LOPERAMIDE HYDROCHLORIDE 4 MG: 1 SOLUTION ORAL at 16:35

## 2018-04-27 RX ADMIN — ENOXAPARIN SODIUM 40 MG: 40 INJECTION SUBCUTANEOUS at 08:11

## 2018-04-27 RX ADMIN — AMOXICILLIN AND CLAVULANATE POTASSIUM 875 MG: 400; 57 POWDER, FOR SUSPENSION ORAL at 08:11

## 2018-04-27 RX ADMIN — LOPERAMIDE HYDROCHLORIDE 4 MG: 1 SOLUTION ORAL at 20:32

## 2018-04-27 NOTE — PLAN OF CARE
"Problem: Goal Outcome Summary  Goal: Goal Outcome Summary  Outcome: No Change  Alert and oriented x4. Stated that abdominal pain is much improved, \"its not much anymore\". Asleep  in between cares. TF as per orders without problems, patient is tolerating without complaints. Independent in the room, calls for assistance to manage tubings with ambulation. CT to water seal without problems. Call light in reach. Continue with POC.        "

## 2018-04-27 NOTE — PROGRESS NOTES
Brief Medicine Note    Patient reports pain is improving. CXR shows stent in place. RUQ US shows some steatosis. LFTs are stable/somewhat improved today. Will trend again on Monday. Continue current plan of care.     Ely Monroy PA-C  Hospitalist Service  Pager [phone]

## 2018-04-27 NOTE — PLAN OF CARE
"Problem: Goal Outcome Summary  Goal: Goal Outcome Summary  RN: Continue to monitor right epigastric pain, pt with little complaint today.  Pt states having difficulty \"burping\" at times and better with position changes, wondering if this is what to expect, provider updated. Pt ambulates in [place]nard with staff present to carry drainage bags.  Suture removed right flank without problems. Continues with yellow watery diarrhea.       "

## 2018-04-28 PROCEDURE — 27210437 ZZH NUTRITION PRODUCT SEMIELEM INTERMED LITER

## 2018-04-28 PROCEDURE — 12000022 ZZH R&B SNF

## 2018-04-28 PROCEDURE — 25000132 ZZH RX MED GY IP 250 OP 250 PS 637: Performed by: PHYSICIAN ASSISTANT

## 2018-04-28 PROCEDURE — 25000128 H RX IP 250 OP 636: Performed by: PHYSICIAN ASSISTANT

## 2018-04-28 PROCEDURE — 25000125 ZZHC RX 250: Performed by: PHYSICIAN ASSISTANT

## 2018-04-28 RX ADMIN — MULTIVITAMIN 15 ML: LIQUID ORAL at 08:38

## 2018-04-28 RX ADMIN — LOPERAMIDE HYDROCHLORIDE 4 MG: 1 SOLUTION ORAL at 13:59

## 2018-04-28 RX ADMIN — FLUCONAZOLE 200 MG: 40 POWDER, FOR SUSPENSION ORAL at 08:38

## 2018-04-28 RX ADMIN — AMOXICILLIN AND CLAVULANATE POTASSIUM 875 MG: 400; 57 POWDER, FOR SUSPENSION ORAL at 21:39

## 2018-04-28 RX ADMIN — ENOXAPARIN SODIUM 40 MG: 40 INJECTION SUBCUTANEOUS at 08:38

## 2018-04-28 RX ADMIN — PANTOPRAZOLE SODIUM 40 MG: 40 TABLET, DELAYED RELEASE ORAL at 08:38

## 2018-04-28 RX ADMIN — Medication 5 UNITS: at 10:11

## 2018-04-28 RX ADMIN — Medication 2 PACKET: at 21:40

## 2018-04-28 RX ADMIN — LOPERAMIDE HYDROCHLORIDE 4 MG: 1 SOLUTION ORAL at 21:39

## 2018-04-28 RX ADMIN — AMOXICILLIN AND CLAVULANATE POTASSIUM 875 MG: 400; 57 POWDER, FOR SUSPENSION ORAL at 08:38

## 2018-04-28 RX ADMIN — Medication 2 PACKET: at 08:38

## 2018-04-28 RX ADMIN — LOPERAMIDE HYDROCHLORIDE 4 MG: 1 SOLUTION ORAL at 16:48

## 2018-04-28 RX ADMIN — LOPERAMIDE HYDROCHLORIDE 4 MG: 1 SOLUTION ORAL at 08:38

## 2018-04-28 NOTE — PLAN OF CARE
Problem: Goal Outcome Summary  Goal: Goal Outcome Summary  Outcome: Improving  Alert & oriented, able to make needs known, flat effect. Chest tube in place and draining serosanguineous drainage, output has not change since 4/23 marking, drsg changed. G-tube patent, draining green drainage, output 150 cc. J-tube clamped after feeding completed. Denies pain or discomfort.

## 2018-04-28 NOTE — PLAN OF CARE
Problem: Goal Outcome Summary  Goal: Goal Outcome Summary  Outcome: No Change  Alert and oriented x4. Reported no new issues overnight. Patient endorses mild epigastrium pain, declined medication for the pain.  Appears to be sleeping during rounds/in between cares. TF as per orders at 90 ml/h and water flushes of 180 ml q 4 hours, patient is tolerating without complaints. Reports no problems with B/B. Calls for assistance. Continue with POC.

## 2018-04-28 NOTE — PLAN OF CARE
Problem: Goal Outcome Summary  Goal: Goal Outcome Summary  AOx4. G-tube draining to gravity. Continuous enteral feeding through J-tube. Chest tube patent with minimal output. Pt state having pain in the right upper abdomen but declined pain meds. BM this shift. Pt denied nausea, dizziness, and SOB. Pt in bed and call light is within reach.

## 2018-04-29 PROCEDURE — 25000128 H RX IP 250 OP 636: Performed by: PHYSICIAN ASSISTANT

## 2018-04-29 PROCEDURE — 25000132 ZZH RX MED GY IP 250 OP 250 PS 637: Performed by: PHYSICIAN ASSISTANT

## 2018-04-29 PROCEDURE — 27210437 ZZH NUTRITION PRODUCT SEMIELEM INTERMED LITER

## 2018-04-29 PROCEDURE — 12000022 ZZH R&B SNF

## 2018-04-29 RX ORDER — OXYCODONE HCL 5 MG/5 ML
5 SOLUTION, ORAL ORAL EVERY 4 HOURS PRN
Status: DISCONTINUED | OUTPATIENT
Start: 2018-04-29 | End: 2018-05-04

## 2018-04-29 RX ADMIN — AMOXICILLIN AND CLAVULANATE POTASSIUM 875 MG: 400; 57 POWDER, FOR SUSPENSION ORAL at 09:57

## 2018-04-29 RX ADMIN — PANTOPRAZOLE SODIUM 40 MG: 40 TABLET, DELAYED RELEASE ORAL at 09:58

## 2018-04-29 RX ADMIN — ENOXAPARIN SODIUM 40 MG: 40 INJECTION SUBCUTANEOUS at 09:58

## 2018-04-29 RX ADMIN — Medication 2 PACKET: at 20:34

## 2018-04-29 RX ADMIN — LOPERAMIDE HYDROCHLORIDE 4 MG: 1 SOLUTION ORAL at 17:17

## 2018-04-29 RX ADMIN — LOPERAMIDE HYDROCHLORIDE 4 MG: 1 SOLUTION ORAL at 09:57

## 2018-04-29 RX ADMIN — FLUCONAZOLE 200 MG: 40 POWDER, FOR SUSPENSION ORAL at 09:58

## 2018-04-29 RX ADMIN — AMOXICILLIN AND CLAVULANATE POTASSIUM 875 MG: 400; 57 POWDER, FOR SUSPENSION ORAL at 20:35

## 2018-04-29 RX ADMIN — MULTIVITAMIN 15 ML: LIQUID ORAL at 09:57

## 2018-04-29 RX ADMIN — Medication 2 PACKET: at 09:56

## 2018-04-29 RX ADMIN — LOPERAMIDE HYDROCHLORIDE 4 MG: 1 SOLUTION ORAL at 13:14

## 2018-04-29 RX ADMIN — ACETAMINOPHEN 500 MG: 325 SOLUTION ORAL at 09:56

## 2018-04-29 RX ADMIN — ACETAMINOPHEN 500 MG: 325 SOLUTION ORAL at 17:18

## 2018-04-29 RX ADMIN — LOPERAMIDE HYDROCHLORIDE 4 MG: 1 SOLUTION ORAL at 20:35

## 2018-04-29 NOTE — PLAN OF CARE
Problem: Goal Outcome Summary  Goal: Goal Outcome Summary  Outcome: No Change  Pt alert, oriented, quiet, able to make needs known. Denied SOB. Stated some pain present in esophageal area; declined pain medication; said pain is tolerable. Pt ambulates independently in room and hallway; pulse 105 after walking hallway multiple times. Chest tube dsg CDI; no new drainage present. J-tube patent, dsg CDI; started TF at 1645; g-tube to gravity, slight green drainage present. Continent of B/B; reported one loose stool this shift. Con't to monitor.

## 2018-04-29 NOTE — PROGRESS NOTES
Brief Medicine Note    Contacted by nursing regarding return of epigastric abdominal pain overnight that kept him awake at times. Is using Tylenol w/ incomplete relief. Discussed low dose oxycodone w/ patient if he needs it - hesitant to take opioids but may try at bedtime. Pain could be from the stent and hopefully will continue to improve, but he will review w/ surgeons at his Tuesday clinic visit.     Ely Monroy PA-C  Hospitalist Service  Pager [phone]

## 2018-04-29 NOTE — PLAN OF CARE
Problem: Goal Outcome Summary  Goal: Goal Outcome Summary  Outcome: No Change  Patient reported no new concerns overnight. No complaints of pain. Appears to be sleeping comfortably in between cares. Cycled TF and water flushes as per orders without problems. CT with no noted problems,no output tonight. Independent with bed mobility. Calls for assistance as needed. Continue with POC.

## 2018-04-29 NOTE — PLAN OF CARE
"Problem: Goal Outcome Summary  Goal: Goal Outcome Summary  Outcome: No Change  Patient complaining of more pain in his upper abdomen this am. Patient stated \" I didn't sleep much because of the pain.\" I talked to Ely BLANCO and she saw the patient and ordered oxycodone po for the pain but the patient doesn't want to take it. I did medicate him with Tylenol this am via tube and he did get relief. VSS. Weight done. Patient up walking in the hallway x 2 this shift independently holding on th his IV pole. No shortness of breath.Dressings intact to chest tube site and GT site. No output in chest tube and container. Patient had visitors this afternoon.      "

## 2018-04-30 ENCOUNTER — APPOINTMENT (OUTPATIENT)
Dept: LAB | Facility: CLINIC | Age: 58
End: 2018-04-30
Attending: INTERNAL MEDICINE
Payer: MEDICAID

## 2018-04-30 LAB
ALBUMIN SERPL-MCNC: 2.7 G/DL (ref 3.4–5)
ALP SERPL-CCNC: 138 U/L (ref 40–150)
ALT SERPL W P-5'-P-CCNC: 103 U/L (ref 0–70)
ANION GAP SERPL CALCULATED.3IONS-SCNC: 9 MMOL/L (ref 3–14)
AST SERPL W P-5'-P-CCNC: 27 U/L (ref 0–45)
BILIRUB SERPL-MCNC: 0.3 MG/DL (ref 0.2–1.3)
BUN SERPL-MCNC: 22 MG/DL (ref 7–30)
CALCIUM SERPL-MCNC: 8.9 MG/DL (ref 8.5–10.1)
CHLORIDE SERPL-SCNC: 102 MMOL/L (ref 94–109)
CO2 SERPL-SCNC: 26 MMOL/L (ref 20–32)
CREAT SERPL-MCNC: 0.54 MG/DL (ref 0.66–1.25)
ERYTHROCYTE [DISTWIDTH] IN BLOOD BY AUTOMATED COUNT: 18.8 % (ref 10–15)
GFR SERPL CREATININE-BSD FRML MDRD: >90 ML/MIN/1.7M2
GLUCOSE SERPL-MCNC: 149 MG/DL (ref 70–99)
HCT VFR BLD AUTO: 35.1 % (ref 40–53)
HGB BLD-MCNC: 9.7 G/DL (ref 13.3–17.7)
MAGNESIUM SERPL-MCNC: 2.3 MG/DL (ref 1.6–2.3)
MCH RBC QN AUTO: 19.5 PG (ref 26.5–33)
MCHC RBC AUTO-ENTMCNC: 27.6 G/DL (ref 31.5–36.5)
MCV RBC AUTO: 71 FL (ref 78–100)
PHOSPHATE SERPL-MCNC: 2.9 MG/DL (ref 2.5–4.5)
PLATELET # BLD AUTO: 528 10E9/L (ref 150–450)
POTASSIUM SERPL-SCNC: 4.1 MMOL/L (ref 3.4–5.3)
PROT SERPL-MCNC: 6.8 G/DL (ref 6.8–8.8)
RBC # BLD AUTO: 4.98 10E12/L (ref 4.4–5.9)
SODIUM SERPL-SCNC: 137 MMOL/L (ref 133–144)
WBC # BLD AUTO: 11.9 10E9/L (ref 4–11)

## 2018-04-30 PROCEDURE — 84100 ASSAY OF PHOSPHORUS: CPT | Performed by: PHYSICIAN ASSISTANT

## 2018-04-30 PROCEDURE — 25000128 H RX IP 250 OP 636: Performed by: PHYSICIAN ASSISTANT

## 2018-04-30 PROCEDURE — 83735 ASSAY OF MAGNESIUM: CPT | Performed by: PHYSICIAN ASSISTANT

## 2018-04-30 PROCEDURE — 25000132 ZZH RX MED GY IP 250 OP 250 PS 637: Performed by: PHYSICIAN ASSISTANT

## 2018-04-30 PROCEDURE — 80053 COMPREHEN METABOLIC PANEL: CPT | Performed by: PHYSICIAN ASSISTANT

## 2018-04-30 PROCEDURE — 27210437 ZZH NUTRITION PRODUCT SEMIELEM INTERMED LITER

## 2018-04-30 PROCEDURE — 85027 COMPLETE CBC AUTOMATED: CPT | Performed by: PHYSICIAN ASSISTANT

## 2018-04-30 PROCEDURE — 36415 COLL VENOUS BLD VENIPUNCTURE: CPT | Performed by: PHYSICIAN ASSISTANT

## 2018-04-30 PROCEDURE — 12000022 ZZH R&B SNF

## 2018-04-30 RX ADMIN — ACETAMINOPHEN 500 MG: 325 SOLUTION ORAL at 09:05

## 2018-04-30 RX ADMIN — LOPERAMIDE HYDROCHLORIDE 4 MG: 1 SOLUTION ORAL at 10:15

## 2018-04-30 RX ADMIN — AMOXICILLIN AND CLAVULANATE POTASSIUM 875 MG: 400; 57 POWDER, FOR SUSPENSION ORAL at 21:00

## 2018-04-30 RX ADMIN — MULTIVITAMIN 15 ML: LIQUID ORAL at 09:04

## 2018-04-30 RX ADMIN — FLUCONAZOLE 200 MG: 40 POWDER, FOR SUSPENSION ORAL at 09:03

## 2018-04-30 RX ADMIN — ACETAMINOPHEN 500 MG: 325 SOLUTION ORAL at 21:00

## 2018-04-30 RX ADMIN — ENOXAPARIN SODIUM 40 MG: 40 INJECTION SUBCUTANEOUS at 09:04

## 2018-04-30 RX ADMIN — LOPERAMIDE HYDROCHLORIDE 4 MG: 1 SOLUTION ORAL at 16:09

## 2018-04-30 RX ADMIN — AMOXICILLIN AND CLAVULANATE POTASSIUM 875 MG: 400; 57 POWDER, FOR SUSPENSION ORAL at 09:04

## 2018-04-30 RX ADMIN — PANTOPRAZOLE SODIUM 40 MG: 40 TABLET, DELAYED RELEASE ORAL at 09:03

## 2018-04-30 RX ADMIN — Medication 2 PACKET: at 21:03

## 2018-04-30 RX ADMIN — ACETAMINOPHEN 500 MG: 325 SOLUTION ORAL at 02:11

## 2018-04-30 RX ADMIN — LOPERAMIDE HYDROCHLORIDE 4 MG: 1 SOLUTION ORAL at 21:00

## 2018-04-30 RX ADMIN — ACETAMINOPHEN 500 MG: 325 SOLUTION ORAL at 14:52

## 2018-04-30 RX ADMIN — Medication 2 PACKET: at 09:03

## 2018-04-30 NOTE — PLAN OF CARE
Problem: Goal Outcome Summary  Goal: Goal Outcome Summary  Outcome: No Change  Patient alert x 4 and he directs his cares. VSS. Weight done this am. Patient went down for an Esophogram this am, see results.Patient was medicated with Tylenol for complaints of abdominal pain and he did get relief. Patient has been up ambulating in the maynard ways independently. Patient has an appointment at the [facility] tomorrow for follow up 1645.

## 2018-04-30 NOTE — PLAN OF CARE
Problem: Goal Outcome Summary  Goal: Goal Outcome Summary  Outcome: No Change  Alert and oriented x4. Makes needs known. Medicated with Tylenol x1 per request for epigastrium pain with stated relief during follow-up. Appeared to be sleeping off and on in between encounters. TF as per orders at 90 ml/h and water flushes at 180 ml q 4 hours. Independent in the room but does call for assistance with tubing. CT with no problems, output at the same level as previous markings. XR Esophagram scheduled for today. Call light in reach. Continue with POC.

## 2018-04-30 NOTE — PLAN OF CARE
Problem: TCU Patient Goals  Goal: TCU Plan of Care  Outcome: No Change  VSS. Alert and oriented. Verbalized mild pain to upper mid abdomen, Tylenol was given with some relief. Pt declined Oxycodone. Independent with ambulation using his walker, walks in the hallway once this shift. Dressings clean, dry and intact to chest tube site, with no output this shift. Gastrostomy to gravity drain with 5 mls greenish drainage. GJT dressing changed. Continent of bowel and bladder. Had one loose stool this shift, scheduled Loperamide given. Continued on NPO. Jejunostomy tube feeding started at 4pm until 0800, rate at 90ml/hr and flush set up to 180mls every 4hrs as ordered. Will continue to monitor pts' abdominal pain.  Vital signs:  Temp: 96.1  F (35.6  C) Temp src: Oral BP: 127/76 Pulse: 97 Heart Rate: 99 Resp: 18 SpO2: 96 % O2 Device: None (Room air)

## 2018-04-30 NOTE — PROGRESS NOTES
THORACIC SURGERY FOLLOW UP VISIT    Dear Dr. Michel,  I saw Mr. Michael Saldivar in follow-up today. The clinical summary follows:    PREOP DIAGNOSIS   Boerhaave esophageal perforation   PROCEDURE   3/19/2018: Left thoracotomy, primary repair of esophageal perforation, EGD  3/28/2018: EGD exchange of gastrostomy tube, gastropexy and pharyngostomy tube placement.   3/30/2018 IR exchanged G for GJ tube    4/20/2018 EGD with Esophageal Stent placement (23 x 150 mm, fully covered)     COMPLICATIONS  Esophageal leak    INTERVAL STUDIES  4/30/2018 XR Esophogram: Stent in place, no evidence of leak    /69  Pulse (!) 45  Temp 98.6  F (37  C) (Oral)  Resp 16  Wt 75.3 kg (166 lb)  SpO2 97%  BMI 26.79 kg/m2     SUBJECTIVE  The patient remains in the TCU.  He has been ambulating without difficulty.  He has noted some intermittent epigastric pain, which worsened yesterday.  This was poorly controlled with Tylenol.  He is tolerating jejunal feeds, though he has some loose stools.  He has remained n.p.o.  Per Mr. Saldivar, he has had no fevers or chills.    On exam, the incisions are healing well.  The gastrojejunostomy site is clean.  There is thick, tan drainage from the chest tube.  This does not appear to be large in amount, but the tube was twisted and kinked.    IMPRESSION (K22.3) Perforation of esophagus   This is a 57 year-old man with esophageal perforation s/p primary repair complicated by leak managed with pharyngostomy, GJ and esophageal stent.  There is no evidence of leak on 4/30 esophogram.     PLAN  I spent a total of 15 minutes with Mr. Michael Saldivar and more than 50% of which were spent in counseling, coordination of care, and face-to-face time. I reviewed the plan as follows:  The patient may advance to a clear liquid diet.  He should remain on proton pump inhibitor therapy.  It is reasonable to change the fluconazole to micafungin and the patient should remain on antimicrobial therapy given the  output from the chest tube.  The patient should increase his activity as tolerated.  The gastrostomy tube may be capped for up to 8 hours daily.   The tube can be to drainage as needed.  The chest tube will remain to water seal.  Necessary Tests & Appointments: Follow-up in 1 week with a chest x-ray.  All questions were answered and the patient and present family were in agreement with the plan.  I appreciate the opportunity to participate in the care of your patient and will keep you updated.  Sincerely,    Keith Ybarra MD

## 2018-05-01 ENCOUNTER — OFFICE VISIT (OUTPATIENT)
Dept: SURGERY | Facility: CLINIC | Age: 58
End: 2018-05-01
Attending: THORACIC SURGERY (CARDIOTHORACIC VASCULAR SURGERY)
Payer: MEDICAID

## 2018-05-01 VITALS
HEART RATE: 45 BPM | WEIGHT: 166 LBS | DIASTOLIC BLOOD PRESSURE: 69 MMHG | BODY MASS INDEX: 26.79 KG/M2 | RESPIRATION RATE: 16 BRPM | SYSTOLIC BLOOD PRESSURE: 108 MMHG | OXYGEN SATURATION: 97 % | TEMPERATURE: 98.6 F

## 2018-05-01 DIAGNOSIS — K22.3 PERFORATION OF ESOPHAGUS: Primary | ICD-10-CM

## 2018-05-01 PROCEDURE — 12000022 ZZH R&B SNF

## 2018-05-01 PROCEDURE — 99024 POSTOP FOLLOW-UP VISIT: CPT | Mod: ZP | Performed by: THORACIC SURGERY (CARDIOTHORACIC VASCULAR SURGERY)

## 2018-05-01 PROCEDURE — 25000132 ZZH RX MED GY IP 250 OP 250 PS 637: Performed by: PHYSICIAN ASSISTANT

## 2018-05-01 PROCEDURE — 25000128 H RX IP 250 OP 636: Performed by: PHYSICIAN ASSISTANT

## 2018-05-01 PROCEDURE — G0463 HOSPITAL OUTPT CLINIC VISIT: HCPCS | Mod: ZF

## 2018-05-01 PROCEDURE — 27210437 ZZH NUTRITION PRODUCT SEMIELEM INTERMED LITER

## 2018-05-01 RX ADMIN — LOPERAMIDE HYDROCHLORIDE 4 MG: 1 SOLUTION ORAL at 13:18

## 2018-05-01 RX ADMIN — MULTIVITAMIN 15 ML: LIQUID ORAL at 07:48

## 2018-05-01 RX ADMIN — LOPERAMIDE HYDROCHLORIDE 4 MG: 1 SOLUTION ORAL at 07:49

## 2018-05-01 RX ADMIN — FLUCONAZOLE 200 MG: 40 POWDER, FOR SUSPENSION ORAL at 07:49

## 2018-05-01 RX ADMIN — Medication 2 PACKET: at 10:03

## 2018-05-01 RX ADMIN — ENOXAPARIN SODIUM 40 MG: 40 INJECTION SUBCUTANEOUS at 07:48

## 2018-05-01 RX ADMIN — PANTOPRAZOLE SODIUM 40 MG: 40 TABLET, DELAYED RELEASE ORAL at 07:49

## 2018-05-01 RX ADMIN — AMOXICILLIN AND CLAVULANATE POTASSIUM 875 MG: 400; 57 POWDER, FOR SUSPENSION ORAL at 20:28

## 2018-05-01 RX ADMIN — AMOXICILLIN AND CLAVULANATE POTASSIUM 875 MG: 400; 57 POWDER, FOR SUSPENSION ORAL at 07:50

## 2018-05-01 RX ADMIN — Medication 2 PACKET: at 20:28

## 2018-05-01 RX ADMIN — LOPERAMIDE HYDROCHLORIDE 4 MG: 1 SOLUTION ORAL at 20:28

## 2018-05-01 ASSESSMENT — PAIN SCALES - GENERAL: PAINLEVEL: MILD PAIN (3)

## 2018-05-01 NOTE — NURSING NOTE
"Oncology Rooming Note    May 1, 2018 5:00 PM   Michael Saldivar is a 57 year old male who presents for:    Chief Complaint   Patient presents with     Oncology Clinic Visit     Esophageal  Post op ,Hospital F\U     Initial Vitals: /69  Pulse (!) 45  Temp 98.6  F (37  C) (Oral)  Resp 16  Wt 75.3 kg (166 lb)  SpO2 97%  BMI 26.79 kg/m2 Estimated body mass index is 26.79 kg/(m^2) as calculated from the following:    Height as of 3/19/18: 1.676 m (5' 6\").    Weight as of this encounter: 75.3 kg (166 lb). Body surface area is 1.87 meters squared.  Mild Pain (3) Comment: Data Unavailable   No LMP for male patient.  Allergies reviewed: Yes  Medications reviewed: Yes    Medications: Medication refills not needed today.  Pharmacy name entered into EPIC: Data Unavailable    Clinical concerns: pt in Transitional care on [facility]  was notified.    10 minutes for nursing intake (face to face time)     Hannah Chilel MA              "

## 2018-05-01 NOTE — LETTER
5/1/2018       RE: Michael Saldivar  [address]  [place] [facility] [address]     Dear Colleague,    Thank you for referring your patient, Michael Saldivar, to the [facility]. Please see a copy of my visit note below.    THORACIC SURGERY FOLLOW UP VISIT    Dear Dr. Michel,  I saw Mr. Michael Saldivar in follow-up today. The clinical summary follows:    PREOP DIAGNOSIS   Boerhaave esophageal perforation   PROCEDURE   3/19/2018: Left thoracotomy, primary repair of esophageal perforation, EGD  3/28/2018: EGD exchange of gastrostomy tube, gastropexy and pharyngostomy tube placement.   3/30/2018 IR exchanged G for GJ tube    4/20/2018 EGD with Esophageal Stent placement (23 x 150 mm, fully covered)     COMPLICATIONS  Esophageal leak    INTERVAL STUDIES  4/30/2018 XR Esophogram: Stent in place, no evidence of leak    /69  Pulse (!) 45  Temp 98.6  F (37  C) (Oral)  Resp 16  Wt 75.3 kg (166 lb)  SpO2 97%  BMI 26.79 kg/m2     SUBJECTIVE  The patient remains in the TCU.  He has been ambulating without difficulty.  He has noted some intermittent epigastric pain, which worsened yesterday.  This was poorly controlled with Tylenol.  He is tolerating jejunal feeds, though he has some loose stools.  He has remained n.p.o.  Per Mr. Saldivar, he has had no fevers or chills.    On exam, the incisions are healing well.  The gastrojejunostomy site is clean.  There is thick, tan drainage from the chest tube.  This does not appear to be large in amount, but the tube was twisted and kinked.    IMPRESSION (K22.3) Perforation of esophagus   This is a 57 year-old man with esophageal perforation s/p primary repair complicated by leak managed with pharyngostomy, GJ and esophageal stent.  There is no evidence of leak on 4/30 esophogram.     PLAN  I spent a total of 15 minutes with Mr. Michael Saldivar and more than 50% of which were spent in counseling, coordination of care, and face-to-face time. I reviewed the plan as  follows:  The patient may advance to a clear liquid diet.  He should remain on proton pump inhibitor therapy.  It is reasonable to change the fluconazole to micafungin and the patient should remain on antimicrobial therapy given the output from the chest tube.  The patient should increase his activity as tolerated.  The gastrostomy tube may be capped for up to 8 hours daily.   The tube can be to drainage as needed.  The chest tube will remain to water seal.  Necessary Tests & Appointments: Follow-up in 1 week with a chest x-ray.  All questions were answered and the patient and present family were in agreement with the plan.  I appreciate the opportunity to participate in the care of your patient and will keep you updated.  Sincerely,    Keith Ybarra MD

## 2018-05-01 NOTE — PLAN OF CARE
Problem: Goal Outcome Summary  Goal: Goal Outcome Summary  Outcome: No Change  Pt.A&Ox4. Denies pain, discomfort, CP and SOB. Ambulates indep.to BR - calls for staff assist managing tubes / lines. GT remains to gravity drainage and JT with cycled TF - off @ 0800. Pt.has appt.this evening @ [facility] with pick-up @ 9691.    0600 - CNA reported GT with minimal output in drainage bag. Writer checked tubing and is without kinks and visible draining noted of small amt.greenish fluid. Dressing @ site C/D/I. Chest tube cannister marked @ approx.300ml ankush this AM.

## 2018-05-01 NOTE — PLAN OF CARE
Problem: TCU Patient Goals  Goal: TCU Plan of Care  Outcome: No Change  VSS. Awake and orientedx4. With complaints of mild on and off discomfort  To mid abdomen, Tylenol was given with relief. Chest tube and GJT dressing CDI, it was changed in am shift. CT output was the same as previous marking, Gastrostomy to gravity drain output was 375 mls greenish in color. Will continue to monitor pt. Pleasant and cooperative.  With on going feeding thru Jejunsotomy started around 4pm until 8am running at 90mls/hr, flush set up to 180 mls every 4 hrs.  Vital signs:  Temp: 98.8  F (37.1  C) Temp src: Oral BP: 121/76 Pulse: 84 Heart Rate: 79 Resp: 16 SpO2: 97 % O2 Device: None (Room air)

## 2018-05-01 NOTE — PLAN OF CARE
Problem: Goal Outcome Summary  Goal: Goal Outcome Summary  Outcome: No Change  Patient is alert x 4. Patient denies any pain so far this shift and no prn Tylenol given. Chest tube and JT and GT dressings intact. VSS. Weight done. Patient has an appointment today at [facility] at 1645. Patient is up independently to the bathroom and walks in the hallway using his walker to hold his GT drainage bag and chest tube container.

## 2018-05-01 NOTE — MR AVS SNAPSHOT
After Visit Summary   5/1/2018    Michael Saldivar    MRN: [id number]           Patient Information     Date Of Birth          1960        Visit Information        Provider Department      5/1/2018 5:00 PM Keith Ybarra MD [facility]        Today's Diagnoses     Perforation of esophagus    -  1       Follow-ups after your visit        Your next 10 appointments already scheduled     May 08, 2018  4:00 PM CDT   XR CHEST 2 VIEWS with UCXR1   [facility] ([facility])    [address]  1st Floor  [facility] [id number] [phone]           Please bring a list of your current medicines to your exam. (Include vitamins, minerals and over-thecounter medicines.) Leave your valuables at home.  Tell your doctor if there is a chance you may be pregnant.  You do not need to do anything special for this exam.            May 08, 2018  5:00 PM CDT   (Arrive by 4:45 PM)   Return Visit with Keith Ybarra MD   [facility] ([facility])    [address]  [address]  [facility] [address] [phone]              Future tests that were ordered for you today     Open Future Orders        Priority Expected Expires Ordered    X-ray Chest 2 vws* Routine 5/1/2018 5/1/2019 5/1/2018            Who to contact     If you have questions or need follow up information about today's clinic visit or your schedule please contact [facility] directly at [phone].  Normal or non-critical lab and imaging results will be communicated to you by MyChart, letter or phone within 4 business days after the clinic has received the results. If you do not hear from us within 7 days, please contact the clinic through MyChart or phone. If you have a critical or abnormal lab result, we will notify you by phone as soon as possible.  Submit refill requests through [employer] or call your pharmacy and  "they will forward the refill request to us. Please allow 3 business days for your refill to be completed.          Additional Information About Your Visit        MyChart Information     [employer] lets you send messages to your doctor, view your test results, renew your prescriptions, schedule appointments and more. To sign up, go to [email or web address] . Click on \"Log in\" on the left side of the screen, which will take you to the Welcome page. Then click on \"Sign up Now\" on the right side of the page.     You will be asked to enter the access code listed below, as well as some personal information. Please follow the directions to create your username and password.     Your access code is: [email or web address]  Expires: 2018 12:07 PM     Your access code will  in 90 days. If you need help or a new code, please call your [facility] clinic or [phone].        Care EveryWhere ID     This is your Care EveryWhere ID. This could be used by other organizations to access your [facility] medical records  [id number]        Your Vitals Were     Pulse Temperature Respirations Pulse Oximetry BMI (Body Mass Index)       45 98.6  F (37  C) (Oral) 16 97% 26.79 kg/m2        Blood Pressure from Last 3 Encounters:   18 123/74   18 108/69   18 122/74    Weight from Last 3 Encounters:   18 75.5 kg (166 lb 8 oz)   18 75.3 kg (166 lb)   18 74.2 kg (163 lb 8 oz)                 Today's Medication Changes      Notice     This visit is during an admission. Changes to the med list made in this visit will be reflected in the After Visit Summary of the admission.             Primary Care Provider Office Phone # Fax #    Jemal FELIX Ryandebimehreen [phone]       [facility] [address]   [facility] [address]        Equal Access to Services     BETTY ZHANG AH: Monique pathka Sosabiha, waaxda luqadaha, qaybta kaalmabrandi thurston, lexie lindquist. So [facility] " [phone].    ATENCIÓN: Si habla kaycee, tiene a patel disposición servicios gratuitos de asistencia lingüística. Mary al [phone].    We comply with applicable federal civil rights laws and [place] laws. We do not discriminate on the basis of race, color, national origin, age, disability, sex, sexual orientation, or gender identity.            Thank you!     Thank you for choosing [facility]  for your care. Our goal is always to provide you with excellent care. Hearing back from our patients is one way we can continue to improve our services. Please take a few minutes to complete the written survey that you may receive in the mail after your visit with us. Thank you!             Your Updated Medication List - Protect others around you: Learn how to safely use, store and throw away your medicines at www.disposemymeds.org.      Notice     This visit is during an admission. Changes to the med list made in this visit will be reflected in the After Visit Summary of the admission.

## 2018-05-01 NOTE — Clinical Note
Hey,  He needs to see Dr. Ybarra in 1 week with a CXR prior please. He is in room 31 waiting to schedule.  Thanks H

## 2018-05-02 PROCEDURE — 25000132 ZZH RX MED GY IP 250 OP 250 PS 637: Performed by: PHYSICIAN ASSISTANT

## 2018-05-02 PROCEDURE — 12000022 ZZH R&B SNF

## 2018-05-02 PROCEDURE — 27210437 ZZH NUTRITION PRODUCT SEMIELEM INTERMED LITER

## 2018-05-02 PROCEDURE — 25000128 H RX IP 250 OP 636: Performed by: PHYSICIAN ASSISTANT

## 2018-05-02 PROCEDURE — 25000132 ZZH RX MED GY IP 250 OP 250 PS 637: Performed by: INTERNAL MEDICINE

## 2018-05-02 PROCEDURE — 99307 SBSQ NF CARE SF MDM 10: CPT | Performed by: PHYSICIAN ASSISTANT

## 2018-05-02 RX ORDER — GUAR GUM
2 PACKET (EA) ORAL 3 TIMES DAILY
Status: DISCONTINUED | OUTPATIENT
Start: 2018-05-02 | End: 2018-05-11 | Stop reason: HOSPADM

## 2018-05-02 RX ADMIN — ENOXAPARIN SODIUM 40 MG: 40 INJECTION SUBCUTANEOUS at 08:01

## 2018-05-02 RX ADMIN — LOPERAMIDE HYDROCHLORIDE 4 MG: 1 SOLUTION ORAL at 16:46

## 2018-05-02 RX ADMIN — PANTOPRAZOLE SODIUM 40 MG: 40 TABLET, DELAYED RELEASE ORAL at 08:01

## 2018-05-02 RX ADMIN — LOPERAMIDE HYDROCHLORIDE 4 MG: 1 SOLUTION ORAL at 13:10

## 2018-05-02 RX ADMIN — AMOXICILLIN AND CLAVULANATE POTASSIUM 875 MG: 400; 57 POWDER, FOR SUSPENSION ORAL at 08:01

## 2018-05-02 RX ADMIN — LOPERAMIDE HYDROCHLORIDE 4 MG: 1 SOLUTION ORAL at 20:13

## 2018-05-02 RX ADMIN — LOPERAMIDE HYDROCHLORIDE 4 MG: 1 SOLUTION ORAL at 08:01

## 2018-05-02 RX ADMIN — Medication 2 PACKET: at 20:16

## 2018-05-02 RX ADMIN — MULTIVITAMIN 15 ML: LIQUID ORAL at 08:01

## 2018-05-02 RX ADMIN — FLUCONAZOLE 200 MG: 40 POWDER, FOR SUSPENSION ORAL at 08:01

## 2018-05-02 RX ADMIN — Medication 2 PACKET: at 13:10

## 2018-05-02 RX ADMIN — Medication 2 PACKET: at 08:01

## 2018-05-02 RX ADMIN — AMOXICILLIN AND CLAVULANATE POTASSIUM 875 MG: 400; 57 POWDER, FOR SUSPENSION ORAL at 20:13

## 2018-05-02 NOTE — PROGRESS NOTES
Brief Medicine Note    Patient briefly seen today to follow up on thoracic surgery clinic visit yesterday. Patient has trialed clr liquids today and states he feels well with that. Has some epigastric pain which Dr Ybarra felt was more related to GERD rather than the stent.    Per Dr Keith Ybarra: Start clear liquid diet, ok to cap gastrostomy tube for up to 8hrs. Continue chest tube to water seal. May consider changing fluconazole to micafungin if ongoing LFT abnormalities.    Today's vital signs, medications, and nursing notes were reviewed.      /74 (BP Location: Right arm)  Pulse 79  Temp 98.5  F (36.9  C) (Oral)  Resp 16  Wt 75.1 kg (165 lb 9.6 oz)  SpO2 96%  BMI 26.73 kg/m2  GENERAL: Alert and oriented x 3. Appears comfortable. Sitting on edge of bed.    A/P:  - Clear Liquid Diet  - OK to cap G tube for up to 8hrs  - Continue fluconazole for now since LFTs are stable, trend in AM  - Continue chest tube to water seal    Benji Mendiola PA-C  [facility]  Pager: [phone]

## 2018-05-02 NOTE — PLAN OF CARE
Problem: Goal Outcome Summary  Goal: Goal Outcome Summary  Patient alert and oriented x 4. Patient denies pain and discomfort. TF Impact peptide running @ 90 ml/hr, water flushes @ 180 ml q 4 hrs-patient tolerating well. Chest tube intact- no respiratory distress noted. GT to gravity bag and draining yellowish/greenish bilious drainage. Noted. Patient uses call light appropriately. Sleeps in between cares, Will continue to monitor patient's status.

## 2018-05-02 NOTE — PLAN OF CARE
Problem: Goal Outcome Summary  Goal: Goal Outcome Summary  Outcome: No Change  Alert and oriented x4. Able to verbalize needs. Denies pain/discomfort. Patient on clear liquid diet. Gtube clamped at 1000 and can stay clamped for 8 hours a day. Both chest tube and G/J tube site care completed. Patient went outside for a walk with nursing student. Uses a walker for ambulation. Continue to monitor. /74 (BP Location: Right arm)  Pulse 79  Temp 98.5  F (36.9  C) (Oral)  Resp 16  Wt 75.1 kg (165 lb 9.6 oz)  SpO2 96%  BMI 26.73 kg/m2.

## 2018-05-02 NOTE — PROGRESS NOTES
"CLINICAL NUTRITION SERVICES - REASSESSMENT NOTE     Nutrition Prescription    RECOMMENDATIONS FOR MDs/PROVIDERS TO ORDER:  None today     Malnutrition Status:    Patient does not meet two of the above criteria necessary for diagnosing malnutrition    Recommendations already ordered by Registered Dietitian (RD):  Ordered Boost Breeze BID for patient to try.   Changed modular to 2 pkts fiber TID for 18 g fiber    Future/Additional Recommendations:  Monitor PO intakes of CLD and diet advancement per MD vs need to adjust TF with potential for increasing PO     EVALUATION OF THE PROGRESS TOWARD GOALS   Diet: Clear Liquids  Nutrition Support: EN - Impact Peptide 1.5 via J-tube @ 90 mL/hr x 16 hrs (4pm-8am) to provide 1440 mL (19 mL/kg), 2160 kcal's (29 kcal/kg), 135 g pro (1.8 g/kg), 1109 free water, 92 g Fat (50% from MCTs), 202 g CHO and no Fiber daily.- 100% of needs  - Addition of 2 pkts Fiber BID for 12 g fiber  - Free Water Flushes: 180 mL q 4 hr during + 60 mL before/after TF  - Certavite 15ml  Intake: Per chart, pt tolerating feeds well at goal with no concerns for interruptions. Continues to report loose stools 4x/day per baseline. Diet advanced today.      NEW FINDINGS   - Weight: most recent weight of 75.5 kg from 5/1 stable throughout admit.   Wt Readings from Last 10 Encounters:   05/01/18 75.5 kg (166 lb 8 oz)   05/01/18 75.3 kg (166 lb)   04/19/18 74.2 kg (163 lb 8 oz)   04/05/18 74.7 kg (164 lb 9.6 oz)       - GI: G-tube continues to drain to gravity bag, yellowish/greenish bilious drainage per RN notes. Per I/O, average output of 550 mL from G-tube over the past 5 days. Per esophogram done 4/30, there is no evidence of leak. Per thoracic MD Dr. Ybarra on 5/1, \"The patient may advance to a clear liquid diet.  He should remain on proton pump inhibitor therapy. The gastrostomy tube may be capped for up to 8 hours daily.\"     MALNUTRITION  % Intake: No decreased intake noted  % Weight Loss: None " noted  Subcutaneous Fat Loss: None observed  Muscle Loss: None observed  Fluid Accumulation/Edema: None noted  Malnutrition Diagnosis: Patient does not meet two of the above criteria necessary for diagnosing malnutrition    Previous Goals   Total avg nutritional intake to meet a minimum of 25 kcal/kg and 1.2 g PRO/kg daily (per dosing wt 74 kg).  Evaluation: Met    Previous Nutrition Diagnosis  Predicted inadequate nutrient intake (protein-energy) related to tolerating TF at goal rate but potential for TF interruptions and pt NPO with reliance on TF to meet 100% assessed needs.   Evaluation: No change    CURRENT NUTRITION DIAGNOSIS  Predicted inadequate nutrient intake (protein-energy) related to tolerating TF at goal rate but potential for TF interruptions and pt NPO with reliance on TF to meet 100% assessed needs.     INTERVENTIONS  Implementation  Collaboration with other providers - discussed pt during rounds  Enteral Nutrition - ordered additional 2 packets of Nutrisource Fiber, changed order to 2 packets TID for total of 18 g fiber to see if it helps with loose stools.   Medical food supplement therapy - ordered Boost Breeze BID for pt to try  Nutrition education for recommended modifications - discussed diet advancement with pt and encouraged PO intakes of apple juice, tea, water, and supplements. Pt reports he is open to trying them. Also suggested trying 2 more packets of fiber to see if loose stools improve, pt agreeable.     Goals  Total avg nutritional intake to meet a minimum of 25 kcal/kg and 1.2 g PRO/kg daily (per dosing wt 74 kg).    Monitoring/Evaluation  Progress toward goals will be monitored and evaluated per protocol.    Ruby Coulter RD, LD  Unit Pager: [phone]

## 2018-05-03 ENCOUNTER — APPOINTMENT (OUTPATIENT)
Dept: LAB | Facility: CLINIC | Age: 58
End: 2018-05-03
Attending: INTERNAL MEDICINE
Payer: MEDICAID

## 2018-05-03 LAB
ALBUMIN SERPL-MCNC: 2.7 G/DL (ref 3.4–5)
ALP SERPL-CCNC: 132 U/L (ref 40–150)
ALT SERPL W P-5'-P-CCNC: 64 U/L (ref 0–70)
ANION GAP SERPL CALCULATED.3IONS-SCNC: 8 MMOL/L (ref 3–14)
AST SERPL W P-5'-P-CCNC: 24 U/L (ref 0–45)
BILIRUB SERPL-MCNC: 0.3 MG/DL (ref 0.2–1.3)
BUN SERPL-MCNC: 21 MG/DL (ref 7–30)
CALCIUM SERPL-MCNC: 8.6 MG/DL (ref 8.5–10.1)
CHLORIDE SERPL-SCNC: 102 MMOL/L (ref 94–109)
CO2 SERPL-SCNC: 27 MMOL/L (ref 20–32)
CREAT SERPL-MCNC: 0.56 MG/DL (ref 0.66–1.25)
GFR SERPL CREATININE-BSD FRML MDRD: >90 ML/MIN/1.7M2
GLUCOSE SERPL-MCNC: 124 MG/DL (ref 70–99)
POTASSIUM SERPL-SCNC: 4 MMOL/L (ref 3.4–5.3)
PROT SERPL-MCNC: 6.7 G/DL (ref 6.8–8.8)
SODIUM SERPL-SCNC: 137 MMOL/L (ref 133–144)

## 2018-05-03 PROCEDURE — 12000022 ZZH R&B SNF

## 2018-05-03 PROCEDURE — 80053 COMPREHEN METABOLIC PANEL: CPT | Performed by: PHYSICIAN ASSISTANT

## 2018-05-03 PROCEDURE — 36415 COLL VENOUS BLD VENIPUNCTURE: CPT | Performed by: PHYSICIAN ASSISTANT

## 2018-05-03 PROCEDURE — 25000132 ZZH RX MED GY IP 250 OP 250 PS 637: Performed by: PHYSICIAN ASSISTANT

## 2018-05-03 PROCEDURE — 25000132 ZZH RX MED GY IP 250 OP 250 PS 637: Performed by: INTERNAL MEDICINE

## 2018-05-03 PROCEDURE — 25000128 H RX IP 250 OP 636: Performed by: PHYSICIAN ASSISTANT

## 2018-05-03 PROCEDURE — 27210437 ZZH NUTRITION PRODUCT SEMIELEM INTERMED LITER

## 2018-05-03 RX ADMIN — Medication 2 PACKET: at 20:58

## 2018-05-03 RX ADMIN — PANTOPRAZOLE SODIUM 40 MG: 40 TABLET, DELAYED RELEASE ORAL at 09:35

## 2018-05-03 RX ADMIN — ENOXAPARIN SODIUM 40 MG: 40 INJECTION SUBCUTANEOUS at 09:37

## 2018-05-03 RX ADMIN — LOPERAMIDE HYDROCHLORIDE 4 MG: 1 SOLUTION ORAL at 13:29

## 2018-05-03 RX ADMIN — LOPERAMIDE HYDROCHLORIDE 4 MG: 1 SOLUTION ORAL at 09:33

## 2018-05-03 RX ADMIN — LOPERAMIDE HYDROCHLORIDE 4 MG: 1 SOLUTION ORAL at 20:59

## 2018-05-03 RX ADMIN — AMOXICILLIN AND CLAVULANATE POTASSIUM 875 MG: 400; 57 POWDER, FOR SUSPENSION ORAL at 20:58

## 2018-05-03 RX ADMIN — LOPERAMIDE HYDROCHLORIDE 4 MG: 1 SOLUTION ORAL at 17:02

## 2018-05-03 RX ADMIN — Medication 2 PACKET: at 13:30

## 2018-05-03 RX ADMIN — AMOXICILLIN AND CLAVULANATE POTASSIUM 875 MG: 400; 57 POWDER, FOR SUSPENSION ORAL at 09:36

## 2018-05-03 RX ADMIN — Medication 2 PACKET: at 09:35

## 2018-05-03 RX ADMIN — FLUCONAZOLE 200 MG: 40 POWDER, FOR SUSPENSION ORAL at 09:36

## 2018-05-03 RX ADMIN — MULTIVITAMIN 15 ML: LIQUID ORAL at 09:34

## 2018-05-03 NOTE — PLAN OF CARE
Problem: Goal Outcome Summary  Goal: Goal Outcome Summary  Outcome: No Change  Alert and oriented x4. Denied any pain for the overnight. Reported no new concerns overnight. TF as per orders at 90 ml/h and water flushes at 180 ml q 4 hours, patient is tolerating without complaints. Independent in the room. Calls for assistance as needed. Chest tube is intact, output amount at the same level as previous markings. Call light in reach. Continue with POC.

## 2018-05-03 NOTE — PLAN OF CARE
Problem: Goal Outcome Summary  Goal: Goal Outcome Summary  Outcome: No Change  Alert and oriented x4. Denies any discomfort this shift. Walked multiple times independently in the hallway this shift, uses a walker. Gtube clamped at 1100 and to stay clamped for 8 hours, tolerating well. Chest tube intact. Continue to monitor. /73 (BP Location: Right arm)  Pulse 90  Temp 96.6  F (35.9  C) (Oral)  Resp 16  Wt 75.3 kg (166 lb 1.6 oz)  SpO2 96%  BMI 26.81 kg/m2.

## 2018-05-04 PROCEDURE — 27210437 ZZH NUTRITION PRODUCT SEMIELEM INTERMED LITER

## 2018-05-04 PROCEDURE — 25000132 ZZH RX MED GY IP 250 OP 250 PS 637: Performed by: PHYSICIAN ASSISTANT

## 2018-05-04 PROCEDURE — 99309 SBSQ NF CARE MODERATE MDM 30: CPT | Performed by: PHYSICIAN ASSISTANT

## 2018-05-04 PROCEDURE — 99207 ZZC CDG-MDM COMPONENT: MEETS MODERATE - UP CODED: CPT | Performed by: PHYSICIAN ASSISTANT

## 2018-05-04 PROCEDURE — 25000128 H RX IP 250 OP 636: Performed by: PHYSICIAN ASSISTANT

## 2018-05-04 PROCEDURE — 25000132 ZZH RX MED GY IP 250 OP 250 PS 637: Performed by: INTERNAL MEDICINE

## 2018-05-04 PROCEDURE — 12000022 ZZH R&B SNF

## 2018-05-04 RX ADMIN — LOPERAMIDE HYDROCHLORIDE 4 MG: 1 SOLUTION ORAL at 21:49

## 2018-05-04 RX ADMIN — AMOXICILLIN AND CLAVULANATE POTASSIUM 875 MG: 400; 57 POWDER, FOR SUSPENSION ORAL at 08:22

## 2018-05-04 RX ADMIN — ENOXAPARIN SODIUM 40 MG: 40 INJECTION SUBCUTANEOUS at 08:23

## 2018-05-04 RX ADMIN — FLUCONAZOLE 200 MG: 40 POWDER, FOR SUSPENSION ORAL at 08:23

## 2018-05-04 RX ADMIN — AMOXICILLIN AND CLAVULANATE POTASSIUM 875 MG: 400; 57 POWDER, FOR SUSPENSION ORAL at 21:49

## 2018-05-04 RX ADMIN — MULTIVITAMIN 15 ML: LIQUID ORAL at 08:23

## 2018-05-04 RX ADMIN — PANTOPRAZOLE SODIUM 40 MG: 40 TABLET, DELAYED RELEASE ORAL at 08:22

## 2018-05-04 RX ADMIN — LOPERAMIDE HYDROCHLORIDE 4 MG: 1 SOLUTION ORAL at 12:07

## 2018-05-04 RX ADMIN — Medication 2 PACKET: at 08:33

## 2018-05-04 RX ADMIN — Medication 2 PACKET: at 21:49

## 2018-05-04 RX ADMIN — LOPERAMIDE HYDROCHLORIDE 4 MG: 1 SOLUTION ORAL at 18:02

## 2018-05-04 RX ADMIN — LOPERAMIDE HYDROCHLORIDE 4 MG: 1 SOLUTION ORAL at 08:23

## 2018-05-04 NOTE — PLAN OF CARE
Problem: Goal Outcome Summary  Goal: Goal Outcome Summary  RN: Pt alert and oriented, VSS. Pt is independent with transfer and ambulation. Remained on clear liquid diet, but pt on drinks ICE water. TF through the J-tube as ordered without issues. G -tube connected to gravity bag at 1900. Dressing to chest tube site CDI, chest tube to water seal system is intact, no air leaks noticed. Pt denies pain or SOB and has no complaints. Continue with POC.

## 2018-05-04 NOTE — PLAN OF CARE
Problem: Goal Outcome Summary  Goal: Goal Outcome Summary  Outcome: No Change  Patient endorsed no acute concerns tonight. No complaints of pain. Cycled TF as per orders at 90 ml/h and 180 ml water flushes q 4 hours, patient is tolerating without complaints. G-tube to gravity- greenish colored output. Appears to be sleeping in between cares. CT is intact, no noted further output above previous markings on the cannister. Continent of bowel and bladder. Calls for assistance as needed. Continue with POC.

## 2018-05-04 NOTE — PROGRESS NOTES
[facility]  Transitional Care Unit Progress Note  Michael Saldivar  [id number]  May 4, 2018         Assessment & Plan:   Michael Saldivar is a 57 year old male with a history of GERD who was admitted to [facility] 3/18-4/6/18 for Boerhaave's esophageal perforation felt to be 2/2 forceful vomiting from a viral illness now s/p left thoracotomy w/ repair of esophageal perforation. Post op course c/b esophageal leak into the L pleural cavity requiring pharyngostomy tube placement and gastropexy on 3/28. Transferred to  TCU 4/6/18 for nutrition and drain care.    Boerhaave Esophageal Perforation. S/p left thoracotomy, chest tube placement (x3 on L and x1 on R), PEG placement and repair of esophageal perforation on 3/18/18 by Dr. Ybarra. Post op course c/b persistent leak from esophagus to left pleural cavity. S/p pharyngostomy tube placement (removed 4/20) and gastropexy on 3/28. IR added jejunal limb to G tube on 3/30. Currently w/ 1 chest tube on the L to water seal with no OP since 4/23. F/u EGD 4/20 showed small persistent cavity likely 2/2 distal stricture therefore an esophageal stent was placed. Empirically treated w/ fluconazole 3/18-present, Zosyn 3/18-4/2, and Augmentin 4/2-present. Has had some epigastric pain throughout TCU stay which Dr Ybarra felt was 2/2 GERD rather than the stent since esophagram 4/30 and XR 4/26 were stable. Pt feeling well; epigastric pain is intermittent.  - Continue clear liquid diet  - G tube to gravity but can be capped for up to 8hrs / day. J tube for TF and meds  - Continue L chest tube to water seal. OP 50 cc since 4/23  - Continue Augmentin and Fluconazole (may switch to micafungin if LFTs worsen) - stop date TBD by Dr Ybarra  - Tylenol PRN for pain, discontinue oxycodone  - d/c lovenox as patient is independently ambulating in hallway several times/day  - Anticipate return to OR pending clinic visit with Dr Ybarra 5/8    Abnormal Liver Biochemistries,  resolved. Intermittent elevated in AP, ALT and AST since 3/26. RUQ US on 4/26 showed midlly echogenic liver w/ coarsened echo texture c/w steatosis. Possibly related to fluconazole although has resolved with changes to regimen.   - Per Dr Ybarra ok to switch to Micafungin if ongoing LFT abnormalities, will continue fluconazole for now since normalized  - F/u with PCP on discharge to evaluate steatosis    Chronic Loose Stools. Likely d/t TFs and antibiotics. C diff PCR neg x 2, last on 4/16. Having 1-2 loose stools/day but no longer watery.  - Continue fiber BID, Imodium QID, Lomotil PRN.      GERD. Continue daily PPI. Consider increasing to BID if ongoing epigastric pain     Chronic Normocytic Anemia. Hgb 11.7 on hospital admit w/ unknown baseline prior to that. Most recently 9.7. Acute drop was likely d/t surgery/infection/inflammation.   - Trending weekly hgb   - No further w/u indicated    Diet and/or tube feedings: clear liquids and TF via J  Lines, tubes, drains: Chest Tube (left), J tube  DVT/GI prophylaxis: Low Risk/Ambulatory with no VTE prophylaxis indicated and Pneumatic Compression Devices, PPI for GI  Indications for psychotropic medications: No diagnosis  Code status: Full Code         Consults:   Nutrition for TF         Discharge Planning:   Pending clinic visit with Dr Ybarra, pending removal of chest tube and TF        Interval History:     Michael is feeling well. He has been up and walking around. He denies any abdominal discomfort today although he has some epigastric pain that comes/goes. He is tolerating clear liquids. He has no chest pain, shortness of breath, fevers or other complaints.         Physical Exam:   Vitals were reviewed  Blood pressure 113/68, pulse 81, temperature 97.7  F (36.5  C), temperature source Oral, resp. rate 18, weight 75.3 kg (166 lb 1.6 oz), SpO2 95 %.  General: Alert, NAD, pleasant  HEENT: NCAT, anicteric sclera, EOMI, mucous membranes pink and  moist  Cardiovascular: RRR, S1S2, no murmurs appreciated  Lungs:CTAB, no wheezing or crackles. L chest tube CDI with minimal OP since 4/23  Abdomen: normoactive BS, soft with no distention and no tenderness. J tube patent, CDI.   Vascular: no peripherall edema, distal pulses palpable  Neurologic: AAO X 3, CN 2-12 grossly intact, no focal deficits  Skin: L thoracotomy incision well healed.     Benji Mendiola  Internal Medicine [facility] Hospitalist  [phone]

## 2018-05-04 NOTE — PLAN OF CARE
Problem: Goal Outcome Summary  Goal: Goal Outcome Summary  Outcome: Improving  G-tube clamped @ 8 in the morning will need to be vented @ 1600.J-tube was last flushed with 180 cc of  water@ noon per orders.Patien is clear liquid besides cycled TF,he likes cherry Popscle.Up in the hallway ambulating.Chest tube intact & is not draining.Denies pain.

## 2018-05-05 PROCEDURE — 12000022 ZZH R&B SNF

## 2018-05-05 PROCEDURE — 25000132 ZZH RX MED GY IP 250 OP 250 PS 637: Performed by: INTERNAL MEDICINE

## 2018-05-05 PROCEDURE — 27210437 ZZH NUTRITION PRODUCT SEMIELEM INTERMED LITER

## 2018-05-05 PROCEDURE — 25000132 ZZH RX MED GY IP 250 OP 250 PS 637: Performed by: PHYSICIAN ASSISTANT

## 2018-05-05 RX ADMIN — Medication 2 PACKET: at 21:11

## 2018-05-05 RX ADMIN — AMOXICILLIN AND CLAVULANATE POTASSIUM 875 MG: 400; 57 POWDER, FOR SUSPENSION ORAL at 21:20

## 2018-05-05 RX ADMIN — LOPERAMIDE HYDROCHLORIDE 4 MG: 1 SOLUTION ORAL at 08:17

## 2018-05-05 RX ADMIN — LOPERAMIDE HYDROCHLORIDE 4 MG: 1 SOLUTION ORAL at 17:43

## 2018-05-05 RX ADMIN — MULTIVITAMIN 15 ML: LIQUID ORAL at 08:17

## 2018-05-05 RX ADMIN — LOPERAMIDE HYDROCHLORIDE 4 MG: 1 SOLUTION ORAL at 21:20

## 2018-05-05 RX ADMIN — AMOXICILLIN AND CLAVULANATE POTASSIUM 875 MG: 400; 57 POWDER, FOR SUSPENSION ORAL at 08:17

## 2018-05-05 RX ADMIN — LOPERAMIDE HYDROCHLORIDE 4 MG: 1 SOLUTION ORAL at 13:29

## 2018-05-05 RX ADMIN — PANTOPRAZOLE SODIUM 40 MG: 40 TABLET, DELAYED RELEASE ORAL at 08:17

## 2018-05-05 RX ADMIN — Medication 2 PACKET: at 13:29

## 2018-05-05 RX ADMIN — FLUCONAZOLE 200 MG: 40 POWDER, FOR SUSPENSION ORAL at 08:17

## 2018-05-05 RX ADMIN — Medication 2 PACKET: at 08:17

## 2018-05-05 NOTE — PLAN OF CARE
Problem: Goal Outcome Summary  Goal: Goal Outcome Summary  Outcome: Improving  Patient ambulating independently in the halls.Denies pain/discomfort.G-tube clamped since 8 am,will vent @ 1600.J-tube site dressing C/D/I.Cycled Tube feed starts @ 4 pm with water flushes of 180 cc every 4 hours.No air leaks detected in chest tube seal/system,drainage volume same as when was marked last.Pleasant.

## 2018-05-05 NOTE — PLAN OF CARE
Problem: Goal Outcome Summary  Goal: Goal Outcome Summary  RN: No new concerns or issues this shift. G-tube unclamped and connected to gravity bag at 1600. TF started at 1600 through the J tube as ordered. Dressing changed to GJ tube site and chest tube site. There is no air leaking in chest tube system. Pt denies pain or SOB and has no complaints.

## 2018-05-06 PROCEDURE — 12000022 ZZH R&B SNF

## 2018-05-06 PROCEDURE — 25000132 ZZH RX MED GY IP 250 OP 250 PS 637: Performed by: PHYSICIAN ASSISTANT

## 2018-05-06 PROCEDURE — 27210437 ZZH NUTRITION PRODUCT SEMIELEM INTERMED LITER

## 2018-05-06 PROCEDURE — 25000132 ZZH RX MED GY IP 250 OP 250 PS 637: Performed by: INTERNAL MEDICINE

## 2018-05-06 RX ADMIN — LOPERAMIDE HYDROCHLORIDE 4 MG: 1 SOLUTION ORAL at 22:52

## 2018-05-06 RX ADMIN — AMOXICILLIN AND CLAVULANATE POTASSIUM 875 MG: 400; 57 POWDER, FOR SUSPENSION ORAL at 22:52

## 2018-05-06 RX ADMIN — Medication 2 PACKET: at 08:04

## 2018-05-06 RX ADMIN — MULTIVITAMIN 15 ML: LIQUID ORAL at 08:03

## 2018-05-06 RX ADMIN — AMOXICILLIN AND CLAVULANATE POTASSIUM 875 MG: 400; 57 POWDER, FOR SUSPENSION ORAL at 08:04

## 2018-05-06 RX ADMIN — Medication 2 PACKET: at 12:47

## 2018-05-06 RX ADMIN — LOPERAMIDE HYDROCHLORIDE 4 MG: 1 SOLUTION ORAL at 08:03

## 2018-05-06 RX ADMIN — LOPERAMIDE HYDROCHLORIDE 4 MG: 1 SOLUTION ORAL at 12:47

## 2018-05-06 RX ADMIN — PANTOPRAZOLE SODIUM 40 MG: 40 TABLET, DELAYED RELEASE ORAL at 08:03

## 2018-05-06 RX ADMIN — LOPERAMIDE HYDROCHLORIDE 4 MG: 1 SOLUTION ORAL at 17:04

## 2018-05-06 RX ADMIN — FLUCONAZOLE 200 MG: 40 POWDER, FOR SUSPENSION ORAL at 08:04

## 2018-05-06 RX ADMIN — Medication 2 PACKET: at 22:52

## 2018-05-06 NOTE — PLAN OF CARE
Problem: Goal Outcome Summary  Goal: Goal Outcome Summary  A&Ox4. VSS. Tubefeeding running at 90 mL/hr, stops at 0800. G-tube remains open to gravity. Patient appears to be sleeping and resting comfortably. Denying shortness of breath and pain. Patient is able to make his needs known. Continue with plan of care.

## 2018-05-06 NOTE — PLAN OF CARE
Problem: Goal Outcome Summary  Goal: Goal Outcome Summary  Outcome: Improving  Focus: Physio  D: Doing well this am. No new medical concerns. States his loose stools from his tube feeding stop 2 hours after its been disconnected for the day. J tube flushed and tube feeding discontinued at 0800. Gastric to clamped at the same time. All medications given via tube. Denies pain. Chest tube to water seal. P: Continue current plan of care.

## 2018-05-06 NOTE — PLAN OF CARE
Problem: Goal Outcome Summary  Goal: Goal Outcome Summary  Patient A&O x4, denied CP, lightheadedness, dizziness, numbness, tingling and SOB, voiding without difficulties, CMS intact, J-tube dressing changed, tube feeding started 4 pm at a rate of 90 ml/hr with water flush of 180 ml every four hour, gastric drainage connected during start of tube feeding, chest tube dressing CDI, no air leak noticed on chest tube seal, self repositioned and turned in bed, walked to the bathroom, demonstrates the ability to use call light appropriately, will continue to monitor patient as POC.

## 2018-05-06 NOTE — PHARMACY-MEDICATION REGIMEN REVIEW
Pharmacy Medication Regimen Review  Michael Saldivar is a 57 year old male who is currently in the Transitional Care Unit.    Assessment: All medications have an appropriate indications, durations and no unnecessary use was found. Augment and Fluconazole course stop time depend on GI evaluation.  Lovenox has been stopped since pt is ambulating.     Plan:   Continue current medications.   Augmentin/Flucon--Scr and LFTs are  monitored QM&TH     Attending provider will be sent moise ferrari for review.  If there are any emergent issues noted above, pharmacist will contact provider directly by phone.      Pharmacy will periodically review the resident's medication regimen for any PRN medications not administered in > 72 hours and discontinue them. The pharmacist will discuss gradual dose reductions of psychopharmacologic medications with interdisciplinary team on a regular basis.    Please contact pharmacy if the above does not answer specific medication questions/concerns.    Background:  A pharmacist has reviewed all medications and pertinent medical history today.  Medications were reviewed for appropriate use and any irregularities found are listed with recommendations.      Current Facility-Administered Medications:      acetaminophen (TYLENOL) solution 500 mg, 500 mg, Per J Tube, Q6H PRN, Ely Monroy PA, 500 mg at 04/30/18 2100     amoxicillin-clavulanate (AUGMENTIN) 400-57 MG/5ML suspension 875 mg, 875 mg, Per J Tube, BID, Lisa Gross PA, 875 mg at 05/06/18 0804     diphenoxylate-atropine (LOMOTIL) liquid 5 mL, 5 mL, Per J Tube, 4x Daily PRN, Jonathan Martins MD     fiber modular (NUTRISOURCE FIBER) packet 2 packet, 2 packet, Per J Tube, TID, Jonathan Martins MD, 2 packet at 05/06/18 0804     fluconazole (DIFLUCAN) suspension 200 mg, 200 mg, Per J Tube, Daily, Lisa Gross PA, 200 mg at 05/06/18 0804     lidocaine (LMX4) kit, , Topical, Q1H PRN, Lisa Gross PA     lidocaine 1 % 1 mL,  1 mL, Other, Q1H PRN, Lisa Gross PA     loperamide (IMODIUM) liquid 4 mg, 4 mg, Per J Tube, 4x Daily, Lisa Gross PA, 4 mg at 05/06/18 0803     medication instructions, , Does not apply, Continuous PRN, Lisa Gross PA     multivitamins with minerals (CERTAVITE/CEROVITE) liquid 15 mL, 15 mL, Per Feeding Tube, Daily, Lisa Gross PA, 15 mL at 05/06/18 0803     naloxone (NARCAN) injection 0.1-0.4 mg, 0.1-0.4 mg, Intravenous, Q2 Min PRN, Lisa Gross PA     ondansetron (ZOFRAN-ODT) ODT tab 4 mg, 4 mg, Oral, Q6H PRN, Luz Elena Jenkins, KATHRYN CNP     pantoprazole (PROTONIX) suspension 40 mg, 40 mg, Per J Tube, Daily, Lisa Gross PA, 40 mg at 05/06/18 0803  No current outpatient prescriptions on file.   PMH: GERD, esophageal perforation due to forceful vomiting in the setting of viral illness.     Sandee Peralta, PharmD

## 2018-05-07 ENCOUNTER — APPOINTMENT (OUTPATIENT)
Dept: LAB | Facility: CLINIC | Age: 58
End: 2018-05-07
Attending: INTERNAL MEDICINE
Payer: MEDICAID

## 2018-05-07 LAB
ALBUMIN SERPL-MCNC: 2.8 G/DL (ref 3.4–5)
ALP SERPL-CCNC: 134 U/L (ref 40–150)
ALT SERPL W P-5'-P-CCNC: 88 U/L (ref 0–70)
ANION GAP SERPL CALCULATED.3IONS-SCNC: 7 MMOL/L (ref 3–14)
AST SERPL W P-5'-P-CCNC: 37 U/L (ref 0–45)
BILIRUB SERPL-MCNC: 0.4 MG/DL (ref 0.2–1.3)
BUN SERPL-MCNC: 22 MG/DL (ref 7–30)
CALCIUM SERPL-MCNC: 8.7 MG/DL (ref 8.5–10.1)
CHLORIDE SERPL-SCNC: 102 MMOL/L (ref 94–109)
CO2 SERPL-SCNC: 28 MMOL/L (ref 20–32)
CREAT SERPL-MCNC: 0.59 MG/DL (ref 0.66–1.25)
ERYTHROCYTE [DISTWIDTH] IN BLOOD BY AUTOMATED COUNT: 17.9 % (ref 10–15)
GFR SERPL CREATININE-BSD FRML MDRD: >90 ML/MIN/1.7M2
GLUCOSE SERPL-MCNC: 111 MG/DL (ref 70–99)
HCT VFR BLD AUTO: 35.4 % (ref 40–53)
HGB BLD-MCNC: 9.8 G/DL (ref 13.3–17.7)
MAGNESIUM SERPL-MCNC: 2.3 MG/DL (ref 1.6–2.3)
MCH RBC QN AUTO: 19.1 PG (ref 26.5–33)
MCHC RBC AUTO-ENTMCNC: 27.7 G/DL (ref 31.5–36.5)
MCV RBC AUTO: 69 FL (ref 78–100)
PHOSPHATE SERPL-MCNC: 2.4 MG/DL (ref 2.5–4.5)
PLATELET # BLD AUTO: 495 10E9/L (ref 150–450)
POTASSIUM SERPL-SCNC: 4 MMOL/L (ref 3.4–5.3)
PROT SERPL-MCNC: 7 G/DL (ref 6.8–8.8)
RBC # BLD AUTO: 5.13 10E12/L (ref 4.4–5.9)
SODIUM SERPL-SCNC: 137 MMOL/L (ref 133–144)
WBC # BLD AUTO: 11.4 10E9/L (ref 4–11)

## 2018-05-07 PROCEDURE — 25000132 ZZH RX MED GY IP 250 OP 250 PS 637: Performed by: INTERNAL MEDICINE

## 2018-05-07 PROCEDURE — 85027 COMPLETE CBC AUTOMATED: CPT | Performed by: PHYSICIAN ASSISTANT

## 2018-05-07 PROCEDURE — 80053 COMPREHEN METABOLIC PANEL: CPT | Performed by: PHYSICIAN ASSISTANT

## 2018-05-07 PROCEDURE — 25000132 ZZH RX MED GY IP 250 OP 250 PS 637: Performed by: PHYSICIAN ASSISTANT

## 2018-05-07 PROCEDURE — 12000022 ZZH R&B SNF

## 2018-05-07 PROCEDURE — 83735 ASSAY OF MAGNESIUM: CPT | Performed by: PHYSICIAN ASSISTANT

## 2018-05-07 PROCEDURE — 84100 ASSAY OF PHOSPHORUS: CPT | Performed by: PHYSICIAN ASSISTANT

## 2018-05-07 PROCEDURE — 36415 COLL VENOUS BLD VENIPUNCTURE: CPT | Performed by: PHYSICIAN ASSISTANT

## 2018-05-07 RX ADMIN — LOPERAMIDE HYDROCHLORIDE 4 MG: 1 SOLUTION ORAL at 16:29

## 2018-05-07 RX ADMIN — Medication 2 PACKET: at 15:18

## 2018-05-07 RX ADMIN — Medication 2 PACKET: at 21:34

## 2018-05-07 RX ADMIN — LOPERAMIDE HYDROCHLORIDE 4 MG: 1 SOLUTION ORAL at 12:21

## 2018-05-07 RX ADMIN — FLUCONAZOLE 200 MG: 40 POWDER, FOR SUSPENSION ORAL at 07:59

## 2018-05-07 RX ADMIN — AMOXICILLIN AND CLAVULANATE POTASSIUM 875 MG: 400; 57 POWDER, FOR SUSPENSION ORAL at 21:33

## 2018-05-07 RX ADMIN — LOPERAMIDE HYDROCHLORIDE 4 MG: 1 SOLUTION ORAL at 21:33

## 2018-05-07 RX ADMIN — LOPERAMIDE HYDROCHLORIDE 4 MG: 1 SOLUTION ORAL at 07:59

## 2018-05-07 RX ADMIN — PANTOPRAZOLE SODIUM 40 MG: 40 TABLET, DELAYED RELEASE ORAL at 07:59

## 2018-05-07 RX ADMIN — AMOXICILLIN AND CLAVULANATE POTASSIUM 875 MG: 400; 57 POWDER, FOR SUSPENSION ORAL at 07:59

## 2018-05-07 RX ADMIN — Medication 2 PACKET: at 08:00

## 2018-05-07 RX ADMIN — MULTIVITAMIN 15 ML: LIQUID ORAL at 07:59

## 2018-05-07 NOTE — PLAN OF CARE
Problem: Goal Outcome Summary  Goal: Goal Outcome Summary  RN: No new issues. TF started @ 1600 through the J tube AS ordered, G tube un clamped at 1600. Pt denies pain or SOB and has no complaints.

## 2018-05-07 NOTE — PLAN OF CARE
Problem: Goal Outcome Summary  Goal: Goal Outcome Summary  Outcome: No Change  Alert and oriented x4. Denies any pain. Denies SOB. Independent with ambulation in the hallway, uses a walker. Chest tube patent, dressing CDI. Continue with current POC.

## 2018-05-07 NOTE — PLAN OF CARE
Problem: Goal Outcome Summary  Goal: Goal Outcome Summary  Outcome: Improving  Alert and oriented X 4. Able to make needs known.  Call light in reach. Up to BR with SBA, needs helps with lines. Denies pain,, nausea,  SOB.  Chest tube to water seal,  patent, .minimal drainage out.  Tube feeding infusing per J tube,  running at 90 ml/hr, tolerating feeding/flushes. G tube to gravity, 350 ml out. Continent of bowel and bladder. Appears to be sleeping during rounds. Continue with plan of care.

## 2018-05-08 ENCOUNTER — RADIANT APPOINTMENT (OUTPATIENT)
Dept: GENERAL RADIOLOGY | Facility: CLINIC | Age: 58
End: 2018-05-08
Attending: CLINICAL NURSE SPECIALIST
Payer: MEDICAID

## 2018-05-08 ENCOUNTER — OFFICE VISIT (OUTPATIENT)
Dept: SURGERY | Facility: CLINIC | Age: 58
End: 2018-05-08
Attending: THORACIC SURGERY (CARDIOTHORACIC VASCULAR SURGERY)
Payer: MEDICAID

## 2018-05-08 VITALS
OXYGEN SATURATION: 97 % | HEART RATE: 85 BPM | SYSTOLIC BLOOD PRESSURE: 112 MMHG | TEMPERATURE: 98.9 F | WEIGHT: 164.1 LBS | DIASTOLIC BLOOD PRESSURE: 69 MMHG | BODY MASS INDEX: 26.37 KG/M2 | HEIGHT: 66 IN

## 2018-05-08 DIAGNOSIS — K22.3 ESOPHAGEAL PERFORATION: Primary | ICD-10-CM

## 2018-05-08 DIAGNOSIS — K22.3 PERFORATION OF ESOPHAGUS: ICD-10-CM

## 2018-05-08 PROCEDURE — G0463 HOSPITAL OUTPT CLINIC VISIT: HCPCS | Mod: ZF

## 2018-05-08 PROCEDURE — 12000022 ZZH R&B SNF

## 2018-05-08 PROCEDURE — 25000132 ZZH RX MED GY IP 250 OP 250 PS 637: Performed by: INTERNAL MEDICINE

## 2018-05-08 PROCEDURE — 99024 POSTOP FOLLOW-UP VISIT: CPT | Mod: ZP | Performed by: THORACIC SURGERY (CARDIOTHORACIC VASCULAR SURGERY)

## 2018-05-08 PROCEDURE — 99309 SBSQ NF CARE MODERATE MDM 30: CPT | Performed by: PHYSICIAN ASSISTANT

## 2018-05-08 PROCEDURE — 25000132 ZZH RX MED GY IP 250 OP 250 PS 637: Performed by: PHYSICIAN ASSISTANT

## 2018-05-08 RX ORDER — CEFAZOLIN SODIUM 1 G/50ML
1 INJECTION, SOLUTION INTRAVENOUS SEE ADMIN INSTRUCTIONS
Status: CANCELLED | OUTPATIENT
Start: 2018-05-08

## 2018-05-08 RX ADMIN — Medication 2 PACKET: at 13:40

## 2018-05-08 RX ADMIN — LOPERAMIDE HYDROCHLORIDE 4 MG: 1 SOLUTION ORAL at 18:49

## 2018-05-08 RX ADMIN — Medication 2 PACKET: at 22:28

## 2018-05-08 RX ADMIN — LOPERAMIDE HYDROCHLORIDE 4 MG: 1 SOLUTION ORAL at 22:28

## 2018-05-08 RX ADMIN — AMOXICILLIN AND CLAVULANATE POTASSIUM 875 MG: 400; 57 POWDER, FOR SUSPENSION ORAL at 22:36

## 2018-05-08 RX ADMIN — PANTOPRAZOLE SODIUM 40 MG: 40 TABLET, DELAYED RELEASE ORAL at 08:58

## 2018-05-08 RX ADMIN — Medication 2 PACKET: at 08:57

## 2018-05-08 RX ADMIN — MULTIVITAMIN 15 ML: LIQUID ORAL at 08:57

## 2018-05-08 RX ADMIN — FLUCONAZOLE 200 MG: 40 POWDER, FOR SUSPENSION ORAL at 08:58

## 2018-05-08 RX ADMIN — AMOXICILLIN AND CLAVULANATE POTASSIUM 875 MG: 400; 57 POWDER, FOR SUSPENSION ORAL at 08:57

## 2018-05-08 RX ADMIN — LOPERAMIDE HYDROCHLORIDE 4 MG: 1 SOLUTION ORAL at 13:40

## 2018-05-08 RX ADMIN — LOPERAMIDE HYDROCHLORIDE 4 MG: 1 SOLUTION ORAL at 08:58

## 2018-05-08 ASSESSMENT — PAIN SCALES - GENERAL: PAINLEVEL: MILD PAIN (3)

## 2018-05-08 NOTE — LETTER
5/8/2018       RE: Michael Saldivar  [address]  [place] [facility] [address]     Dear Colleague,    Thank you for referring your patient, Michael Saldivar, to the [facility]. Please see a copy of my visit note below.    THORACIC SURGERY FOLLOW UP VISIT    Dear Dr. Michel,  I saw Mr. Michael Saldivar in follow-up today. The clinical summary follows:     PREOP DIAGNOSIS   Boerhaave esophageal perforation     PROCEDURE   3/19/2018: Left thoracotomy, primary repair of esophageal perforation, EGD  3/28/2018: EGD exchange of gastrostomy tube, gastropexy and pharyngostomy tube placement.   3/30/2018 IR exchanged G for GJ tube     4/20/2018 EGD with Esophageal Stent placement (23 x 150 mm, fully covered)      COMPLICATIONS  Esophageal leak    INTERVAL STUDIES  I reviewed a chest x-ray done today, showing a stable, small left pleural effusion, with the esophageal stent and chest tube in stable positions.      The patient remains in the TCU.  He has been ambulating without difficulty.  He has noted some intermittent epigastric pain, which has persisted since last week.  This was poorly controlled with Tylenol.  He is tolerating jejunal feeds, but continues to have loose bowel movements as soon as the tube feeds are started each afternoon.  He  is tolerating clear liquids.  Per Mr. Saldivar, he has had no fevers or chills.     On exam, the incisions are healing well.  The gastrojejunostomy site is clean.  There is thin, tan drainage from the chest tube.  Th ere has been 100 cc out in the last week.     IMPRESSION (K22.3) Perforation of esophagus   This is a 57 year-old man with esophageal perforation s/p primary repair complicated by leak managed with pharyngostomy, GJ and esophageal stent.  There is no evidence of leak on 4/30 esophogram.  He is tolerating a clear liquid diet.     PLAN  I spent a total of 15 minutes with Mr. Michael Saldivar and more than 50% of which were spent in counseling, coordination of  care, and face-to-face time. I reviewed the plan as follows:  The patient may advance to a soft diet.  He should remain on proton pump inhibitor therapy and antimicrobial therapy for now.  The patient should increase his activity as tolerated.  The gastrostomy tube may be capped at all times.   The tube can be to drainage as needed.  The chest tube will remain to water seal.    Procedure Planned:  EGD, possible stent revision on Friday, 5/11/2018  Consent:  Pending  Necessary Tests & Appointments:  As above  All questions were answered and the patient and present family were in agreement with the plan.  I appreciate the opportunity to participate in the care of your patient and will keep you updated.  Sincerely,    Keith Ybarra MD

## 2018-05-08 NOTE — NURSING NOTE
"Oncology Rooming Note    May 8, 2018 4:43 PM   Michael Saldivar is a 57 year old male who presents for:    Chief Complaint   Patient presents with     Oncology Clinic Visit     return     Initial Vitals: /69 (BP Location: Left arm)  Pulse 85  Temp 98.9  F (37.2  C)  Ht 1.676 m (5' 5.98\")  Wt 74.4 kg (164 lb 1.6 oz)  SpO2 97%  BMI 26.5 kg/m2 Estimated body mass index is 26.5 kg/(m^2) as calculated from the following:    Height as of this encounter: 1.676 m (5' 5.98\").    Weight as of this encounter: 74.4 kg (164 lb 1.6 oz). Body surface area is 1.86 meters squared.  Mild Pain (3) Comment: Data Unavailable   No LMP for male patient.  Allergies reviewed: Yes  Medications reviewed: Yes    Medications: Medication refills not needed today.  Pharmacy name entered into EPIC: Data Unavailable    Clinical concerns: none     6 minutes for nursing intake (face to face time)     Claudia Keen RN                "

## 2018-05-08 NOTE — MR AVS SNAPSHOT
After Visit Summary   5/8/2018    Michael Saldivar    MRN: [id number]           Patient Information     Date Of Birth          1960        Visit Information        Provider Department      5/8/2018 5:00 PM Keith Ybarra MD [facility]        Today's Diagnoses     Esophageal perforation    -  1      Care Instructions    Preventive Care:     Colorectal Cancer Screening: During our visit today, we discussed that it is recommended you receive colorectal cancer screening. Please call or make an appointment with your primary care provider to discuss this. You may also call the [facility] scheduling line ([phone]) to set up a colonoscopy appointment.            Follow-ups after your visit        Your next 10 appointments already scheduled     May 11, 2018   Procedure with Keith Ybarra MD   [facility], [place], Same Day Surgery (--)    [address] [facility] [address] [phone]              Future tests that were ordered for you today     Open Future Orders        Priority Expected Expires Ordered    CT Chest w/o contrast Routine  5/8/2019 5/8/2018            Who to contact     If you have questions or need follow up information about today's clinic visit or your schedule please contact [facility], located within [facility] directly at [phone].  Normal or non-critical lab and imaging results will be communicated to you by MyChart, letter or phone within 4 business days after the clinic has received the results. If you do not hear from us within 7 days, please contact the clinic through [facility] or phone. If you have a critical or abnormal lab result, we will notify you by phone as soon as possible.  Submit refill requests through [employer] or call your pharmacy and they will forward the refill request to us. Please allow 3 business days for your refill to be completed.          Additional Information About Your Visit        [employer] Information     [employer] lets you send messages to  "your doctor, view your test results, renew your prescriptions, schedule appointments and more. To sign up, go to [email or web address] . Click on \"Log in\" on the left side of the screen, which will take you to the Welcome page. Then click on \"Sign up Now\" on the right side of the page.     You will be asked to enter the access code listed below, as well as some personal information. Please follow the directions to create your username and password.     Your access code is: [email or web address]  Expires: 2018 12:07 PM     Your access code will  in 90 days. If you need help or a new code, please call your [place] clinic or [phone].        Care EveryWhere ID     This is your Care EveryWhere ID. This could be used by other organizations to access your [place] medical records  [id number]        Your Vitals Were     Pulse Temperature Height Pulse Oximetry BMI (Body Mass Index)       85 98.9  F (37.2  C) 1.676 m (5' 5.98\") 97% 26.5 kg/m2        Blood Pressure from Last 3 Encounters:   18 125/73   18 112/69   18 108/69    Weight from Last 3 Encounters:   18 74.2 kg (163 lb 9.6 oz)   18 74.4 kg (164 lb 1.6 oz)   18 75.3 kg (166 lb)              Today, you had the following     No orders found for display         Today's Medication Changes      Notice     This visit is during an admission. Changes to the med list made in this visit will be reflected in the After Visit Summary of the admission.             Primary Care Provider Office Phone # Fax #    Jemal Michel [phone]       [facility] [address]   [facility] [address]        Equal Access to Services     BETTY ZHANG AH: Monique Segal, jose espinosa, arabella thurston, lexie lindquist. [facility] [phone].    ATENCIÓN: Si habla español, tiene a patel disposición servicios gratuitos de asistencia lingüística. Llame al [phone].    We comply " with applicable federal civil rights laws and [place] laws. We do not discriminate on the basis of race, color, national origin, age, disability, sex, sexual orientation, or gender identity.            Thank you!     Thank you for choosing [facility]  for your care. Our goal is always to provide you with excellent care. Hearing back from our patients is one way we can continue to improve our services. Please take a few minutes to complete the written survey that you may receive in the mail after your visit with us. Thank you!             Your Updated Medication List - Protect others around you: Learn how to safely use, store and throw away your medicines at www.disposemymeds.org.      Notice     This visit is during an admission. Changes to the med list made in this visit will be reflected in the After Visit Summary of the admission.

## 2018-05-08 NOTE — PLAN OF CARE
Problem: Goal Outcome Summary  Goal: Goal Outcome Summary  Outcome: No Change  Alert and oriented. Chest tube patent. Dressing CDI. Clear liquid diet. Small, loose BM overnight. Able to make needs known. Will continue to monitor.

## 2018-05-08 NOTE — PROGRESS NOTES
Internal Medicine Progress Note      ASSESSMENT & PLAN   Michael Saldivar is a 57 year old male with a history of GERD who was admitted to [facility] 3/18-4/6/18 for Boerhaave's esophageal perforation felt to be 2/2 forceful vomiting from a viral illness now s/p left thoracotomy w/ repair of esophageal perforation. Post op course c/b esophageal leak into the L pleural cavity requiring pharyngostomy tube placement and gastropexy on 3/28. Transferred to  TCU 4/6/18 for nutrition and drain care.     Boerhaave Esophageal Perforation.  S/p left thoracotomy, chest tube placement (x3 on L and x1 on R), PEG placement and repair of esophageal perforation on 3/18/18 by Dr. Ybarra. Post op course c/b persistent leak from esophagus to left pleural cavity. S/p pharyngostomy tube placement (removed 4/20) and gastropexy on 3/28. IR added jejunal limb to G tube on 3/30. Currently w/ 1 chest tube on the L to water seal with no OP since 4/23. F/u EGD 4/20 showed small persistent cavity likely 2/2 distal stricture therefore an esophageal stent was placed. Empirically treated w/ fluconazole 3/18-present, Zosyn 3/18-4/2, and Augmentin 4/2-present. Feeling well. No acute concerns.   - Follow up appt w Dr. Ybarra later today for ongoing recs  - Continue clear liquid diet  - G tube to gravity but can be capped for up to 8hrs / day.   - J tube for TF and meds  - Continue L chest tube to water seal.   - Continue Augmentin and Fluconazole (may switch to micafungin if LFTs worsen) - stop date TBD by Dr Ybarra  - Tylenol PRN for pain     Abnormal Liver Biochemistries, resolved.  Intermittently elevated iAP, ALT and AST since 3/26. RUQ US on 4/26 showed midlly echogenic liver w/ coarsened echo texture c/w steatosis. Possibly related to fluconazole although has improved with changes to regimen.   - Cont to monitor LFT's  - Per Dr Ybarra ok to switch to Micafungin if ongoing LFT abnormalities  - F/u with PCP on discharge to evaluate  steatosis     Chronic Loose Stools.  Likely d/t TFs and antibiotics. C diff PCR neg x 2, last on 4/16. Overall improved.   - Continue fiber BID, Imodium QID, Lomotil PRN.       GERD. Continue daily PPI. Consider increasing to BID if ongoing epigastric pain      Chronic Normocytic Anemia. Hgb 11.7 on hospital admit w/ unknown baseline prior to that. Most recently 9.7. Acute drop was likely d/t surgery/infection/inflammation.   - Trending weekly hgb   - No further w/u indicated      Pain Assessment:  Current Pain Score 5/8/2018   Patient currently in pain? denies   Pain location -   Pain descriptors -   Michael ames pain level was assessed and he currently denies pain.      Diet: Clear Liquid Diet  Snacks/Supplements Adult: Ensure Clear; Between Meals  Adult Formula Drip Feeding: Specified Time Impact Peptide 1.5; Jejunostomy; Goal Rate: 90; mL/hr; From: 4:00 PM; 8:00 AM; Medication - Tube Feeding Flush Frequency: At least 15-30 mL water before and after medication administration and with tube c...  Fluids: None  DVT Prophylaxis: Low Risk/Ambulatory with no VTE prophylaxis indicated  Code Status: Full Code    Disposition Plan   Expected discharge: Pending Thoracic surgery follow up and removal of chest tube.     Entered: Abdiel Olivares 05/08/2018, 1:55 PM   Information in the above section will display in the discharge planner report.      The patient's care was discussed with the Attending Physician, Dr. Dhaliwal.    Abdiel Olivares PA-C  Internal Medicine Staff Hospitalist Service  [facility]  Pager: [phone]  Please see sticky note for cross cover information  _____________________________________________________________    Interval History   Patient reports feeling well. Loose stools have improved. No abdominal pain. Tolerating CLD. Denies any N/V. No fevers or chills. No chest pain, dyspnea or cough. No urinary complaints.       Data reviewed today: I reviewed all medications, new labs and imaging  results over the last 24 hours.     Physical Exam   Vital Signs: Temp: 96.7  F (35.9  C) Temp src: Oral BP: 118/70 Pulse: 79 Heart Rate: 79 Resp: 16 SpO2: 97 % O2 Device: None (Room air)    Weight: 163 lbs 9.6 oz    GENERAL:  Awake. Alert. Oriented x 3. NAD.   HEENT: No scleral icterus. Mucous membranes moist.   CV: RRR. S1, S2. No murmurs appreciated.   RESPIRATORY: Lungs CTAB with no wheezing, rales, rhonchi.   GI: Abdomen soft, non-distended. Active bowel sounds. No tenderness.    NEUROLOGICAL: No focal deficits. CN II-XII intact grossly. Moves all extremities.    EXTREMITIES: No peripheral edema. Intact bilateral pedal pulses.   SKIN: No jaundice. No rashes.            Data     ROUTINE IP LABS (Last four results)  CMP   Recent Labs  Lab 05/07/18  0716 05/03/18  0641    137   POTASSIUM 4.0 4.0   CHLORIDE 102 102   CO2 28 27   ANIONGAP 7 8   * 124*   BUN 22 21   CR 0.59* 0.56*   MU 8.7 8.6   MAG 2.3  --    PHOS 2.4*  --    PROTTOTAL 7.0 6.7*   ALBUMIN 2.8* 2.7*   BILITOTAL 0.4 0.3   ALKPHOS 134 132   AST 37 24   ALT 88* 64     CBC   Recent Labs  Lab 05/07/18  0716   WBC 11.4*   RBC 5.13   HGB 9.8*   HCT 35.4*   MCV 69*   MCH 19.1*   MCHC 27.7*   RDW 17.9*   *     INR No lab results found in last 7 days.      All labs personally reviewed in [facility] [employer].  See A&P for additional results.     Unresulted Labs Ordered in the Past 30 Days of this Admission     Date and Time Order Name Status Description    3/18/2018 1608 Plasma prepare order unit In process

## 2018-05-08 NOTE — PLAN OF CARE
Problem: Goal Outcome Summary  Goal: Goal Outcome Summary  Outcome: Improving  Patient is alert x 4 and he can direct his cares. Patient's GT was clamped at 0910. Patient is eating a popsicle and drinking ice water. Patient's GT and chest tube dressings changed this am. New stabilizer applied to chest tube. VSS.  Weight done this am. Patient has an appointment at the [facility] this afternoon and transportation will pick him up 1530. No drainage in chest tube and no bubbling in chest tube container.

## 2018-05-08 NOTE — PROGRESS NOTES
THORACIC SURGERY FOLLOW UP VISIT    Dear Dr. Michel,  I saw Mr. Michael Saldivar in follow-up today. The clinical summary follows:     PREOP DIAGNOSIS   Boerhaave esophageal perforation     PROCEDURE   3/19/2018: Left thoracotomy, primary repair of esophageal perforation, EGD  3/28/2018: EGD exchange of gastrostomy tube, gastropexy and pharyngostomy tube placement.   3/30/2018 IR exchanged G for GJ tube     4/20/2018 EGD with Esophageal Stent placement (23 x 150 mm, fully covered)      COMPLICATIONS  Esophageal leak    INTERVAL STUDIES  I reviewed a chest x-ray done today, showing a stable, small left pleural effusion, with the esophageal stent and chest tube in stable positions.      The patient remains in the TCU.  He has been ambulating without difficulty.  He has noted some intermittent epigastric pain, which has persisted since last week.  This was poorly controlled with Tylenol.  He is tolerating jejunal feeds, but continues to have loose bowel movements as soon as the tube feeds are started each afternoon.  He is tolerating clear liquids.  Per Mr. Saldivar, he has had no fevers or chills.     On exam, the incisions are healing well.  The gastrojejunostomy site is clean.  There is thin, tan drainage from the chest tube.  There has been 100 cc out in the last week.     IMPRESSION (K22.3) Perforation of esophagus   This is a 57 year-old man with esophageal perforation s/p primary repair complicated by leak managed with pharyngostomy, GJ and esophageal stent.  There is no evidence of leak on 4/30 esophogram.  He is tolerating a clear liquid diet.     PLAN  I spent a total of 15 minutes with Mr. Michael Saldivar and more than 50% of which were spent in counseling, coordination of care, and face-to-face time. I reviewed the plan as follows:  The patient may advance to a soft diet.  He should remain on proton pump inhibitor therapy and antimicrobial therapy for now.  The patient should increase his activity as  tolerated.  The gastrostomy tube may be capped at all times.   The tube can be to drainage as needed.  The chest tube will remain to water seal.    Procedure Planned:  EGD, possible stent revision on Friday, 5/11/2018  Consent:  Pending  Necessary Tests & Appointments: As above  All questions were answered and the patient and present family were in agreement with the plan.  I appreciate the opportunity to participate in the care of your patient and will keep you updated.  Sincerely,    Keith Ybarra MD

## 2018-05-08 NOTE — PATIENT INSTRUCTIONS
Preventive Care:     Colorectal Cancer Screening: During our visit today, we discussed that it is recommended you receive colorectal cancer screening. Please call or make an appointment with your primary care provider to discuss this. You may also call the [facility] scheduling line ([phone]) to set up a colonoscopy appointment.

## 2018-05-09 ENCOUNTER — TELEPHONE (OUTPATIENT)
Dept: SURGERY | Facility: CLINIC | Age: 58
End: 2018-05-09

## 2018-05-09 ENCOUNTER — APPOINTMENT (OUTPATIENT)
Dept: LAB | Facility: CLINIC | Age: 58
End: 2018-05-09
Attending: INTERNAL MEDICINE
Payer: MEDICAID

## 2018-05-09 PROCEDURE — 25000132 ZZH RX MED GY IP 250 OP 250 PS 637: Performed by: PHYSICIAN ASSISTANT

## 2018-05-09 PROCEDURE — 25000132 ZZH RX MED GY IP 250 OP 250 PS 637: Performed by: INTERNAL MEDICINE

## 2018-05-09 PROCEDURE — 12000022 ZZH R&B SNF

## 2018-05-09 RX ADMIN — AMOXICILLIN AND CLAVULANATE POTASSIUM 875 MG: 400; 57 POWDER, FOR SUSPENSION ORAL at 21:18

## 2018-05-09 RX ADMIN — LOPERAMIDE HYDROCHLORIDE 4 MG: 1 SOLUTION ORAL at 17:16

## 2018-05-09 RX ADMIN — LOPERAMIDE HYDROCHLORIDE 4 MG: 1 SOLUTION ORAL at 12:51

## 2018-05-09 RX ADMIN — Medication 2 PACKET: at 12:51

## 2018-05-09 RX ADMIN — PANTOPRAZOLE SODIUM 40 MG: 40 TABLET, DELAYED RELEASE ORAL at 08:23

## 2018-05-09 RX ADMIN — MULTIVITAMIN 15 ML: LIQUID ORAL at 08:22

## 2018-05-09 RX ADMIN — Medication 2 PACKET: at 21:19

## 2018-05-09 RX ADMIN — AMOXICILLIN AND CLAVULANATE POTASSIUM 875 MG: 400; 57 POWDER, FOR SUSPENSION ORAL at 08:23

## 2018-05-09 RX ADMIN — LOPERAMIDE HYDROCHLORIDE 4 MG: 1 SOLUTION ORAL at 08:22

## 2018-05-09 RX ADMIN — Medication 2 PACKET: at 08:22

## 2018-05-09 RX ADMIN — LOPERAMIDE HYDROCHLORIDE 4 MG: 1 SOLUTION ORAL at 21:19

## 2018-05-09 RX ADMIN — FLUCONAZOLE 200 MG: 40 POWDER, FOR SUSPENSION ORAL at 08:23

## 2018-05-09 NOTE — PROGRESS NOTES
CLINICAL NUTRITION SERVICES - REASSESSMENT NOTE     Nutrition Prescription    RECOMMENDATIONS FOR MDs/PROVIDERS TO ORDER:  Recommend D/C'ing on TF at this time as pt is hesitant to eat solid (soft) foods and has only been consuming popsicles. TF meeting 100% of nutritional needs.     Malnutrition Status:    Patient does not meet two of the above criteria necessary for diagnosing malnutrition    Recommendations already ordered by Registered Dietitian (RD):  Further cycle TF as follows:  - Impact Peptide 1.5 via J-tube @ 120 mL/hr x 12 hrs (8pm-8am) providing 1440 mL, 2160 kcal (29 kcal/kg), 135 g protein (1.8 g/kg), 202 g CHO, 0 g fiber, 92 g Fat (50% from MCTs) and 1109 mL free water per DW of 74 kg, meeting 100% energy/protein needs  - Continue NutriSource Fiber 2 pkts TID to provide an additional 18 g fiber.  - Continue Free Water Flushes of 180 mL q 4 hr during + 60 mL before/after TF    Future/Additional Recommendations:  1. Once pts PO intakes improve, start calorie counts and wean TF as able.   2. Once pt is meeting 75% of minimum nutritional needs (1390 kcal and 68 g protein), D/C TF     EVALUATION OF THE PROGRESS TOWARD GOALS   Diet: Mechanical/Dental Soft, Boost Breeze BID  Nutrition Support:   Impact Peptide 1.5 via J-tube @ 90 mL/hr x 16 hrs (4pm-8am) to provide 1440 mL (19 mL/kg), 2160 kcal's (29 kcal/kg), 135 g pro (1.8 g/kg), 1109 free water, 92 g Fat (50% from MCTs), 202 g CHO and no Fiber daily.- 100% of needs  - 2 pkts Fiber TID to provide 18 g fiber  - Free Water Flushes: 180 mL q 4 hr during + 60 mL before/after TF    Intake: pt reports not eating anything orally, however is tolerating TF at goal per RN notes.        NEW FINDINGS   - Wt has remained stable between 163-166 lbs over the last 3 weeks.   - Chronic loose stools likely d/t TF and antibiotics per PA note on 5/8. Pt is receiving 6 pkts fiber/day, imodium QID and lomotil PRN. Pt reports stools are improved but still loose.      MALNUTRITION  % Intake: Decreased intake does not meet criteria  % Weight Loss: None noted  Subcutaneous Fat Loss: None observed  Muscle Loss: None observed  Fluid Accumulation/Edema: None noted   Malnutrition Diagnosis: Patient does not meet two of the above criteria necessary for diagnosing malnutrition    Previous Goals   Total avg nutritional intake to meet a minimum of 25 kcal/kg and 1.2 g PRO/kg daily (per dosing wt 74 kg).  Evaluation: Met    Previous Nutrition Diagnosis  Predicted inadequate nutrient intake (protein-energy) related to tolerating TF at goal rate but potential for TF interruptions and pt NPO with reliance on TF to meet 100% assessed needs.   Evaluation: Improving    CURRENT NUTRITION DIAGNOSIS  Inadequate oral intake related to restrictive diet and poor appetite as evidenced by mechanical soft diet with continued reliance on TF to meet nutritional needs      INTERVENTIONS  Implementation  Enteral Nutrition - Modify schedule  Provided Mechanical Soft menu to help pt know which foods he may order. He was looking through the menu when this writer entered his room but was going to stick to a popsicle. Pt was hesitant to order anything else but was maybe going to try mashed potatoes later. He is uncertain if he is hungry d/t not eating regularly in such a long time.     Goals  Total avg nutritional intake to meet a minimum of 25 kcal/kg and 1.2 g PRO/kg daily (per dosing wt 74 kg).    Monitoring/Evaluation  Progress toward goals will be monitored and evaluated per protocol.      Angela Pelletier RD, LD  Unit Pager: [phone]

## 2018-05-09 NOTE — PLAN OF CARE
Problem: Goal Outcome Summary  Goal: Goal Outcome Summary  Alert and verbally makes needs known.Quiet demeanor. Independent with personal cares and mobility. Started on mechanical soft diet and tapering tube feeding per Nutrition. Has same day surgery on 5/11/18.

## 2018-05-09 NOTE — PLAN OF CARE
Pt is alert and oriented, denied SOB, pain, or changes in sensation, J tube feeding running without issue, G tube gravity drainage in place producing green output into salmeron bag, no drainage into chest tube, slept through the night no further care concerns at this time continue with POC.

## 2018-05-09 NOTE — TELEPHONE ENCOUNTER
Spoke with patient about upcoming procedure on 5/11/18 at 2:50pm (arrive at 12:50pm) with Dr. Ybarra at [facility] ([address], check in at 3C on 3rd floor) Gave my number [phone] to call with questions.

## 2018-05-09 NOTE — PLAN OF CARE
Problem: Goal Outcome Summary  Goal: Goal Outcome Summary  Outcome: Improving  Patient left for an appointment at 1600, returned at 1800 , is A&O x 3, denies pain. VS'S. GT site dressing changed, Feeding is infusing per rate ordered. G-Tube connected to drainage bag. Had a small loose BM this shift.Chest Tube no drainage. Will continue to monitor.

## 2018-05-10 ENCOUNTER — ANESTHESIA EVENT (OUTPATIENT)
Dept: SURGERY | Facility: CLINIC | Age: 58
End: 2018-05-10
Payer: MEDICAID

## 2018-05-10 ENCOUNTER — APPOINTMENT (OUTPATIENT)
Dept: LAB | Facility: CLINIC | Age: 58
End: 2018-05-10
Attending: INTERNAL MEDICINE
Payer: MEDICAID

## 2018-05-10 LAB
ALBUMIN SERPL-MCNC: 2.9 G/DL (ref 3.4–5)
ALP SERPL-CCNC: 140 U/L (ref 40–150)
ALT SERPL W P-5'-P-CCNC: 139 U/L (ref 0–70)
ANION GAP SERPL CALCULATED.3IONS-SCNC: 6 MMOL/L (ref 3–14)
AST SERPL W P-5'-P-CCNC: 40 U/L (ref 0–45)
BILIRUB SERPL-MCNC: 0.2 MG/DL (ref 0.2–1.3)
BUN SERPL-MCNC: 28 MG/DL (ref 7–30)
CALCIUM SERPL-MCNC: 8.7 MG/DL (ref 8.5–10.1)
CHLORIDE SERPL-SCNC: 104 MMOL/L (ref 94–109)
CO2 SERPL-SCNC: 28 MMOL/L (ref 20–32)
CREAT SERPL-MCNC: 0.59 MG/DL (ref 0.66–1.25)
GFR SERPL CREATININE-BSD FRML MDRD: >90 ML/MIN/1.7M2
GLUCOSE SERPL-MCNC: 125 MG/DL (ref 70–99)
POTASSIUM SERPL-SCNC: 4.1 MMOL/L (ref 3.4–5.3)
PROT SERPL-MCNC: 6.9 G/DL (ref 6.8–8.8)
SODIUM SERPL-SCNC: 138 MMOL/L (ref 133–144)

## 2018-05-10 PROCEDURE — 12000022 ZZH R&B SNF

## 2018-05-10 PROCEDURE — 80053 COMPREHEN METABOLIC PANEL: CPT | Performed by: PHYSICIAN ASSISTANT

## 2018-05-10 PROCEDURE — 25000132 ZZH RX MED GY IP 250 OP 250 PS 637: Performed by: INTERNAL MEDICINE

## 2018-05-10 PROCEDURE — 25000132 ZZH RX MED GY IP 250 OP 250 PS 637: Performed by: PHYSICIAN ASSISTANT

## 2018-05-10 PROCEDURE — 36415 COLL VENOUS BLD VENIPUNCTURE: CPT | Performed by: PHYSICIAN ASSISTANT

## 2018-05-10 RX ORDER — ONDANSETRON 4 MG/1
4 TABLET, ORALLY DISINTEGRATING ORAL EVERY 6 HOURS PRN
Status: ON HOLD | COMMUNITY
End: 2018-06-07

## 2018-05-10 RX ORDER — DIPHENOXYLATE HCL/ATROPINE 2.5-.025/5
5 LIQUID (ML) ORAL 4 TIMES DAILY PRN
Status: ON HOLD | COMMUNITY
End: 2018-06-07

## 2018-05-10 RX ORDER — ALUMINA, MAGNESIA, AND SIMETHICONE 2400; 2400; 240 MG/30ML; MG/30ML; MG/30ML
30 SUSPENSION ORAL EVERY 4 HOURS PRN
Status: DISCONTINUED | OUTPATIENT
Start: 2018-05-10 | End: 2018-05-11 | Stop reason: HOSPADM

## 2018-05-10 RX ORDER — ALUMINA, MAGNESIA, AND SIMETHICONE 2400; 2400; 240 MG/30ML; MG/30ML; MG/30ML
30 SUSPENSION ORAL ONCE
Status: COMPLETED | OUTPATIENT
Start: 2018-05-10 | End: 2018-05-10

## 2018-05-10 RX ORDER — MAGNESIUM HYDROXIDE/ALUMINUM HYDROXICE/SIMETHICONE 120; 1200; 1200 MG/30ML; MG/30ML; MG/30ML
30 SUSPENSION ORAL EVERY 4 HOURS PRN
COMMUNITY

## 2018-05-10 RX ADMIN — Medication 2 PACKET: at 20:03

## 2018-05-10 RX ADMIN — MULTIVITAMIN 15 ML: LIQUID ORAL at 08:52

## 2018-05-10 RX ADMIN — LOPERAMIDE HYDROCHLORIDE 4 MG: 1 SOLUTION ORAL at 13:50

## 2018-05-10 RX ADMIN — PANTOPRAZOLE SODIUM 40 MG: 40 TABLET, DELAYED RELEASE ORAL at 08:52

## 2018-05-10 RX ADMIN — LOPERAMIDE HYDROCHLORIDE 4 MG: 1 SOLUTION ORAL at 21:54

## 2018-05-10 RX ADMIN — AMOXICILLIN AND CLAVULANATE POTASSIUM 875 MG: 400; 57 POWDER, FOR SUSPENSION ORAL at 08:51

## 2018-05-10 RX ADMIN — Medication 2 PACKET: at 13:50

## 2018-05-10 RX ADMIN — ALUMINUM HYDROXIDE, MAGNESIUM HYDROXIDE, AND DIMETHICONE 30 ML: 400; 400; 40 SUSPENSION ORAL at 06:22

## 2018-05-10 RX ADMIN — FLUCONAZOLE 200 MG: 40 POWDER, FOR SUSPENSION ORAL at 08:51

## 2018-05-10 RX ADMIN — AMOXICILLIN AND CLAVULANATE POTASSIUM 875 MG: 400; 57 POWDER, FOR SUSPENSION ORAL at 21:54

## 2018-05-10 RX ADMIN — LOPERAMIDE HYDROCHLORIDE 4 MG: 1 SOLUTION ORAL at 17:34

## 2018-05-10 RX ADMIN — Medication 2 PACKET: at 08:51

## 2018-05-10 RX ADMIN — LOPERAMIDE HYDROCHLORIDE 4 MG: 1 SOLUTION ORAL at 08:52

## 2018-05-10 NOTE — PLAN OF CARE
Problem: Goal Outcome Summary  Goal: Goal Outcome Summary  Outcome: No Change  No new issues or concerns. Indep.transfers/ambulation to BR but calls staff for assist managing tubes/lines. Has GJ tube with cycled feeding via JT - off @ 0800 and GT has remained clamped. Denies pain, discomfort, CP and SOB. CT to water seal. Having EGD with possible stent revision tomorrow.     0630 - Pt.c/o heartburn / indigestion - states Tums not helpful. H.O.ordered Maalox ES x1 dose. Will monitor effectiveness.  0655 - Pt.reported Maalox helpful - will sticky note MD if pt.can have ordered PRN.

## 2018-05-10 NOTE — PLAN OF CARE
Problem: Goal Outcome Summary  Goal: Goal Outcome Summary  RN: Pt alert and oriented, VSS. Independent with ADL's. TF started at 20:00 vs J tube without issues, G tube is  clamped through out the shift. Pt denies nausea or stoma discomfort. Chest tube to water seal without air leaking. Pt denies pain or SOB and has no complaints.

## 2018-05-10 NOTE — ANESTHESIA PREPROCEDURE EVALUATION
Anesthesia Evaluation     . Pt has had prior anesthetic. Type: MAC and General (04/20/18; 0745; Mask Ventilation: Not attempted (RSI); Ease of Intubation: Easy; Airway Size: 8;  Cuffed;  Oral;  Blade Type: Dom;  Blade Size: 4;  Place by: jw;  Insertion Attempts: 1;  Secured at (cm)to lip: 23 cm;  Breath Sounds: Equal, clear and )           ROS/MED HX    ENT/Pulmonary:  - neg pulmonary ROS     Neurologic:  - neg neurologic ROS     Cardiovascular:  - neg cardiovascular ROS   (+) ----. : . . . :. . Previous cardiac testing       METS/Exercise Tolerance:     Hematologic:  - neg hematologic  ROS       Musculoskeletal:  - neg musculoskeletal ROS       GI/Hepatic:     (+) GERD Other GI/Hepatic perforated esophagus 3/18.  EGD with pharyngostomy placement 3/28.      Renal/Genitourinary:  - ROS Renal section negative       Endo:  - neg endo ROS       Psychiatric:  - neg psychiatric ROS       Infectious Disease:  - neg infectious disease ROS       Malignancy:      - no malignancy   Other:    - neg other ROS               Procedure: Procedure(s):  Esophagogastroduodenoscopy Possible Esophageal Stent Removal - Wound Class:     HPI: Michael Saldivar is a 57 year old male who presents for the above procedure.     PMHx/PSHx:  Past Medical History:   Diagnosis Date     GERD (gastroesophageal reflux disease)        Past Surgical History:   Procedure Laterality Date     ENDOSCOPIC INSERTION TUBE GASTROSTOMY N/A 3/18/2018    Procedure: ENDOSCOPIC INSERTION TUBE GASTROSTOMY;  COMPINED ESOPHAGOSCOPY, GASTROSCOPY, DUODENOSCOPY, PEG-TUBE INSERTION, RIGHT CHEST TUBE INSERTION, POSSIBLE NASOGASTRIC TUBE, POSSIBLE THORACOTOMY. ;  Surgeon: Keith Ybarra MD;  Location: [facility] OR     ENDOSCOPIC INSERTION TUBE GASTROSTOMY N/A 3/28/2018    Procedure: ENDOSCOPIC INSERTION TUBE GASTROSTOMY;;  Surgeon: Maria Del Rosario Flores MD;  Location: [facility] OR     ESOPHAGOSCOPY, GASTROSCOPY, DUODENOSCOPY (EGD), COMBINED N/A 3/18/2018    Procedure: COMBINED  ESOPHAGOSCOPY, GASTROSCOPY, DUODENOSCOPY (EGD);  COMPINED ESOPHAGOSCOPY, GASTROSCOPY, DUODENOSCOPY, PEG-TUBE INSERTION, RIGHT CHEST TUBE INSERTION, POSSIBLE NASOGASTRIC TUBE, POSSIBLE THORACOTOMY. ;  Surgeon: Keith Ybarra MD;  Location: [facility] OR     ESOPHAGOSCOPY, GASTROSCOPY, DUODENOSCOPY (EGD), COMBINED N/A 3/28/2018    Procedure: COMBINED ESOPHAGOSCOPY, GASTROSCOPY, DUODENOSCOPY (EGD);  Esophagogastroduodenoscopy, Pharyngostomy Tube Placement, Gastroplexy, Feeding Tube Exchange;  Surgeon: Maria Del Rosario Flores MD;  Location: [facility] OR     HAND SURGERY       PHARYNGOSTOMY N/A 3/28/2018    Procedure: PHARYNGOSTOMY;;  Surgeon: Maria Del Rosario Flores MD;  Location: [facility]     THORACOTOMY Left 3/18/2018    Procedure: THORACOTOMY;  Esophogastroduodenoscopy, PEG-TUBE INSERTION, Left THORACOTOMY, Chest tube insertion;  Surgeon: Keith Ybarra MD;  Location: [facility] OR         Current Facility-Administered Medications on File Prior to Visit:  acetaminophen (TYLENOL) solution 500 mg   [COMPLETED] alum & mag hydroxide-simethicone (MYLANTA ES/MAALOX  ES) suspension 30 mL   alum & mag hydroxide-simethicone (MYLANTA ES/MAALOX  ES) suspension 30 mL   amoxicillin-clavulanate (AUGMENTIN) 400-57 MG/5ML suspension 875 mg   chlorhexidine (HIBICLENS) 4 % liquid   diphenoxylate-atropine (LOMOTIL) liquid 5 mL   fiber modular (NUTRISOURCE FIBER) packet 2 packet   fluconazole (DIFLUCAN) suspension 200 mg   lidocaine (LMX4) kit   lidocaine 1 % 1 mL   loperamide (IMODIUM) liquid 4 mg   medication instructions   multivitamins with minerals (CERTAVITE/CEROVITE) liquid 15 mL   naloxone (NARCAN) injection 0.1-0.4 mg   ondansetron (ZOFRAN-ODT) ODT tab 4 mg   pantoprazole (PROTONIX) suspension 40 mg     Current Outpatient Prescriptions on File Prior to Visit:  acetaminophen (TYLENOL) 32 mg/mL solution 20.3 mLs (650 mg) by Per J Tube route every 6 hours as needed for mild pain or fever (Patient taking differently: 500 mg by Per J Tube route every 6 hours as needed for  mild pain or fever )   alum & mag hydroxide-simethicone (MYLANTA/MAALOX) 200-200-20 MG/5ML SUSP suspension Take 30 mLs by mouth every 4 hours as needed for indigestion   amoxicillin-clavulanate (AUGMENTIN) 400-57 MG/5ML suspension 10.9 mLs (875 mg) by Per J Tube route 2 times daily   diphenoxylate-atropine (LOMOTIL) 2.5-0.025 MG/5ML liquid Take 5 mLs by mouth 4 times daily as needed for diarrhea   fiber modular (NUTRISOURCE FIBER) packet 1 packet by Per J Tube route 2 times daily (Patient taking differently: 2 packets by Per J Tube route 2 times daily )   fluconazole (DIFLUCAN) 40 MG/ML suspension 5 mLs (200 mg) by Per J Tube route daily   loperamide (IMODIUM) 1 MG/5ML liquid 20 mLs (4 mg) by Per J Tube route 4 times daily   loperamide (IMODIUM) 1 MG/5ML liquid 10 mLs (2 mg) by Per J Tube route 4 times daily as needed for diarrhea   multivitamins with minerals (CERTAVITE/CEROVITE) LIQD liquid 15 mLs by Per Feeding Tube route daily   ondansetron (ZOFRAN-ODT) 4 MG ODT tab Take 4 mg by mouth every 6 hours as needed for nausea   order for DME Equipment being ordered: [facility] Suction MachineSuction Canister-2Suction Connect Tube-25 in 1 Connector-2Bacteria Filter-2Treatment Diagnosis: Esophogeal Perforation   order for DME Equipment being ordered: Other: suction and cannister for pharyngostomy tubeTreatment Diagnosis: Esophageal perforation   pantoprazole (PROTONIX) SUSP suspension Take 40 mg by mouth daily       Social Hx:   Social History   Substance Use Topics     Smoking status: Former Smoker     Packs/day: 1.50     Years: 36.00     Types: Cigarettes     Quit date: 3/18/2013     Smokeless tobacco: Never Used     Alcohol use No       Allergies: No Known Allergies      NPO Status: Per ASA Guidelines    Labs:    Blood Bank:  Lab Results   Component Value Date    ABO O 03/26/2018    RH Pos 03/26/2018    AS Neg 03/26/2018     BMP:  Recent Labs   Lab Test  04/19/18   0603   NA  140   POTASSIUM  3.8   CHLORIDE  105   CO2  25    BUN  25   CR  0.57*   GLC  131*   MU  8.7     CBC:   Recent Labs   Lab Test  04/16/18   0548   WBC  10.2   RBC  4.49   HGB  8.6*   HCT  32.1*   MCV  72*   MCH  19.2*   MCHC  27.3*   RDW  19.1*   PLT  390     Coags:  Recent Labs   Lab Test  04/02/18   0829   INR  1.17*             Physical Exam  Normal systems: dental    Airway   Mallampati: II  TM distance: >3 FB  Neck ROM: full    Dental     Cardiovascular   Rhythm and rate: regular and normal      Pulmonary    breath sounds clear to auscultation                        Anesthesia Plan      History & Physical Review  History and physical reviewed and following examination; no interval change.    ASA Status:  3 .    NPO Status:  > 8 hours    Plan for General, ETT and RSI with Intravenous and Propofol induction. Maintenance will be Balanced.    PONV prophylaxis:  Ondansetron (or other 5HT-3)  Additional equipment: 2nd IV      Postoperative Care  Postoperative pain management:  Multi-modal analgesia.      Consents  Anesthetic plan, risks, benefits and alternatives discussed with:  Patient..                      Comments:     Michael Saldivar is a 57 year old male with PMHx of GERD who is undergoing above procedure for ongoing management of esophageal perforation. She is s/p primary repair complicated by leak managed with pharyngostomy, GJ and esophageal stent.  There is no evidence of leak on 4/30 esophogram.  He is tolerating a clear liquid diet.    Plan today is for general anesthesia with ETT, maintenance with inhalation anesthetics.  PONV prophylaxis with Ondansetron, and Decadron.  Post operative pain management with multimodal analgesia.  Plan discussed with patient who expressed understanding and desire to proceed.     Ashlee Montejo MD, PhD

## 2018-05-10 NOTE — PLAN OF CARE
Problem: Goal Outcome Summary  Goal: Goal Outcome Summary  Outcome: No Change  Pt verbalized relief from abdominal discomfort this afternoon. JANETH BLANCO was updated about pt's request for a prn dose of Maalox, new order in place. Independent with ambulation in the room and in the hallway. No respiratory distress noted. Dressing changed to the gastrotomy and the chest tube sites, no drainage noted. Refused to eat breakfast due to lack of appetite and abdominal discomfort in the morning but plans to eat lunch. Diet was advanced to clear liquid. Has same day surgery on 5/11/18 at 1450, transport will  pt at 1215. Pt can have clear liquids until 1245. Pt is expected to have Hibiclens shower this evening and in the morning in preparation for the surgery.

## 2018-05-11 ENCOUNTER — HOSPITAL ENCOUNTER (INPATIENT)
Facility: SKILLED NURSING FACILITY | Age: 58
LOS: 12 days | Discharge: ANOTHER HOSPITAL | End: 2018-05-23
Attending: HOSPITALIST | Admitting: HOSPITALIST
Payer: MEDICAID

## 2018-05-11 ENCOUNTER — ANESTHESIA (OUTPATIENT)
Dept: SURGERY | Facility: CLINIC | Age: 58
End: 2018-05-11
Payer: MEDICAID

## 2018-05-11 ENCOUNTER — APPOINTMENT (OUTPATIENT)
Dept: GENERAL RADIOLOGY | Facility: CLINIC | Age: 58
End: 2018-05-11
Attending: THORACIC SURGERY (CARDIOTHORACIC VASCULAR SURGERY)
Payer: MEDICAID

## 2018-05-11 ENCOUNTER — HOSPITAL ENCOUNTER (OUTPATIENT)
Facility: CLINIC | Age: 58
Discharge: HOME | End: 2018-05-11
Attending: THORACIC SURGERY (CARDIOTHORACIC VASCULAR SURGERY) | Admitting: THORACIC SURGERY (CARDIOTHORACIC VASCULAR SURGERY)
Payer: MEDICAID

## 2018-05-11 VITALS
SYSTOLIC BLOOD PRESSURE: 111 MMHG | TEMPERATURE: 97.8 F | BODY MASS INDEX: 26.87 KG/M2 | RESPIRATION RATE: 18 BRPM | OXYGEN SATURATION: 94 % | HEART RATE: 90 BPM | WEIGHT: 166.4 LBS | DIASTOLIC BLOOD PRESSURE: 65 MMHG

## 2018-05-11 VITALS
RESPIRATION RATE: 14 BRPM | DIASTOLIC BLOOD PRESSURE: 74 MMHG | HEART RATE: 94 BPM | BODY MASS INDEX: 26.79 KG/M2 | TEMPERATURE: 98.2 F | HEIGHT: 66 IN | OXYGEN SATURATION: 94 % | WEIGHT: 166.67 LBS | SYSTOLIC BLOOD PRESSURE: 126 MMHG

## 2018-05-11 DIAGNOSIS — K22.3 ESOPHAGEAL PERFORATION: ICD-10-CM

## 2018-05-11 LAB — GLUCOSE BLDC GLUCOMTR-MCNC: 111 MG/DL (ref 70–99)

## 2018-05-11 PROCEDURE — 82962 GLUCOSE BLOOD TEST: CPT

## 2018-05-11 PROCEDURE — C9399 UNCLASSIFIED DRUGS OR BIOLOG: HCPCS | Performed by: NURSE ANESTHETIST, CERTIFIED REGISTERED

## 2018-05-11 PROCEDURE — 25000132 ZZH RX MED GY IP 250 OP 250 PS 637: Performed by: PHYSICIAN ASSISTANT

## 2018-05-11 PROCEDURE — 37000008 ZZH ANESTHESIA TECHNICAL FEE, 1ST 30 MIN: Performed by: THORACIC SURGERY (CARDIOTHORACIC VASCULAR SURGERY)

## 2018-05-11 PROCEDURE — 25000128 H RX IP 250 OP 636: Performed by: NURSE ANESTHETIST, CERTIFIED REGISTERED

## 2018-05-11 PROCEDURE — 40000277 XR SURGERY CARM FLUORO LESS THAN 5 MIN W STILLS: Mod: TC

## 2018-05-11 PROCEDURE — 25000125 ZZHC RX 250: Performed by: NURSE ANESTHETIST, CERTIFIED REGISTERED

## 2018-05-11 PROCEDURE — 12000022 ZZH R&B SNF

## 2018-05-11 PROCEDURE — 25000566 ZZH SEVOFLURANE, EA 15 MIN: Performed by: THORACIC SURGERY (CARDIOTHORACIC VASCULAR SURGERY)

## 2018-05-11 PROCEDURE — 40000170 ZZH STATISTIC PRE-PROCEDURE ASSESSMENT II: Performed by: THORACIC SURGERY (CARDIOTHORACIC VASCULAR SURGERY)

## 2018-05-11 PROCEDURE — 25000132 ZZH RX MED GY IP 250 OP 250 PS 637: Performed by: THORACIC SURGERY (CARDIOTHORACIC VASCULAR SURGERY)

## 2018-05-11 PROCEDURE — 25000128 H RX IP 250 OP 636: Performed by: ANESTHESIOLOGY

## 2018-05-11 PROCEDURE — 36000055 ZZH SURGERY LEVEL 2 W FLUORO 1ST 30 MIN - UMMC: Performed by: THORACIC SURGERY (CARDIOTHORACIC VASCULAR SURGERY)

## 2018-05-11 PROCEDURE — 27210794 ZZH OR GENERAL SUPPLY STERILE: Performed by: THORACIC SURGERY (CARDIOTHORACIC VASCULAR SURGERY)

## 2018-05-11 PROCEDURE — 71000012 ZZH RECOVERY PHASE 1 LEVEL 1 FIRST HR: Performed by: THORACIC SURGERY (CARDIOTHORACIC VASCULAR SURGERY)

## 2018-05-11 PROCEDURE — C1769 GUIDE WIRE: HCPCS | Performed by: THORACIC SURGERY (CARDIOTHORACIC VASCULAR SURGERY)

## 2018-05-11 PROCEDURE — 36000053 ZZH SURGERY LEVEL 2 EA 15 ADDTL MIN - UMMC: Performed by: THORACIC SURGERY (CARDIOTHORACIC VASCULAR SURGERY)

## 2018-05-11 PROCEDURE — 37000009 ZZH ANESTHESIA TECHNICAL FEE, EACH ADDTL 15 MIN: Performed by: THORACIC SURGERY (CARDIOTHORACIC VASCULAR SURGERY)

## 2018-05-11 PROCEDURE — 71000027 ZZH RECOVERY PHASE 2 EACH 15 MINS: Performed by: THORACIC SURGERY (CARDIOTHORACIC VASCULAR SURGERY)

## 2018-05-11 PROCEDURE — C1874 STENT, COATED/COV W/DEL SYS: HCPCS | Performed by: THORACIC SURGERY (CARDIOTHORACIC VASCULAR SURGERY)

## 2018-05-11 DEVICE — STENT ESOPHAGEAL ENDOMAXX 23X150MM MAXX-2315: Type: IMPLANTABLE DEVICE | Site: ESOPHAGUS | Status: FUNCTIONAL

## 2018-05-11 RX ORDER — ONDANSETRON 4 MG/1
4 TABLET, ORALLY DISINTEGRATING ORAL EVERY 30 MIN PRN
Status: DISCONTINUED | OUTPATIENT
Start: 2018-05-11 | End: 2018-05-11 | Stop reason: HOSPADM

## 2018-05-11 RX ORDER — LIDOCAINE 40 MG/G
CREAM TOPICAL
Status: DISCONTINUED | OUTPATIENT
Start: 2018-05-11 | End: 2018-05-23 | Stop reason: HOSPADM

## 2018-05-11 RX ORDER — LOPERAMIDE HYDROCHLORIDE 1 MG/5ML
4 SOLUTION ORAL 4 TIMES DAILY
Status: DISCONTINUED | OUTPATIENT
Start: 2018-05-11 | End: 2018-05-23 | Stop reason: HOSPADM

## 2018-05-11 RX ORDER — LIDOCAINE 40 MG/G
CREAM TOPICAL
Status: DISCONTINUED | OUTPATIENT
Start: 2018-05-11 | End: 2018-05-11 | Stop reason: HOSPADM

## 2018-05-11 RX ORDER — SODIUM CHLORIDE, SODIUM LACTATE, POTASSIUM CHLORIDE, CALCIUM CHLORIDE 600; 310; 30; 20 MG/100ML; MG/100ML; MG/100ML; MG/100ML
INJECTION, SOLUTION INTRAVENOUS CONTINUOUS
Status: DISCONTINUED | OUTPATIENT
Start: 2018-05-11 | End: 2018-05-11 | Stop reason: HOSPADM

## 2018-05-11 RX ORDER — LIDOCAINE 40 MG/G
CREAM TOPICAL
Status: CANCELLED | OUTPATIENT
Start: 2018-05-11

## 2018-05-11 RX ORDER — LOPERAMIDE HYDROCHLORIDE 1 MG/5ML
4 SOLUTION ORAL 4 TIMES DAILY
Status: CANCELLED | OUTPATIENT
Start: 2018-05-11

## 2018-05-11 RX ORDER — NALOXONE HYDROCHLORIDE 0.4 MG/ML
.1-.4 INJECTION, SOLUTION INTRAMUSCULAR; INTRAVENOUS; SUBCUTANEOUS
Status: CANCELLED | OUTPATIENT
Start: 2018-05-11

## 2018-05-11 RX ORDER — DEXAMETHASONE SODIUM PHOSPHATE 10 MG/ML
INJECTION, SOLUTION INTRAMUSCULAR; INTRAVENOUS PRN
Status: DISCONTINUED | OUTPATIENT
Start: 2018-05-11 | End: 2018-05-11

## 2018-05-11 RX ORDER — GUAR GUM
2 PACKET (EA) ORAL 3 TIMES DAILY
Status: CANCELLED | OUTPATIENT
Start: 2018-05-11

## 2018-05-11 RX ORDER — NALOXONE HYDROCHLORIDE 0.4 MG/ML
.1-.4 INJECTION, SOLUTION INTRAMUSCULAR; INTRAVENOUS; SUBCUTANEOUS
Status: DISCONTINUED | OUTPATIENT
Start: 2018-05-11 | End: 2018-05-23 | Stop reason: HOSPADM

## 2018-05-11 RX ORDER — ONDANSETRON 4 MG/1
4 TABLET, ORALLY DISINTEGRATING ORAL EVERY 6 HOURS PRN
Status: CANCELLED | OUTPATIENT
Start: 2018-05-11

## 2018-05-11 RX ORDER — FLUCONAZOLE 40 MG/ML
200 POWDER, FOR SUSPENSION ORAL DAILY
Status: DISCONTINUED | OUTPATIENT
Start: 2018-05-12 | End: 2018-05-23 | Stop reason: HOSPADM

## 2018-05-11 RX ORDER — CEFAZOLIN SODIUM 2 G/100ML
2 INJECTION, SOLUTION INTRAVENOUS
Status: DISCONTINUED | OUTPATIENT
Start: 2018-05-11 | End: 2018-05-11 | Stop reason: HOSPADM

## 2018-05-11 RX ORDER — FENTANYL CITRATE 50 UG/ML
25-50 INJECTION, SOLUTION INTRAMUSCULAR; INTRAVENOUS
Status: DISCONTINUED | OUTPATIENT
Start: 2018-05-11 | End: 2018-05-11 | Stop reason: HOSPADM

## 2018-05-11 RX ORDER — PROPOFOL 10 MG/ML
INJECTION, EMULSION INTRAVENOUS PRN
Status: DISCONTINUED | OUTPATIENT
Start: 2018-05-11 | End: 2018-05-11

## 2018-05-11 RX ORDER — ONDANSETRON 2 MG/ML
INJECTION INTRAMUSCULAR; INTRAVENOUS PRN
Status: DISCONTINUED | OUTPATIENT
Start: 2018-05-11 | End: 2018-05-11

## 2018-05-11 RX ORDER — DIPHENOXYLATE HCL/ATROPINE 2.5-.025/5
5 LIQUID (ML) ORAL 4 TIMES DAILY PRN
Status: CANCELLED | OUTPATIENT
Start: 2018-05-11

## 2018-05-11 RX ORDER — NALOXONE HYDROCHLORIDE 0.4 MG/ML
.1-.4 INJECTION, SOLUTION INTRAMUSCULAR; INTRAVENOUS; SUBCUTANEOUS
Status: DISCONTINUED | OUTPATIENT
Start: 2018-05-11 | End: 2018-05-11 | Stop reason: HOSPADM

## 2018-05-11 RX ORDER — ALUMINA, MAGNESIA, AND SIMETHICONE 2400; 2400; 240 MG/30ML; MG/30ML; MG/30ML
30 SUSPENSION ORAL EVERY 4 HOURS PRN
Status: DISCONTINUED | OUTPATIENT
Start: 2018-05-11 | End: 2018-05-23 | Stop reason: HOSPADM

## 2018-05-11 RX ORDER — AMOXICILLIN AND CLAVULANATE POTASSIUM 400; 57 MG/5ML; MG/5ML
875 POWDER, FOR SUSPENSION ORAL 2 TIMES DAILY
Status: CANCELLED | OUTPATIENT
Start: 2018-05-11

## 2018-05-11 RX ORDER — CEFAZOLIN SODIUM 1 G/3ML
1 INJECTION, POWDER, FOR SOLUTION INTRAMUSCULAR; INTRAVENOUS SEE ADMIN INSTRUCTIONS
Status: DISCONTINUED | OUTPATIENT
Start: 2018-05-11 | End: 2018-05-11 | Stop reason: HOSPADM

## 2018-05-11 RX ORDER — LIDOCAINE HYDROCHLORIDE 20 MG/ML
INJECTION, SOLUTION INFILTRATION; PERINEURAL PRN
Status: DISCONTINUED | OUTPATIENT
Start: 2018-05-11 | End: 2018-05-11

## 2018-05-11 RX ORDER — AMOXICILLIN AND CLAVULANATE POTASSIUM 400; 57 MG/5ML; MG/5ML
875 POWDER, FOR SUSPENSION ORAL 2 TIMES DAILY
Status: DISCONTINUED | OUTPATIENT
Start: 2018-05-11 | End: 2018-05-23 | Stop reason: HOSPADM

## 2018-05-11 RX ORDER — FENTANYL CITRATE 50 UG/ML
INJECTION, SOLUTION INTRAMUSCULAR; INTRAVENOUS PRN
Status: DISCONTINUED | OUTPATIENT
Start: 2018-05-11 | End: 2018-05-11

## 2018-05-11 RX ORDER — ALUMINA, MAGNESIA, AND SIMETHICONE 2400; 2400; 240 MG/30ML; MG/30ML; MG/30ML
30 SUSPENSION ORAL EVERY 4 HOURS PRN
Status: CANCELLED | OUTPATIENT
Start: 2018-05-11

## 2018-05-11 RX ORDER — FLUCONAZOLE 40 MG/ML
200 POWDER, FOR SUSPENSION ORAL DAILY
Status: CANCELLED | OUTPATIENT
Start: 2018-05-12

## 2018-05-11 RX ORDER — DIPHENOXYLATE HCL/ATROPINE 2.5-.025/5
5 LIQUID (ML) ORAL 4 TIMES DAILY PRN
Status: DISCONTINUED | OUTPATIENT
Start: 2018-05-11 | End: 2018-05-23 | Stop reason: HOSPADM

## 2018-05-11 RX ORDER — ONDANSETRON 2 MG/ML
4 INJECTION INTRAMUSCULAR; INTRAVENOUS EVERY 30 MIN PRN
Status: DISCONTINUED | OUTPATIENT
Start: 2018-05-11 | End: 2018-05-11 | Stop reason: HOSPADM

## 2018-05-11 RX ORDER — ONDANSETRON 4 MG/1
4 TABLET, ORALLY DISINTEGRATING ORAL EVERY 6 HOURS PRN
Status: DISCONTINUED | OUTPATIENT
Start: 2018-05-11 | End: 2018-05-23 | Stop reason: HOSPADM

## 2018-05-11 RX ORDER — GUAR GUM
2 PACKET (EA) ORAL 3 TIMES DAILY
Status: DISCONTINUED | OUTPATIENT
Start: 2018-05-11 | End: 2018-05-23 | Stop reason: HOSPADM

## 2018-05-11 RX ADMIN — ONDANSETRON 4 MG: 2 INJECTION INTRAMUSCULAR; INTRAVENOUS at 16:41

## 2018-05-11 RX ADMIN — FENTANYL CITRATE 100 MCG: 50 INJECTION, SOLUTION INTRAMUSCULAR; INTRAVENOUS at 16:30

## 2018-05-11 RX ADMIN — MIDAZOLAM 2 MG: 1 INJECTION INTRAMUSCULAR; INTRAVENOUS at 16:30

## 2018-05-11 RX ADMIN — AMOXICILLIN AND CLAVULANATE POTASSIUM 875 MG: 400; 57 POWDER, FOR SUSPENSION ORAL at 20:33

## 2018-05-11 RX ADMIN — DEXAMETHASONE SODIUM PHOSPHATE 6 MG: 10 INJECTION, SOLUTION INTRAMUSCULAR; INTRAVENOUS at 16:41

## 2018-05-11 RX ADMIN — LIDOCAINE HYDROCHLORIDE 100 MG: 20 INJECTION, SOLUTION INFILTRATION; PERINEURAL at 16:30

## 2018-05-11 RX ADMIN — SUGAMMADEX 200 MG: 100 INJECTION, SOLUTION INTRAVENOUS at 17:15

## 2018-05-11 RX ADMIN — LOPERAMIDE HYDROCHLORIDE 4 MG: 1 SOLUTION ORAL at 20:33

## 2018-05-11 RX ADMIN — SODIUM CHLORIDE, POTASSIUM CHLORIDE, SODIUM LACTATE AND CALCIUM CHLORIDE: 600; 310; 30; 20 INJECTION, SOLUTION INTRAVENOUS at 16:17

## 2018-05-11 RX ADMIN — LOPERAMIDE HYDROCHLORIDE 4 MG: 1 SOLUTION ORAL at 09:33

## 2018-05-11 RX ADMIN — ROCURONIUM BROMIDE 50 MG: 10 INJECTION INTRAVENOUS at 16:30

## 2018-05-11 RX ADMIN — LOPERAMIDE HYDROCHLORIDE 4 MG: 1 SOLUTION ORAL at 12:04

## 2018-05-11 RX ADMIN — PANTOPRAZOLE SODIUM 40 MG: 40 TABLET, DELAYED RELEASE ORAL at 09:33

## 2018-05-11 RX ADMIN — ALUMINUM HYDROXIDE, MAGNESIUM HYDROXIDE, AND DIMETHICONE 30 ML: 400; 400; 40 SUSPENSION ORAL at 04:30

## 2018-05-11 RX ADMIN — FLUCONAZOLE 200 MG: 40 POWDER, FOR SUSPENSION ORAL at 09:33

## 2018-05-11 RX ADMIN — ANTISEPTIC SURGICAL SCRUB: 0.04 SOLUTION TOPICAL at 06:29

## 2018-05-11 RX ADMIN — Medication 2 PACKET: at 20:32

## 2018-05-11 RX ADMIN — AMOXICILLIN AND CLAVULANATE POTASSIUM 875 MG: 400; 57 POWDER, FOR SUSPENSION ORAL at 09:33

## 2018-05-11 RX ADMIN — PROPOFOL 150 MG: 10 INJECTION, EMULSION INTRAVENOUS at 16:30

## 2018-05-11 NOTE — ANESTHESIA CARE TRANSFER NOTE
Patient: Michael Saldivar    Procedure(s):  Esophagogastroduodenoscopy Possible Esophageal Stent Removal - Wound Class: II-Clean Contaminated    Diagnosis: Esophageal Perforation   Diagnosis Additional Information: No value filed.    Anesthesia Type:   General, ETT, RSI     Note:  Airway :Face Mask  Patient transferred to:PACU  Comments: Pt. Pink and breathing spontaneously.  Vitals stable.  Report given to oncoming nurse.  Transfer of care occurred.Handoff Report: Identifed the Patient, Identified the Reponsible Provider, Reviewed the pertinent medical history, Discussed the surgical course, Reviewed Intra-OP anesthesia mangement and issues during anesthesia, Set expectations for post-procedure period and Allowed opportunity for questions and acknowledgement of understanding      Vitals: (Last set prior to Anesthesia Care Transfer)    CRNA VITALS  5/11/2018 1657 - 5/11/2018 1737      5/11/2018             Pulse: 105    SpO2: 99 %                Electronically Signed By: KATHRYN Avila CRNA  May 11, 2018  5:37 PM

## 2018-05-11 NOTE — IP AVS SNAPSHOT
Same Day Surgery [address] [address]    Phone:  [phone]                                       After Visit Summary   5/11/2018    Michael Saldivar    MRN: [id number]           After Visit Summary Signature Page     I have received my discharge instructions, and my questions have been answered. I have discussed any challenges I see with this plan with the nurse or doctor.    ..........................................................................................................................................  Patient/Patient Representative Signature      ..........................................................................................................................................  Patient Representative Print Name and Relationship to Patient    ..................................................               ................................................  Date                                            Time    ..........................................................................................................................................  Reviewed by Signature/Title    ...................................................              ..............................................  Date                                                            Time

## 2018-05-11 NOTE — BRIEF OP NOTE
[facility], [place]    Brief Operative Note    Pre-operative diagnosis: Esophageal Perforation   Post-operative diagnosis * No post-op diagnosis entered *  Procedure: Procedure(s):  Esophagogastroduodenoscopy Possible Esophageal Stent Removal - Wound Class: II-Clean Contaminated  Surgeon: Surgeon(s) and Role:     * Maria Del Rosario Flores MD - Primary     * Kam Duffy MD - Resident Assisting  Anesthesia: General   Estimated blood loss: None  Drains: None  Specimens: * No specimens in log *  Findings:   None.  Complications: None.  Implants: Materials Involved:  EndoMaxx fully covered esophageal stent  Grafts:  None.

## 2018-05-11 NOTE — PLAN OF CARE
Problem: Goal Outcome Summary  Goal: Goal Outcome Summary  Outcome: No Change  RN: Pt c/o heartburn/ gastric reflux comfortably managed with Mylanta/ Maalox. G-tube clamped with no request to unclamped as symptom was previously relieved with Mylanta/ Maalox.  Tolerating TF and H20 flushes per order via J-tube. Will turn off at 0645 for Combined EGD and replaced esophageal stent at 1450. Pt will be on clear liquids starting 0700- 1245 then NPO . See Patient care order. Chest tube to water seal without air leaks noted. Dressing on site CDI. Agree to have the 2nd Hibiclen shower at 0600 per order. Appear to be sleeping/resting between cares.  Transportation to p/u pt at 1215. Continue with plan of care.    0630: Hibiclen shower done. TF stopped at the same time. CT and GJ tube dressing got wet so dressings on both changed. [employer] anchor also applied for both. Placement had to be moved slightly d/t redness on previous locations. Pt reinstructed on Clear liquid and NPO order. Pt acknowledged.

## 2018-05-11 NOTE — ANESTHESIA POSTPROCEDURE EVALUATION
Patient: Michael Saldivar    Procedure(s):  Esophagogastroduodenoscopy Possible Esophageal Stent Removal - Wound Class: II-Clean Contaminated    Diagnosis:Esophageal Perforation   Diagnosis Additional Information: No value filed.    Anesthesia Type:  General, ETT, RSI    Note:  Anesthesia Post Evaluation    Patient location during evaluation: PACU  Patient participation: Able to fully participate in evaluation  Level of consciousness: awake  Pain management: adequate  Airway patency: patent  Cardiovascular status: blood pressure returned to baseline  Respiratory status: acceptable  Hydration status: euvolemic  PONV: none             Last vitals:  Vitals:    05/11/18 1310 05/11/18 1730 05/11/18 1745   BP: 124/72 128/77 120/77   Pulse: 94     Resp: 16 10 14   Temp: 37.3  C (99.1  F) 36.8  C (98.2  F)    SpO2: 97% 98% 97%         Electronically Signed By: Ashlee Montejo MD  May 11, 2018  6:03 PM

## 2018-05-11 NOTE — IP AVS SNAPSHOT
MRN:[id number]                      After Visit Summary   5/11/2018    Michael Saldivar    MRN: [id number]           Thank you!     Thank you for choosing [facility] for your care. Our goal is always to provide you with excellent care. Hearing back from our patients is one way we can continue to improve our services. Please take a few minutes to complete the written survey that you may receive in the mail after you visit with us. Thank you!        Patient Information     Date Of Birth          1960        About your hospital stay     You were admitted on:  May 11, 2018 You last received care in the:  Same Day Surgery [facility]    You were discharged on:  May 11, 2018        Reason for your hospital stay       You were admitted to the OR for revision of esophageal stent                  Who to Call     For medical emergencies, please call 911.  For non-urgent questions about your medical care, please call your primary care provider or clinic, [phone]  For questions related to your surgery, please call your surgery clinic        Attending Provider     Provider Specialty    Keith Ybarra MD Thoracic Surgery (Cardiothoracic Vascular Surgery)       Primary Care Provider Office Phone # Fax #    Jemal Michel [phone]      After Care Instructions     Activity       Your activity upon discharge: activity as tolerated            Diet       Follow this diet upon discharge: Clears advance to soft diet as tolerated, as discussed in clinic. Continue tube feed.            Discharge Instructions       Patient to follow up with Dr Ybarra in 2 weeks at the Thoracic Surgery Clinic                  Follow-up Appointments     Follow Up and recommended labs and tests       Follow with Dr. Ybarra in 2 weeks                  Further instructions from your care team       [facility]  Same-Day Surgery   Adult Discharge Orders & Instructions     For  24 hours after surgery    1. Get plenty of rest.  A responsible adult must stay with you for at least 24 hours after you leave the hospital.   2. Do not drive or use heavy equipment.  If you have weakness or tingling, don't drive or use heavy equipment until this feeling goes away.  3. Do not drink alcohol.  4. Avoid strenuous or risky activities.  Ask for help when climbing stairs.   5. You may feel lightheaded.  IF so, sit for a few minutes before standing.  Have someone help you get up.   6. If you have nausea (feel sick to your stomach): Drink only clear liquids such as apple juice, ginger ale, broth or 7-Up.  Rest may also help.  Be sure to drink enough fluids.  Move to a regular diet as you feel able.  7. You may have a slight fever. Call the doctor if your fever is over 100 F (37.7 C) (taken under the tongue) or lasts longer than 24 hours.  8. You may have a dry mouth, a sore throat, muscle aches or trouble sleeping.  These should go away after 24 hours.  9. Do not make important or legal decisions.   Call your doctor for any of the followin.  Signs of infection (fever, growing tenderness at the surgery site, a large amount of drainage or bleeding, severe pain, foul-smelling drainage, redness, swelling).    2. It has been over 8 to 10 hours since surgery and you are still not able to urinate (pass water).    3.  Headache for over 24 hours.    4.  Numbness, tingling or weakness the day after surgery (if you had spinal anesthesia).  To contact a doctor, call ___Dr. Ybarra's office @[phone]_________ or:    X   [phone] and ask for the resident on call for ___Thoracic Surgery______ (answered 24 hours a day)  X    Emergency Department: [facility]: [phone]       (TTY for hearing impaired: [phone])            Pending Results     No orders found from 2018 to 2018.            Statement of Approval     Ordered          18 6606  I have reviewed and agree with all the  "recommendations and orders detailed in this document.  EFFECTIVE NOW     Approved and electronically signed by:  Kam Duffy MD           18 1812  I have reviewed and agree with all the recommendations and orders detailed in this document.  EFFECTIVE NOW     Approved and electronically signed by:  Kam Duffy MD             Admission Information     Date & Time Department Dept. Phone    2018 Same Day Surgery [facility] [phone]      Your Vitals Were     Blood Pressure Pulse Temperature Respirations Height Weight    126/74 94 98.2  F (36.8  C) (Oral) 14 1.676 m (5' 6\") 75.6 kg (166 lb 10.7 oz)    Pulse Oximetry BMI (Body Mass Index)                94% 26.9 kg/m2          [email or web address] Information     [facility] lets you send messages to your doctor, view your test results, renew your prescriptions, schedule appointments and more. To sign up, go to [email or web address] . Click on \"Log in\" on the left side of the screen, which will take you to the Welcome page. Then click on \"Sign up Now\" on the right side of the page.     You will be asked to enter the access code listed below, as well as some personal information. Please follow the directions to create your username and password.     Your access code is: [email or web address]  Expires: 2018 12:07 PM     Your access code will  in 90 days. If you need help or a new code, please call your [facility] clinic or [phone].        Care EveryWhere ID     This is your Care EveryWhere ID. This could be used by other organizations to access your [facility] medical records  [id number]        Equal Access to Services     [facility] AH: Hadii sunny Segal, waaxda luqadaha, qaybta kaalmalexie fofana. So [facility] [phone].    ATENCIÓN: Si habla español, tiene a patel disposición servicios gratuitos de asistencia lingüística. Llame al [phone].    We comply with applicable federal civil rights laws and " [place] laws. We do not discriminate on the basis of race, color, national origin, age, disability, sex, sexual orientation, or gender identity.               Review of your medicines      CONTINUE these medicines which may have CHANGED, or have new prescriptions. If we are uncertain of the size of tablets/capsules you have at home, strength may be listed as something that might have changed.        Dose / Directions    acetaminophen 32 mg/mL solution   Commonly known as:  TYLENOL   This may have changed:  how much to take   Used for:  Esophageal perforation        Dose:  650 mg   20.3 mLs (650 mg) by Per J Tube route every 6 hours as needed for mild pain or fever   Quantity:  473 mL   Refills:  0       fiber modular packet   This may have changed:  how much to take   Used for:  Esophageal perforation        Dose:  1 packet   1 packet by Per J Tube route 2 times daily   Quantity:  75 packet   Refills:  0         CONTINUE these medicines which have NOT CHANGED        Dose / Directions    alum & mag hydroxide-simethicone 200-200-20 MG/5ML Susp suspension   Commonly known as:  MYLANTA/MAALOX        Dose:  30 mL   Take 30 mLs by mouth every 4 hours as needed for indigestion   Refills:  0       amoxicillin-clavulanate 400-57 MG/5ML suspension   Commonly known as:  AUGMENTIN   Indication:  esophageal perforation   Used for:  Esophageal perforation        Dose:  875 mg   10.9 mLs (875 mg) by Per J Tube route 2 times daily   Quantity:  300 mL   Refills:  0       diphenoxylate-atropine 2.5-0.025 MG/5ML liquid   Commonly known as:  LOMOTIL        Dose:  5 mL   Take 5 mLs by mouth 4 times daily as needed for diarrhea   Refills:  0       fluconazole 40 MG/ML suspension   Commonly known as:  DIFLUCAN   Indication:  esophageal perforation   Used for:  Esophageal perforation        Dose:  200 mg   5 mLs (200 mg) by Per J Tube route daily   Quantity:  105 mL   Refills:  0       * loperamide 1 MG/5ML liquid   Commonly known as:   IMODIUM   Used for:  Bowel habit changes        Dose:  4 mg   20 mLs (4 mg) by Per J Tube route 4 times daily   Quantity:  200 mL   Refills:  0       * loperamide 1 MG/5ML liquid   Commonly known as:  IMODIUM   Used for:  Esophageal perforation        Dose:  2 mg   10 mLs (2 mg) by Per J Tube route 4 times daily as needed for diarrhea   Quantity:  236 mL   Refills:  1       multivitamins with minerals Liqd liquid   Used for:  Esophageal perforation        Dose:  15 mL   15 mLs by Per Feeding Tube route daily   Quantity:  2 Bottle   Refills:  0       ondansetron 4 MG ODT tab   Commonly known as:  ZOFRAN-ODT        Dose:  4 mg   Take 4 mg by mouth every 6 hours as needed for nausea   Refills:  0       order for DME   Used for:  Esophageal perforation        Equipment being ordered:  [facility] [place] Suction Machine Suction Canister-2 Suction Connect Tube-2 5 in 1 Connector-2 Bacteria Filter-2  Treatment Diagnosis: Esophogeal Perforation   Quantity:  1 Month   Refills:  0       order for DME   Used for:  Esophageal perforation        Equipment being ordered: Other: suction and cannister for pharyngostomy tube Treatment Diagnosis: Esophageal perforation   Quantity:  7 Container   Refills:  0       pantoprazole Susp suspension   Commonly known as:  PROTONIX        Dose:  40 mg   Take 40 mg by mouth daily   Refills:  0       * Notice:  This list has 2 medication(s) that are the same as other medications prescribed for you. Read the directions carefully, and ask your doctor or other care provider to review them with you.             Protect others around you: Learn how to safely use, store and throw away your medicines at www.disposemymeds.org.             Medication List: This is a list of all your medications and when to take them. Check marks below indicate your daily home schedule. Keep this list as a reference.      Medications           Morning Afternoon Evening Bedtime As Needed    acetaminophen 32 mg/mL solution   Commonly  known as:  TYLENOL   20.3 mLs (650 mg) by Per J Tube route every 6 hours as needed for mild pain or fever                                alum & mag hydroxide-simethicone 200-200-20 MG/5ML Susp suspension   Commonly known as:  MYLANTA/MAALOX   Take 30 mLs by mouth every 4 hours as needed for indigestion                                amoxicillin-clavulanate 400-57 MG/5ML suspension   Commonly known as:  AUGMENTIN   10.9 mLs (875 mg) by Per J Tube route 2 times daily                                diphenoxylate-atropine 2.5-0.025 MG/5ML liquid   Commonly known as:  LOMOTIL   Take 5 mLs by mouth 4 times daily as needed for diarrhea                                fiber modular packet   1 packet by Per J Tube route 2 times daily                                fluconazole 40 MG/ML suspension   Commonly known as:  DIFLUCAN   5 mLs (200 mg) by Per J Tube route daily                                * loperamide 1 MG/5ML liquid   Commonly known as:  IMODIUM   20 mLs (4 mg) by Per J Tube route 4 times daily                                * loperamide 1 MG/5ML liquid   Commonly known as:  IMODIUM   10 mLs (2 mg) by Per J Tube route 4 times daily as needed for diarrhea                                multivitamins with minerals Liqd liquid   15 mLs by Per Feeding Tube route daily                                ondansetron 4 MG ODT tab   Commonly known as:  ZOFRAN-ODT   Take 4 mg by mouth every 6 hours as needed for nausea                                order for DME   Equipment being ordered:  [facility] [place] Suction Machine Suction Canister-2 Suction Connect Tube-2 5 in 1 Connector-2 Bacteria Filter-2  Treatment Diagnosis: Esophogeal Perforation                                order for DME   Equipment being ordered: Other: suction and cannister for pharyngostomy tube Treatment Diagnosis: Esophageal perforation                                pantoprazole Susp suspension   Commonly known as:  PROTONIX   Take 40 mg by mouth daily                                 * Notice:  This list has 2 medication(s) that are the same as other medications prescribed for you. Read the directions carefully, and ask your doctor or other care provider to review them with you.

## 2018-05-11 NOTE — PLAN OF CARE
Problem: Goal Outcome Summary  Goal: Goal Outcome Summary  Outcome: No Change  All medications were given this morning except Multivitamin and Nutrisource fiber in preparation for EGD procedure and possible chest tube removal by Thoracic. Pt had Hibiclens shower last evening and early this morning. Dressing was changed to the GT and chest tube sites by the outgoing RN. Pt is expected to be picked up at 1215. No oral intake noted. Remains independent in the room with quiet demeanor. No complaints of discomfort.

## 2018-05-11 NOTE — OR NURSING
Patient arrived to Phase II Recovery s/p EGD with stent exchange.  Pt awake, alert & oriented, VSS.  Denies pain or nausea. Pt was on 1L per NC, Weaned O2 to off with SpO2 93-94%, pt using incentive spirometer to 1000.  Meets criteria to discharge back to [facility]. NPO while here, has had no ice chips.  Report called to [facility] JANELLE Ashraf, he will contact primary team regarding ?restart tube feed.  PIV left in place, called rehab to question removal--they will remove if it is not needed there.    Pt left via [facility] at [address] in stable condition.

## 2018-05-11 NOTE — PLAN OF CARE
Problem: Goal Outcome Summary  Goal: Goal Outcome Summary  RN: Pt A/O x4, VSS. Pt reports having loose stool twice this shift. Pt had shower with Hibiclens this evening. Dressing changed to GJ tube and chest tube sites. TF started at 20:00 as ordered through the J tube without issues. G tube remains clamped, pt denies stoma discomfort or pain. Chest tube to water seal without air leaking, no out put from chest tube. Will continue with POC.

## 2018-05-12 PROCEDURE — 25000125 ZZHC RX 250: Performed by: PHYSICIAN ASSISTANT

## 2018-05-12 PROCEDURE — 27210437 ZZH NUTRITION PRODUCT SEMIELEM INTERMED LITER

## 2018-05-12 PROCEDURE — 12000022 ZZH R&B SNF

## 2018-05-12 PROCEDURE — 25000132 ZZH RX MED GY IP 250 OP 250 PS 637: Performed by: PHYSICIAN ASSISTANT

## 2018-05-12 PROCEDURE — 00000146 ZZHCL STATISTIC GLUCOSE BY METER IP

## 2018-05-12 RX ADMIN — AMOXICILLIN AND CLAVULANATE POTASSIUM 875 MG: 400; 57 POWDER, FOR SUSPENSION ORAL at 10:15

## 2018-05-12 RX ADMIN — ALUMINUM HYDROXIDE, MAGNESIUM HYDROXIDE, AND DIMETHICONE 30 ML: 400; 400; 40 SUSPENSION ORAL at 18:24

## 2018-05-12 RX ADMIN — Medication 2 PACKET: at 20:47

## 2018-05-12 RX ADMIN — ONDANSETRON 4 MG: 4 TABLET, ORALLY DISINTEGRATING ORAL at 21:03

## 2018-05-12 RX ADMIN — ALUMINUM HYDROXIDE, MAGNESIUM HYDROXIDE, AND DIMETHICONE 30 ML: 400; 400; 40 SUSPENSION ORAL at 10:15

## 2018-05-12 RX ADMIN — LOPERAMIDE HYDROCHLORIDE 4 MG: 1 SOLUTION ORAL at 13:03

## 2018-05-12 RX ADMIN — MULTIVITAMIN 15 ML: LIQUID ORAL at 08:47

## 2018-05-12 RX ADMIN — AMOXICILLIN AND CLAVULANATE POTASSIUM 875 MG: 400; 57 POWDER, FOR SUSPENSION ORAL at 20:48

## 2018-05-12 RX ADMIN — LOPERAMIDE HYDROCHLORIDE 4 MG: 1 SOLUTION ORAL at 18:25

## 2018-05-12 RX ADMIN — PANTOPRAZOLE SODIUM 40 MG: 40 TABLET, DELAYED RELEASE ORAL at 08:47

## 2018-05-12 RX ADMIN — Medication 2 PACKET: at 13:04

## 2018-05-12 RX ADMIN — FLUCONAZOLE 200 MG: 40 POWDER, FOR SUSPENSION ORAL at 08:47

## 2018-05-12 RX ADMIN — Medication 2 PACKET: at 08:47

## 2018-05-12 RX ADMIN — LOPERAMIDE HYDROCHLORIDE 4 MG: 1 SOLUTION ORAL at 08:47

## 2018-05-12 NOTE — PLAN OF CARE
Problem: Goal Outcome Summary  Goal: Goal Outcome Summary  Patient is alert and oriented and able to make his needs known. Patient with flat affect, soft spoken. Skin intact. GJ dressing changed, site WNL. Patient tolerating all medications and flushes via J-tube without difficulty. Patient on clear liquid diet. Chest tube site WNL with some scabbing around site. Area cleansed and new dressing applied. Chest tube to air seal. 20cc of milky, pink drainage out this shift. Lungs clear bilaterally throughout and patient denied any difficulty with shortness of breath. Patient is up independently in his room and is continent of bowel and bladder. He reported a bowel movement this shift and has been voiding spontaneously. Writer removed left hand PIV today due to non-use. He had it placed for procedure yesterday, but does not utilize it on unit. He requested PRN Maalox for heartburn today, which was effective for him. Patient denied any difficulty with pain this shift. Care plan reviewed and adjusted.

## 2018-05-12 NOTE — PLAN OF CARE
Problem: Goal Outcome Summary  Goal: Goal Outcome Summary  RN: Pt A/O x4. Slept well over night. No new concerns or issues. G tube clamped at all the time, pt denies nausea or bloating. Pt also denies pain and has no complaints.

## 2018-05-12 NOTE — PHARMACY-CONSULT NOTE
Pharmacy Tube Feeding Consult    Medication reviewed for administration by feeding tube and for potential food/drug interactions.    Recommendation: No changes are needed at this time.     Pharmacy will continue to follow as new medications are ordered.    Darrian Rapp, PharmD

## 2018-05-12 NOTE — PROGRESS NOTES
18 1500   Living Arrangements   Lives With alone   Living Arrangements house   Discharge Needs Assessment   Transportation Available family or friend will provide     Social Work: Initial Assessment with Discharge Plan    Patient Name: Michael Saldivar  : 1960  Age: 57 year old  Completed assessment with: patient & daughter  Admitted to TCU: 18    Presenting Information   Date of SW assessment: 18  Health Care Directive: none  Primary Health Care Agent: next-of-kin would be surrogate decision-maker if necessary  Secondary Health Care Agent: n/a  Living Situation: lives alone in house in [facility]  Previous Functional Status: previously was working and independent  DME available: walker  Patient and family understanding of hospitalization: stent out  Cultural/Language/Spiritual Considerations: speaks English  Abuse concerns: none  PAS: confirmation number-[id number]  BIMS: score of 15 (cognition intact)  PHQ-9: score of 3 (minimal depressive symptoms)    Physical Health  Esophagogastroduodenoscopy, esophageal stent removal     Provider Information   Primary Care Physician: Jemal Michel    [facility]:  Diagnosis: none  Current Support/Services: n/a  Previous Services: n/a  Services Needed/Recommended: health psychologist available during stay    Chemical Dependency:   Diagnosis: none  Current Support/Services: n/a  Previous Services: n/a  Services Needed/Recommended: none    Support System  Marital Status: single  Family support: brother-Michael ([place]), daughter-Juanis ([place]), niece-Sarah Beth ([place])  Other support available: relatives  Gaps in support system: several family members live in [place] which is about 30 miles from his home    Community Resources  Current in home services: none  Previous services: none  [facility] and [facility] were contacted regarding home care services prior to placement at [facility]    Financial/Employment/Education  Employment Status: construction job,  "hasn't worked for a long time  Income Source: past wages  Education: high school  Financial Concerns: limited income; daughter said she has been paying patient's bills with her own money  Insurance: [place] effective 3/1/18    Discharge Plan   Patient and family discharge goal: return home with home care services  Barriers to discharge: medical complexity  Disposition: to be determined  Transportation Needs: brother likely can give him a ride home  Name of Transportation Company and Phone: eligible through [place]?    Additional comments   D:  See H&P for full medical background.  I:  Met with patient to complete assessment and social work consult.  Reviewed health care directive and offered for  staff to assist with filling it out if patient wanted that assistance, but patient declined booklet saying he was given one in the hospital.  Of note, patient displayed discomfort with document which daughter feels is due to inability to read.  Patient okay with unit sw following up another time during stay.  A:  Patient is doing okay.  Supportive family, but [place] all [place] and didn't know how much they can assist him.  Daughter visiting today, mentioned being \"on vacation until summer\".  She said patient \"never tells her what's going on\" and would like to participate in discharge planning conversations.  Daughter said that her uncle Micheal is the one contacted by staff (only his name is on face sheet).  Patient okay with daughter and niece (Sarah Beth) being listed on face sheet.  He may be eligible for waivered services under [place].    P:  SW will follow and assist as needed.    Writer introduced self and explained role.   provided patient with printout of orders and initial care plan goals which were given on 5/12 for patient for review.  Writer explained this is an overview and available to answer questions as able.  If further questions, then writer can direct patient to the nurse, provider, therapy staff or " interdisciplinary team member as needed.  Patient didn't have any questions at this time.  Copies of orders and care plan placed in chart.      MARY Fontana  Weekend   Acute Rehab Unit Phone: [phone]  Transitional Care Unit Phone: [phone]

## 2018-05-12 NOTE — PLAN OF CARE
Pt returned from same day procedure after supper,  EGD with stent exchange. Alert and oriented x 4, conversant No c/o pain or discomfort. No nausea. Chest tube still in place. Cycled TF, impact peptide started per order,  running via J tube. G tube clamped, no abdominal discomfort or bloating. PIV in place to left arm, will DC in the morning. Call within reach.

## 2018-05-12 NOTE — PROGRESS NOTES
Nutrition Services - Brief Note    Consult received - Provider Order - Registered Dietitian to Assess and Order TF per Medical Nutrition Therapy Protocol.    Patient initially admitted to TCU 4/20. Same day surgery appointment yesterday, 5/11, for EGD with stent exchange.    Has since resumed EN regimen and is tolerating at goal. Has been followed by RD surrounding admission, with most recent note dated 5/9.    No new interventions implemented at this time. RD to continue to follow per protocol.     Sarai Mcgraw RD, LD  Weekend/On-call Pager: [phone]

## 2018-05-13 ENCOUNTER — APPOINTMENT (OUTPATIENT)
Dept: LAB | Facility: CLINIC | Age: 58
End: 2018-05-13
Attending: INTERNAL MEDICINE
Payer: MEDICAID

## 2018-05-13 LAB — GLUCOSE BLDC GLUCOMTR-MCNC: 151 MG/DL (ref 70–99)

## 2018-05-13 PROCEDURE — 12000022 ZZH R&B SNF

## 2018-05-13 PROCEDURE — 27210437 ZZH NUTRITION PRODUCT SEMIELEM INTERMED LITER

## 2018-05-13 PROCEDURE — 99309 SBSQ NF CARE MODERATE MDM 30: CPT | Performed by: PHYSICIAN ASSISTANT

## 2018-05-13 PROCEDURE — 00000146 ZZHCL STATISTIC GLUCOSE BY METER IP

## 2018-05-13 PROCEDURE — 25000132 ZZH RX MED GY IP 250 OP 250 PS 637: Performed by: PHYSICIAN ASSISTANT

## 2018-05-13 RX ADMIN — AMOXICILLIN AND CLAVULANATE POTASSIUM 875 MG: 400; 57 POWDER, FOR SUSPENSION ORAL at 09:24

## 2018-05-13 RX ADMIN — AMOXICILLIN AND CLAVULANATE POTASSIUM 875 MG: 400; 57 POWDER, FOR SUSPENSION ORAL at 20:21

## 2018-05-13 RX ADMIN — PANTOPRAZOLE SODIUM 40 MG: 40 TABLET, DELAYED RELEASE ORAL at 09:24

## 2018-05-13 RX ADMIN — LOPERAMIDE HYDROCHLORIDE 4 MG: 1 SOLUTION ORAL at 17:45

## 2018-05-13 RX ADMIN — Medication 2 PACKET: at 20:21

## 2018-05-13 RX ADMIN — MULTIVITAMIN 15 ML: LIQUID ORAL at 09:24

## 2018-05-13 RX ADMIN — LOPERAMIDE HYDROCHLORIDE 4 MG: 1 SOLUTION ORAL at 09:25

## 2018-05-13 RX ADMIN — FLUCONAZOLE 200 MG: 40 POWDER, FOR SUSPENSION ORAL at 09:24

## 2018-05-13 RX ADMIN — LOPERAMIDE HYDROCHLORIDE 4 MG: 1 SOLUTION ORAL at 14:19

## 2018-05-13 RX ADMIN — LOPERAMIDE HYDROCHLORIDE 4 MG: 1 SOLUTION ORAL at 22:00

## 2018-05-13 RX ADMIN — Medication 2 PACKET: at 09:25

## 2018-05-13 RX ADMIN — Medication 2 PACKET: at 14:19

## 2018-05-13 NOTE — PLAN OF CARE
"Problem: Goal Outcome Summary  Goal: Goal Outcome Summary  Outcome: No Change  Pt remains independent with  Mobility. Complained of frequent stool during the night, Stated \"I went every hour since 2 am until 7 am\". Pt was offered Lomotil in addition to Loperamide at 0900.  Pt refused , stated \"I think it's getting better now, since they hooked me up to the tube\". GT was connected to gravity but pt agreed to have it capped off at 0900. All medications were given via the J-tube without any complaints of nausea or abdominal discomfort or bloating. Dressing changed to the G-J tube and the chest tube sites, scant amount of drainage noted. No respiratory distress noted.     1500: Order written by JANETH BLANCO for chest x-ray to ensure that stent is not displaced due to complaints of bloating and abdominal discomfort last evening.   "

## 2018-05-13 NOTE — PROGRESS NOTES
Internal Medicine Progress Note      ASSESSMENT & PLAN   Michael Saldivar is a 57 year old male with a history of GERD who was admitted to [facility] 3/18-4/6/18 for Boerhaave's esophageal perforation felt to be 2/2 forceful vomiting from a viral illness now s/p left thoracotomy w/ repair of esophageal perforation. Post op course c/b esophageal leak into the L pleural cavity requiring pharyngostomy tube placement and gastropexy on 3/28. Transferred to  TCU 4/6/18 for nutrition and drain care.     Boerhaave Esophageal Perforation.  S/p left thoracotomy, chest tube placement (x3 on L and x1 on R), PEG placement and repair of esophageal perforation on 3/18/18 by Dr. Ybarra. Post op course c/b persistent leak from esophagus to left pleural cavity. S/p pharyngostomy tube placement (removed 4/20) and gastropexy on 3/28. IR added jejunal limb to G tube on 3/30. Currently w/ 1 chest tube on the L to water seal with minimal OP. Repeat EGD 4/20 showed small persistent cavity likely 2/2 distal stricture therefore an esophageal stent was placed. Empirically treated w/ fluconazole 3/18-present, Zosyn 3/18-4/2, and Augmentin 4/2-present. Thoracic surgery follow up w repeat EGD 5/11, details not available but appears they exchanged his esophageal stent. Chest tube remains in tact d/t output.  Patient noted increased nausea and bloating following EGD. G tube had to be placed back to gravity. Mild epigastric pain noted. ? Possible stent could have migrated.   - CXR today to assess placement of stent  - Cont clear liquid diet  - G tube to gravity for symptoms, otherwise OK to cap.   - J tube for TF and meds  - Cont L chest tube to water seal.   - Cont Augmentin and Fluconazole - stop date TBD by Dr Ybarra  - Tylenol PRN for pain  - Will need to contact thoracic team to discuss further recommendations re: CT and follow up     Abnormal Liver Biochemistries, resolved.  Intermittently elevated iAP, ALT and AST since 3/26. RUQ US on  4/26 showed midlly echogenic liver w/ coarsened echo texture c/w steatosis. Possibly related to fluconazole although has improved with changes to regimen. LFTs remain stable.  - Monitor LFT's every M/Th  - Per Dr Amada jain to switch to Micafungin if ongoing LFT abnormalities  - F/u with PCP on discharge to evaluate steatosis     Chronic Loose Stools.  Likely d/t TFs and antibiotics. C diff PCR neg x 2, last on 4/16. Overall improved.   - Continue fiber BID, Imodium QID, Lomotil PRN.       GERD. Continue daily PPI. Consider increasing to BID if ongoing epigastric pain      Chronic Normocytic Anemia. Hgb 11.7 on hospital admit w/ unknown baseline prior to that. Most recently 9.7. Acute drop was likely d/t surgery/infection/inflammation.   - Trending weekly hgb   - No further w/u indicated      Pain Assessment:  Current Pain Score 5/12/2018   Patient currently in pain? denies   Pain location -   Pain descriptors -   Michael ames pain level was assessed and he currently denies pain.      Diet: Adult Formula Drip Feeding: Specified Time Impact Peptide 1.5; Jejunostomy; Goal Rate: 120; mL/hr; From: 8:00 PM; 8:00 AM; Medication - Tube Feeding Flush Frequency: At least 15-30 mL water before and after medication administration and with tube ...  Clear Liquid Diet  Snacks/Supplements Adult: Ensure Clear; Between Meals  Fluids: None  DVT Prophylaxis: Low Risk/Ambulatory with no VTE prophylaxis indicated  Code Status: Full Code    Disposition Plan   Expected discharge: Pending Thoracic surgery follow up and removal of chest tube.     Entered: Abdiel Olivares 05/13/2018, 2:46 PM   Information in the above section will display in the discharge planner report.      The patient's care was discussed with the Attending Physician, Dr. Dhaliwal.    Abdiel Olivares PA-C  Internal Medicine Staff Hospitalist Service  [facility]  Pager: [phone]  Please see sticky note for cross cover  information  _____________________________________________________________    Interval History   Patient reports increased nausea, bloating and epigastric pain since EGD on 5/11. G tube placed back to gravity with immediate output and subsequent improvement in symptoms, although they have not completely resolved. Denies any fevers or chills. No vomiting. +BM.     ROS:  Pulm, CV, GI and  performed and negative unless otherwise noted above.       Data reviewed today: I reviewed all medications, new labs and imaging results over the last 24 hours.     Physical Exam   Vital Signs: Temp: 96.2  F (35.7  C) Temp src: Oral BP: 123/65 Pulse: 105   Resp: 20 SpO2: 97 % O2 Device: None (Room air)    Weight: 167 lbs 12.8 oz    GENERAL:  Awake. Alert. Oriented x 3. NAD.   HEENT: No scleral icterus. Mucous membranes moist.   CV: RRR. S1, S2. No murmurs appreciated.   RESPIRATORY: Lungs CTAB with no wheezing, rales, rhonchi.   GI: Abdomen soft, non-distended. Active bowel sounds. No tenderness.    NEUROLOGICAL: No focal deficits.Moves all extremities.    EXTREMITIES: No peripheral edema. Intact bilateral pedal pulses.   SKIN: No jaundice. No rashes.            Data     ROUTINE IP LABS (Last four results)  CMP     Recent Labs  Lab 05/10/18  0729 05/07/18  0716    137   POTASSIUM 4.1 4.0   CHLORIDE 104 102   CO2 28 28   ANIONGAP 6 7   * 111*   BUN 28 22   CR 0.59* 0.59*   MU 8.7 8.7   MAG  --  2.3   PHOS  --  2.4*   PROTTOTAL 6.9 7.0   ALBUMIN 2.9* 2.8*   BILITOTAL 0.2 0.4   ALKPHOS 140 134   AST 40 37   * 88*     CBC     Recent Labs  Lab 05/07/18  0716   WBC 11.4*   RBC 5.13   HGB 9.8*   HCT 35.4*   MCV 69*   MCH 19.1*   MCHC 27.7*   RDW 17.9*   *     INR No lab results found in last 7 days.      All labs personally reviewed in [facility].  See A&P for additional results.     Unresulted Labs Ordered in the Past 30 Days of this Admission     Date and Time Order Name Status Description    3/18/2018 1608 Plasma  prepare order unit In process

## 2018-05-13 NOTE — PLAN OF CARE
Pt alert and oriented x 4. Able to make needs known. C/o of epigastric discomfort, Maalox given x 1. Later this shift, c/o nausea & abdominal discomfort; G tube connected to gravity drainage, 250+ ml greenish drainage notned within/< one hr. PRN Zofran given-effective. Cycled TF started around 2100 due to abdominal discomfort & nausea, tolerating well so far. Call within reach.

## 2018-05-13 NOTE — PLAN OF CARE
Problem: Goal Outcome Summary  Goal: Goal Outcome Summary  Patient alert and oriented x 4. Patient sleeps in between cares. GT intact and to gravity- no complaints of pain an discomfort or any N/V noted. L chest tube intact- no respiratory distress noted. Patient calls staff @ night to assist with his tubing during toileting. TF running @ 120 ml/hr infusing via JT and water flushes-patient tolerating well- new bag hung this shift. Will continue to monitor patient's status.

## 2018-05-14 ENCOUNTER — APPOINTMENT (OUTPATIENT)
Dept: LAB | Facility: CLINIC | Age: 58
End: 2018-05-14
Attending: INTERNAL MEDICINE
Payer: MEDICAID

## 2018-05-14 LAB
ALBUMIN SERPL-MCNC: 2.6 G/DL (ref 3.4–5)
ALP SERPL-CCNC: 112 U/L (ref 40–150)
ALT SERPL W P-5'-P-CCNC: 41 U/L (ref 0–70)
ANION GAP SERPL CALCULATED.3IONS-SCNC: 6 MMOL/L (ref 3–14)
AST SERPL W P-5'-P-CCNC: 14 U/L (ref 0–45)
BILIRUB SERPL-MCNC: 0.5 MG/DL (ref 0.2–1.3)
BUN SERPL-MCNC: 20 MG/DL (ref 7–30)
CALCIUM SERPL-MCNC: 9 MG/DL (ref 8.5–10.1)
CHLORIDE SERPL-SCNC: 106 MMOL/L (ref 94–109)
CO2 SERPL-SCNC: 29 MMOL/L (ref 20–32)
CREAT SERPL-MCNC: 0.53 MG/DL (ref 0.66–1.25)
ERYTHROCYTE [DISTWIDTH] IN BLOOD BY AUTOMATED COUNT: 17.4 % (ref 10–15)
GFR SERPL CREATININE-BSD FRML MDRD: >90 ML/MIN/1.7M2
GLUCOSE BLDC GLUCOMTR-MCNC: 91 MG/DL (ref 70–99)
GLUCOSE SERPL-MCNC: 138 MG/DL (ref 70–99)
HCT VFR BLD AUTO: 35.9 % (ref 40–53)
HGB BLD-MCNC: 9.7 G/DL (ref 13.3–17.7)
MAGNESIUM SERPL-MCNC: 2 MG/DL (ref 1.6–2.3)
MCH RBC QN AUTO: 18.8 PG (ref 26.5–33)
MCHC RBC AUTO-ENTMCNC: 27 G/DL (ref 31.5–36.5)
MCV RBC AUTO: 70 FL (ref 78–100)
PHOSPHATE SERPL-MCNC: 2.1 MG/DL (ref 2.5–4.5)
PLATELET # BLD AUTO: 471 10E9/L (ref 150–450)
POTASSIUM SERPL-SCNC: 3.9 MMOL/L (ref 3.4–5.3)
PROT SERPL-MCNC: 6.6 G/DL (ref 6.8–8.8)
RBC # BLD AUTO: 5.15 10E12/L (ref 4.4–5.9)
SODIUM SERPL-SCNC: 141 MMOL/L (ref 133–144)
WBC # BLD AUTO: 12.6 10E9/L (ref 4–11)

## 2018-05-14 PROCEDURE — 27210437 ZZH NUTRITION PRODUCT SEMIELEM INTERMED LITER

## 2018-05-14 PROCEDURE — 25000128 H RX IP 250 OP 636

## 2018-05-14 PROCEDURE — 99309 SBSQ NF CARE MODERATE MDM 30: CPT | Performed by: NURSE PRACTITIONER

## 2018-05-14 PROCEDURE — 25000128 H RX IP 250 OP 636: Performed by: NURSE PRACTITIONER

## 2018-05-14 PROCEDURE — 84100 ASSAY OF PHOSPHORUS: CPT | Performed by: PHYSICIAN ASSISTANT

## 2018-05-14 PROCEDURE — 85027 COMPLETE CBC AUTOMATED: CPT | Performed by: PHYSICIAN ASSISTANT

## 2018-05-14 PROCEDURE — 25000125 ZZHC RX 250: Performed by: NURSE PRACTITIONER

## 2018-05-14 PROCEDURE — 80053 COMPREHEN METABOLIC PANEL: CPT | Performed by: PHYSICIAN ASSISTANT

## 2018-05-14 PROCEDURE — 25000132 ZZH RX MED GY IP 250 OP 250 PS 637: Performed by: PHYSICIAN ASSISTANT

## 2018-05-14 PROCEDURE — 83735 ASSAY OF MAGNESIUM: CPT | Performed by: PHYSICIAN ASSISTANT

## 2018-05-14 PROCEDURE — 36415 COLL VENOUS BLD VENIPUNCTURE: CPT | Performed by: PHYSICIAN ASSISTANT

## 2018-05-14 PROCEDURE — 12000022 ZZH R&B SNF

## 2018-05-14 RX ORDER — SODIUM CHLORIDE 9 MG/ML
INJECTION, SOLUTION INTRAVENOUS
Status: COMPLETED
Start: 2018-05-14 | End: 2018-05-14

## 2018-05-14 RX ADMIN — MULTIVITAMIN 15 ML: LIQUID ORAL at 09:14

## 2018-05-14 RX ADMIN — LOPERAMIDE HYDROCHLORIDE 4 MG: 1 SOLUTION ORAL at 20:06

## 2018-05-14 RX ADMIN — SODIUM PHOSPHATE, MONOBASIC, MONOHYDRATE AND SODIUM PHOSPHATE, DIBASIC, ANHYDROUS 15 MMOL: 276; 142 INJECTION, SOLUTION INTRAVENOUS at 12:34

## 2018-05-14 RX ADMIN — Medication 2 PACKET: at 09:14

## 2018-05-14 RX ADMIN — SODIUM CHLORIDE 500 ML: 9 INJECTION, SOLUTION INTRAVENOUS at 14:02

## 2018-05-14 RX ADMIN — LOPERAMIDE HYDROCHLORIDE 4 MG: 1 SOLUTION ORAL at 12:39

## 2018-05-14 RX ADMIN — Medication 2 PACKET: at 14:00

## 2018-05-14 RX ADMIN — LOPERAMIDE HYDROCHLORIDE 4 MG: 1 SOLUTION ORAL at 16:47

## 2018-05-14 RX ADMIN — AMOXICILLIN AND CLAVULANATE POTASSIUM 875 MG: 400; 57 POWDER, FOR SUSPENSION ORAL at 09:14

## 2018-05-14 RX ADMIN — AMOXICILLIN AND CLAVULANATE POTASSIUM 875 MG: 400; 57 POWDER, FOR SUSPENSION ORAL at 20:06

## 2018-05-14 RX ADMIN — Medication 2 PACKET: at 20:06

## 2018-05-14 RX ADMIN — PANTOPRAZOLE SODIUM 40 MG: 40 TABLET, DELAYED RELEASE ORAL at 09:14

## 2018-05-14 RX ADMIN — FLUCONAZOLE 200 MG: 40 POWDER, FOR SUSPENSION ORAL at 09:14

## 2018-05-14 NOTE — PROGRESS NOTES
Transitional Care Unit   Internal Medicine Progress Note   Date of Service: 5/14/2018    Patient: Michael Saldivar  MRN: [id number]  Admission Date: 5/11/2018  TCU Day # 3            Assessment & Plan:   Michael Saldivar is a 57 year old male with a history of GERD who was admitted to [facility] 3/18-4/6/18 for Boerhaave's esophageal perforation felt to be 2/2 forceful vomiting from a viral illness now s/p left thoracotomy w/ repair of esophageal perforation. Post op course c/b esophageal leak into the L pleural cavity requiring pharyngostomy tube placement and gastropexy on 3/28. Transferred to  TCU 4/6/18 for nutrition and drain care.    Boerhaave Esophageal Perforation - S/p left thoracotomy, chest tube placement (x 3 on L and x1 on R), pEG placement and repair of esophogeal perforation on 3/18/18 by Dr. Ybarra.  Post op course c.b persistent leak from esophagus to left pleural cavity.  S/p pharyngostomy tube placement ([facility] 4/20) and gastropexy (3/28).  ID added jejunal limb to G tube on 3/30.  Currently with 1 chest tube on L to water seal with minimal OP.  Repeat EGD 4/20 noted small, persistent cavity likely 2/2 distal stricture therefore an esophageal stent was placed.  Empirically treated with Fluconazole 3/18-present, Zosyn 3/18-4.2, and Augmentin 4/2-present.  Thoracic surgery f/u with repeat EGD 5/11, details not charted, appears as though then exchanged his esophageal stent.  Chest tube remains intact.  Increased nausea and bloating following EGD, G tube placed to gravity.  XR (5/13) demonstrated stent in good position.  Pt notes pain in epigastric area that is intermittent.  Ordered for CLD but minimal PO intake.   - I spoke with Dr. Ybarra today: plan for repeat EGD this week, OK to advance to soft diet as tolerated  - G tube to gravity for symptoms, otherwise OK to cap  - J tube for feeds and medications   - Continue chset tube to water seal  - Continue Augmentin and Fluconozole - stop date TBD by  Dr. Ybarra  - Pain control: Tylenol PRN     Chronic Loose Stools - LIkely 2/2 TFs and antibiotics.  CDiff PCT neg x 2 most recently on 4/16.  Overall improved.   - Continue fiber BID, Immodium QID and Lomotil PRN     Hx of GERD -  Currently on daily PPI.  Will wait for repeat EGD this week, if ongoing epigastric pain after that, will consider increasing to BID dosing.     Chronic Normocytic Anemia - Unknown BL Hgb PTA.  Hgb 11.7 on admission, currently 9.7.  No s/s of clinical bleeding.   - CBC q MTh    Resolved TCU Issues:  Abnormal Liver Biochemistries. Intermittently elevated AP, ALT and AST since 3/26.  RUQ US on 4/26 with mildly echogenic liver with coarsened echo texture c/w steatosis.  Possibly 2/2 fluconazole.  LFTs normal.  If up trending again, per Dr. Ybarra ok to change to Micafungin.  Continue to check LFTs q MTh.     Diet and/or tube feedings:  Impact Peptide 1.5 @120mL/hour from 8pm to 8am  Lines, tubes, drains: Peripheral IV, chest tube.  Tube cares per hospital or home care instructions  feeding tube GJ-Tube.   Tube cares per hospital or home care instructions  DVT/GI prophylaxis: Low Risk/Ambulatory with no VTE prophylaxis indicated, Protonix for GERD  Indications for psychotropic medications: No diagnosis  Code status: Full Code         Consults:   Thoracic Surgery   Nutrition  Physical Therapy  Occupational Therapy         Discharge Planning:     Disposition Plan   Expected discharge: Pending thoracic surgery follow up and CT removal.  Plan to D/C to  prior living arrangement     Entered: Bhakti Seaman 05/14/2018, 11:39 AM           Interval History:   Chief complaint of epigastric pain that is intermittent.  Michael had some abdominal pain with bloating yesterday prompting CXR which showed the stent was in stable condition.  He states the pain is zero right now but can be very severe some times.  Hesitant to trial oral foods as last attempt he felt very nauseated and bloated requiring the  G tube to be vented.  Otherwise, denies fever, chills, NVD, current abd pain, chest pain or SOB.  No dysuria.                Physical Exam:   Blood pressure 122/68, pulse 94, temperature 98.5  F (36.9  C), temperature source Oral, resp. rate 15, weight 76.1 kg (167 lb 12.8 oz), SpO2 96 %.  GENERAL: Awake. Appears to be resting comfortably. NAD.   HEENT: NC/AT. Anicteric sclera. Mucous membranes moist.  NECK: Supple   CV: RRR, S1S2. No appreciable murmurs, rubs, or gallops.   RESPIRATORY: Normal respiratory effort on RA. Lungs CTAB without wheezes, rales or rhonchi.   GI: Soft, non-tender, and non-distended with bowel sounds present in all quadrants.  EXTREMITIES: No peripheral edema. Warm & well perfused.  NEUROLOGIC: Alert and orientated x 3. CN II-XII grossly intact. No focal deficits.   MUSCULOSKELETAL: No joint swelling or tenderness.    SKIN: No jaundice. No rashes or lesions. Left chest tube intact with scant drainage, no leak.         Labs & Studies:     ROUTINE IP LABS (Last four results)  CMP   Recent Labs  Lab 05/14/18  0756 05/10/18  0729    138   POTASSIUM 3.9 4.1   CHLORIDE 106 104   CO2 29 28   ANIONGAP 6 6   * 125*   BUN 20 28   CR 0.53* 0.59*   MU 9.0 8.7   MAG 2.0  --    PHOS 2.1*  --    PROTTOTAL 6.6* 6.9   ALBUMIN 2.6* 2.9*   BILITOTAL 0.5 0.2   ALKPHOS 112 140   AST 14 40   ALT 41 139*     CBC   Recent Labs  Lab 05/14/18  0756   WBC 12.6*   RBC 5.15   HGB 9.7*   HCT 35.9*   MCV 70*   MCH 18.8*   MCHC 27.0*   RDW 17.4*   *     INR No lab results found in last 7 days.      Unresulted Labs Ordered in the Past 30 Days of this Admission     Date and Time Order Name Status Description    3/18/2018 1608 Plasma prepare order unit In process           Recent Results (from the past 24 hour(s))   XR Chest 1 View    Narrative    XR CHEST 1 VW 5/13/2018 7:14 PM    CLINICAL HISTORY: evaluate position of esophageal stent;     COMPARISON: Fluoroscopic images 5/11/2018. CT chest abdomen  pelvis  5/9/2018.    FINDINGS: Esophageal stent extends from just above the aysha to just  below the GE junction. This is similar in position compared with  fluoroscopic spot films on 5/11/2018. Percutaneous gastrojejunostomy  tube is unchanged. Left basilar chest tube is stable in position.    Heart is not enlarged. Mediastinal silhouette and pulmonary  vasculature are within normal limits. Left basilar linear pulmonary  opacities. No pneumothorax. Unchanged blunting of the costophrenic  angles. Upper abdomen is within normal limits.      Impression    IMPRESSION:  1. Stable position of esophageal stent compared with 5/11/2018.  2. Left basilar opacities, unchanged.    I have personally reviewed the examination and initial interpretation  and I agree with the findings.    MD Bhakti CALDERON, [facility]  Hospitalist Service  Pager [phone]

## 2018-05-14 NOTE — PLAN OF CARE
Problem: Goal Outcome Summary  Goal: Goal Outcome Summary  Outcome: No Change  Patient sleeping between cares, up independently in room and to bathroom, denies pain and is tolerating tube feeding at 120 ml per hr with 180 ml water flush every 4 hrs via J tube, Gastric tube is open to gravity drainage bag. Chest tube dressing intact, no air leak noted, see chest X-ray result from 5/13, continue to monitor

## 2018-05-14 NOTE — PROGRESS NOTES
Pt went for CXR to check stent placement. House Dr  Called to read. She did not see anything wrong with the stent but wanted to check with radiology before I started the tube feeding. Pt denies nausea.

## 2018-05-14 NOTE — PLAN OF CARE
Problem: Goal Outcome Summary  Goal: Goal Outcome Summary  Outcome: No Change  Patient is alert x 4 and he directs his cares. Patient is up in the room and ambulating in the halls independently using his walker to hold his chest tube container. VSS,weight done. Patient's phos 2.1 and he is getting IV phos replacement via PIV . IV phos should be done around 1700, it needs to run for 4 hours. Patient is drinking clear liquids at bedside ( small amounts).

## 2018-05-15 ENCOUNTER — APPOINTMENT (OUTPATIENT)
Dept: LAB | Facility: CLINIC | Age: 58
End: 2018-05-15
Attending: INTERNAL MEDICINE
Payer: MEDICAID

## 2018-05-15 LAB — PHOSPHATE SERPL-MCNC: 2.2 MG/DL (ref 2.5–4.5)

## 2018-05-15 PROCEDURE — 12000022 ZZH R&B SNF

## 2018-05-15 PROCEDURE — 27210437 ZZH NUTRITION PRODUCT SEMIELEM INTERMED LITER

## 2018-05-15 PROCEDURE — 25000128 H RX IP 250 OP 636: Performed by: NURSE PRACTITIONER

## 2018-05-15 PROCEDURE — 36415 COLL VENOUS BLD VENIPUNCTURE: CPT | Performed by: INTERNAL MEDICINE

## 2018-05-15 PROCEDURE — 25000125 ZZHC RX 250: Performed by: NURSE PRACTITIONER

## 2018-05-15 PROCEDURE — 84100 ASSAY OF PHOSPHORUS: CPT | Performed by: INTERNAL MEDICINE

## 2018-05-15 PROCEDURE — 25000132 ZZH RX MED GY IP 250 OP 250 PS 637: Performed by: PHYSICIAN ASSISTANT

## 2018-05-15 RX ADMIN — AMOXICILLIN AND CLAVULANATE POTASSIUM 875 MG: 400; 57 POWDER, FOR SUSPENSION ORAL at 08:16

## 2018-05-15 RX ADMIN — FLUCONAZOLE 200 MG: 40 POWDER, FOR SUSPENSION ORAL at 08:16

## 2018-05-15 RX ADMIN — SODIUM PHOSPHATE, MONOBASIC, MONOHYDRATE AND SODIUM PHOSPHATE, DIBASIC, ANHYDROUS 15 MMOL: 276; 142 INJECTION, SOLUTION INTRAVENOUS at 10:41

## 2018-05-15 RX ADMIN — Medication 2 PACKET: at 20:18

## 2018-05-15 RX ADMIN — AMOXICILLIN AND CLAVULANATE POTASSIUM 875 MG: 400; 57 POWDER, FOR SUSPENSION ORAL at 20:18

## 2018-05-15 RX ADMIN — PANTOPRAZOLE SODIUM 40 MG: 40 TABLET, DELAYED RELEASE ORAL at 08:16

## 2018-05-15 RX ADMIN — Medication 2 PACKET: at 08:17

## 2018-05-15 RX ADMIN — ALUMINUM HYDROXIDE, MAGNESIUM HYDROXIDE, AND DIMETHICONE 30 ML: 400; 400; 40 SUSPENSION ORAL at 23:21

## 2018-05-15 RX ADMIN — LOPERAMIDE HYDROCHLORIDE 4 MG: 1 SOLUTION ORAL at 16:20

## 2018-05-15 RX ADMIN — ACETAMINOPHEN 500 MG: 325 SOLUTION ORAL at 17:58

## 2018-05-15 RX ADMIN — LOPERAMIDE HYDROCHLORIDE 4 MG: 1 SOLUTION ORAL at 20:18

## 2018-05-15 RX ADMIN — LOPERAMIDE HYDROCHLORIDE 4 MG: 1 SOLUTION ORAL at 08:16

## 2018-05-15 RX ADMIN — LOPERAMIDE HYDROCHLORIDE 4 MG: 1 SOLUTION ORAL at 12:08

## 2018-05-15 RX ADMIN — MULTIVITAMIN 15 ML: LIQUID ORAL at 08:16

## 2018-05-15 NOTE — PLAN OF CARE
Problem: Goal Outcome Summary  Goal: Goal Outcome Summary  Outcome: No Change  RN: Pt has no c/o pain or discomfort so far this shift. Tolerating TF and H20 flushes via J-tube to be completed at 0800 per order.New bag hung at 0400.  G-tube is open to gravity since bedtime and has dark green drainage. See I&O. Up independently in the room. Reported having 2 loose stool since midnight. On scheduled Imodium tabs daily. Refused offer of PRN Lomotil. Appear to be sleeping/resting during rounds. Using call light appropriately. Continue with plan of care.

## 2018-05-15 NOTE — PLAN OF CARE
Problem: Goal Outcome Summary  Goal: Goal Outcome Summary  Phosphorous level-2.2. Replacement given and phosphorous level scheduled for tomorrow morning. Continues to be independent with personal cares and mobility.

## 2018-05-15 NOTE — PLAN OF CARE
Problem: Goal Outcome Summary  Goal: Goal Outcome Summary  Pt alert and oriented x4. Independent in room and halls for transferring. Denies any shortness of breath/chest pain. Voiding without difficulty, 1 BM this shift. Chest tube dressing changed. 0 cc output this shift. Impact peptide TF started via J tube at 2000 at 120 ml/hr with 180 ml water flushes q 4 hours. G tube to gravity drainage at bedtime. Phosphorus replaced today, lab orders entered to draw tomorrow AM. Denies any new concerns.

## 2018-05-15 NOTE — OP NOTE
Procedure Date: 2018      PREOPERATIVE DIAGNOSIS:  Esophageal perforation, status post leak after repair, now status post stenting.      POSTOPERATIVE DIAGNOSIS:  Esophageal perforation, status post leak after repair, now status post stenting.      PROCEDURES PERFORMED:  Esophagogastroduodenoscopy and removal and replacement of stent.      SURGEON:  Keith Ybarra MD      ASSISTANT SURGEON:   Krishan Doyle MD      OPERATIVE FINDINGS:  No defect visualized in the esophagus or GE junction.      DESCRIPTION OF PROCEDURE:  After obtaining informed consent, the patient was brought to the operating room and laid supine on the operating table.  After induction of general anesthesia, introduction of an endotracheal tube, an adult gastroscope was passed per orally.  The preexisting stent was visualized using a rat-tooth forceps and with fluoroscopic visualization the stent was removed.  After this, we thoroughly examined the esophagus, GE junction and the stomach.  There was no obvious defect visualized on this examination as compared to previous.  On injecting dye through the scope with fluoroscopic visualization, there was still no evidence of leakage.  At this point, we passed a 260 Super Stiff Amplatz wire through the scope and deployed another 23.150 Endo CHASTITY stent to make sure that the healing would be complete and so that the patient will be able to start on a diet with the stent in place.  The patient tolerated the procedure well.            KRISHAN DOYLE MD             D: 2018   T: 05/15/2018   MT: CATHI      Name:     MJ VELASCO   MRN:      [id number]        Account:        [id number]   :      1960           Procedure Date: 2018      Document: [id number]

## 2018-05-16 ENCOUNTER — APPOINTMENT (OUTPATIENT)
Dept: LAB | Facility: CLINIC | Age: 58
End: 2018-05-16
Attending: INTERNAL MEDICINE
Payer: MEDICAID

## 2018-05-16 LAB — PHOSPHATE SERPL-MCNC: 2.4 MG/DL (ref 2.5–4.5)

## 2018-05-16 PROCEDURE — 25000132 ZZH RX MED GY IP 250 OP 250 PS 637: Performed by: PHYSICIAN ASSISTANT

## 2018-05-16 PROCEDURE — 25000128 H RX IP 250 OP 636: Performed by: NURSE PRACTITIONER

## 2018-05-16 PROCEDURE — 84100 ASSAY OF PHOSPHORUS: CPT | Performed by: INTERNAL MEDICINE

## 2018-05-16 PROCEDURE — 25000125 ZZHC RX 250: Performed by: NURSE PRACTITIONER

## 2018-05-16 PROCEDURE — 12000022 ZZH R&B SNF

## 2018-05-16 PROCEDURE — 27210437 ZZH NUTRITION PRODUCT SEMIELEM INTERMED LITER

## 2018-05-16 PROCEDURE — 36415 COLL VENOUS BLD VENIPUNCTURE: CPT | Performed by: INTERNAL MEDICINE

## 2018-05-16 RX ADMIN — ALUMINUM HYDROXIDE, MAGNESIUM HYDROXIDE, AND DIMETHICONE 30 ML: 400; 400; 40 SUSPENSION ORAL at 18:25

## 2018-05-16 RX ADMIN — Medication 2 PACKET: at 20:43

## 2018-05-16 RX ADMIN — Medication 2 PACKET: at 13:04

## 2018-05-16 RX ADMIN — LOPERAMIDE HYDROCHLORIDE 4 MG: 1 SOLUTION ORAL at 09:20

## 2018-05-16 RX ADMIN — LOPERAMIDE HYDROCHLORIDE 4 MG: 1 SOLUTION ORAL at 20:42

## 2018-05-16 RX ADMIN — AMOXICILLIN AND CLAVULANATE POTASSIUM 875 MG: 400; 57 POWDER, FOR SUSPENSION ORAL at 20:42

## 2018-05-16 RX ADMIN — PANTOPRAZOLE SODIUM 40 MG: 40 TABLET, DELAYED RELEASE ORAL at 09:20

## 2018-05-16 RX ADMIN — SODIUM PHOSPHATE, MONOBASIC, MONOHYDRATE AND SODIUM PHOSPHATE, DIBASIC, ANHYDROUS 15 MMOL: 276; 142 INJECTION, SOLUTION INTRAVENOUS at 10:18

## 2018-05-16 RX ADMIN — ALUMINUM HYDROXIDE, MAGNESIUM HYDROXIDE, AND DIMETHICONE 30 ML: 400; 400; 40 SUSPENSION ORAL at 09:30

## 2018-05-16 RX ADMIN — ALUMINUM HYDROXIDE, MAGNESIUM HYDROXIDE, AND DIMETHICONE 30 ML: 400; 400; 40 SUSPENSION ORAL at 13:26

## 2018-05-16 RX ADMIN — Medication 2 PACKET: at 09:20

## 2018-05-16 RX ADMIN — LOPERAMIDE HYDROCHLORIDE 4 MG: 1 SOLUTION ORAL at 18:25

## 2018-05-16 RX ADMIN — FLUCONAZOLE 200 MG: 40 POWDER, FOR SUSPENSION ORAL at 09:20

## 2018-05-16 RX ADMIN — LOPERAMIDE HYDROCHLORIDE 4 MG: 1 SOLUTION ORAL at 13:04

## 2018-05-16 RX ADMIN — MULTIVITAMIN 15 ML: LIQUID ORAL at 09:20

## 2018-05-16 RX ADMIN — AMOXICILLIN AND CLAVULANATE POTASSIUM 875 MG: 400; 57 POWDER, FOR SUSPENSION ORAL at 09:20

## 2018-05-16 NOTE — PLAN OF CARE
Problem: Goal Outcome Summary  Goal: Goal Outcome Summary  Patient alert and oriented x 4. TF running via JT-patient tolerating well. GT to gravity drain- no N/V noted. Chest tube intact to L chest-no respiratory distress noted. Denies pain and discomfort. Will continue to monitor patient's status.

## 2018-05-16 NOTE — PROGRESS NOTES
CLINICAL NUTRITION SERVICES - REASSESSMENT NOTE     Nutrition Prescription    RECOMMENDATIONS FOR MDs/PROVIDERS TO ORDER:  Consult SLP for swallowing eval as pt is fearful to eat solid foods.    Malnutrition Status:    Patient does not meet two of the above criteria necessary for diagnosing malnutrition    Recommendations already ordered by Registered Dietitian (RD):  Encourage PO intakes of solid foods in order to begin weaning TF     Future/Additional Recommendations:  1. Once pts PO intakes improve, start calorie counts and wean TF as able.   2. Once pt is meeting 75% of minimum nutritional needs (1390 kcal and 68 g protein), D/C TF     EVALUATION OF THE PROGRESS TOWARD GOALS   Diet: Mechanical Soft, Boost Breeze BID  Nutrition Support:   Impact Peptide 1.5 via J-tube @ 120 mL/hr x 12 hrs (8pm-8am) providing 1440 mL, 2160 kcal (29 kcal/kg), 135 g protein (1.8 g/kg), 202 g CHO, 0 g fiber, 92 g Fat (50% from MCTs) and 1109 mL free water per DW of 74 kg, meeting 100% energy/protein needs  - Nutri[employer] Fiber 2 pkts TID to provide an additional 18 g fiber.  - Free Water Flushes of 180 mL q 4 hr during + 60 mL before/after TF    Intake: pt has not ordered any meals via room service over the last week. He does report drinking 2 Boost Breeze/day.     TF: reports some fullness but this is 'usual' and finds the maalox is helpful.    NEW FINDINGS   - Wt remains stable over the last month.  - Pt has not eaten any solid foods (only liquids).    MALNUTRITION  % Intake: Decreased intake does not meet criteria  % Weight Loss: None noted  Subcutaneous Fat Loss: None observed  Muscle Loss: None observed  Fluid Accumulation/Edema: None noted  Malnutrition Diagnosis: Patient does not meet two of the above criteria necessary for diagnosing malnutrition    Previous Goals   Total avg nutritional intake to meet a minimum of 25 kcal/kg and 1.2 g PRO/kg daily (per dosing wt 74 kg).  Evaluation: Met    Previous Nutrition  Diagnosis  Inadequate oral intake related to restrictive diet and poor appetite as evidenced by mechanical soft diet with continued reliance on TF to meet nutritional needs    Evaluation: No longer applicable, changed below    CURRENT NUTRITION DIAGNOSIS  Predicted protein-energy intake related to reliance on EN as evidenced by mechanical soft diet with continued reliance on TF to meet nutritional needs     INTERVENTIONS  Implementation  Encouraged pt to begin eating some food and discussed the process to wean TF vs continuing to be dependent on TF post D/C. Pt reports he has little interest in starting to eat soon d/t fear. He was unable to elaborate further but denied fear of pain or difficulty swallowing. May be beneficial     Goals  Total avg nutritional intake to meet a minimum of 25 kcal/kg and 1.2 g PRO/kg daily (per dosing wt 74 kg).    Monitoring/Evaluation  Progress toward goals will be monitored and evaluated per protocol.      Angela Pelletier RD, LD  Unit Pager: [phone]

## 2018-05-16 NOTE — PLAN OF CARE
Problem: Goal Outcome Summary  Goal: Goal Outcome Summary  Pt alert and oriented x4. Independent in room and halls. Denies any shortness of breath/chest pain. Voiding without difficulty. Reports loose stools, which is not new for pt. Chest tube intact, dressing changed. GJ tube dressing changed, tube feeding started around 2030 at 120 ml/hr with water flushes 180 ml q 4 hours. G tube to gravity drainage at bedtime. PIV to left hand. Requested tylenol for mid abdominal pain, stated it helped some. Phosphorus replaced today, recheck tomorrow AM. Continue POC.

## 2018-05-16 NOTE — PLAN OF CARE
Problem: TCU Patient Goals  Goal: TCU Plan of Care  Outcome: No Change  VSS. Alert and oriented.  Pleasant and cooperative. With complaints of stomach upset, Maalox given with relief. Denies any n&v. Phosphorus level: 2.4, replacement given, to recheck Phos irene am. Ambulated in the hallway. Independent with ADL's. Chest tube dressing CDI, no output noted. Continued to have loose stools, scheduled immodium given. Gastrostomy tube capped for 8 hrs, jejunostomy tube flushing well.  Will continue to monitor pt.  Vital signs:  Temp: 96.6  F (35.9  C) Temp src: Oral BP: 124/73 Pulse: 87 Heart Rate: 84 Resp: 18 SpO2: 97 % O2 Device: None (Room air)

## 2018-05-16 NOTE — PROGRESS NOTES
Met with patient to update the DC planning discussion. He remains on TCU with a chest tube, and GJ tube with overnight feedings. He is drinking some fluids by mouth. Awaiting next EGD to be scheduled soon. Patient plans to stay on TCU until chest tube is removed. He lives alone and states he has no one to help him, doesn't want to impose on family. He is also hoping he will not need to have tube feeding at home. He did have PLC class for tube feeding at hospital. Offered to set up another PLC enteral class for review to prepare for possible need for tube feeding at home and he declined at this time, preferring to wait until DC plan better known. Will continue to follow DC planning needs throughout TCU stay.

## 2018-05-17 ENCOUNTER — APPOINTMENT (OUTPATIENT)
Dept: LAB | Facility: CLINIC | Age: 58
End: 2018-05-17
Attending: INTERNAL MEDICINE
Payer: MEDICAID

## 2018-05-17 ENCOUNTER — TELEPHONE (OUTPATIENT)
Dept: SURGERY | Facility: CLINIC | Age: 58
End: 2018-05-17

## 2018-05-17 DIAGNOSIS — K22.3 ESOPHAGEAL PERFORATION: Primary | ICD-10-CM

## 2018-05-17 LAB
ALBUMIN SERPL-MCNC: 2.5 G/DL (ref 3.4–5)
ALP SERPL-CCNC: 115 U/L (ref 40–150)
ALT SERPL W P-5'-P-CCNC: 38 U/L (ref 0–70)
ANION GAP SERPL CALCULATED.3IONS-SCNC: 7 MMOL/L (ref 3–14)
AST SERPL W P-5'-P-CCNC: 17 U/L (ref 0–45)
BILIRUB SERPL-MCNC: 0.3 MG/DL (ref 0.2–1.3)
BUN SERPL-MCNC: 21 MG/DL (ref 7–30)
CALCIUM SERPL-MCNC: 8.4 MG/DL (ref 8.5–10.1)
CHLORIDE SERPL-SCNC: 106 MMOL/L (ref 94–109)
CO2 SERPL-SCNC: 29 MMOL/L (ref 20–32)
CREAT SERPL-MCNC: 0.63 MG/DL (ref 0.66–1.25)
ERYTHROCYTE [DISTWIDTH] IN BLOOD BY AUTOMATED COUNT: 17.6 % (ref 10–15)
GFR SERPL CREATININE-BSD FRML MDRD: >90 ML/MIN/1.7M2
GLUCOSE SERPL-MCNC: 142 MG/DL (ref 70–99)
HCT VFR BLD AUTO: 35.8 % (ref 40–53)
HGB BLD-MCNC: 10 G/DL (ref 13.3–17.7)
MCH RBC QN AUTO: 19.2 PG (ref 26.5–33)
MCHC RBC AUTO-ENTMCNC: 27.9 G/DL (ref 31.5–36.5)
MCV RBC AUTO: 69 FL (ref 78–100)
PHOSPHATE SERPL-MCNC: 2.2 MG/DL (ref 2.5–4.5)
PLATELET # BLD AUTO: 452 10E9/L (ref 150–450)
POTASSIUM SERPL-SCNC: 4 MMOL/L (ref 3.4–5.3)
PROT SERPL-MCNC: 6.7 G/DL (ref 6.8–8.8)
RBC # BLD AUTO: 5.2 10E12/L (ref 4.4–5.9)
SODIUM SERPL-SCNC: 142 MMOL/L (ref 133–144)
WBC # BLD AUTO: 12.4 10E9/L (ref 4–11)

## 2018-05-17 PROCEDURE — 99309 SBSQ NF CARE MODERATE MDM 30: CPT | Performed by: NURSE PRACTITIONER

## 2018-05-17 PROCEDURE — 36415 COLL VENOUS BLD VENIPUNCTURE: CPT | Performed by: PHYSICIAN ASSISTANT

## 2018-05-17 PROCEDURE — 25000132 ZZH RX MED GY IP 250 OP 250 PS 637: Performed by: NURSE PRACTITIONER

## 2018-05-17 PROCEDURE — 25000128 H RX IP 250 OP 636: Performed by: NURSE PRACTITIONER

## 2018-05-17 PROCEDURE — 25000125 ZZHC RX 250: Performed by: NURSE PRACTITIONER

## 2018-05-17 PROCEDURE — 85027 COMPLETE CBC AUTOMATED: CPT | Performed by: PHYSICIAN ASSISTANT

## 2018-05-17 PROCEDURE — 25000132 ZZH RX MED GY IP 250 OP 250 PS 637: Performed by: PHYSICIAN ASSISTANT

## 2018-05-17 PROCEDURE — 80053 COMPREHEN METABOLIC PANEL: CPT | Performed by: PHYSICIAN ASSISTANT

## 2018-05-17 PROCEDURE — 84100 ASSAY OF PHOSPHORUS: CPT | Performed by: PHYSICIAN ASSISTANT

## 2018-05-17 PROCEDURE — 12000022 ZZH R&B SNF

## 2018-05-17 PROCEDURE — 27210437 ZZH NUTRITION PRODUCT SEMIELEM INTERMED LITER

## 2018-05-17 RX ADMIN — AMOXICILLIN AND CLAVULANATE POTASSIUM 875 MG: 400; 57 POWDER, FOR SUSPENSION ORAL at 09:40

## 2018-05-17 RX ADMIN — LOPERAMIDE HYDROCHLORIDE 4 MG: 1 SOLUTION ORAL at 13:07

## 2018-05-17 RX ADMIN — LOPERAMIDE HYDROCHLORIDE 4 MG: 1 SOLUTION ORAL at 17:36

## 2018-05-17 RX ADMIN — Medication 2 PACKET: at 08:34

## 2018-05-17 RX ADMIN — PANTOPRAZOLE SODIUM 40 MG: 40 TABLET, DELAYED RELEASE ORAL at 20:54

## 2018-05-17 RX ADMIN — PANTOPRAZOLE SODIUM 40 MG: 40 TABLET, DELAYED RELEASE ORAL at 08:28

## 2018-05-17 RX ADMIN — LOPERAMIDE HYDROCHLORIDE 4 MG: 1 SOLUTION ORAL at 20:53

## 2018-05-17 RX ADMIN — Medication 2 PACKET: at 13:07

## 2018-05-17 RX ADMIN — SODIUM PHOSPHATE, MONOBASIC, MONOHYDRATE AND SODIUM PHOSPHATE, DIBASIC, ANHYDROUS 15 MMOL: 276; 142 INJECTION, SOLUTION INTRAVENOUS at 08:38

## 2018-05-17 RX ADMIN — LOPERAMIDE HYDROCHLORIDE 4 MG: 1 SOLUTION ORAL at 08:28

## 2018-05-17 RX ADMIN — ALUMINUM HYDROXIDE, MAGNESIUM HYDROXIDE, AND DIMETHICONE 30 ML: 400; 400; 40 SUSPENSION ORAL at 04:47

## 2018-05-17 RX ADMIN — FLUCONAZOLE 200 MG: 40 POWDER, FOR SUSPENSION ORAL at 08:28

## 2018-05-17 RX ADMIN — AMOXICILLIN AND CLAVULANATE POTASSIUM 875 MG: 400; 57 POWDER, FOR SUSPENSION ORAL at 20:54

## 2018-05-17 RX ADMIN — ALUMINUM HYDROXIDE, MAGNESIUM HYDROXIDE, AND DIMETHICONE 30 ML: 400; 400; 40 SUSPENSION ORAL at 08:43

## 2018-05-17 RX ADMIN — Medication 2 PACKET: at 20:54

## 2018-05-17 RX ADMIN — MULTIVITAMIN 15 ML: LIQUID ORAL at 08:28

## 2018-05-17 NOTE — PROGRESS NOTES
Transitional Care Unit   Internal Medicine Progress Note   Date of Service: 5/14/2018    Patient: Michael Saldivar  MRN: [id number]  Admission Date: 5/11/2018  TCU Day # 6            Assessment & Plan:   Michael Saldivar is a 57 year old male with a history of GERD who was admitted to [facility] 3/18-4/6/18 for Boerhaave's esophageal perforation felt to be 2/2 forceful vomiting from a viral illness now s/p left thoracotomy w/ repair of esophageal perforation. Post op course c/b esophageal leak into the L pleural cavity requiring pharyngostomy tube placement and gastropexy on 3/28. Transferred to  TCU 4/6/18 for nutrition and drain care.    Boerhaave Esophageal Perforation; Intolerance of PO intake - S/p left thoracotomy, chest tube placement (x 3 on L and x1 on R), pEG placement and repair of esophogeal perforation on 3/18/18 by Dr. Ybarra.  Post op course c.b persistent leak from esophagus to left pleural cavity.  S/p pharyngostomy tube placement ([facility] 4/20) and gastropexy (3/28).  ID added jejunal limb to G tube on 3/30.  Currently with 1 chest tube on L to water seal with minimal OP.  Repeat EGD 4/20 noted small, persistent cavity likely 2/2 distal stricture therefore an esophageal stent was placed.  Empirically treated with Fluconazole 3/18-present, Zosyn 3/18-4.2, and Augmentin 4/2-present.  Thoracic surgery f/u with repeat EGD 5/11, details not charted, appears as though then exchanged his esophageal stent.  Chest tube remains intact.  Pver the past week pt noting increased nausea, bloating and reflux symptoms following EGD.  XR (5/13) demonstrated stent in good position.  Pt has made himself NPO despite being ordered for soft diet 2/2 reflux symptoms.  Current plan is for EGD on 5/23/18 with Dr. Ybarra.   - Discussed with Dr. Ybarra today, he may expedite EGD for tomorrow, depending on scheduling.  Ideally would like to wait a full 2 weeks (5/23) but If symptoms worsening may warrant investigation   -  Will order CLD, although patient is not interested  - Increase PPI to BID  - G tube to gravity for symptoms, otherwise ok to cap   - J tube for feeds and meds, tolerating  - Cont. Chest tube to water seal, record output  - Continue Augmentin and Fluconozole - stop date TBD by Dr. Ybarra  - Pain control: Tylenol PRN     Chronic Loose Stools - LIkely 2/2 TFs and antibiotics.  CDiff PCT neg x 2 most recently on 4/16.  Overall improved.   - Continue fiber BID, Immodium QID and Lomotil PRN     Hx of GERD -  See above.  Symptoms seem to be worse since most recent EGD.   - Increase PPI to BID    Mild Leukocytosis - WBC 12.4 currently which has been stable over past month.  No current s/s of infection.  Managed on antibx as above per surgical service.  Likely inflammatory in setting of esophageal perforation.   - Trend CBC q MTh    Chronic Normocytic Anemia - Unknown BL Hgb PTA.  Hgb 11.7 on admission, most recently 10.  No s/s of clinical bleeding.   - CBC q MTh    Resolved TCU Issues:  Abnormal Liver Biochemistries. Intermittently elevated AP, ALT and AST since 3/26.  RUQ US on 4/26 with mildly echogenic liver with coarsened echo texture c/w steatosis.  Possibly 2/2 fluconazole.  LFTs normal.  If up trending again, per Dr. Ybarra ok to change to Micafungin.  Continue to check LFTs q MTh.     Diet and/or tube feedings:  Impact Peptide 1.5 @120mL/hour from 8pm to 8am  Lines, tubes, drains: Peripheral IV, chest tube.  Tube cares per hospital or home care instructions  feeding tube GJ-Tube.   Tube cares per hospital or home care instructions  DVT/GI prophylaxis: Low Risk/Ambulatory with no VTE prophylaxis indicated, Protonix for GERD  Indications for psychotropic medications: No diagnosis  Code status: Full Code         Consults:   Thoracic Surgery   Nutrition  Physical Therapy  Occupational Therapy         Discharge Planning:     Disposition Plan   Expected discharge: Pending thoracic surgery follow up and CT removal.   "Plan to D/C to  prior living arrangement     Entered: Bhakti Seaman 05/17/2018, 3:28 PM           Interval History:     Micahel has been trying to drink supplements over the past 48 hours.  He states that after he drinks the supplement, he feels his stomach is \"filling up his neck\" and he feels it is \"going into my lungs\" even when sitting up.  Unrelieved with venting of G tube.  He has stopped everything PO today and \"feels better than he has in days\".  He does not want to trial any further PO nutrition at this time.  Denies fever, chills, nausea, abdominal pain, chest pain, shortness of breath, lower extremity edema.           Physical Exam:   Blood pressure 118/70, pulse 88, temperature 97.7  F (36.5  C), temperature source Oral, resp. rate 20, weight 76.2 kg (168 lb 1.6 oz), SpO2 96 %.  GENERAL: Awake. Appears to be resting comfortably. NAD.   HEENT: NC/AT. Anicteric sclera. Mucous membranes moist.  NECK: Supple   CV: RRR, S1S2. No appreciable murmurs, rubs, or gallops.   RESPIRATORY: Normal respiratory effort on RA. Lungs CTAB without wheezes, rales or rhonchi. Left sided chest tube to water seal.    GI: Soft, non-tender, and non-distended with bowel sounds present in all quadrants. GJ tube CDI.   EXTREMITIES: No peripheral edema. Warm & well perfused.  NEUROLOGIC: Alert and orientated x 3. CN II-XII grossly intact. No focal deficits.   MUSCULOSKELETAL: No joint swelling or tenderness.    SKIN: No jaundice. No rashes or lesions. Left chest tube intact with scant drainage, no leak.       Labs & Studies:     ROUTINE IP LABS (Last four results)  CMP     Recent Labs  Lab 05/17/18  0640 05/16/18  0723 05/15/18  0757 05/14/18  0756     --   --  141   POTASSIUM 4.0  --   --  3.9   CHLORIDE 106  --   --  106   CO2 29  --   --  29   ANIONGAP 7  --   --  6   *  --   --  138*   BUN 21  --   --  20   CR 0.63*  --   --  0.53*   MU 8.4*  --   --  9.0   MAG  --   --   --  2.0   PHOS 2.2* 2.4* 2.2* 2.1* "   PROTTOTAL 6.7*  --   --  6.6*   ALBUMIN 2.5*  --   --  2.6*   BILITOTAL 0.3  --   --  0.5   ALKPHOS 115  --   --  112   AST 17  --   --  14   ALT 38  --   --  41     CBC     Recent Labs  Lab 05/17/18  0640 05/14/18  0756   WBC 12.4* 12.6*   RBC 5.20 5.15   HGB 10.0* 9.7*   HCT 35.8* 35.9*   MCV 69* 70*   MCH 19.2* 18.8*   MCHC 27.9* 27.0*   RDW 17.6* 17.4*   * 471*     INR No lab results found in last 7 days.      Unresulted Labs Ordered in the Past 30 Days of this Admission     Date and Time Order Name Status Description    3/18/2018 1608 Plasma prepare order unit In process           Bhakti Seaman, [facility]  Hospitalist Service  Pager [phone]

## 2018-05-17 NOTE — PLAN OF CARE
Patient is alert and oriented. Pleasant and cooperative. Patient voiced concern of heart burn, relieved with Mylanta. Denied pain and SOB during care. Phosphorus level at 2.4 this AM, replacement was given and will need to recheck Phosphorus level 05/17/18 AM. Patient is independent with ADL's. Chest tube dressing changed this shift with no output noted. Patient continued to have loose stools this evening shift and given scheduled immodium. Gastrostomy tube, jejunostomy tube flushing well; tube feeding running and started around 2030.    /71 (BP Location: Right arm)  Pulse 88  Temp 98.4  F (36.9  C) (Oral)  Resp 16  Wt 75.9 kg (167 lb 6.4 oz)  SpO2 96%  BMI 27.02 kg/m2

## 2018-05-17 NOTE — PLAN OF CARE
Problem: Goal Outcome Summary  Goal: Goal Outcome Summary  Outcome: No Change  VSS. Alert and orientedx4. Chest tube dressing CDI with no output. GT clamped for 8 hrs. No BM this shift. Pt asked for Mylanta/maalox once this shift. Independent with ADL'd. Phophorus level 2.2, replacement was done, ordered to recheck phophorus irene am. PIV to L hand removed, d/t pain when flushed and slight swelling noted to site. Pt do not want to reinsert PIV today, but agreed to reinsert it tomorrow depending on phosphorus result.  1300: pt verbalized discomfort like filling up fluids to his neck, and feels like going to his lungs, and would cough, it happens mostly around 1822-4429. Pt stopped to drink boost and he feels better than he has when he had it. EMMY Ya notified, seen pt. NP f/u to surgery and will plan for EGD tomorrow.   Will continue to monitor pt.  Vital signs:  Temp: 97.7  F (36.5  C) Temp src: Oral BP: 118/70 Pulse: 88 Heart Rate: 94 Resp: 20 SpO2: 96 % O2 Device: None (Room air)

## 2018-05-17 NOTE — PLAN OF CARE
Problem: Goal Outcome Summary  Goal: Goal Outcome Summary  Patient alert and oriented x 4. Patient complained of gastric distress- maalox given and patient stated helpful. Patient GT clamped @ 0447 and patient toleting well, patient wanted GT clamped for now. Chest tube intact to L chest-no respiratory distress noted. TF tolerating well via JT. Will continue with current plan of care.

## 2018-05-18 ENCOUNTER — APPOINTMENT (OUTPATIENT)
Dept: LAB | Facility: CLINIC | Age: 58
End: 2018-05-18
Attending: INTERNAL MEDICINE
Payer: MEDICAID

## 2018-05-18 LAB — PHOSPHATE SERPL-MCNC: 2.3 MG/DL (ref 2.5–4.5)

## 2018-05-18 PROCEDURE — 12000022 ZZH R&B SNF

## 2018-05-18 PROCEDURE — 84100 ASSAY OF PHOSPHORUS: CPT | Performed by: INTERNAL MEDICINE

## 2018-05-18 PROCEDURE — 27210437 ZZH NUTRITION PRODUCT SEMIELEM INTERMED LITER

## 2018-05-18 PROCEDURE — 36415 COLL VENOUS BLD VENIPUNCTURE: CPT | Performed by: INTERNAL MEDICINE

## 2018-05-18 PROCEDURE — 25000132 ZZH RX MED GY IP 250 OP 250 PS 637: Performed by: PHYSICIAN ASSISTANT

## 2018-05-18 PROCEDURE — 25000132 ZZH RX MED GY IP 250 OP 250 PS 637: Performed by: NURSE PRACTITIONER

## 2018-05-18 PROCEDURE — 99309 SBSQ NF CARE MODERATE MDM 30: CPT | Performed by: NURSE PRACTITIONER

## 2018-05-18 RX ADMIN — PANTOPRAZOLE SODIUM 40 MG: 40 TABLET, DELAYED RELEASE ORAL at 09:16

## 2018-05-18 RX ADMIN — MULTIVITAMIN 15 ML: LIQUID ORAL at 09:16

## 2018-05-18 RX ADMIN — PANTOPRAZOLE SODIUM 40 MG: 40 TABLET, DELAYED RELEASE ORAL at 20:14

## 2018-05-18 RX ADMIN — LOPERAMIDE HYDROCHLORIDE 4 MG: 1 SOLUTION ORAL at 16:16

## 2018-05-18 RX ADMIN — LOPERAMIDE HYDROCHLORIDE 4 MG: 1 SOLUTION ORAL at 20:14

## 2018-05-18 RX ADMIN — LOPERAMIDE HYDROCHLORIDE 4 MG: 1 SOLUTION ORAL at 09:16

## 2018-05-18 RX ADMIN — AMOXICILLIN AND CLAVULANATE POTASSIUM 875 MG: 400; 57 POWDER, FOR SUSPENSION ORAL at 09:15

## 2018-05-18 RX ADMIN — FLUCONAZOLE 200 MG: 40 POWDER, FOR SUSPENSION ORAL at 09:16

## 2018-05-18 RX ADMIN — Medication 2 PACKET: at 20:15

## 2018-05-18 RX ADMIN — LOPERAMIDE HYDROCHLORIDE 4 MG: 1 SOLUTION ORAL at 13:17

## 2018-05-18 RX ADMIN — Medication 2 PACKET: at 09:16

## 2018-05-18 RX ADMIN — AMOXICILLIN AND CLAVULANATE POTASSIUM 875 MG: 400; 57 POWDER, FOR SUSPENSION ORAL at 20:14

## 2018-05-18 RX ADMIN — POTASSIUM & SODIUM PHOSPHATES POWDER PACK 280-160-250 MG 2 PACKET: 280-160-250 PACK at 13:17

## 2018-05-18 RX ADMIN — Medication 2 PACKET: at 13:17

## 2018-05-18 NOTE — PLAN OF CARE
Problem: Goal Outcome Summary  Goal: Goal Outcome Summary  Pt alert and oriented. Independent in room. Denies any shortness of breath/chest pain. C/o some pain to mid abdomen but declined any pain medication. Continent of bowel/bladder, BM today. Phosphorus 2.3. Pt voiced that he would like to avoid having PIV placed if possible. Discussed with EMMY Ya and started on daily phosphorus packets. Tube feeding off in AM, GJ tube dressing changed. G tube clamped for day. Chest tube was out 3 cm, cardiothoracic surgery saw pt and replaced suture. Dressing changed. Had chest xray to verify stent placement. EGD scheduled for 5/23.

## 2018-05-18 NOTE — PROGRESS NOTES
Brief Medicine Note    CXR reviewed.  Esophageal stent in good position.     Bhakti Seaman, [facility]  Hospitalist Service  Pager [phone]

## 2018-05-18 NOTE — PLAN OF CARE
Patient is alert and oriented x4. Chest tube dressing changed today, clean dry and intact with 10 ml output. Noticed that tube came out about 3  cm. GT drained 150 ml and currently draining to gravity. Patient is independent with ADL's. Diet was changed to clear liquids. NP Bhakti is trying to schedule EGD for 05/18/18. TF started at 2100 this shift and running with no concerns. Phosphorus was 2.2 during AM with recheck scheduled for 05/18/18 AM. Patient denied SOB and pain during cares. Will continue to monitory.     Temp: 98.1  F (36.7  C) Temp src: Oral BP: 125/66 Pulse: 79 Heart Rate: 94 Resp: 18 SpO2: 96 % O2 Device: None (Room air)

## 2018-05-18 NOTE — PLAN OF CARE
Patient is alert and oriented. Patient denies SOB and/or pain during cares. Independent in room. Patient indicated mild upper mid abdomen pain but declined any pain medication/intervention. Tube feeding started at 2000 and running with no concern. G tube draining to gravity as of 2200. Chest tube clean, dry and intact. IS given to patient this evening.     Vital signs:  Temp: 97.9  F (36.6  C) Temp src: Oral BP: 116/68 Pulse: 96 Heart Rate: 87 Resp: 20 SpO2: 97 %

## 2018-05-18 NOTE — PLAN OF CARE
Problem: Goal Outcome Summary  Goal: Goal Outcome Summary  Outcome: No Change  Alert and oriented x4. No complaints of pain or any new acute concerns tonight. Cycled TF as per orders at 120 ml/h and water flushes at 180 ml q4 hours, patient is tolerating without complaints. Independent in the room/mabulation to the bathroom. Denies any loose stools for overnight. Chest tube is intact, no further output noted tonight. Calls for assistance as needed. Continue with POC.

## 2018-05-18 NOTE — PROGRESS NOTES
"Brief Medicine Note    Subjective:   Michael Saldivar is a 57 year old male with a history of GERD who was admitted to [facility] 3/18-4/6/18 for Boerhaave's esophageal perforation felt to be 2/2 forceful vomiting from a viral illness now s/p left thoracotomy w/ repair of esophageal perforation. Post op course c/b esophageal leak into the L pleural cavity requiring pharyngostomy tube placement and gastropexy on 3/28. Transferred to  TCU 4/6/18 for nutrition and drain care.    I was notified about chest tube sutures being dislodged and migration of chest tube out about 3cm.  Currently, patient feels \"OK\".  As per my note yesterday, he continues to refrain from eating due to significant reflux that he was experiencing.  He denies fever, chills, nausea, vomiting, chest pain or shortness of breath.  He describes intermittent abdominal pain that \"comes and goes\" every since the stent was replaced on 5/11.  Diarrhea improved, denies dysuria.     Objective:  /75 (BP Location: Right arm)  Pulse 96  Temp 95.8  F (35.4  C) (Oral)  Resp 18  Wt 75.4 kg (166 lb 3.2 oz)  SpO2 96%  BMI 26.83 kg/m2    Vitals signs reviewed    General: Alert, interactive, NAD  HEENT: Sclera anicteric, PERRL and EOMI. Buccal cavity without lesions or thrush.   Neck: Supple, no JVD or cervical LAD  Resp: CTA a/p, no crackles or wheezes.   Cardiac: S1, S2, regular rate and rhythm, no murmur  Abdomen: Soft, nontender, nondistended. +BS.  No HSM or masses, no rebound or guarding.  GJ tube in place, no s/s of infection.   Extremities: No LE edema  Skin: Warm and dry, no generalized jaundice or acute rash.  Left chest tube secures with dressing, suture on tubing, not in skin.   Neuro: Alert & oriented x 3, Cns 2-12 intact, moves all extremities equally    Labs/diagnostics:   ROUTINE IP LABS (Last four results)  CMP   Recent Labs  Lab 05/18/18  0724 05/17/18  0640 05/16/18  0723 05/15/18  0757 05/14/18  0756   NA  --  142  --   --  141   POTASSIUM  " --  4.0  --   --  3.9   CHLORIDE  --  106  --   --  106   CO2  --  29  --   --  29   ANIONGAP  --  7  --   --  6   GLC  --  142*  --   --  138*   BUN  --  21  --   --  20   CR  --  0.63*  --   --  0.53*   MU  --  8.4*  --   --  9.0   MAG  --   --   --   --  2.0   PHOS 2.3* 2.2* 2.4* 2.2* 2.1*   PROTTOTAL  --  6.7*  --   --  6.6*   ALBUMIN  --  2.5*  --   --  2.6*   BILITOTAL  --  0.3  --   --  0.5   ALKPHOS  --  115  --   --  112   AST  --  17  --   --  14   ALT  --  38  --   --  41     CBC   Recent Labs  Lab 05/17/18  0640 05/14/18  0756   WBC 12.4* 12.6*   RBC 5.20 5.15   HGB 10.0* 9.7*   HCT 35.8* 35.9*   MCV 69* 70*   MCH 19.2* 18.8*   MCHC 27.9* 27.0*   RDW 17.6* 17.4*   * 471*       Assessment and plan:  Michael Saldivar is a 57 year old male with a history of GERD who was admitted to [facility] 3/18-4/6/18 for Boerhaave's esophageal perforation felt to be 2/2 forceful vomiting from a viral illness now s/p left thoracotomy w/ repair of esophageal perforation. Post op course c/b esophageal leak into the L pleural cavity requiring pharyngostomy tube placement and gastropexy on 3/28. Transferred to  TCU 4/6/18 for nutrition and drain care.    1. Boerhaave Esophageal Perforation; Intolerance of PO intake.  Please see my note from yesterday for more details.  Ongoing c/o intolerance of oral intake.  Chest tube became slightly dislodged overnight but asymptomatic.   - Thoracic surgery PA to come to bedside to resuture today  - Plan for EGD with stent exchange on 5/23/18 with Dr. Ybarra   - CLD ok, will need to be made NPO at midnight on 5/22 and hold TF at that time.   - Will obtain CXR today to re-assess position of esophageal stent   - Continue increased dose of PPI (changed to BID on 5/17)    2.  Hypophosphatemia - Ongoing issue.  Has been receiving IV Na Phos but lost PIV.  He has been receiving 15mmol of phos daily.   - Discussed with pharmD, will transition to neutra phos packets, 2 packets daily and will  monitor phos level q M and Th    Bhakti Seaman, [facility]  Hospitalist Service  Pager [phone]

## 2018-05-19 PROCEDURE — 25000132 ZZH RX MED GY IP 250 OP 250 PS 637: Performed by: NURSE PRACTITIONER

## 2018-05-19 PROCEDURE — 12000022 ZZH R&B SNF

## 2018-05-19 PROCEDURE — 27210437 ZZH NUTRITION PRODUCT SEMIELEM INTERMED LITER

## 2018-05-19 PROCEDURE — 25000132 ZZH RX MED GY IP 250 OP 250 PS 637: Performed by: PHYSICIAN ASSISTANT

## 2018-05-19 RX ADMIN — PANTOPRAZOLE SODIUM 40 MG: 40 TABLET, DELAYED RELEASE ORAL at 09:02

## 2018-05-19 RX ADMIN — Medication 2 PACKET: at 09:04

## 2018-05-19 RX ADMIN — Medication 2 PACKET: at 12:50

## 2018-05-19 RX ADMIN — Medication 2 PACKET: at 19:46

## 2018-05-19 RX ADMIN — FLUCONAZOLE 200 MG: 40 POWDER, FOR SUSPENSION ORAL at 09:02

## 2018-05-19 RX ADMIN — LOPERAMIDE HYDROCHLORIDE 4 MG: 1 SOLUTION ORAL at 09:00

## 2018-05-19 RX ADMIN — AMOXICILLIN AND CLAVULANATE POTASSIUM 875 MG: 400; 57 POWDER, FOR SUSPENSION ORAL at 20:09

## 2018-05-19 RX ADMIN — LOPERAMIDE HYDROCHLORIDE 4 MG: 1 SOLUTION ORAL at 12:50

## 2018-05-19 RX ADMIN — MULTIVITAMIN 15 ML: LIQUID ORAL at 09:00

## 2018-05-19 RX ADMIN — LOPERAMIDE HYDROCHLORIDE 4 MG: 1 SOLUTION ORAL at 20:09

## 2018-05-19 RX ADMIN — ALUMINUM HYDROXIDE, MAGNESIUM HYDROXIDE, AND DIMETHICONE 30 ML: 400; 400; 40 SUSPENSION ORAL at 16:41

## 2018-05-19 RX ADMIN — LOPERAMIDE HYDROCHLORIDE 4 MG: 1 SOLUTION ORAL at 16:34

## 2018-05-19 RX ADMIN — AMOXICILLIN AND CLAVULANATE POTASSIUM 875 MG: 400; 57 POWDER, FOR SUSPENSION ORAL at 09:01

## 2018-05-19 RX ADMIN — POTASSIUM & SODIUM PHOSPHATES POWDER PACK 280-160-250 MG 2 PACKET: 280-160-250 PACK at 09:00

## 2018-05-19 RX ADMIN — PANTOPRAZOLE SODIUM 40 MG: 40 TABLET, DELAYED RELEASE ORAL at 20:09

## 2018-05-19 NOTE — PLAN OF CARE
Problem: Goal Outcome Summary  Goal: Goal Outcome Summary  Outcome: No Change  RN: Pt has no c/o pain or discomfort this shift. No gastric reflux or heart burn per pt. Tolerating Tf and H20 flushes via J-tube, new bag hung at 0130 and should run until 0800. G-tube is open to gravity with green drainage noted. Water sealed chest tube intact with dressing CDI. Output is milky, tan color. See &O. Up independently to the BR. Appear to be sleeping/resting during rounds. Using call light appropriately. Continue with plan of care.

## 2018-05-19 NOTE — PLAN OF CARE
Problem: Goal Outcome Summary  Goal: Goal Outcome Summary  Quiet. Flat affect. Walks frequently in hallway.No changes.

## 2018-05-20 PROCEDURE — 99207 ZZC CDG-MDM COMPONENT: MEETS MODERATE - UP CODED: CPT | Performed by: NURSE PRACTITIONER

## 2018-05-20 PROCEDURE — 25000132 ZZH RX MED GY IP 250 OP 250 PS 637: Performed by: PHYSICIAN ASSISTANT

## 2018-05-20 PROCEDURE — 12000022 ZZH R&B SNF

## 2018-05-20 PROCEDURE — 99309 SBSQ NF CARE MODERATE MDM 30: CPT | Performed by: NURSE PRACTITIONER

## 2018-05-20 PROCEDURE — 25000132 ZZH RX MED GY IP 250 OP 250 PS 637: Performed by: NURSE PRACTITIONER

## 2018-05-20 PROCEDURE — 27210437 ZZH NUTRITION PRODUCT SEMIELEM INTERMED LITER

## 2018-05-20 RX ADMIN — MULTIVITAMIN 15 ML: LIQUID ORAL at 07:49

## 2018-05-20 RX ADMIN — AMOXICILLIN AND CLAVULANATE POTASSIUM 875 MG: 400; 57 POWDER, FOR SUSPENSION ORAL at 07:48

## 2018-05-20 RX ADMIN — FLUCONAZOLE 200 MG: 40 POWDER, FOR SUSPENSION ORAL at 07:48

## 2018-05-20 RX ADMIN — AMOXICILLIN AND CLAVULANATE POTASSIUM 875 MG: 400; 57 POWDER, FOR SUSPENSION ORAL at 20:20

## 2018-05-20 RX ADMIN — PANTOPRAZOLE SODIUM 40 MG: 40 TABLET, DELAYED RELEASE ORAL at 20:20

## 2018-05-20 RX ADMIN — Medication 2 PACKET: at 13:26

## 2018-05-20 RX ADMIN — Medication 2 PACKET: at 20:19

## 2018-05-20 RX ADMIN — Medication 2 PACKET: at 07:49

## 2018-05-20 RX ADMIN — LOPERAMIDE HYDROCHLORIDE 4 MG: 1 SOLUTION ORAL at 17:21

## 2018-05-20 RX ADMIN — LOPERAMIDE HYDROCHLORIDE 4 MG: 1 SOLUTION ORAL at 07:48

## 2018-05-20 RX ADMIN — POTASSIUM & SODIUM PHOSPHATES POWDER PACK 280-160-250 MG 2 PACKET: 280-160-250 PACK at 07:49

## 2018-05-20 RX ADMIN — LOPERAMIDE HYDROCHLORIDE 4 MG: 1 SOLUTION ORAL at 20:20

## 2018-05-20 RX ADMIN — LOPERAMIDE HYDROCHLORIDE 4 MG: 1 SOLUTION ORAL at 13:26

## 2018-05-20 RX ADMIN — PANTOPRAZOLE SODIUM 40 MG: 40 TABLET, DELAYED RELEASE ORAL at 07:48

## 2018-05-20 NOTE — PROGRESS NOTES
RN: NP updated r/t recent change in color over past few days of drainage in chest tube container.  Drainage is a tan milky color, the milky aspect is new to the drainage.  No other adverse sx, no new orders, continue to monitor, and record drainage.  Pt has Dr appointment 5/23, EGD.

## 2018-05-20 NOTE — PLAN OF CARE
Problem: Goal Outcome Summary  Goal: Goal Outcome Summary  Outcome: No Change  Alert and oriented x 4. C/o heart burn gave Mylanta around 1700 and it was effective. Around 1700 pt request  G tube  To be connected to salmeron bag. 600cc greenish output from G tube bag. o2 sat 98% room air. Denied having SOB or respiratory distress. Pt is on clear liquid diet. He had 2 boost drink this Am and c/o heart burn. Encouraged pt to drink water and juice. Changed chest tube site dressing moderate amount milky tan drainage noted no output from the canister.

## 2018-05-20 NOTE — PLAN OF CARE
Problem: Goal Outcome Summary  Goal: Goal Outcome Summary  Outcome: No Change  Alert and oriented x4. Denies any norma/disocmfort this shift. Denies SOB. Chest tube noted with tan/creamy drainage. Charge nurse update LEANN Ya. Walks independent with ambulation in hallway. Uses a walker. Both chest tube and Gtube dressings changed this shift. /55 (BP Location: Right arm)  Pulse 63  Temp 96.9  F (36.1  C) (Oral)  Resp 18  Wt 75.8 kg (167 lb)  SpO2 94%  BMI 26.95 kg/m2.

## 2018-05-20 NOTE — PLAN OF CARE
Problem: Goal Outcome Summary  Goal: Goal Outcome Summary  Outcome: No Change  Pt.sleeping off and on. Up to BR indep., reported loose stool, (not new). Pt.on clear liquid diet and cycled TF via JT - off @ 0800. GT is open to gravity drainage - greenish fluid. Pt.states he clamps GT during the day. Denies CP / SOB. Chest tube with milky drainage, pale, yellowish-green with minimal output noted compared to last marking on cannister. (05/19 = approx.280mls). Dressings to GJ tube and CT are C/D/I.

## 2018-05-20 NOTE — PROGRESS NOTES
Transitional Care Unit   Internal Medicine Progress Note   Date of Service: 5/14/2018    Patient: Michael Saldivar  MRN: [id number]  Admission Date: 5/11/2018  TCU Day # 9            Assessment & Plan:   Michael Saldivar is a 57 year old male with a history of GERD who was admitted to [facility] 3/18-4/6/18 for Boerhaave's esophageal perforation felt to be 2/2 forceful vomiting from a viral illness now s/p left thoracotomy w/ repair of esophageal perforation. Post op course c/b esophageal leak into the L pleural cavity requiring pharyngostomy tube placement and gastropexy on 3/28. Transferred to  TCU 4/6/18 for nutrition and drain care.    Boerhaave Esophageal Perforation; Intolerance of PO intake - S/p left thoracotomy, chest tube placement (x 3 on L and x1 on R), pEG placement and repair of esophogeal perforation on 3/18/18 by Dr. Ybarra.  Post op course c.b persistent leak from esophagus to left pleural cavity.  S/p pharyngostomy tube placement ([facility] 4/20) and gastropexy (3/28).  ID added jejunal limb to G tube on 3/30.  Currently with 1 chest tube on L to water seal with minimal OP.  Repeat EGD 4/20 noted small, persistent cavity likely 2/2 distal stricture therefore an esophageal stent was placed.  Empirically treated with Fluconazole 3/18-present, Zosyn 3/18-4.2, and Augmentin 4/2-present.  Thoracic surgery f/u with repeat EGD 5/11, stent exchanged. Advanced to soft diet per Dr. Ybarra reccs, however, intolerance of PO intake this week (notes bloating, mild abd pain and reflux) therefore pt weaned to CLD.  Tolerating min intake.  PPI doubled 5/17.   XR (5/13) demonstrated stent in good position.  Discussed with Dr. Ybarra twice this week, plan for EGD on 5/23.  Chest tube sutures fell out 5/16, re-sutured by thoracic 5/17.  - Continue CLD   - EGD on 5/23, will need to hold TF prior   - Continue PPI BID   - G tube to gravity for symptoms, otherwise ok to cap   - J tube for feeds and meds, tolerating  -  Cont. Chest tube to water seal, record output  - Continue Augmentin and Fluconozole - stop date TBD by Dr. Ybarra  - Pain control: Tylenol PRN    Chronic Loose Stools - Likely 2/2 TFs and antibiotics.  CDiff PCT neg x 2 most recently on 4/16.  Overall improved.   - Continue fiber BID, Immodium QID and Lomotil PRN     Hypophosphatemia - Likely nutritional.  Was receiving about 15mmol IV per day on elyte repletion protocol, however, patient has requested IV not be replaced if at all possible.  Started BID oral supplementation on 5/18 and tolerating.   - Check phos qMTh  - Continue Neutraphos 2 packets BID daily    Hx of GERD -  See above.  Symptoms seem to be worse since most recent EGD.   - Continue PPI BID     Mild Leukocytosis - WBC 12.4 currently which has been stable over past month.  No current s/s of infection.  Managed on antibx as above per surgical service.  Likely inflammatory in setting of esophageal perforation.   - Trend CBC q MTh    Chronic Normocytic Anemia - Unknown BL Hgb PTA.  Hgb 11.7 on admission, most recently 10.  No s/s of clinical bleeding.   - CBC q MTh    Resolved TCU Issues:  Abnormal Liver Biochemistries. Intermittently elevated AP, ALT and AST since 3/26.  RUQ US on 4/26 with mildly echogenic liver with coarsened echo texture c/w steatosis.  Possibly 2/2 fluconazole.  LFTs normal.  If up trending again, per Dr. Ybarra ok to change to Micafungin.  Continue to check LFTs q MTh.     Diet and/or tube feedings:  Impact Peptide 1.5 @120mL/hour from 8pm to 8am and CLD   Lines, tubes, drains: Peripheral IV, chest tube to water seal   feeding tube GJ-Tube.    DVT/GI prophylaxis: Low Risk/Ambulatory with no VTE prophylaxis indicated, Protonix for GERD  Indications for psychotropic medications: No diagnosis  Code status: Full Code         Consults:     Nutrition  Physical Therapy  Occupational Therapy         Discharge Planning:       Will remains in the TCU until chest tube removed          Interval History:     Doing well.  Notes that he had some abdominal pain overnight which resolved. He overall feels much better when he is not eating.  He had some boost shakes yesterday and experienced heart burn after ingesting them.  Denies fever, chills, nausea, vomiting, diarrhea, chest pain or shortness of breath.          Physical Exam:   Blood pressure 130/55, pulse 63, temperature 96.9  F (36.1  C), temperature source Oral, resp. rate 18, weight 76.1 kg (167 lb 11.2 oz), SpO2 94 %.  GENERAL: Awake. Appears to be resting comfortably. NAD.   HEENT: NC/AT. Anicteric sclera. Mucous membranes moist.  NECK: Supple   CV: RRR, S1S2. No appreciable murmurs, rubs, or gallops.   RESPIRATORY: Normal respiratory effort on RA. Lungs CTAB without wheezes, rales or rhonchi. Left sided chest tube to water seal, suture in place.  Dressing CDI.     GI: Soft, non-tender, and non-distended with bowel sounds present in all quadrants. GJ tube CDI.   EXTREMITIES: No peripheral edema. Warm & well perfused.  NEUROLOGIC: Alert and orientated x 3. CN II-XII grossly intact. No focal deficits.   MUSCULOSKELETAL: No joint swelling or tenderness.    SKIN: No jaundice. No rashes or lesions. Left chest tube intact with scant drainage, no leak.       Labs & Studies:     ROUTINE IP LABS (Last four results)  CMP     Recent Labs  Lab 05/18/18  0724 05/17/18  0640 05/16/18  0723 05/15/18  0757 05/14/18  0756   NA  --  142  --   --  141   POTASSIUM  --  4.0  --   --  3.9   CHLORIDE  --  106  --   --  106   CO2  --  29  --   --  29   ANIONGAP  --  7  --   --  6   GLC  --  142*  --   --  138*   BUN  --  21  --   --  20   CR  --  0.63*  --   --  0.53*   MU  --  8.4*  --   --  9.0   MAG  --   --   --   --  2.0   PHOS 2.3* 2.2* 2.4* 2.2* 2.1*   PROTTOTAL  --  6.7*  --   --  6.6*   ALBUMIN  --  2.5*  --   --  2.6*   BILITOTAL  --  0.3  --   --  0.5   ALKPHOS  --  115  --   --  112   AST  --  17  --   --  14   ALT  --  38  --   --  41     CBC      Recent Labs  Lab 05/17/18  0640 05/14/18  0756   WBC 12.4* 12.6*   RBC 5.20 5.15   HGB 10.0* 9.7*   HCT 35.8* 35.9*   MCV 69* 70*   MCH 19.2* 18.8*   MCHC 27.9* 27.0*   RDW 17.6* 17.4*   * 471*     INR No lab results found in last 7 days.      Unresulted Labs Ordered in the Past 30 Days of this Admission     Date and Time Order Name Status Description    3/18/2018 1608 Plasma prepare order unit In process           Bhakti Seaman [facility]  Hospitalist Service  Pager [phone]

## 2018-05-21 ENCOUNTER — APPOINTMENT (OUTPATIENT)
Dept: LAB | Facility: CLINIC | Age: 58
End: 2018-05-21
Attending: INTERNAL MEDICINE
Payer: MEDICAID

## 2018-05-21 LAB
ALBUMIN SERPL-MCNC: 2.8 G/DL (ref 3.4–5)
ALP SERPL-CCNC: 130 U/L (ref 40–150)
ALT SERPL W P-5'-P-CCNC: 60 U/L (ref 0–70)
ANION GAP SERPL CALCULATED.3IONS-SCNC: 7 MMOL/L (ref 3–14)
AST SERPL W P-5'-P-CCNC: 22 U/L (ref 0–45)
BILIRUB SERPL-MCNC: 0.3 MG/DL (ref 0.2–1.3)
BUN SERPL-MCNC: 25 MG/DL (ref 7–30)
CALCIUM SERPL-MCNC: 8.9 MG/DL (ref 8.5–10.1)
CHLORIDE SERPL-SCNC: 106 MMOL/L (ref 94–109)
CO2 SERPL-SCNC: 29 MMOL/L (ref 20–32)
CREAT SERPL-MCNC: 0.62 MG/DL (ref 0.66–1.25)
ERYTHROCYTE [DISTWIDTH] IN BLOOD BY AUTOMATED COUNT: 17.7 % (ref 10–15)
GFR SERPL CREATININE-BSD FRML MDRD: >90 ML/MIN/1.7M2
GLUCOSE SERPL-MCNC: 110 MG/DL (ref 70–99)
HCT VFR BLD AUTO: 38.2 % (ref 40–53)
HGB BLD-MCNC: 10.3 G/DL (ref 13.3–17.7)
MAGNESIUM SERPL-MCNC: 2.2 MG/DL (ref 1.6–2.3)
MCH RBC QN AUTO: 18.7 PG (ref 26.5–33)
MCHC RBC AUTO-ENTMCNC: 27 G/DL (ref 31.5–36.5)
MCV RBC AUTO: 69 FL (ref 78–100)
PHOSPHATE SERPL-MCNC: 2.9 MG/DL (ref 2.5–4.5)
PLATELET # BLD AUTO: 517 10E9/L (ref 150–450)
POTASSIUM SERPL-SCNC: 4 MMOL/L (ref 3.4–5.3)
PROT SERPL-MCNC: 7.2 G/DL (ref 6.8–8.8)
RBC # BLD AUTO: 5.52 10E12/L (ref 4.4–5.9)
SODIUM SERPL-SCNC: 142 MMOL/L (ref 133–144)
WBC # BLD AUTO: 11.9 10E9/L (ref 4–11)

## 2018-05-21 PROCEDURE — 25000132 ZZH RX MED GY IP 250 OP 250 PS 637: Performed by: NURSE PRACTITIONER

## 2018-05-21 PROCEDURE — 12000022 ZZH R&B SNF

## 2018-05-21 PROCEDURE — 27210437 ZZH NUTRITION PRODUCT SEMIELEM INTERMED LITER

## 2018-05-21 PROCEDURE — 84100 ASSAY OF PHOSPHORUS: CPT | Performed by: PHYSICIAN ASSISTANT

## 2018-05-21 PROCEDURE — 25000132 ZZH RX MED GY IP 250 OP 250 PS 637: Performed by: PHYSICIAN ASSISTANT

## 2018-05-21 PROCEDURE — 36415 COLL VENOUS BLD VENIPUNCTURE: CPT | Performed by: PHYSICIAN ASSISTANT

## 2018-05-21 PROCEDURE — 83735 ASSAY OF MAGNESIUM: CPT | Performed by: PHYSICIAN ASSISTANT

## 2018-05-21 PROCEDURE — 80053 COMPREHEN METABOLIC PANEL: CPT | Performed by: PHYSICIAN ASSISTANT

## 2018-05-21 PROCEDURE — 85027 COMPLETE CBC AUTOMATED: CPT | Performed by: PHYSICIAN ASSISTANT

## 2018-05-21 RX ADMIN — PANTOPRAZOLE SODIUM 40 MG: 40 TABLET, DELAYED RELEASE ORAL at 20:08

## 2018-05-21 RX ADMIN — Medication 2 PACKET: at 12:30

## 2018-05-21 RX ADMIN — AMOXICILLIN AND CLAVULANATE POTASSIUM 875 MG: 400; 57 POWDER, FOR SUSPENSION ORAL at 08:07

## 2018-05-21 RX ADMIN — Medication 2 PACKET: at 20:09

## 2018-05-21 RX ADMIN — AMOXICILLIN AND CLAVULANATE POTASSIUM 875 MG: 400; 57 POWDER, FOR SUSPENSION ORAL at 20:08

## 2018-05-21 RX ADMIN — LOPERAMIDE HYDROCHLORIDE 4 MG: 1 SOLUTION ORAL at 17:48

## 2018-05-21 RX ADMIN — LOPERAMIDE HYDROCHLORIDE 4 MG: 1 SOLUTION ORAL at 12:30

## 2018-05-21 RX ADMIN — LOPERAMIDE HYDROCHLORIDE 4 MG: 1 SOLUTION ORAL at 20:07

## 2018-05-21 RX ADMIN — MULTIVITAMIN 15 ML: LIQUID ORAL at 08:07

## 2018-05-21 RX ADMIN — PANTOPRAZOLE SODIUM 40 MG: 40 TABLET, DELAYED RELEASE ORAL at 08:08

## 2018-05-21 RX ADMIN — LOPERAMIDE HYDROCHLORIDE 4 MG: 1 SOLUTION ORAL at 08:07

## 2018-05-21 RX ADMIN — POTASSIUM & SODIUM PHOSPHATES POWDER PACK 280-160-250 MG 2 PACKET: 280-160-250 PACK at 08:07

## 2018-05-21 RX ADMIN — ALUMINUM HYDROXIDE, MAGNESIUM HYDROXIDE, AND DIMETHICONE 30 ML: 400; 400; 40 SUSPENSION ORAL at 20:08

## 2018-05-21 RX ADMIN — FLUCONAZOLE 200 MG: 40 POWDER, FOR SUSPENSION ORAL at 08:07

## 2018-05-21 RX ADMIN — Medication 2 PACKET: at 08:08

## 2018-05-21 RX ADMIN — ACETAMINOPHEN 500 MG: 325 SOLUTION ORAL at 20:08

## 2018-05-21 NOTE — PLAN OF CARE
Problem: Goal Outcome Summary  Goal: Goal Outcome Summary  Outcome: No Change  Pt.A&Ox4. Denies pain, discomfort, CP and SOB. Up to BR indep.- reports loose stool x1. Cycled TF infusing per order via JT - off @ 0820. GT currently to gravity drainage. Pt.states he keeps GT clamped when TF not infusing. CT drainage is milky/tan-colored, pt.stated drainage has been this color since CT was repositioned and resutured.

## 2018-05-21 NOTE — PLAN OF CARE
Problem: Goal Outcome Summary  Goal: Goal Outcome Summary  Outcome: No Change  RN: Pt AA&O x3, able to make needs knowna nd uses call light appropriately. TF in J-tube to run 2020-0820, G-tube open to gravity bag. Chest tube drainage marked at 300 mL with milky tan output. GJ dsg and chest tube dsg CDI. Pt walks hallways independently. Con't POC.

## 2018-05-21 NOTE — PLAN OF CARE
Problem: Goal Outcome Summary  Goal: Goal Outcome Summary  RN: Independent in room and ambulating in halls without problems. Denies pain.  Clear liquid tolerating well.  Scheduled tube feed off 0800. All meds via jtube and feeding via j tube.  G tube clamped at 0830 and pt tolerating well.  Order to clamp x 8 hours. No skin problems noted.  Pt declined skin check buttock and slime, and reports no problems.  No resp distress. CT scant drainage output if any this shift, level at 320 cc, milky tan drainage in cannister, no adverse sx with this, pt has appointment on 5/23. Continue to monitor.

## 2018-05-22 ENCOUNTER — ANESTHESIA EVENT (OUTPATIENT)
Dept: SURGERY | Facility: CLINIC | Age: 58
End: 2018-05-22
Payer: MEDICAID

## 2018-05-22 ENCOUNTER — TELEPHONE (OUTPATIENT)
Dept: SURGERY | Facility: CLINIC | Age: 58
End: 2018-05-22

## 2018-05-22 PROCEDURE — 12000022 ZZH R&B SNF

## 2018-05-22 PROCEDURE — 99309 SBSQ NF CARE MODERATE MDM 30: CPT | Performed by: PHYSICIAN ASSISTANT

## 2018-05-22 PROCEDURE — 27210437 ZZH NUTRITION PRODUCT SEMIELEM INTERMED LITER

## 2018-05-22 PROCEDURE — 25000132 ZZH RX MED GY IP 250 OP 250 PS 637: Performed by: PHYSICIAN ASSISTANT

## 2018-05-22 PROCEDURE — 25000132 ZZH RX MED GY IP 250 OP 250 PS 637: Performed by: NURSE PRACTITIONER

## 2018-05-22 RX ADMIN — FLUCONAZOLE 200 MG: 40 POWDER, FOR SUSPENSION ORAL at 08:18

## 2018-05-22 RX ADMIN — LOPERAMIDE HYDROCHLORIDE 4 MG: 1 SOLUTION ORAL at 17:57

## 2018-05-22 RX ADMIN — Medication 2 PACKET: at 12:31

## 2018-05-22 RX ADMIN — POTASSIUM & SODIUM PHOSPHATES POWDER PACK 280-160-250 MG 2 PACKET: 280-160-250 PACK at 08:18

## 2018-05-22 RX ADMIN — LOPERAMIDE HYDROCHLORIDE 4 MG: 1 SOLUTION ORAL at 12:31

## 2018-05-22 RX ADMIN — AMOXICILLIN AND CLAVULANATE POTASSIUM 875 MG: 400; 57 POWDER, FOR SUSPENSION ORAL at 08:18

## 2018-05-22 RX ADMIN — Medication 2 PACKET: at 20:37

## 2018-05-22 RX ADMIN — Medication 2 PACKET: at 08:16

## 2018-05-22 RX ADMIN — ACETAMINOPHEN 500 MG: 325 SOLUTION ORAL at 17:57

## 2018-05-22 RX ADMIN — MULTIVITAMIN 15 ML: LIQUID ORAL at 08:18

## 2018-05-22 RX ADMIN — ACETAMINOPHEN 500 MG: 325 SOLUTION ORAL at 08:32

## 2018-05-22 RX ADMIN — PANTOPRAZOLE SODIUM 40 MG: 40 TABLET, DELAYED RELEASE ORAL at 20:37

## 2018-05-22 RX ADMIN — LOPERAMIDE HYDROCHLORIDE 4 MG: 1 SOLUTION ORAL at 20:37

## 2018-05-22 RX ADMIN — LOPERAMIDE HYDROCHLORIDE 4 MG: 1 SOLUTION ORAL at 08:18

## 2018-05-22 RX ADMIN — AMOXICILLIN AND CLAVULANATE POTASSIUM 875 MG: 400; 57 POWDER, FOR SUSPENSION ORAL at 20:37

## 2018-05-22 RX ADMIN — PANTOPRAZOLE SODIUM 40 MG: 40 TABLET, DELAYED RELEASE ORAL at 08:19

## 2018-05-22 ASSESSMENT — ACTIVITIES OF DAILY LIVING (ADL)
FALL_HISTORY_WITHIN_LAST_SIX_MONTHS: NO
PRIOR_FUNCTIONAL_LEVEL_COMMENT: INDEPENDENT
WHICH_OF_THE_ABOVE_FUNCTIONAL_RISKS_HAD_A_RECENT_ONSET_OR_CHANGE?: AMBULATION
TRANSFERRING: 0-->INDEPENDENT
AMBULATION: 0-->INDEPENDENT
RETIRED_EATING: 0-->INDEPENDENT
TOILETING: 0-->INDEPENDENT
DRESS: 0-->INDEPENDENT
COGNITION: 0 - NO COGNITION ISSUES REPORTED
SWALLOWING: 0-->SWALLOWS FOODS/LIQUIDS WITHOUT DIFFICULTY
BATHING: 0-->INDEPENDENT
RETIRED_COMMUNICATION: 0-->UNDERSTANDS/COMMUNICATES WITHOUT DIFFICULTY

## 2018-05-22 ASSESSMENT — LIFESTYLE VARIABLES: TOBACCO_USE: 1

## 2018-05-22 NOTE — ANESTHESIA PREPROCEDURE EVALUATION
Anesthesia Evaluation     . Pt has had prior anesthetic. Type: General    No history of anesthetic complications          ROS/MED HX    ENT/Pulmonary: Comment: Bilaterally hard of hearing    (+)tobacco use, , . .    Neurologic:  - neg neurologic ROS     Cardiovascular: Comment: Stress 7/2009: negative  TTE 7/15/2009: EVEF nl (55-60%)  Trace TR    (+) ----. : . . . :. dysrhythmias (LBBB) . Previous cardiac testing Echodate:2009results:EF 60%. Normal studydate: results:ECG reviewed date:3/18/18 results:NSR with LBBB date: results:          METS/Exercise Tolerance:  4 - Raking leaves, gardening   Hematologic:     (+) Anemia, -      Musculoskeletal:  - neg musculoskeletal ROS       GI/Hepatic: Comment: Esophageal perforation s/p multiple of stents    (+) GERD Symptomatic,       Renal/Genitourinary:  - ROS Renal section negative       Endo:  - neg endo ROS       Psychiatric:  - neg psychiatric ROS       Infectious Disease:  - neg infectious disease ROS       Malignancy:      - no malignancy   Other:    - neg other ROS                 Physical Exam  Normal systems: dental    Airway   Mallampati: II  TM distance: >3 FB  Neck ROM: full    Dental     Cardiovascular   Rhythm and rate: regular and normal  (-) no weak pulses and no murmur    Pulmonary    breath sounds clear to auscultation(-) no rhonchi                    Anesthesia Plan      History & Physical Review      ASA Status:  2 .    NPO Status:  > 8 hours    Plan for General, ETT and RSI     GETA. PIVx1. Standard ASA monitors. IV opioids. PACU postop    Risks and benefits of anesthetic discussed with patient including sore throat, voice hoarseness, injury to vocal cords, throat, mouth, teeth, tongue, and lips from intubation; nausea/vomiting; cardiac arrest, respiratory complications, MI, stroke, blood clots, death.    Presented opportunity to answer patient and family questions. Questions addressed.        Postoperative Care      Consents  Anesthetic plan, risks,  benefits and alternatives discussed with:  Patient..        ANESTHESIA PREOP EVALUATION    Procedure: Procedure(s):  Esophagogastroduodenoscopy, Stent Exchange - Wound Class:     HPI: Michael Saldivar is a 57 year old male presenting for above procedure.     PMHx/PSHx/ROS:  Past Medical History:   Diagnosis Date     GERD (gastroesophageal reflux disease)        Past Surgical History:   Procedure Laterality Date     COMBINED ESOPHAGOSCOPY, GASTROSCOPY, DUODENOSCOPY (EGD), REPLACE ESOPHAGEAL STENT N/A 5/11/2018    Procedure: COMBINED ESOPHAGOSCOPY, GASTROSCOPY, DUODENOSCOPY (EGD), REPLACE ESOPHAGEAL STENT;  Esophagogastroduodenoscopy Esophageal Stent Removal;  Surgeon: Keith Ybarra MD;  Location: [facility] OR     ENDOSCOPIC INSERTION TUBE GASTROSTOMY N/A 3/18/2018    Procedure: ENDOSCOPIC INSERTION TUBE GASTROSTOMY;  COMPINED ESOPHAGOSCOPY, GASTROSCOPY, DUODENOSCOPY, PEG-TUBE INSERTION, RIGHT CHEST TUBE INSERTION, POSSIBLE NASOGASTRIC TUBE, POSSIBLE THORACOTOMY. ;  Surgeon: Keith Ybarra MD;  Location: [facility] OR     ENDOSCOPIC INSERTION TUBE GASTROSTOMY N/A 3/28/2018    Procedure: ENDOSCOPIC INSERTION TUBE GASTROSTOMY;;  Surgeon: Maria Del Rosario Flores MD;  Location: [facility] OR     ESOPHAGOSCOPY, GASTROSCOPY, DUODENOSCOPY (EGD), COMBINED N/A 3/18/2018    Procedure: COMBINED ESOPHAGOSCOPY, GASTROSCOPY, DUODENOSCOPY (EGD);  COMPINED ESOPHAGOSCOPY, GASTROSCOPY, DUODENOSCOPY, PEG-TUBE INSERTION, RIGHT CHEST TUBE INSERTION, POSSIBLE NASOGASTRIC TUBE, POSSIBLE THORACOTOMY. ;  Surgeon: Keith Ybarra MD;  Location: [facility] OR     ESOPHAGOSCOPY, GASTROSCOPY, DUODENOSCOPY (EGD), COMBINED N/A 3/28/2018    Procedure: COMBINED ESOPHAGOSCOPY, GASTROSCOPY, DUODENOSCOPY (EGD);  Esophagogastroduodenoscopy, Pharyngostomy Tube Placement, Gastroplexy, Feeding Tube Exchange;  Surgeon: Maria Del Rosario Flores MD;  Location: [facility] OR     HAND SURGERY       PHARYNGOSTOMY N/A 3/28/2018    Procedure: PHARYNGOSTOMY;;  Surgeon: Maria Del Rosario Flores MD;  Location: [facility] OR      THORACOTOMY Left 3/18/2018    Procedure: THORACOTOMY;  Esophogastroduodenoscopy, PEG-TUBE INSERTION, Left THORACOTOMY, Chest tube insertion;  Surgeon: Keith Ybarra MD;  Location:  OR         Past Anes Hx: No personal or family h/o anesthesia problems    Soc Hx:   Social History   Substance Use Topics     Smoking status: Former Smoker     Packs/day: 1.50     Years: 36.00     Types: Cigarettes     Quit date: 3/18/2013     Smokeless tobacco: Never Used     Alcohol use No       Allergies: No Known Allergies    Meds:   Prescriptions Prior to Admission   Medication Sig Dispense Refill Last Dose     acetaminophen (TYLENOL) 32 mg/mL solution 20.3 mLs (650 mg) by Per J Tube route every 6 hours as needed for mild pain or fever (Patient taking differently: 500 mg by Per J Tube route every 6 hours as needed for mild pain or fever ) 473 mL 0 Past Month at Unknown time     alum & mag hydroxide-simethicone (MYLANTA/MAALOX) 200-200-20 MG/5ML SUSP suspension Take 30 mLs by mouth every 4 hours as needed for indigestion   5/11/2018 at 0430     amoxicillin-clavulanate (AUGMENTIN) 400-57 MG/5ML suspension 10.9 mLs (875 mg) by Per J Tube route 2 times daily 300 mL 0 5/11/2018 at 0930     diphenoxylate-atropine (LOMOTIL) 2.5-0.025 MG/5ML liquid Take 5 mLs by mouth 4 times daily as needed for diarrhea   Unknown at Unknown time     fiber modular (NUTRISOURCE FIBER) packet 1 packet by Per J Tube route 2 times daily (Patient taking differently: 2 packets by Per J Tube route 2 times daily ) 75 packet 0 More than a month at Unknown time     fluconazole (DIFLUCAN) 40 MG/ML suspension 5 mLs (200 mg) by Per J Tube route daily 105 mL 0 5/11/2018 at 0930     loperamide (IMODIUM) 1 MG/5ML liquid 20 mLs (4 mg) by Per J Tube route 4 times daily 200 mL  5/11/2018 at 1204     loperamide (IMODIUM) 1 MG/5ML liquid 10 mLs (2 mg) by Per J Tube route 4 times daily as needed for diarrhea 236 mL 1 Taking     multivitamins with minerals (CERTAVITE/CEROVITE)  LIQD liquid 15 mLs by Per Feeding Tube route daily 2 Bottle 0 5/10/2018 at Unknown time     ondansetron (ZOFRAN-ODT) 4 MG ODT tab Take 4 mg by mouth every 6 hours as needed for nausea   More than a month at Unknown time     order for DME Equipment being ordered:   [facility] Suction Machine  Suction Canister-2  Suction Connect Tube-2  5 in 1 Connector-2  Bacteria Filter-2    Treatment Diagnosis: Esophogeal Perforation 1 Month 0 Taking     order for DME Equipment being ordered: Other: suction and cannister for pharyngostomy tube  Treatment Diagnosis: Esophageal perforation 7 Container 0 Taking     pantoprazole (PROTONIX) SUSP suspension Take 40 mg by mouth daily   5/11/2018 at 0930       No current outpatient prescriptions on file.       Physical Exam:  Vitals: There were no vitals taken for this visit.  BMI= There is no height or weight on file to calculate BMI.      Labs:  UPT: No results found for: HCGQUANT      BMP:  Recent Labs   Lab Test  05/21/18   0715   NA  142   POTASSIUM  4.0   CHLORIDE  106   CO2  29   BUN  25   CR  0.62*   GLC  110*   MU  8.9     CBC:   Recent Labs   Lab Test  05/21/18   0715   WBC  11.9*   RBC  5.52   HGB  10.3*   HCT  38.2*   MCV  69*   MCH  18.7*   MCHC  27.0*   RDW  17.7*   PLT  517*     Coags:  Recent Labs   Lab Test  04/02/18   0829   INR  1.17*       Assessment/Plan:  - ASA 3  - GETA with standard ASA monitors, IV induction, balanced anesthetic  - PIV  - Antibiotics per surgery  - PONV prophylaxis  - Blood products available, possible administration discussed with patient  - Relevant risks, benefits, alternatives and the anesthetic plan were discussed with patient/family or family representative.  All questions were answered and there was agreement to proceed.      Alfred LIM, MYLES                        .

## 2018-05-22 NOTE — PLAN OF CARE
Problem: Goal Outcome Summary  Goal: Goal Outcome Summary  Outcome: No Change  Alert and oriented x4. Patient still noting right upper epigastric pain, states not bothersome at the moment and declined any pain medication. Cycled TF as per orders at 120 ml/h and water flushes at 180 ml q 4 hours and patient is tolerating without complaints. CT with no noted additional output tonight, intact with no noted leakage. G-tube to gravity. Independent in the room. Call light in reach. Continue with POC.

## 2018-05-22 NOTE — PLAN OF CARE
Brief Medicine Note  May 22, 2018      Patient has planned EGD tomorrow. NPO at midnight, tube feeds to be held tonight. Orders placed.    Zunilda Lazar PA-C

## 2018-05-22 NOTE — PLAN OF CARE
Problem: Goal Outcome Summary  Goal: Goal Outcome Summary  RN: Chest tube site is CD, the tube is in place, but it is about one inch out from insertion site. Dressing changed. Pt will see GI specialist tomorrow.

## 2018-05-22 NOTE — PLAN OF CARE
Problem: Goal Outcome Summary  Goal: Goal Outcome Summary  Outcome: No Change  Alert and oriented x4. Patient ambulated in the hallway multiple times this evening. Patient complained of pain to right side of abdomen/chest, states pain is sharp and comes on and off( new pain as of today). Tylenol 500 mg and maalox administerd with some relief. Patient was asked to notify staff immediately is pain gets worse. TF started at 2000. Continue to monitor.    /73 (BP Location: Right arm)  Pulse 88  Temp 96.3  F (35.7  C) (Oral)  Resp 18  Wt 75.9 kg (167 lb 4.8 oz)  SpO2 94%  BMI 27 kg/m2.

## 2018-05-22 NOTE — PROGRESS NOTES
[facility]  Transitional Care Unit Progress Note  Michael Saldivar  [id number]  May 22, 2018         Assessment & Plan:   Michael Saldivar is a 57 year old male with a history of GERD who was admitted to [facility] 3/18-4/6/18 for Boerhaave's esophageal perforation felt to be 2/2 forceful vomiting from a viral illness now s/p left thoracotomy w/ repair of esophageal perforation. Post op course c/b esophageal leak into the L pleural cavity requiring pharyngostomy tube placement and gastropexy on 3/28. Transferred to  TCU 4/6/18 for nutrition and drain care.     Boerhaave Esophageal Perforation; Intolerance of PO intake. S/p left thoracotomy, chest tube placement (x 3 on L and x1 on R), pEG placement and repair of esophogeal perforation on 3/18/18 by Dr. Ybarra.  Post op course c.b persistent leak from esophagus to left pleural cavity.  S/p pharyngostomy tube placement (removec 4/20) and gastropexy (3/28).  ID added jejunal limb to G tube on 3/30.  Currently with 1 chest tube on L to water seal with minimal OP.  Repeat EGD 4/20 noted small, persistent cavity likely 2/2 distal stricture therefore an esophageal stent was placed.  Empirically treated with Fluconazole 3/18-present, Zosyn 3/18-4.2, and Augmentin 4/2-present.  Thoracic surgery f/u with repeat EGD 5/11, stent exchanged. Advanced to soft diet per Dr. Ybarra recs, however did not tolerate so weaned to CLD. PPI doubled 5/17. AXR (5/13) demonstrated stent in good position. Chest tube sutures fell out 5/16, re-sutured by thoracic 5/17. Chest tube with more milky output over past 3d and pt complains of new mid abd pain that comes and goes over past 48h. Afebrile. VSS. WBC 11.9 (c/w previous). Abd soft, +BS, very mild ttp on deep palpation to epigastric area, no rebound or guarding. Notified thoracic surgery of pts complaints, plan for EGD for eval tomorrow.   - Plan for EGD with thoracic surgery tomorrow  - FEN: CLD, TFs. NPO at MN and hold TFs  tonight for EGD tomorrow.   - Continue PPI BID   - TFs and meds via J tube, G tube to gravity.   - Chest tube to water seal, monitor output  - Continue Augmentin and Fluconozole - stop date TBD by Dr. Ybarra  - Pain control: Tylenol PRN     Chronic Loose Stools. Presumed 2/2 TFs and antibiotics.  CDiff PCT neg x 2 most recently on 4/16.  Overall improved. Cont fiber BID, imodium qid, and lomotil prn.       Hypophosphatemia. Likely nutritional. Was receiving about 15mmol IV per day on electrolyte repletion protocol, however, patient has requested IV not be replaced if at all possible. Started BID oral supplementation on 5/18 and tolerating. Phos improved to 2.9 on 5/21. Cont neutrophos packets BID. Monitor Phos qM/Th.      Hx of GERD. See above. Cont PPI BID.      Mild Leukocytosis. WBC 11.9 currently which has been stable over past month.  No current s/s of infection.  Managed on antibx as above per surgical service.  Likely inflammatory in setting of esophageal perforation. Monitor CBC qM/Th.      Chronic Normocytic Anemia. Unknown BL Hgb PTA.  Hgb 11.7 on admission, most recently 10.3 on 5/21. Monitor CBC qM/Th.     Resolved TCU Issues  Abnormal Liver Biochemistries. Intermittently elevated AP, ALT and AST since 3/26.  RUQ US on 4/26 with mildly echogenic liver with coarsened echo texture c/w steatosis.  Possibly 2/2 fluconazole.  LFTs normal.  If up trending again, per Dr. Ybarra ok to change to Micafungin.  Continue to check LFTs q MTh.      Diet and/or tube feedings:  CLD, TFs, NPO at MN and TF hels tonight  Lines, tubes, drains: Peripheral IV, chest tube to water seal   feeding tube GJ-Tube.    DVT/GI prophylaxis: Low Risk/Ambulatory with no VTE prophylaxis indicated, Protonix for GERD  Indications for psychotropic medications: No diagnosis  Code status: Full Code         Consults:   nutrition         Discharge Planning:   pending        Interval History:   Michael is doing well today. He does report that  he has this new abdominal pain that is mid epigastric and radiates a little to his right upper side that has been coming and going. It is not constant. He is not sure what makes it better or worse. He denies any radiation of pain. Tolerating CLD. Bowels consistent with previous. He denies any fevers or chills. He is understanding of procedure tomorrow. He has no chest pain, sob, or dyspnea.            Physical Exam:   Vitals were reviewed  Blood pressure 116/68, pulse 84, temperature 98.1  F (36.7  C), temperature source Oral, resp. rate 18, weight 75.8 kg (167 lb 3.2 oz), SpO2 96 %.  General:alert, NAD, WDWN, pleasant and interactive  HEENT: MMM  Cardiovascular: RRR  Lungs:CTAB anterolaterally  Abdomen: normoactive BS, soft with no distention and very mild tenderness to palpation of epigastrum. No rebound or guarding. No focal tenderness. GJ c/d/i.   Vascular: no peripheral edema, distal pulses palpable  Neurologic: AAO X 3, CN 2-12 grossly intact, no focal deficits  Skin: no jaundice, rashes, or lesions  Tubes: chest tube with milky light gray output      Zunilda Espinoza [facility]  Internal Medicine [facility] Hospitalist  [phone]

## 2018-05-22 NOTE — PLAN OF CARE
Problem: Goal Outcome Summary  Goal: Goal Outcome Summary  Outcome: No Change  Patient up in the halls with walker ambulating,chest tube intact,G-tube clamped from 8 am.J-tube last flushed with 180 cc of water @ 12 noon.Patient complained of discomfort right chest offered  Tylenol expressed some relief an hour later.patient wanted a provider to answer  his questions for upcoming procedure tomorrow and Eliza went and talked to patient.Check orders for tomorrow.

## 2018-05-23 ENCOUNTER — ANESTHESIA (OUTPATIENT)
Dept: SURGERY | Facility: CLINIC | Age: 58
End: 2018-05-23
Payer: MEDICAID

## 2018-05-23 ENCOUNTER — APPOINTMENT (OUTPATIENT)
Dept: GENERAL RADIOLOGY | Facility: CLINIC | Age: 58
End: 2018-05-23
Attending: THORACIC SURGERY (CARDIOTHORACIC VASCULAR SURGERY)
Payer: MEDICAID

## 2018-05-23 ENCOUNTER — SURGERY (OUTPATIENT)
Age: 58
End: 2018-05-23
Payer: MEDICAID

## 2018-05-23 ENCOUNTER — HOSPITAL ENCOUNTER (INPATIENT)
Facility: SKILLED NURSING FACILITY | Age: 58
LOS: 17 days | Discharge: HOME | End: 2018-06-09
Attending: INTERNAL MEDICINE | Admitting: INTERNAL MEDICINE
Payer: MEDICAID

## 2018-05-23 ENCOUNTER — HOSPITAL ENCOUNTER (OUTPATIENT)
Facility: CLINIC | Age: 58
Discharge: HOME | End: 2018-05-23
Attending: THORACIC SURGERY (CARDIOTHORACIC VASCULAR SURGERY) | Admitting: THORACIC SURGERY (CARDIOTHORACIC VASCULAR SURGERY)
Payer: MEDICAID

## 2018-05-23 VITALS
SYSTOLIC BLOOD PRESSURE: 130 MMHG | BODY MASS INDEX: 26.6 KG/M2 | OXYGEN SATURATION: 97 % | RESPIRATION RATE: 16 BRPM | WEIGHT: 164.8 LBS | DIASTOLIC BLOOD PRESSURE: 68 MMHG | HEART RATE: 78 BPM | TEMPERATURE: 97.6 F

## 2018-05-23 VITALS
SYSTOLIC BLOOD PRESSURE: 126 MMHG | WEIGHT: 167.55 LBS | RESPIRATION RATE: 14 BRPM | TEMPERATURE: 98.1 F | BODY MASS INDEX: 28.6 KG/M2 | OXYGEN SATURATION: 94 % | DIASTOLIC BLOOD PRESSURE: 71 MMHG | HEIGHT: 64 IN

## 2018-05-23 DIAGNOSIS — E83.39 HYPOPHOSPHATEMIA: ICD-10-CM

## 2018-05-23 DIAGNOSIS — K22.3 ESOPHAGEAL PERFORATION: Primary | ICD-10-CM

## 2018-05-23 DIAGNOSIS — K22.3 ESOPHAGEAL PERFORATION: ICD-10-CM

## 2018-05-23 DIAGNOSIS — R19.7 DIARRHEA, UNSPECIFIED TYPE: Primary | ICD-10-CM

## 2018-05-23 DIAGNOSIS — R23.8 SKIN IRRITATION: ICD-10-CM

## 2018-05-23 DIAGNOSIS — E46 MALNUTRITION, UNSPECIFIED TYPE (H): ICD-10-CM

## 2018-05-23 LAB — GLUCOSE BLDC GLUCOMTR-MCNC: 89 MG/DL (ref 70–99)

## 2018-05-23 PROCEDURE — 25000132 ZZH RX MED GY IP 250 OP 250 PS 637: Performed by: PHYSICIAN ASSISTANT

## 2018-05-23 PROCEDURE — 27211024 ZZHC OR SUPPLY OTHER OPNP: Performed by: THORACIC SURGERY (CARDIOTHORACIC VASCULAR SURGERY)

## 2018-05-23 PROCEDURE — C9399 UNCLASSIFIED DRUGS OR BIOLOG: HCPCS | Performed by: ANESTHESIOLOGY

## 2018-05-23 PROCEDURE — 25000132 ZZH RX MED GY IP 250 OP 250 PS 637: Performed by: NURSE PRACTITIONER

## 2018-05-23 PROCEDURE — 71000027 ZZH RECOVERY PHASE 2 EACH 15 MINS: Performed by: THORACIC SURGERY (CARDIOTHORACIC VASCULAR SURGERY)

## 2018-05-23 PROCEDURE — 25000566 ZZH SEVOFLURANE, EA 15 MIN: Performed by: THORACIC SURGERY (CARDIOTHORACIC VASCULAR SURGERY)

## 2018-05-23 PROCEDURE — 25000125 ZZHC RX 250: Performed by: ANESTHESIOLOGY

## 2018-05-23 PROCEDURE — 25000128 H RX IP 250 OP 636: Performed by: THORACIC SURGERY (CARDIOTHORACIC VASCULAR SURGERY)

## 2018-05-23 PROCEDURE — 36000053 ZZH SURGERY LEVEL 2 EA 15 ADDTL MIN - UMMC: Performed by: THORACIC SURGERY (CARDIOTHORACIC VASCULAR SURGERY)

## 2018-05-23 PROCEDURE — 27210794 ZZH OR GENERAL SUPPLY STERILE: Performed by: THORACIC SURGERY (CARDIOTHORACIC VASCULAR SURGERY)

## 2018-05-23 PROCEDURE — 43247 EGD REMOVE FOREIGN BODY: CPT | Mod: GC | Performed by: THORACIC SURGERY (CARDIOTHORACIC VASCULAR SURGERY)

## 2018-05-23 PROCEDURE — 37000008 ZZH ANESTHESIA TECHNICAL FEE, 1ST 30 MIN: Performed by: THORACIC SURGERY (CARDIOTHORACIC VASCULAR SURGERY)

## 2018-05-23 PROCEDURE — 40000277 XR SURGERY CARM FLUORO LESS THAN 5 MIN W STILLS: Mod: TC

## 2018-05-23 PROCEDURE — 36000055 ZZH SURGERY LEVEL 2 W FLUORO 1ST 30 MIN - UMMC: Performed by: THORACIC SURGERY (CARDIOTHORACIC VASCULAR SURGERY)

## 2018-05-23 PROCEDURE — 82962 GLUCOSE BLOOD TEST: CPT

## 2018-05-23 PROCEDURE — 71000014 ZZH RECOVERY PHASE 1 LEVEL 2 FIRST HR: Performed by: THORACIC SURGERY (CARDIOTHORACIC VASCULAR SURGERY)

## 2018-05-23 PROCEDURE — 25000128 H RX IP 250 OP 636: Performed by: ANESTHESIOLOGY

## 2018-05-23 PROCEDURE — 40000170 ZZH STATISTIC PRE-PROCEDURE ASSESSMENT II: Performed by: THORACIC SURGERY (CARDIOTHORACIC VASCULAR SURGERY)

## 2018-05-23 PROCEDURE — 12000022 ZZH R&B SNF

## 2018-05-23 PROCEDURE — 37000009 ZZH ANESTHESIA TECHNICAL FEE, EACH ADDTL 15 MIN: Performed by: THORACIC SURGERY (CARDIOTHORACIC VASCULAR SURGERY)

## 2018-05-23 RX ORDER — FLUCONAZOLE 40 MG/ML
200 POWDER, FOR SUSPENSION ORAL DAILY
Status: CANCELLED | OUTPATIENT
Start: 2018-05-24

## 2018-05-23 RX ORDER — ONDANSETRON 4 MG/1
4 TABLET, ORALLY DISINTEGRATING ORAL EVERY 6 HOURS PRN
Status: CANCELLED | OUTPATIENT
Start: 2018-05-23

## 2018-05-23 RX ORDER — EPHEDRINE SULFATE 50 MG/ML
INJECTION, SOLUTION INTRAMUSCULAR; INTRAVENOUS; SUBCUTANEOUS PRN
Status: DISCONTINUED | OUTPATIENT
Start: 2018-05-23 | End: 2018-05-23

## 2018-05-23 RX ORDER — MEPERIDINE HYDROCHLORIDE 50 MG/ML
12.5 INJECTION INTRAMUSCULAR; INTRAVENOUS; SUBCUTANEOUS
Status: DISCONTINUED | OUTPATIENT
Start: 2018-05-23 | End: 2018-05-23 | Stop reason: HOSPADM

## 2018-05-23 RX ORDER — ONDANSETRON 4 MG/1
4 TABLET, ORALLY DISINTEGRATING ORAL EVERY 6 HOURS PRN
Status: DISCONTINUED | OUTPATIENT
Start: 2018-05-23 | End: 2018-05-29

## 2018-05-23 RX ORDER — FENTANYL CITRATE 50 UG/ML
25-50 INJECTION, SOLUTION INTRAMUSCULAR; INTRAVENOUS
Status: CANCELLED | OUTPATIENT
Start: 2018-05-23

## 2018-05-23 RX ORDER — SODIUM CHLORIDE, SODIUM LACTATE, POTASSIUM CHLORIDE, CALCIUM CHLORIDE 600; 310; 30; 20 MG/100ML; MG/100ML; MG/100ML; MG/100ML
INJECTION, SOLUTION INTRAVENOUS CONTINUOUS
Status: DISCONTINUED | OUTPATIENT
Start: 2018-05-23 | End: 2018-05-23 | Stop reason: HOSPADM

## 2018-05-23 RX ORDER — LOPERAMIDE HYDROCHLORIDE 1 MG/5ML
4 SOLUTION ORAL 4 TIMES DAILY
Status: CANCELLED | OUTPATIENT
Start: 2018-05-23

## 2018-05-23 RX ORDER — FLUCONAZOLE 40 MG/ML
200 POWDER, FOR SUSPENSION ORAL DAILY
Status: DISCONTINUED | OUTPATIENT
Start: 2018-05-24 | End: 2018-05-31

## 2018-05-23 RX ORDER — ONDANSETRON 4 MG/1
4 TABLET, ORALLY DISINTEGRATING ORAL EVERY 30 MIN PRN
Status: DISCONTINUED | OUTPATIENT
Start: 2018-05-23 | End: 2018-05-23 | Stop reason: HOSPADM

## 2018-05-23 RX ORDER — IOPAMIDOL 755 MG/ML
INJECTION, SOLUTION INTRAVASCULAR PRN
Status: DISCONTINUED | OUTPATIENT
Start: 2018-05-23 | End: 2018-05-23 | Stop reason: HOSPADM

## 2018-05-23 RX ORDER — GUAR GUM
2 PACKET (EA) ORAL 3 TIMES DAILY
Status: CANCELLED | OUTPATIENT
Start: 2018-05-23

## 2018-05-23 RX ORDER — HYDRALAZINE HYDROCHLORIDE 20 MG/ML
2.5-5 INJECTION INTRAMUSCULAR; INTRAVENOUS EVERY 10 MIN PRN
Status: DISCONTINUED | OUTPATIENT
Start: 2018-05-23 | End: 2018-05-23 | Stop reason: HOSPADM

## 2018-05-23 RX ORDER — ALUMINA, MAGNESIA, AND SIMETHICONE 2400; 2400; 240 MG/30ML; MG/30ML; MG/30ML
30 SUSPENSION ORAL EVERY 4 HOURS PRN
Status: DISCONTINUED | OUTPATIENT
Start: 2018-05-23 | End: 2018-05-29

## 2018-05-23 RX ORDER — DIPHENOXYLATE HCL/ATROPINE 2.5-.025/5
5 LIQUID (ML) ORAL 4 TIMES DAILY PRN
Status: CANCELLED | OUTPATIENT
Start: 2018-05-23

## 2018-05-23 RX ORDER — ONDANSETRON 2 MG/ML
4 INJECTION INTRAMUSCULAR; INTRAVENOUS EVERY 30 MIN PRN
Status: DISCONTINUED | OUTPATIENT
Start: 2018-05-23 | End: 2018-05-23 | Stop reason: HOSPADM

## 2018-05-23 RX ORDER — NALOXONE HYDROCHLORIDE 0.4 MG/ML
.1-.4 INJECTION, SOLUTION INTRAMUSCULAR; INTRAVENOUS; SUBCUTANEOUS
Status: CANCELLED | OUTPATIENT
Start: 2018-05-23

## 2018-05-23 RX ORDER — HYDROMORPHONE HYDROCHLORIDE 1 MG/ML
.3-.5 INJECTION, SOLUTION INTRAMUSCULAR; INTRAVENOUS; SUBCUTANEOUS EVERY 10 MIN PRN
Status: DISCONTINUED | OUTPATIENT
Start: 2018-05-23 | End: 2018-05-23 | Stop reason: HOSPADM

## 2018-05-23 RX ORDER — LOPERAMIDE HYDROCHLORIDE 1 MG/5ML
4 SOLUTION ORAL 4 TIMES DAILY
Status: DISCONTINUED | OUTPATIENT
Start: 2018-05-23 | End: 2018-06-02

## 2018-05-23 RX ORDER — AMOXICILLIN AND CLAVULANATE POTASSIUM 400; 57 MG/5ML; MG/5ML
875 POWDER, FOR SUSPENSION ORAL 2 TIMES DAILY
Status: CANCELLED | OUTPATIENT
Start: 2018-05-23

## 2018-05-23 RX ORDER — DEXAMETHASONE SODIUM PHOSPHATE 4 MG/ML
4 INJECTION, SOLUTION INTRA-ARTICULAR; INTRALESIONAL; INTRAMUSCULAR; INTRAVENOUS; SOFT TISSUE EVERY 10 MIN PRN
Status: DISCONTINUED | OUTPATIENT
Start: 2018-05-23 | End: 2018-05-23 | Stop reason: HOSPADM

## 2018-05-23 RX ORDER — FENTANYL CITRATE 50 UG/ML
INJECTION, SOLUTION INTRAMUSCULAR; INTRAVENOUS PRN
Status: DISCONTINUED | OUTPATIENT
Start: 2018-05-23 | End: 2018-05-23

## 2018-05-23 RX ORDER — ONDANSETRON 2 MG/ML
INJECTION INTRAMUSCULAR; INTRAVENOUS PRN
Status: DISCONTINUED | OUTPATIENT
Start: 2018-05-23 | End: 2018-05-23

## 2018-05-23 RX ORDER — NALOXONE HYDROCHLORIDE 0.4 MG/ML
.1-.4 INJECTION, SOLUTION INTRAMUSCULAR; INTRAVENOUS; SUBCUTANEOUS
Status: DISCONTINUED | OUTPATIENT
Start: 2018-05-23 | End: 2018-06-09 | Stop reason: HOSPADM

## 2018-05-23 RX ORDER — LIDOCAINE 40 MG/G
CREAM TOPICAL
Status: DISCONTINUED | OUTPATIENT
Start: 2018-05-23 | End: 2018-06-09 | Stop reason: HOSPADM

## 2018-05-23 RX ORDER — DIPHENOXYLATE HCL/ATROPINE 2.5-.025/5
5 LIQUID (ML) ORAL 4 TIMES DAILY PRN
Status: DISCONTINUED | OUTPATIENT
Start: 2018-05-23 | End: 2018-06-02

## 2018-05-23 RX ORDER — DEXAMETHASONE SODIUM PHOSPHATE 4 MG/ML
INJECTION, SOLUTION INTRA-ARTICULAR; INTRALESIONAL; INTRAMUSCULAR; INTRAVENOUS; SOFT TISSUE PRN
Status: DISCONTINUED | OUTPATIENT
Start: 2018-05-23 | End: 2018-05-23

## 2018-05-23 RX ORDER — FENTANYL CITRATE 50 UG/ML
25-50 INJECTION, SOLUTION INTRAMUSCULAR; INTRAVENOUS EVERY 5 MIN PRN
Status: DISCONTINUED | OUTPATIENT
Start: 2018-05-23 | End: 2018-05-23 | Stop reason: HOSPADM

## 2018-05-23 RX ORDER — LIDOCAINE 40 MG/G
CREAM TOPICAL
Status: CANCELLED | OUTPATIENT
Start: 2018-05-23

## 2018-05-23 RX ORDER — PROPOFOL 10 MG/ML
INJECTION, EMULSION INTRAVENOUS PRN
Status: DISCONTINUED | OUTPATIENT
Start: 2018-05-23 | End: 2018-05-23

## 2018-05-23 RX ORDER — NALOXONE HYDROCHLORIDE 0.4 MG/ML
.1-.4 INJECTION, SOLUTION INTRAMUSCULAR; INTRAVENOUS; SUBCUTANEOUS
Status: DISCONTINUED | OUTPATIENT
Start: 2018-05-23 | End: 2018-05-23 | Stop reason: HOSPADM

## 2018-05-23 RX ORDER — LIDOCAINE HYDROCHLORIDE 20 MG/ML
INJECTION, SOLUTION INFILTRATION; PERINEURAL PRN
Status: DISCONTINUED | OUTPATIENT
Start: 2018-05-23 | End: 2018-05-23

## 2018-05-23 RX ORDER — GUAR GUM
2 PACKET (EA) ORAL 3 TIMES DAILY
Status: DISCONTINUED | OUTPATIENT
Start: 2018-05-23 | End: 2018-06-04 | Stop reason: CLARIF

## 2018-05-23 RX ORDER — ALUMINA, MAGNESIA, AND SIMETHICONE 2400; 2400; 240 MG/30ML; MG/30ML; MG/30ML
30 SUSPENSION ORAL EVERY 4 HOURS PRN
Status: CANCELLED | OUTPATIENT
Start: 2018-05-23

## 2018-05-23 RX ORDER — AMOXICILLIN AND CLAVULANATE POTASSIUM 400; 57 MG/5ML; MG/5ML
875 POWDER, FOR SUSPENSION ORAL 2 TIMES DAILY
Status: DISCONTINUED | OUTPATIENT
Start: 2018-05-23 | End: 2018-05-31

## 2018-05-23 RX ADMIN — LOPERAMIDE HYDROCHLORIDE 4 MG: 1 SOLUTION ORAL at 08:57

## 2018-05-23 RX ADMIN — FLUCONAZOLE 200 MG: 40 POWDER, FOR SUSPENSION ORAL at 08:57

## 2018-05-23 RX ADMIN — AMOXICILLIN AND CLAVULANATE POTASSIUM 875 MG: 400; 57 POWDER, FOR SUSPENSION ORAL at 08:56

## 2018-05-23 RX ADMIN — IOPAMIDOL 30 ML: 755 INJECTION, SOLUTION INTRAVENOUS at 13:56

## 2018-05-23 RX ADMIN — Medication 2 PACKET: at 20:08

## 2018-05-23 RX ADMIN — FENTANYL CITRATE 50 MCG: 50 INJECTION, SOLUTION INTRAMUSCULAR; INTRAVENOUS at 13:15

## 2018-05-23 RX ADMIN — POTASSIUM & SODIUM PHOSPHATES POWDER PACK 280-160-250 MG 2 PACKET: 280-160-250 PACK at 08:57

## 2018-05-23 RX ADMIN — PHENYLEPHRINE HYDROCHLORIDE 100 MCG: 10 INJECTION, SOLUTION INTRAMUSCULAR; INTRAVENOUS; SUBCUTANEOUS at 13:21

## 2018-05-23 RX ADMIN — FENTANYL CITRATE 50 MCG: 50 INJECTION, SOLUTION INTRAMUSCULAR; INTRAVENOUS at 13:10

## 2018-05-23 RX ADMIN — Medication 2 PACKET: at 08:57

## 2018-05-23 RX ADMIN — LIDOCAINE HYDROCHLORIDE 80 MG: 20 INJECTION, SOLUTION INFILTRATION; PERINEURAL at 13:13

## 2018-05-23 RX ADMIN — LOPERAMIDE HYDROCHLORIDE 4 MG: 1 SOLUTION ORAL at 20:08

## 2018-05-23 RX ADMIN — ROCURONIUM BROMIDE 20 MG: 10 INJECTION INTRAVENOUS at 13:21

## 2018-05-23 RX ADMIN — SUGAMMADEX 100 MG: 100 INJECTION, SOLUTION INTRAVENOUS at 13:52

## 2018-05-23 RX ADMIN — MULTIVITAMIN 15 ML: LIQUID ORAL at 08:57

## 2018-05-23 RX ADMIN — ONDANSETRON 4 MG: 2 INJECTION INTRAMUSCULAR; INTRAVENOUS at 13:47

## 2018-05-23 RX ADMIN — Medication 5 MG: at 13:27

## 2018-05-23 RX ADMIN — PHENYLEPHRINE HYDROCHLORIDE 100 MCG: 10 INJECTION, SOLUTION INTRAMUSCULAR; INTRAVENOUS; SUBCUTANEOUS at 13:27

## 2018-05-23 RX ADMIN — PROPOFOL 150 MG: 10 INJECTION, EMULSION INTRAVENOUS at 13:15

## 2018-05-23 RX ADMIN — PANTOPRAZOLE SODIUM 40 MG: 40 TABLET, DELAYED RELEASE ORAL at 08:57

## 2018-05-23 RX ADMIN — DEXAMETHASONE SODIUM PHOSPHATE 6 MG: 4 INJECTION, SOLUTION INTRA-ARTICULAR; INTRALESIONAL; INTRAMUSCULAR; INTRAVENOUS; SOFT TISSUE at 13:20

## 2018-05-23 RX ADMIN — PANTOPRAZOLE SODIUM 40 MG: 40 TABLET, DELAYED RELEASE ORAL at 20:10

## 2018-05-23 RX ADMIN — AMOXICILLIN AND CLAVULANATE POTASSIUM 875 MG: 400; 57 POWDER, FOR SUSPENSION ORAL at 20:10

## 2018-05-23 RX ADMIN — Medication 80 MG: at 13:15

## 2018-05-23 RX ADMIN — LOPERAMIDE HYDROCHLORIDE 4 MG: 1 SOLUTION ORAL at 18:19

## 2018-05-23 RX ADMIN — ACETAMINOPHEN 500 MG: 325 SOLUTION ORAL at 07:20

## 2018-05-23 ASSESSMENT — ENCOUNTER SYMPTOMS: DYSRHYTHMIAS: 1

## 2018-05-23 NOTE — DISCHARGE INSTRUCTIONS
[facility]  Same-Day Surgery   Adult Discharge Orders & Instructions     For 24 hours after surgery    1. Get plenty of rest.  A responsible adult must stay with you for at least 24 hours after you leave the hospital.   2. Do not drive or use heavy equipment.  If you have weakness or tingling, don't drive or use heavy equipment until this feeling goes away.  3. Do not drink alcohol.  4. Avoid strenuous or risky activities.  Ask for help when climbing stairs.   5. You may feel lightheaded.  IF so, sit for a few minutes before standing.  Have someone help you get up.   6. If you have nausea (feel sick to your stomach): Drink only clear liquids such as apple juice, ginger ale, broth or 7-Up.  Rest may also help.  Be sure to drink enough fluids.  Move to a regular diet as you feel able.  7. You may have a slight fever. Call the doctor if your fever is over 100.5 F (37.7 C) (taken under the tongue) or lasts longer than 24 hours.  8. You may have a dry mouth, a sore throat, muscle aches or trouble sleeping.  These should go away after 24 hours.  9. Do not make important or legal decisions.   Call your doctor for any of the followin.  Signs of infection (fever, growing tenderness at the surgery site, a large amount of drainage or bleeding, severe pain, foul-smelling drainage, redness, swelling).    2. It has been over 8 to 10 hours since surgery and you are still not able to urinate (pass water).    3.  Headache for over 24 hours.      To contact a doctor, call Dr. Ybarra's clinic @ [phone]  or:        [phone] and ask for the resident on call for Thoracic surgery (answered 24 hours a day)      Emergency Department:    [facility]: [phone]       (TTY for hearing impaired: [phone])

## 2018-05-23 NOTE — BRIEF OP NOTE
[facility], [place]    Brief Operative Note    Pre-operative diagnosis: Esophageal Perforation   Post-operative diagnosis * No post-op diagnosis entered *  Procedure: Procedure(s):  Esophagogastroduodenoscopy, Stent Exchange - Wound Class: II-Clean Contaminated  Surgeon: Surgeon(s) and Role:     * Keith Ybarra MD - Primary     * Han Carl PA-C - Assisting  Anesthesia: General   Estimated blood loss: None  Drains:  Pre-existing chest tube  Specimens: * No specimens in log *  Findings:   None.  Complications: None.  Implants: None.      Stent removed, no replacement.

## 2018-05-23 NOTE — IP AVS SNAPSHOT
[address] [address]    Phone:  [phone]                                       After Visit Summary   5/23/2018    Michael Saldivar    MRN: [id number]           After Visit Summary Signature Page     I have received my discharge instructions, and my questions have been answered. I have discussed any challenges I see with this plan with the nurse or doctor.    ..........................................................................................................................................  Patient/Patient Representative Signature      ..........................................................................................................................................  Patient Representative Print Name and Relationship to Patient    ..................................................               ................................................  Date                                            Time    ..........................................................................................................................................  Reviewed by Signature/Title    ...................................................              ..............................................  Date                                                            Time

## 2018-05-23 NOTE — IP AVS SNAPSHOT
MRN:[id number]                      After Visit Summary   5/23/2018    Michael Saldivar    MRN: [id number]           Thank you!     Thank you for choosing [facility] for your care. Our goal is always to provide you with excellent care. Hearing back from our patients is one way we can continue to improve our services. Please take a few minutes to complete the written survey that you may receive in the mail after you visit with us. Thank you!        Patient Information     Date Of Birth          1960        About your hospital stay     You were admitted on:  May 23, 2018 You last received care in the:  Same Day Surgery [facility]    You were discharged on:  May 23, 2018       Who to Call     For medical emergencies, please call 911.  For non-urgent questions about your medical care, please call your primary care provider or clinic, [phone]  For questions related to your surgery, please call your surgery clinic        Attending Provider     Provider Specialty    Keith Ybarra MD Thoracic Surgery (Cardiothoracic Vascular Surgery)       Primary Care Provider Office Phone # Fax #    Jemal Michel [phone]      After Care Instructions     Diet Instructions       Clear liquid diet            Discharge Instructions       Follow up with Dr. Ybarra on Tuesday, May 29. Obtain esophagram prior to clinic.            No driving or operating machinery        until the day after procedure                  Future tests that were ordered for you     XR Video Swallow w Esophagram       Prior to 5/29 clinic visit                  Further instructions from your care team       [facility]  Same-Day Surgery   Adult Discharge Orders & Instructions     For 24 hours after surgery    1. Get plenty of rest.  A responsible adult must stay with you for at least 24 hours after you leave the hospital.   2. Do not drive or use heavy equipment.  If you have  "weakness or tingling, don't drive or use heavy equipment until this feeling goes away.  3. Do not drink alcohol.  4. Avoid strenuous or risky activities.  Ask for help when climbing stairs.   5. You may feel lightheaded.  IF so, sit for a few minutes before standing.  Have someone help you get up.   6. If you have nausea (feel sick to your stomach): Drink only clear liquids such as apple juice, ginger ale, broth or 7-Up.  Rest may also help.  Be sure to drink enough fluids.  Move to a regular diet as you feel able.  7. You may have a slight fever. Call the doctor if your fever is over 100.5 F (37.7 C) (taken under the tongue) or lasts longer than 24 hours.  8. You may have a dry mouth, a sore throat, muscle aches or trouble sleeping.  These should go away after 24 hours.  9. Do not make important or legal decisions.   Call your doctor for any of the followin.  Signs of infection (fever, growing tenderness at the surgery site, a large amount of drainage or bleeding, severe pain, foul-smelling drainage, redness, swelling).    2. It has been over 8 to 10 hours since surgery and you are still not able to urinate (pass water).    3.  Headache for over 24 hours.      To contact a doctor, call Dr. Ybarra's clinic @ [phone]  or:        [phone] and ask for the resident on call for Thoracic surgery (answered 24 hours a day)      Emergency Department:    [facility]: [phone]       (TTY for hearing impaired: [phone])          Pending Results     No orders found from 2018 to 2018.            Admission Information     Date & Time Provider Department Dept. Phone    2018 Keith Ybarra MD Same Day Surgery [facility] [phone]      Your Vitals Were     Blood Pressure Temperature Respirations Height Weight Pulse Oximetry    125/67 98.1  F (36.7  C) (Oral) 12 1.63 m (5' 4.17\") 76 kg (167 lb 8.8 oz) 92%    BMI (Body Mass Index)                   28.6 kg/m2           [facility] " "Information     [employer] lets you send messages to your doctor, view your test results, renew your prescriptions, schedule appointments and more. To sign up, go to [email or web address] . Click on \"Log in\" on the left side of the screen, which will take you to the Welcome page. Then click on \"Sign up Now\" on the right side of the page.     You will be asked to enter the access code listed below, as well as some personal information. Please follow the directions to create your username and password.     Your access code is: [email or web address]  Expires: 2018 12:07 PM     Your access code will  in 90 days. If you need help or a new code, please call your [place] clinic or [phone].        Care EveryWhere ID     This is your Care EveryWhere ID. This could be used by other organizations to access your [place] medical records  [id number]        Equal Access to Services     BETTY ZHANG : Haduhsa wilsono Sosabiha, waaxda luebenezer, qaybta kaalmada adechito, lexie chambers . So [facility] [phone].    ATENCIÓN: Si pia benoit, tiene a patel disposición servicios gratuitos de asistencia lingüística. Llame al [phone].    We comply with applicable federal civil rights laws and [place] laws. We do not discriminate on the basis of race, color, national origin, age, disability, sex, sexual orientation, or gender identity.               Review of your medicines      CONTINUE these medicines which may have CHANGED, or have new prescriptions. If we are uncertain of the size of tablets/capsules you have at home, strength may be listed as something that might have changed.        Dose / Directions    acetaminophen 32 mg/mL solution   Commonly known as:  TYLENOL   This may have changed:  how much to take   Used for:  Esophageal perforation        Dose:  650 mg   20.3 mLs (650 mg) by Per J Tube route every 6 hours as needed for mild pain or fever   Quantity:  473 mL   Refills:  0       " fiber modular packet   This may have changed:  how much to take   Used for:  Esophageal perforation        Dose:  1 packet   1 packet by Per J Tube route 2 times daily   Quantity:  75 packet   Refills:  0         CONTINUE these medicines which have NOT CHANGED        Dose / Directions    alum & mag hydroxide-simethicone 200-200-20 MG/5ML Susp suspension   Commonly known as:  MYLANTA/MAALOX        Dose:  30 mL   Take 30 mLs by mouth every 4 hours as needed for indigestion   Refills:  0       amoxicillin-clavulanate 400-57 MG/5ML suspension   Commonly known as:  AUGMENTIN   Indication:  esophageal perforation   Used for:  Esophageal perforation        Dose:  875 mg   10.9 mLs (875 mg) by Per J Tube route 2 times daily   Quantity:  300 mL   Refills:  0       diphenoxylate-atropine 2.5-0.025 MG/5ML liquid   Commonly known as:  LOMOTIL        Dose:  5 mL   Take 5 mLs by mouth 4 times daily as needed for diarrhea   Refills:  0       fluconazole 40 MG/ML suspension   Commonly known as:  DIFLUCAN   Indication:  esophageal perforation   Used for:  Esophageal perforation        Dose:  200 mg   5 mLs (200 mg) by Per J Tube route daily   Quantity:  105 mL   Refills:  0       * loperamide 1 MG/5ML liquid   Commonly known as:  IMODIUM   Used for:  Bowel habit changes        Dose:  4 mg   20 mLs (4 mg) by Per J Tube route 4 times daily   Quantity:  200 mL   Refills:  0       * loperamide 1 MG/5ML liquid   Commonly known as:  IMODIUM   Used for:  Esophageal perforation        Dose:  2 mg   10 mLs (2 mg) by Per J Tube route 4 times daily as needed for diarrhea   Quantity:  236 mL   Refills:  1       multivitamins with minerals Liqd liquid   Used for:  Esophageal perforation        Dose:  15 mL   15 mLs by Per Feeding Tube route daily   Quantity:  2 Bottle   Refills:  0       ondansetron 4 MG ODT tab   Commonly known as:  ZOFRAN-ODT        Dose:  4 mg   Take 4 mg by mouth every 6 hours as needed for nausea   Refills:  0       order  for DME   Used for:  Esophageal perforation        Equipment being ordered:  [facility] Suction Machine Suction Canister-2 Suction Connect Tube-2 5 in 1 Connector-2 Bacteria Filter-2  Treatment Diagnosis: Esophogeal Perforation   Quantity:  1 Month   Refills:  0       order for DME   Used for:  Esophageal perforation        Equipment being ordered: Other: suction and cannister for pharyngostomy tube Treatment Diagnosis: Esophageal perforation   Quantity:  7 Container   Refills:  0       pantoprazole Susp suspension   Commonly known as:  PROTONIX        Dose:  40 mg   Take 40 mg by mouth daily   Refills:  0       * Notice:  This list has 2 medication(s) that are the same as other medications prescribed for you. Read the directions carefully, and ask your doctor or other care provider to review them with you.             Protect others around you: Learn how to safely use, store and throw away your medicines at www.disposemymeds.org.             Medication List: This is a list of all your medications and when to take them. Check marks below indicate your daily home schedule. Keep this list as a reference.      Medications           Morning Afternoon Evening Bedtime As Needed    acetaminophen 32 mg/mL solution   Commonly known as:  TYLENOL   20.3 mLs (650 mg) by Per J Tube route every 6 hours as needed for mild pain or fever                                alum & mag hydroxide-simethicone 200-200-20 MG/5ML Susp suspension   Commonly known as:  MYLANTA/MAALOX   Take 30 mLs by mouth every 4 hours as needed for indigestion                                amoxicillin-clavulanate 400-57 MG/5ML suspension   Commonly known as:  AUGMENTIN   10.9 mLs (875 mg) by Per J Tube route 2 times daily                                diphenoxylate-atropine 2.5-0.025 MG/5ML liquid   Commonly known as:  LOMOTIL   Take 5 mLs by mouth 4 times daily as needed for diarrhea                                fiber modular packet   1 packet by Per J Tube  route 2 times daily                                fluconazole 40 MG/ML suspension   Commonly known as:  DIFLUCAN   5 mLs (200 mg) by Per J Tube route daily                                * loperamide 1 MG/5ML liquid   Commonly known as:  IMODIUM   20 mLs (4 mg) by Per J Tube route 4 times daily                                * loperamide 1 MG/5ML liquid   Commonly known as:  IMODIUM   10 mLs (2 mg) by Per J Tube route 4 times daily as needed for diarrhea                                multivitamins with minerals Liqd liquid   15 mLs by Per Feeding Tube route daily                                ondansetron 4 MG ODT tab   Commonly known as:  ZOFRAN-ODT   Take 4 mg by mouth every 6 hours as needed for nausea                                order for DME   Equipment being ordered:  [facility] Suction Machine Suction Canister-2 Suction Connect Tube-2 5 in 1 Connector-2 Bacteria Filter-2  Treatment Diagnosis: Esophogeal Perforation                                order for DME   Equipment being ordered: Other: suction and cannister for pharyngostomy tube Treatment Diagnosis: Esophageal perforation                                pantoprazole Susp suspension   Commonly known as:  PROTONIX   Take 40 mg by mouth daily                                * Notice:  This list has 2 medication(s) that are the same as other medications prescribed for you. Read the directions carefully, and ask your doctor or other care provider to review them with you.

## 2018-05-23 NOTE — PLAN OF CARE
Problem: Goal Outcome Summary  Goal: Goal Outcome Summary  [place] with [facility].

## 2018-05-23 NOTE — OR NURSING
Writer spoke with Dr. Dionicio Dawn (anesthesia) and she states patient is OK to transfer out of PACU, she will enter anesthesia sign out.

## 2018-05-23 NOTE — PLAN OF CARE
Pt alert and oriented, able to make needs known. Scheduled for EGD tomorrow, 5/23, pt aware.TF on hold per order. NPO after 0015. Chest tube dressing changed by charge RN, LEANNA. G tube connected to gravity drainage at . Call within reach.

## 2018-05-23 NOTE — ANESTHESIA POSTPROCEDURE EVALUATION
Patient: Michael Saldivar    Procedure(s):  Esophagogastroduodenoscopy, Stent removal - Wound Class: II-Clean Contaminated    Diagnosis:Esophageal Perforation   Diagnosis Additional Information: No value filed.    Anesthesia Type:  General, ETT, RSI    Note:  Anesthesia Post Evaluation    Patient location during evaluation: PACU  Patient participation: Able to fully participate in evaluation  Level of consciousness: awake and alert  Pain management: adequate  Airway patency: patent  Cardiovascular status: acceptable  Respiratory status: acceptable  Hydration status: acceptable  PONV: none     Anesthetic complications: None    Comments: Satting well in PACU. OK to go home.        Last vitals:  Vitals:    05/23/18 1415 05/23/18 1430 05/23/18 1445   BP: 136/73 119/73 120/68   Resp: 14 14 12   Temp: 36.5  C (97.7  F)  36.8  C (98.2  F)   SpO2: 93% 98% 93%         Electronically Signed By: Neel López MD  May 23, 2018  2:55 PM

## 2018-05-23 NOTE — IP AVS SNAPSHOT
"` `           TR TRANSITIONAL [facility]: [phone]            Medication Administration Report for Michael Saldivar as of 06/09/18 0943   Legend:    Given Hold Not Given Due Canceled Entry Other Actions    Time Time (Time) Time  Time-Action       Inactive    Active    Linked        Medications 06/03/18 06/04/18 06/05/18 06/06/18 06/07/18 06/08/18 06/09/18    acetaminophen (TYLENOL) tablet 500 mg  Dose: 500 mg  Freq: EVERY 6 HOURS PRN Route: PO  PRN Reasons: mild pain,fever  Start: 06/05/18 1327   Admin Instructions: Maximum acetaminophen dose from all sources = 75 mg/kg/day not to exceed 4 gram    Admin. Amount: 1 tablet (1 × 500 mg tablet)  Dispense Loc: [facility]               calcium carbonate (TUMS) chewable tablet 1,000 mg  Dose: 1,000 mg  Freq: 2 TIMES DAILY PRN Route: PO  PRN Reason: heartburn  Start: 06/01/18 0334   Admin. Amount: 2 tablet (2 × 500 mg tablet)  Last Admin: 06/01/18 0340  Dispense Loc: [facility]               lidocaine (LMX4) kit  Freq: EVERY 1 HOUR PRN Route: Top  PRN Reason: moderate pain  PRN Comment: with VAD insertion or accessing implanted port  Start: 05/23/18 1740   Admin Instructions: Do NOT give if patient has a history of allergy to any local anesthetic or any \"jennifer\" product.   Apply 30 minutes prior to VAD insertion or port access.  MAX Dose:  2.5 g (  of 5 g tube)    Dispense Loc: Transitional [facility]  POC: Discharge/Readmit: MD or TCU staff to release               lidocaine 1 % 1 mL  Dose: 1 mL  Freq: EVERY 1 HOUR PRN Route: OTHER  PRN Comment: mild pain with VAD insertion or accessing implanted port  Start: 05/23/18 1740   Admin Instructions: Do NOT give if patient has a history of allergy to any local anesthetic or any \"jennifer\" product. MAX dose 1 mL subcutaneous OR intradermal in divided doses.    Admin. Amount: 1 mL  Dispense Loc: [facility]  Volume: 2 mL  POC: Discharge/Readmit: MD or TCU staff to release     "           loperamide (IMODIUM) capsule 4 mg  Dose: 4 mg  Freq: 4 TIMES DAILY PRN Route: PO  PRN Reason: diarrhea  Start: 06/05/18 1328   Admin. Amount: 2 capsule (2 × 2 mg capsule)  Dispense Loc: [facility]               medication instructions  Freq: CONTINUOUS PRN Route: XX  Start: 05/23/18 1740   Admin Instructions: Routine PRN Nurse may request from Pharmacy a change of form of medication (e.g. Liquid to tablet).      Dispense Loc: [facility]  POC: Discharge/Readmit: MD or TCU staff to release               multivitamin, therapeutic (THERA-VIT) tablet 1 tablet  Dose: 1 tablet  Freq: DAILY Route: PO  Indications Comment: Supplement  Start: 06/06/18 0800   Admin. Amount: 1 tablet  Last Admin: 06/09/18 0759  Dispense Loc: Transitional Care [facility]        0849 (1 tablet)-Given        1010 (1 tablet)-Given        0830 (1 tablet)-Given        0759 (1 tablet)-Given           mupirocin (BACTROBAN) 2 % cream  Freq: 2 TIMES DAILY Route: Top  Start: 06/06/18 2100   Admin Instructions: Apply to G tube site    Last Admin: 06/09/18 0801  Dispense Loc: [facility]        2050 ( )-Given        1011 ( )-Given       1944 ( )-Given       (2219)-Not Given        0830 ( )-Given       2123 ( )-Given        0801 ( )-Given       [ ] 2100           naloxone (NARCAN) injection 0.1-0.4 mg  Dose: 0.1-0.4 mg  Freq: EVERY 2 MIN PRN Route: IV  PRN Reason: opioid reversal  Start: 05/23/18 1740   Admin Instructions: For respiratory rate LESS than or EQUAL to 8.  Partial reversal dose:  0.1 mg titrated q 2 minutes for Analgesia Side Effects Monitoring Sedation Level of 3 (frequently drowsy, arousable, drifts to sleep during conversation).Full reversal dose:  0.4 mg bolus for Analgesia Side Effects Monitoring Sedation Level of 4 (somnolent, minimal or no response to stimulation).  For ordered doses up to 2mg give IVP. Give each 0.4mg over 15 seconds in emergency situations. For non-emergent situations  further dilute in 9mL of NS to facilitate titration of response.    Admin. Amount: 0.1-0.4 mg = 0.25-1 mL Conc: 0.4 mg/mL  Dispense Loc: [facility]  Volume: 1 mL  POC: Discharge/Readmit: MD or TCU staff to release               pantoprazole (PROTONIX) EC tablet 40 mg  Dose: 40 mg  Freq: 2 TIMES DAILY BEFORE MEALS Route: PO  Indications of Use: GASTROESOPHAGEAL REFLUX DISEASE  Start: 06/05/18 1630   Admin Instructions: DO NOT CRUSH.    Admin. Amount: 1 tablet (1 × 40 mg tablet)  Last Admin: 06/09/18 0800  Dispense Loc: [facility] [address] [place] [address]       1622 (40 mg)-Given        0852 (40 mg)-Given       1656 (40 mg)-Given        1009 (40 mg)-Given       1652 (40 mg)-Given        0830 (40 mg)-Given       1719 (40 mg)-Given        0800 (40 mg)-Given       [ ] 1630           potassium & sodium phosphates (NEUTRA-PHOS) Packet 1 packet  Dose: 1 packet  Freq: 3 TIMES DAILY Route: PO  Indications of Use: HYPOPHOSPHATEMIA  Start: 06/07/18 1030   Admin. Amount: 1 packet  Last Admin: 06/09/18 0759  Dispense Loc: Transitional Care [facility]  POC: Discharge/Readmit: MD or TCU staff to release         1100 (1 packet)-Given       1449 (1 packet)-Given       1942 (1 packet)-Given        0830 (1 packet)-Given       1345 (1 packet)-Given       2013 (1 packet)-Given        0759 (1 packet)-Given       [ ] 1400       [ ] 2000          Discontinued Medications  Medications 06/03/18 06/04/18 06/05/18 06/06/18 06/07/18 06/08/18 06/09/18         Dose: 1 packet  Freq: 3 TIMES DAILY Route: PO  Indications of Use: HYPOPHOSPHATEMIA  Start: 06/07/18 1400   End: 06/07/18 1022   Admin. Amount: 1 packet  Dispense Loc: Transitional Care [facility]  POC: Discharge/Readmit: MD or TCU staff to release         1022-Med Discontinued           Dose: 2 packet  Freq: 4 TIMES DAILY Route: PO  Indications of Use: HYPOPHOSPHATEMIA  Start: 06/05/18 1700   End: 06/07/18 0934   Admin. Amount: 2 packet  Last Admin: 06/06/18 2050  Dispense  Loc: Transitional Care [facility]  POC: Discharge/Readmit: MD or TCU staff to release       1622 (2 packet)-Given       2033 (2 packet)-Given        0849 (2 packet)-Given       1400 (2 packet)-Given       1656 (2 packet)-Given       2050 (2 packet)-Given        0934-Med Discontinued  (1057)-Not Given

## 2018-05-23 NOTE — IP AVS SNAPSHOT
MRN:[id number]                      After Visit Summary   5/23/2018    Michael Saldivar    MRN: [id number]           Thank you!     Thank you for choosing [facility] for your care. Our goal is always to provide you with excellent care. Hearing back from our patients is one way we can continue to improve our services. Please take a few minutes to complete the written survey that you may receive in the mail after you visit with us. Thank you!        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 Post op course c/b esophageal leak into the L pleural cavity requiring pharyngostomy tube placement and gastropexy on 3/28. Transferred to  TCU 4/6/18 for nutritional support and drain cares. Has remained at TCU for drain cares, chest tube cares and tube feeds. Drains and chest tube were removed and patients diet was advanced. Once he was able to tolerate po intake and meet caloric needs, his TFs were weaned, and he was discharged home with plan for close follow up with thoracic surgery.                  Who to Call     For medical emergencies, please call 911.  For non-urgent questions about your medical care, please call your primary care provider or clinic, [phone]          Attending Provider     Provider Specialty    Kvng Russell MD Internal Medicine    Jonathan Martins MD Internal Medicine       Primary Care Provider Office Phone # Fax #    Jemal Michel [phone]       When to contact your care team        Please contact your primary care provider or seek medical care if you develop chest pain, shortness of breath, fever >101F, chills, abdominal pain, or if any other concerns arise.                  After Care Instructions     Activity       Your activity upon discharge: activity as tolerated            Diet       Follow this diet upon discharge: mechanical soft diet, encourage water intake, ensure clear between meals    Free water flushes via J tube: please do 300cc of water every 3 hours from 8AM to 10PM.            Free water       Free water flushes via J tube: please do 300cc of water every 3 hours from 8AM to 10PM.            Tubes and drains       You are going home with the following tubes or drains: feeding tube GJ-Tube. Wound dressing changes as outlined above. Please flush your G and J part of tube with 60cc of water daily to keep the tube patent.            Wound care and dressings       Instructions to care for your wound at home: continue to use bactroban twice daily to your feeding tube site through 6/12. Gently cleanse around feeding tube site daily with mild soap and water, ensure site is dry before placing gauze around feeding tube.                  Follow-up Appointments     Adult [facility] Follow-up and recommended labs and tests       - Follow up with thoracic surgery as scheduled for next week  - Follow up with your primary care provider on Monday 6/11 - you need to have a BMP checked at this appointment and have your free water adjusted as needed    Appointments on [place] and/or [facility] (with [facility] or [facility] provider or service). Call [phone] if you haven't heard regarding these appointments within 7 days of discharge.                  Your next 10 appointments already scheduled     Jun 12, 2018  3:00 PM CDT   (Arrive by 2:45 PM)   Return Visit with Keith Ybarra MD   [facility] ([facility])    [address] [place] [address]  [address]  [facility] [address] [phone]              Additional Services     SPEECH THERAPY REFERRAL       *This therapy referral will be filtered to a centralized scheduling office at [facility] and the patient will receive a call to schedule an appointment at a [facility] location most convenient for them. *     [facility] provides Speech Therapy evaluation and treatment and many specialty services across the [facility] system.  If requesting a specialty program, please choose from the list below.  If you have not heard from the scheduling office within 2 business days, please call [phone] for all locations, with the exception of [place], please call [phone] and [facility], please call [phone]      Treatment: Evaluation & Treatment  Speech Treatment Diagnosis: Boorhave Esophageal Perforation  Special Instructions: None  Special Programs: Video Swallow Study    Please be aware that coverage of these services is subject to the terms and limitations of your health insurance plan.  Call member services at your health plan with any benefit or coverage questions.      **Note to Provider:  If you are referring outside of [facility] for the therapy appointment, please list the name of the location in the \"special instructions\" above, print the referral and give to the patient to schedule the appointment.                  Pending Results     No orders found from 5/21/2018 to 5/24/2018.            Statement of Approval     Ordered          06/09/18 0910  I have reviewed and agree with all the recommendations and orders detailed in this document.  EFFECTIVE NOW     Approved and electronically signed by:  Zunilda Lazar PA-C           06/07/18 1005  I have reviewed and agree with all the recommendations and orders detailed in this document.  EFFECTIVE NOW     Approved and electronically signed by:  Zunilda Lazar PA-C             Admission " "Information     Date & Time Provider Department Dept. Phone    2018 Jonathan Martins MD [facility] [phone]      Your Vitals Were     Blood Pressure Pulse Temperature Respirations Weight Pulse Oximetry    125/79 (BP Location: Right arm) 67 97.5  F (36.4  C) (Oral) 18 78.5 kg (173 lb) 98%    BMI (Body Mass Index)                   28.79 kg/m2           MyChart Information     [employer] lets you send messages to your doctor, view your test results, renew your prescriptions, schedule appointments and more. To sign up, go to [email or web address] . Click on \"Log in\" on the left side of the screen, which will take you to the Welcome page. Then click on \"Sign up Now\" on the right side of the page.     You will be asked to enter the access code listed below, as well as some personal information. Please follow the directions to create your username and password.     Your access code is: [email or web address]  Expires: 2018 12:07 PM     Your access code will  in 90 days. If you need help or a new code, please call your [facility] clinic or [phone].        Care EveryWhere ID     This is your Care EveryWhere ID. This could be used by other organizations to access your [facility] medical records  [id number]        Equal Access to Services     BETTY ZHANG AH: Hadii sunny pathak Sosabiha, waaxda luqadaha, qaybta kaalmada bertrand, lexie lindquist. So [facility] [phone].    ATENCIÓN: Si habla español, tiene a patel disposición servicios gratuitos de asistencia lingüística. Mary al [phone].    We comply with applicable federal civil rights laws and [place] laws. We do not discriminate on the basis of race, color, national origin, age, disability, sex, sexual orientation, or gender identity.               Review of your medicines      START taking        Dose / Directions    acetaminophen 500 MG tablet   Commonly known as:  TYLENOL   Used for:  Esophageal perforation   Replaces:  " acetaminophen 32 mg/mL solution        Dose:  500 mg   Take 1 tablet (500 mg) by mouth every 6 hours as needed for mild pain or fever   Refills:  0       loperamide 2 MG capsule   Commonly known as:  IMODIUM   Used for:  Diarrhea, unspecified type   Replaces:  loperamide 1 MG/5ML liquid        Dose:  4 mg   Take 2 capsules (4 mg) by mouth 4 times daily as needed for diarrhea   Quantity:  20 capsule   Refills:  0       multivitamin, therapeutic Tabs tablet   Indication:  Supplement   Used for:  Malnutrition, unspecified type (H)        Dose:  1 tablet   Take 1 tablet by mouth daily   Quantity:  30 tablet   Refills:  0       mupirocin 2 % cream   Commonly known as:  BACTROBAN   Used for:  Skin irritation        Apply topically 2 times daily for 6 days   Quantity:  15 g   Refills:  0       pantoprazole 40 MG EC tablet   Commonly known as:  PROTONIX   Indication:  Gastroesophageal Reflux Disease   Used for:  Esophageal perforation   Replaces:  pantoprazole Susp suspension        Dose:  40 mg   Take 1 tablet (40 mg) by mouth 2 times daily (before meals)   Quantity:  60 tablet   Refills:  0       phosphorus tablet 250 mg 250 MG per tablet   Commonly known as:  PHOSPHA 250 NEUTRAL   Used for:  Hypophosphatemia        Dose:  250 mg   Take 1 tablet (250 mg) by mouth 3 times daily   Quantity:  42 tablet   Refills:  0         CONTINUE these medicines which have NOT CHANGED        Dose / Directions    alum & mag hydroxide-simethicone 200-200-20 MG/5ML Susp suspension   Commonly known as:  MYLANTA/MAALOX        Dose:  30 mL   Take 30 mLs by mouth every 4 hours as needed for indigestion   Refills:  0       order for DME   Used for:  Esophageal perforation        Equipment being ordered:  [facility] Suction Machine Suction Canister-2 Suction Connect Tube-2 5 in 1 Connector-2 Bacteria Filter-2  Treatment Diagnosis: Esophogeal Perforation   Quantity:  1 Month   Refills:  0       order for DME   Used for:  Esophageal perforation         Equipment being ordered: Other: suction and cannister for pharyngostomy tube Treatment Diagnosis: Esophageal perforation   Quantity:  7 Container   Refills:  0         STOP taking     acetaminophen 32 mg/mL solution   Commonly known as:  TYLENOL   Replaced by:  acetaminophen 500 MG tablet           amoxicillin-clavulanate 400-57 MG/5ML suspension   Commonly known as:  AUGMENTIN           diphenoxylate-atropine 2.5-0.025 MG/5ML liquid   Commonly known as:  LOMOTIL           fiber modular packet           fluconazole 40 MG/ML suspension   Commonly known as:  DIFLUCAN           loperamide 1 MG/5ML liquid   Commonly known as:  IMODIUM   Replaced by:  loperamide 2 MG capsule           multivitamins with minerals Liqd liquid           ondansetron 4 MG ODT tab   Commonly known as:  ZOFRAN-ODT           pantoprazole Susp suspension   Commonly known as:  PROTONIX   Replaced by:  pantoprazole 40 MG EC tablet                Where to get your medicines      These medications were sent to [facility] [address]  [address] [address]     Phone:  [phone]     loperamide 2 MG capsule    multivitamin, therapeutic Tabs tablet    mupirocin 2 % cream    pantoprazole 40 MG EC tablet    phosphorus tablet 250 mg 250 MG per tablet                Protect others around you: Learn how to safely use, store and throw away your medicines at www.disposemymeds.org.             Medication List: This is a list of all your medications and when to take them. Check marks below indicate your daily home schedule. Keep this list as a reference.      Medications           Morning Afternoon Evening Bedtime As Needed    acetaminophen 500 MG tablet   Commonly known as:  TYLENOL   Take 1 tablet (500 mg) by mouth every 6 hours as needed for mild pain or fever                                alum & mag hydroxide-simethicone 200-200-20 MG/5ML Susp suspension   Commonly known as:  MYLANTA/MAALOX    Take 30 mLs by mouth every 4 hours as needed for indigestion                                loperamide 2 MG capsule   Commonly known as:  IMODIUM   Take 2 capsules (4 mg) by mouth 4 times daily as needed for diarrhea                                multivitamin, therapeutic Tabs tablet   Take 1 tablet by mouth daily   Last time this was given:  1 tablet on 6/9/2018  7:59 AM                                mupirocin 2 % cream   Commonly known as:  BACTROBAN   Apply topically 2 times daily for 6 days   Last time this was given:  6/9/2018  8:01 AM                                order for DME   Equipment being ordered:  [facility] Suction Machine Suction Canister-2 Suction Connect Tube-2 5 in 1 Connector-2 Bacteria Filter-2  Treatment Diagnosis: Esophogeal Perforation                                order for DME   Equipment being ordered: Other: suction and cannister for pharyngostomy tube Treatment Diagnosis: Esophageal perforation                                pantoprazole 40 MG EC tablet   Commonly known as:  PROTONIX   Take 1 tablet (40 mg) by mouth 2 times daily (before meals)   Last time this was given:  40 mg on 6/9/2018  8:00 AM                                phosphorus tablet 250 mg 250 MG per tablet   Commonly known as:  PHOSPHA 250 NEUTRAL   Take 1 tablet (250 mg) by mouth 3 times daily

## 2018-05-23 NOTE — IP AVS SNAPSHOT
Same Day Surgery [address] [address]    Phone:  [phone]                                       After Visit Summary   5/23/2018    Michael Saldivar    MRN: [id number]           After Visit Summary Signature Page     I have received my discharge instructions, and my questions have been answered. I have discussed any challenges I see with this plan with the nurse or doctor.    ..........................................................................................................................................  Patient/Patient Representative Signature      ..........................................................................................................................................  Patient Representative Print Name and Relationship to Patient    ..................................................               ................................................  Date                                            Time    ..........................................................................................................................................  Reviewed by Signature/Title    ...................................................              ..............................................  Date                                                            Time

## 2018-05-23 NOTE — ANESTHESIA CARE TRANSFER NOTE
Patient: Michael Saldivar    Procedure(s):  Esophagogastroduodenoscopy, Stent removal - Wound Class: II-Clean Contaminated    Diagnosis: Esophageal Perforation   Diagnosis Additional Information: No value filed.    Anesthesia Type:   General, ETT, RSI     Note:  Airway :Face Mask  Patient transferred to:PACU  Comments: VSS. Patient resting comfortably but does have some pain in his throat. Good cough. No issues with transport. Handoff Report: Identifed the Patient, Identified the Reponsible Provider, Reviewed the pertinent medical history, Discussed the surgical course, Reviewed Intra-OP anesthesia mangement and issues during anesthesia, Set expectations for post-procedure period and Allowed opportunity for questions and acknowledgement of understanding      Vitals: (Last set prior to Anesthesia Care Transfer)    CRNA VITALS  5/23/2018 1332 - 5/23/2018 1407      5/23/2018             Pulse: 117    SpO2: 95 %                Electronically Signed By: Sancho Borja MD  May 23, 2018  2:07 PM

## 2018-05-24 ENCOUNTER — APPOINTMENT (OUTPATIENT)
Dept: LAB | Facility: CLINIC | Age: 58
End: 2018-05-24
Attending: INTERNAL MEDICINE
Payer: MEDICAID

## 2018-05-24 LAB
ALBUMIN SERPL-MCNC: 2.7 G/DL (ref 3.4–5)
ALP SERPL-CCNC: 118 U/L (ref 40–150)
ALT SERPL W P-5'-P-CCNC: 70 U/L (ref 0–70)
ANION GAP SERPL CALCULATED.3IONS-SCNC: 7 MMOL/L (ref 3–14)
AST SERPL W P-5'-P-CCNC: 27 U/L (ref 0–45)
BILIRUB SERPL-MCNC: 0.3 MG/DL (ref 0.2–1.3)
BUN SERPL-MCNC: 25 MG/DL (ref 7–30)
CALCIUM SERPL-MCNC: 8.6 MG/DL (ref 8.5–10.1)
CHLORIDE SERPL-SCNC: 105 MMOL/L (ref 94–109)
CO2 SERPL-SCNC: 29 MMOL/L (ref 20–32)
CREAT SERPL-MCNC: 0.61 MG/DL (ref 0.66–1.25)
ERYTHROCYTE [DISTWIDTH] IN BLOOD BY AUTOMATED COUNT: 17.6 % (ref 10–15)
GFR SERPL CREATININE-BSD FRML MDRD: >90 ML/MIN/1.7M2
GLUCOSE SERPL-MCNC: 139 MG/DL (ref 70–99)
HCT VFR BLD AUTO: 34.5 % (ref 40–53)
HGB BLD-MCNC: 9.5 G/DL (ref 13.3–17.7)
MAGNESIUM SERPL-MCNC: 2.1 MG/DL (ref 1.6–2.3)
MCH RBC QN AUTO: 18.8 PG (ref 26.5–33)
MCHC RBC AUTO-ENTMCNC: 27.5 G/DL (ref 31.5–36.5)
MCV RBC AUTO: 68 FL (ref 78–100)
PHOSPHATE SERPL-MCNC: 1.8 MG/DL (ref 2.5–4.5)
PLATELET # BLD AUTO: 475 10E9/L (ref 150–450)
POTASSIUM SERPL-SCNC: 3.8 MMOL/L (ref 3.4–5.3)
PROT SERPL-MCNC: 6.6 G/DL (ref 6.8–8.8)
RBC # BLD AUTO: 5.06 10E12/L (ref 4.4–5.9)
SODIUM SERPL-SCNC: 141 MMOL/L (ref 133–144)
WBC # BLD AUTO: 15.9 10E9/L (ref 4–11)

## 2018-05-24 PROCEDURE — 25000132 ZZH RX MED GY IP 250 OP 250 PS 637: Performed by: PHYSICIAN ASSISTANT

## 2018-05-24 PROCEDURE — 83735 ASSAY OF MAGNESIUM: CPT | Performed by: PHYSICIAN ASSISTANT

## 2018-05-24 PROCEDURE — 12000022 ZZH R&B SNF

## 2018-05-24 PROCEDURE — 99309 SBSQ NF CARE MODERATE MDM 30: CPT | Performed by: PHYSICIAN ASSISTANT

## 2018-05-24 PROCEDURE — 85027 COMPLETE CBC AUTOMATED: CPT | Performed by: PHYSICIAN ASSISTANT

## 2018-05-24 PROCEDURE — 80053 COMPREHEN METABOLIC PANEL: CPT | Performed by: PHYSICIAN ASSISTANT

## 2018-05-24 PROCEDURE — 36415 COLL VENOUS BLD VENIPUNCTURE: CPT | Performed by: PHYSICIAN ASSISTANT

## 2018-05-24 PROCEDURE — 84100 ASSAY OF PHOSPHORUS: CPT | Performed by: PHYSICIAN ASSISTANT

## 2018-05-24 RX ADMIN — PANTOPRAZOLE SODIUM 40 MG: 40 TABLET, DELAYED RELEASE ORAL at 09:44

## 2018-05-24 RX ADMIN — AMOXICILLIN AND CLAVULANATE POTASSIUM 875 MG: 400; 57 POWDER, FOR SUSPENSION ORAL at 23:18

## 2018-05-24 RX ADMIN — FLUCONAZOLE 200 MG: 40 POWDER, FOR SUSPENSION ORAL at 09:45

## 2018-05-24 RX ADMIN — AMOXICILLIN AND CLAVULANATE POTASSIUM 875 MG: 400; 57 POWDER, FOR SUSPENSION ORAL at 09:44

## 2018-05-24 RX ADMIN — POTASSIUM & SODIUM PHOSPHATES POWDER PACK 280-160-250 MG 2 PACKET: 280-160-250 PACK at 20:35

## 2018-05-24 RX ADMIN — LOPERAMIDE HYDROCHLORIDE 4 MG: 1 SOLUTION ORAL at 21:47

## 2018-05-24 RX ADMIN — PANTOPRAZOLE SODIUM 40 MG: 40 TABLET, DELAYED RELEASE ORAL at 23:18

## 2018-05-24 RX ADMIN — LOPERAMIDE HYDROCHLORIDE 4 MG: 1 SOLUTION ORAL at 12:27

## 2018-05-24 RX ADMIN — Medication 2 PACKET: at 23:18

## 2018-05-24 RX ADMIN — LOPERAMIDE HYDROCHLORIDE 4 MG: 1 SOLUTION ORAL at 08:08

## 2018-05-24 RX ADMIN — Medication 15 ML: at 08:08

## 2018-05-24 RX ADMIN — POTASSIUM & SODIUM PHOSPHATES POWDER PACK 280-160-250 MG 2 PACKET: 280-160-250 PACK at 08:07

## 2018-05-24 RX ADMIN — Medication 2 PACKET: at 12:27

## 2018-05-24 RX ADMIN — Medication 2 PACKET: at 08:16

## 2018-05-24 RX ADMIN — LOPERAMIDE HYDROCHLORIDE 4 MG: 1 SOLUTION ORAL at 17:24

## 2018-05-24 NOTE — PROGRESS NOTES
[facility]  Transitional Care Unit Progress Note  Michael Saldivar  [id number]  May 24, 2018         Assessment & Plan:   Michael Saldivar is a 57 year old male with a history of GERD who was admitted to [facility] 3/18-4/6/18 for Boerhaave's esophageal perforation felt to be 2/2 forceful vomiting from a viral illness now s/p left thoracotomy w/ repair of esophageal perforation. Post op course c/b esophageal leak into the L pleural cavity requiring pharyngostomy tube placement and gastropexy on 3/28. Transferred to  TCU 4/6/18 for nutrition and drain care.      Boerhaave Esophageal Perforation; Intolerance of PO intake. S/p left thoracotomy, chest tube placement, pEG placement and repair of esophogeal perforation on 3/18/18 by Dr. Ybarra.  Post op course c/b persistent leak from esophagus to left pleural cavity.  S/p pharyngostomy tube placement (removed 4/20) and gastropexy (3/28). IR added jejunal limb to G tube on 3/30. Has undergone EGDs on 4/20, 5/11, and 5/23 for esophageal stent exchangement, now removed on 5/23. Continues on TFs via J tube, tolerating CLD (1 cup per shift; did not tolerate soft diet). S/p zosyn 3/18-4/2, continues on fluconazole (started 3/18) and augmentin (started 4/2). Chest tube with milky output over past week, thoracic aware. VSS, denies abd pain.   - FEN: CLD (1 cup per shift), TFs and meds via J tube, G tube to gravity  - Continue PPI BID   - Chest tube to water seal, monitor output  - Continue Augmentin and Fluconozole - stop date TBD by Dr. Ybarra  - Pain control: Tylenol PRN  - Plan for video swallow with esophagram on 5/29 and follow up in clinic with Dr Ybarra following      Chronic Loose Stools. Presumed 2/2 TFs and antibiotics.  CDiff PCT neg x 2 most recently on 4/16.  Overall improved. Cont fiber BID, imodium qid, and lomotil prn.        Hypophosphatemia. Likely nutritional. Was receiving about 15mmol IV per day on electrolyte repletion protocol,  however, patient has requested IV not be replaced if at all possible. Started oral supplementation on 5/18 and tolerating. Phos improved to 2.9 on 5/21, but down to 1.8 today (however TFs were held for EGD). Will increase neutrophos to 2 packets BID. Monitor Phos qM/Th.       Hx of GERD. See above. Cont PPI BID.       Mild Leukocytosis. WBC has been fluctuating 11-13 over past month, upto 15.9 today (however in setting of EGD yesterday). On abx and antifungals as above per surgical service. HD stable. Afebrile. No new s/sx of infection. Likely inflammatory. Monitor CBC qM/Th.      Chronic Normocytic Anemia. Unknown BL Hgb PTA.  Hgb 11.7 on admission, most recently 9.5 today, no s/sx of bleeding.      Resolved TCU Issues  Abnormal Liver Biochemistries. Intermittently elevated AP, ALT and AST since 3/26.  RUQ US on 4/26 with mildly echogenic liver with coarsened echo texture c/w steatosis.  Possibly 2/2 fluconazole.  LFTs normalized since 5/17.  If up trending again, per Dr. Amada jain to change to Micafungin.  Continue to check LFTs q MTh.       Diet and/or tube feedings:  CLD (only 1 cup per shift), TFs  Lines, tubes, drains: chest tube to water seal, GJ tube  DVT/GI prophylaxis: Low Risk/Ambulatory with no VTE prophylaxis indicated, Protonix for GERD  Indications for psychotropic medications: No diagnosis  Code status: Full Code         Consults:   Thoracic surgery         Discharge Planning:   Will remain at TCU until chest tube removed        Interval History:   Michael is doing okay today. Reports that after the procedure yesterday his abdominal pain completely resolved. He denies any new abdominal pain. He is tolerating sips of clear liquids. He denies any fevers or chills. No chest pain, sob, or dyspnea.          Physical Exam:   Vitals were reviewed  Blood pressure 112/72, pulse 91, temperature 97.1  F (36.2  C), temperature source Oral, resp. rate 16, SpO2 95 %.  General:alert, NAD, WDWN, pleasant and  cooperative  HEENT: NCAT, anicteric sclera, EOMI,MMM  Cardiovascular: RRR, no m/r/g  Lungs:CTAB. Chest tube with milky gray output.   Abdomen: normoactive BS, soft with no distention and no tenderness. G tube with bilious output.   Vascular: no peripheral edema, distal pulses palpable  Neurologic: AAO X 3, CN 2-12 grossly intact, no focal deficits  Skin: no jaundice, rashes, or lesions on exposed areas of skin.       Zunilda Espinoza [facility] [place] [facility]  Internal Medicine [facility] Hospitalist  [phone]

## 2018-05-24 NOTE — PLAN OF CARE
Problem: Goal Outcome Summary  Goal: Goal Outcome Summary  Outcome: No Change  No new issues or concerns. Denies pain, discomfort, CP and SOB. Cycled TF infusing per order via JT - off @ 0800. GT remains clamped. Chest tube intact / patent, milky tan drainage. Up indep.to BR.

## 2018-05-24 NOTE — PLAN OF CARE
Pt returned from same day surgery, EGD, stent removed no replacement. VSS. Chest tube remains in place, dressing changed to site. No c/o pain or discomfort. Cycled TF started per order, running at 120 ml/hr with 180 H20 q 4 hrs, via J tube. G rube clamped. No discomfort or nausea. Call within reach.

## 2018-05-24 NOTE — PROGRESS NOTES
Social Work: Initial Assessment with Discharge Plan     Patient Name: Michael Saldivar  : 1960  Age: 57 year old  Completed assessment with: Pt  Admitted to TCU: 2018     Presenting Information   Date of SW assessment: 2018  Health Care Directive: none  Primary Health Care Agent: next-of-kin would be surrogate decision-maker if necessary  Secondary Health Care Agent: n/a  Living Situation: lives alone in house in [facility]  Previous Functional Status: previously was working and independent  DME available: walker  Patient and family understanding of hospitalization: stent out  Cultural/Language/Spiritual Considerations: speaks English  Abuse concerns: none  PAS: confirmation number-[id number]  BIMS: score of 15 (cognition intact)  PHQ-9: score of 00 (minimal depressive symptoms)     Physical Health  Esophagogastroduodenoscopy, esophageal stent removal      Provider Information   Primary Care Physician: Jemal Michel     Mental Health:  Diagnosis: none  Current Support/Services: n/a  Previous Services: n/a  Services Needed/Recommended: health psychologist available during stay     Chemical Dependency:   Diagnosis: none  Current Support/Services: n/a  Previous Services: n/a  Services Needed/Recommended: none     Support System  Marital Status: single  Family support: brother-Michael ([place]), daughter-Juanis ([place]), niece-Sarah Beth ([place])  Other support available: relatives  Gaps in support system: several family members live in [place] which is about 30 miles from his home     Community Resources  Current in home services: none  Previous services: none  [facility] and [facility]     Financial/Employment/Education  Employment Status: construction job, hasn't worked for a long time  Income Source: past wages  Education: high school  Financial Concerns: limited income  Insurance: [place] effective 3/1/18     Discharge Plan   Patient  and family discharge goal: return home with home care services  Barriers to discharge: medical clearance, safe d/c plans  Disposition: to be determined  Transportation Needs: brother likely can give him a ride home  Name of Transportation Company and Phone: [place] transportation.     Pt will be given care plan goals and orders tomorrow 5/25/2018. Pt aware and agreeable to this. SW will continue to follow and assist as needed.    MARY Muro  [facility]   P: [phone]  Pgr: [phone]      05/24/18 1400   Living Arrangements   Lives With alone   Living Arrangements house   Able to Return to Prior Living Arrangements yes   Home Safety   Patient Feels Safe Living in Home? yes   Discharge Planning   Expected Discharge Date (TBD)   Discharge Needs Assessment   Patient/family verbalizes understanding of discharge plan recommendations? Yes

## 2018-05-25 LAB — GLUCOSE BLDC GLUCOMTR-MCNC: 123 MG/DL (ref 70–99)

## 2018-05-25 PROCEDURE — 12000022 ZZH R&B SNF

## 2018-05-25 PROCEDURE — 00000146 ZZHCL STATISTIC GLUCOSE BY METER IP

## 2018-05-25 PROCEDURE — 25000132 ZZH RX MED GY IP 250 OP 250 PS 637: Performed by: PHYSICIAN ASSISTANT

## 2018-05-25 RX ADMIN — AMOXICILLIN AND CLAVULANATE POTASSIUM 875 MG: 400; 57 POWDER, FOR SUSPENSION ORAL at 09:04

## 2018-05-25 RX ADMIN — Medication 15 ML: at 09:05

## 2018-05-25 RX ADMIN — POTASSIUM & SODIUM PHOSPHATES POWDER PACK 280-160-250 MG 2 PACKET: 280-160-250 PACK at 20:24

## 2018-05-25 RX ADMIN — PANTOPRAZOLE SODIUM 40 MG: 40 TABLET, DELAYED RELEASE ORAL at 09:05

## 2018-05-25 RX ADMIN — LOPERAMIDE HYDROCHLORIDE 4 MG: 1 SOLUTION ORAL at 20:24

## 2018-05-25 RX ADMIN — Medication 2 PACKET: at 20:24

## 2018-05-25 RX ADMIN — LOPERAMIDE HYDROCHLORIDE 4 MG: 1 SOLUTION ORAL at 13:59

## 2018-05-25 RX ADMIN — FLUCONAZOLE 200 MG: 40 POWDER, FOR SUSPENSION ORAL at 09:05

## 2018-05-25 RX ADMIN — Medication 2 PACKET: at 13:58

## 2018-05-25 RX ADMIN — POTASSIUM & SODIUM PHOSPHATES POWDER PACK 280-160-250 MG 2 PACKET: 280-160-250 PACK at 09:05

## 2018-05-25 RX ADMIN — Medication 2 PACKET: at 09:06

## 2018-05-25 RX ADMIN — PANTOPRAZOLE SODIUM 40 MG: 40 TABLET, DELAYED RELEASE ORAL at 20:24

## 2018-05-25 RX ADMIN — AMOXICILLIN AND CLAVULANATE POTASSIUM 875 MG: 400; 57 POWDER, FOR SUSPENSION ORAL at 20:24

## 2018-05-25 RX ADMIN — LOPERAMIDE HYDROCHLORIDE 4 MG: 1 SOLUTION ORAL at 09:05

## 2018-05-25 RX ADMIN — LOPERAMIDE HYDROCHLORIDE 4 MG: 1 SOLUTION ORAL at 18:05

## 2018-05-25 NOTE — OP NOTE
PREOPERATIVE DIAGNOSIS:  Esophageal perforation, status post leak after repair, now status post stenting.       POSTOPERATIVE DIAGNOSIS:  Esophageal perforation, status post leak after repair, status post stenting.       PROCEDURES PERFORMED:  Esophagogastroduodenoscopy, stent removal       SURGEON:  Keith Ybarra MD       ASSISTANT SURGEON:  Pavan Osei MD      OPERATIVE FINDINGS:  No defect visualized in the esophagus or GE junction.       DESCRIPTION OF PROCEDURE:  After obtaining informed consent, the patient was brought to the operating room and laid supine on the operating table.  After induction of general anesthesia, introduction of an endotracheal tube, an adult gastroscope was passed per orally.  The preexisting stent was visualized and removed using a rat-tooth forceps.  The esophagus, GE junction and the stomach were examined.  There was no obvious defect visualized on this examination as compared to previous.  On injecting dye through the scope under fluoroscopic visualization, there was still no evidence of leakage.  The air was aspirated and the scope withdrawn.  The patient tolerated the procedure well.

## 2018-05-25 NOTE — PLAN OF CARE
Problem: Goal Outcome Summary  Goal: Goal Outcome Summary  Outcome: Improving  Patient is A&O x 3, denies pain.LS clear, BS active. No loose stool  this shift. Indep in room.VS'S. TF infusing as per order. G-tube draining greenish drainage. WIll continue to monitor.

## 2018-05-25 NOTE — PLAN OF CARE
Problem: Goal Outcome Summary  Goal: Goal Outcome Summary  Outcome: No Change  TF off at 0800 per scheduled. G tube capped at 0800. Denies pain. Denies SOB. Ambulated in hallway few times independently using walker. No problem with chest tube. Continue to monitor.

## 2018-05-25 NOTE — PLAN OF CARE
Problem: Goal Outcome Summary  Goal: Goal Outcome Summary  Outcome: No Change  Alert and oriented  X4. Reported no new issues overnight. No complaints of pain during cares. TF as per orders at 120 ml/h and water flushes at 180 ml q 4 hours, patient is tolerating without complaints. G-tube to gravity overnight, patient stated he felt more comfortable with tube to gravity. Independent in the room. Call light in reach. Continue with POC.

## 2018-05-25 NOTE — PROGRESS NOTES
CLINICAL NUTRITION SERVICES - REASSESSMENT NOTE     Nutrition Prescription    RECOMMENDATIONS FOR MDs/PROVIDERS TO ORDER:  ADAT pending VFSS    Malnutrition Status:    Patient does not meet two of the above criteria necessary for diagnosing malnutrition    Recommendations already ordered by Registered Dietitian (RD):  None today    Future/Additional Recommendations:  1. Once pts PO intakes improve, start calorie counts and wean TF as able.   2. Once pt is meeting 75% of minimum nutritional needs (1390 kcal and 68 g protein), D/C TF     EVALUATION OF THE PROGRESS TOWARD GOALS   Diet: Clear Liquid (1 cup per shift), Boost Breeze BID  Nutrition Support:   Impact Peptide 1.5 via J-tube @ 120 mL/hr x 12 hrs (8pm-8am) providing 1440 mL, 2160 kcal (29 kcal/kg), 135 g protein (1.8 g/kg), 202 g CHO, 0 g fiber, 92 g Fat (50% from MCTs) and 1109 mL free water per DW of 74 kg, meeting 100% energy/protein needs  - NutriSource Fiber 2 pkts TID to provide an additional 18 g fiber.  - Free Water Flushes of 180 mL q 4 hr during + 60 mL before/after TF    Intake: tolerating TF at goal.        NEW FINDINGS   - Stent removed on 5/23 and no evidence of leakage per surgery note.   - Nutraphos increased to 2 pkts BID d/t Phos of 1.8 on 5/24.  - Pt gained 4 lbs over the last 3 days, likely d/t procedure. Previously stable at ~165 lbs for the last ~month.   - VFSS planned for next week to assess ability to advance diet.     MALNUTRITION  % Intake: Decreased intake does not meet criteria  % Weight Loss: None noted  Subcutaneous Fat Loss: None observed  Muscle Loss: None observed  Fluid Accumulation/Edema: None noted  Malnutrition Diagnosis: Patient does not meet two of the above criteria necessary for diagnosing malnutrition    Previous Goals   Total avg nutritional intake to meet a minimum of 25 kcal/kg and 1.2 g PRO/kg daily (per dosing wt 74 kg).  Evaluation: Met    Previous Nutrition Diagnosis  Predicted protein-energy intake related to  reliance on EN as evidenced by mechanical soft diet with continued reliance on TF to meet nutritional needs   Evaluation: No change    CURRENT NUTRITION DIAGNOSIS  Predicted protein-energy intake related to reliance on EN as evidenced by mechanical soft diet with continued reliance on TF to meet nutritional needs       INTERVENTIONS  Implementation  Continue EN as ordered    Goals  Total avg nutritional intake to meet a minimum of 25 kcal/kg and 1.2 g PRO/kg daily (per dosing wt 74 kg).    Monitoring/Evaluation  Progress toward goals will be monitored and evaluated per protocol.      Angela Pelletier RD, LD  Unit Pager: [phone]

## 2018-05-26 LAB — GLUCOSE BLDC GLUCOMTR-MCNC: 131 MG/DL (ref 70–99)

## 2018-05-26 PROCEDURE — 00000146 ZZHCL STATISTIC GLUCOSE BY METER IP

## 2018-05-26 PROCEDURE — 27210437 ZZH NUTRITION PRODUCT SEMIELEM INTERMED LITER

## 2018-05-26 PROCEDURE — 12000022 ZZH R&B SNF

## 2018-05-26 PROCEDURE — 25000132 ZZH RX MED GY IP 250 OP 250 PS 637: Performed by: PHYSICIAN ASSISTANT

## 2018-05-26 RX ADMIN — POTASSIUM & SODIUM PHOSPHATES POWDER PACK 280-160-250 MG 2 PACKET: 280-160-250 PACK at 20:05

## 2018-05-26 RX ADMIN — Medication 2 PACKET: at 20:06

## 2018-05-26 RX ADMIN — Medication 2 PACKET: at 08:32

## 2018-05-26 RX ADMIN — Medication 2 PACKET: at 13:54

## 2018-05-26 RX ADMIN — AMOXICILLIN AND CLAVULANATE POTASSIUM 875 MG: 400; 57 POWDER, FOR SUSPENSION ORAL at 20:04

## 2018-05-26 RX ADMIN — PANTOPRAZOLE SODIUM 40 MG: 40 TABLET, DELAYED RELEASE ORAL at 08:32

## 2018-05-26 RX ADMIN — FLUCONAZOLE 200 MG: 40 POWDER, FOR SUSPENSION ORAL at 08:32

## 2018-05-26 RX ADMIN — AMOXICILLIN AND CLAVULANATE POTASSIUM 875 MG: 400; 57 POWDER, FOR SUSPENSION ORAL at 08:32

## 2018-05-26 RX ADMIN — LOPERAMIDE HYDROCHLORIDE 4 MG: 1 SOLUTION ORAL at 20:03

## 2018-05-26 RX ADMIN — POTASSIUM & SODIUM PHOSPHATES POWDER PACK 280-160-250 MG 2 PACKET: 280-160-250 PACK at 08:32

## 2018-05-26 RX ADMIN — PANTOPRAZOLE SODIUM 40 MG: 40 TABLET, DELAYED RELEASE ORAL at 20:05

## 2018-05-26 RX ADMIN — LOPERAMIDE HYDROCHLORIDE 4 MG: 1 SOLUTION ORAL at 17:15

## 2018-05-26 NOTE — PLAN OF CARE
Problem: Goal Outcome Summary  Goal: Goal Outcome Summary  Outcome: No Change  Alert and oriented. Patient reported no new concerns overnight. No complaints of pain. TF and water flushes as per orders without problems. CT intact and with no further output tonight. Independent in the room. Continue with POC.

## 2018-05-26 NOTE — PLAN OF CARE
Problem: Goal Outcome Summary  Goal: Goal Outcome Summary  Patient denied pain, A&O x4, independent with walker in hallway. Chest tube canister changed this shift at 1800. 360mL total milky colored fluid in old canister. Continue to monitor.

## 2018-05-26 NOTE — PLAN OF CARE
Problem: Goal Outcome Summary  Goal: Goal Outcome Summary  Outcome: No Change  TF off at 0800 per scheduled and same time gastric tube was capped. Denies pain. Denies nausea. Denies SOB. Ambulated few times in hallway. No new concern noted. Continue to monitor.

## 2018-05-27 PROCEDURE — 12000022 ZZH R&B SNF

## 2018-05-27 PROCEDURE — 25000132 ZZH RX MED GY IP 250 OP 250 PS 637: Performed by: PHYSICIAN ASSISTANT

## 2018-05-27 PROCEDURE — 27210437 ZZH NUTRITION PRODUCT SEMIELEM INTERMED LITER

## 2018-05-27 RX ADMIN — LOPERAMIDE HYDROCHLORIDE 4 MG: 1 SOLUTION ORAL at 08:10

## 2018-05-27 RX ADMIN — Medication 2 PACKET: at 08:09

## 2018-05-27 RX ADMIN — AMOXICILLIN AND CLAVULANATE POTASSIUM 875 MG: 400; 57 POWDER, FOR SUSPENSION ORAL at 20:21

## 2018-05-27 RX ADMIN — LOPERAMIDE HYDROCHLORIDE 4 MG: 1 SOLUTION ORAL at 16:34

## 2018-05-27 RX ADMIN — Medication 2 PACKET: at 20:20

## 2018-05-27 RX ADMIN — AMOXICILLIN AND CLAVULANATE POTASSIUM 875 MG: 400; 57 POWDER, FOR SUSPENSION ORAL at 08:11

## 2018-05-27 RX ADMIN — PANTOPRAZOLE SODIUM 40 MG: 40 TABLET, DELAYED RELEASE ORAL at 20:21

## 2018-05-27 RX ADMIN — LOPERAMIDE HYDROCHLORIDE 4 MG: 1 SOLUTION ORAL at 20:21

## 2018-05-27 RX ADMIN — FLUCONAZOLE 200 MG: 40 POWDER, FOR SUSPENSION ORAL at 08:11

## 2018-05-27 RX ADMIN — Medication 2 PACKET: at 12:54

## 2018-05-27 RX ADMIN — PANTOPRAZOLE SODIUM 40 MG: 40 TABLET, DELAYED RELEASE ORAL at 08:11

## 2018-05-27 RX ADMIN — LOPERAMIDE HYDROCHLORIDE 4 MG: 1 SOLUTION ORAL at 12:53

## 2018-05-27 RX ADMIN — POTASSIUM & SODIUM PHOSPHATES POWDER PACK 280-160-250 MG 2 PACKET: 280-160-250 PACK at 08:10

## 2018-05-27 RX ADMIN — POTASSIUM & SODIUM PHOSPHATES POWDER PACK 280-160-250 MG 2 PACKET: 280-160-250 PACK at 20:23

## 2018-05-27 NOTE — PLAN OF CARE
Patient is alert and oriented X4. Independent with walker in hallway. Patient denies SOB and/or chest pain during cares. Chest tube drainage about 1ml; chest tube dressing is clean, dry and intact. TF started at 2000 as per orders and is running with no signs and symptoms of concerns; flushed per orders without problems. G-Tube is draining to gravity with 200 ml. Will continue to monitor.     Temp: 95.9  F (35.5  C) Temp src: Oral BP: 122/71 Pulse: 68   Resp: 18 SpO2: 97 % O2 Device: None (Room air)

## 2018-05-27 NOTE — PROGRESS NOTES
Writer introduced self and explained role.   provided patient with printout of orders and initial care plan goals which were given on 5/26 for patient for review.  Writer explained this is an overview and available to answer questions as able.  If further questions, then writer can direct patient to the nurse, provider, therapy staff or interdisciplinary team member as needed.  Patient didn't have any questions at this time.  Copies of orders and care plan placed in chart.

## 2018-05-27 NOTE — PLAN OF CARE
Problem: Goal Outcome Summary  Goal: Goal Outcome Summary  Outcome: No Change  TF off at 0800 and same time gastric tube was clamped. Denies pain, nausea or SOB. Chest tube has 10 cc milky, [place] output. Continues with ambulation in hallway independently. Reported no problem with B/B. Declined skin assessment. Continue to monitor.

## 2018-05-27 NOTE — PLAN OF CARE
Problem: Goal Outcome Summary  Goal: Goal Outcome Summary  Outcome: No Change  Cycled TF is running as per orders without problems. Patient with no complaints of of pain or new concerns tonight. Appears to be sleeping in between cares. CT intact, no output noted tonight. Call light in reach. Continue with POC.

## 2018-05-28 ENCOUNTER — APPOINTMENT (OUTPATIENT)
Dept: LAB | Facility: CLINIC | Age: 58
End: 2018-05-28
Attending: INTERNAL MEDICINE
Payer: MEDICAID

## 2018-05-28 LAB
ALBUMIN SERPL-MCNC: 2.8 G/DL (ref 3.4–5)
ALP SERPL-CCNC: 107 U/L (ref 40–150)
ALT SERPL W P-5'-P-CCNC: 49 U/L (ref 0–70)
ANION GAP SERPL CALCULATED.3IONS-SCNC: 6 MMOL/L (ref 3–14)
AST SERPL W P-5'-P-CCNC: 21 U/L (ref 0–45)
BILIRUB SERPL-MCNC: 0.3 MG/DL (ref 0.2–1.3)
BUN SERPL-MCNC: 26 MG/DL (ref 7–30)
CALCIUM SERPL-MCNC: 8.5 MG/DL (ref 8.5–10.1)
CHLORIDE SERPL-SCNC: 109 MMOL/L (ref 94–109)
CO2 SERPL-SCNC: 29 MMOL/L (ref 20–32)
CREAT SERPL-MCNC: 0.73 MG/DL (ref 0.66–1.25)
ERYTHROCYTE [DISTWIDTH] IN BLOOD BY AUTOMATED COUNT: 17.5 % (ref 10–15)
GFR SERPL CREATININE-BSD FRML MDRD: >90 ML/MIN/1.7M2
GLUCOSE SERPL-MCNC: 107 MG/DL (ref 70–99)
HCT VFR BLD AUTO: 36 % (ref 40–53)
HGB BLD-MCNC: 9.9 G/DL (ref 13.3–17.7)
MAGNESIUM SERPL-MCNC: 2.5 MG/DL (ref 1.6–2.3)
MCH RBC QN AUTO: 18.8 PG (ref 26.5–33)
MCHC RBC AUTO-ENTMCNC: 27.5 G/DL (ref 31.5–36.5)
MCV RBC AUTO: 68 FL (ref 78–100)
PHOSPHATE SERPL-MCNC: 3.5 MG/DL (ref 2.5–4.5)
PLATELET # BLD AUTO: 449 10E9/L (ref 150–450)
POTASSIUM SERPL-SCNC: 4.7 MMOL/L (ref 3.4–5.3)
PROT SERPL-MCNC: 6.5 G/DL (ref 6.8–8.8)
RBC # BLD AUTO: 5.26 10E12/L (ref 4.4–5.9)
SODIUM SERPL-SCNC: 144 MMOL/L (ref 133–144)
WBC # BLD AUTO: 7 10E9/L (ref 4–11)

## 2018-05-28 PROCEDURE — 80053 COMPREHEN METABOLIC PANEL: CPT | Performed by: PHYSICIAN ASSISTANT

## 2018-05-28 PROCEDURE — 12000022 ZZH R&B SNF

## 2018-05-28 PROCEDURE — 85027 COMPLETE CBC AUTOMATED: CPT | Performed by: PHYSICIAN ASSISTANT

## 2018-05-28 PROCEDURE — 83735 ASSAY OF MAGNESIUM: CPT | Performed by: PHYSICIAN ASSISTANT

## 2018-05-28 PROCEDURE — 84100 ASSAY OF PHOSPHORUS: CPT | Performed by: PHYSICIAN ASSISTANT

## 2018-05-28 PROCEDURE — 25000132 ZZH RX MED GY IP 250 OP 250 PS 637: Performed by: PHYSICIAN ASSISTANT

## 2018-05-28 PROCEDURE — 27210437 ZZH NUTRITION PRODUCT SEMIELEM INTERMED LITER

## 2018-05-28 PROCEDURE — 36415 COLL VENOUS BLD VENIPUNCTURE: CPT | Performed by: PHYSICIAN ASSISTANT

## 2018-05-28 RX ADMIN — Medication 15 ML: at 08:17

## 2018-05-28 RX ADMIN — Medication 2 PACKET: at 19:59

## 2018-05-28 RX ADMIN — LOPERAMIDE HYDROCHLORIDE 4 MG: 1 SOLUTION ORAL at 10:27

## 2018-05-28 RX ADMIN — Medication 2 PACKET: at 14:21

## 2018-05-28 RX ADMIN — AMOXICILLIN AND CLAVULANATE POTASSIUM 875 MG: 400; 57 POWDER, FOR SUSPENSION ORAL at 20:06

## 2018-05-28 RX ADMIN — FLUCONAZOLE 200 MG: 40 POWDER, FOR SUSPENSION ORAL at 08:18

## 2018-05-28 RX ADMIN — LOPERAMIDE HYDROCHLORIDE 4 MG: 1 SOLUTION ORAL at 17:35

## 2018-05-28 RX ADMIN — POTASSIUM & SODIUM PHOSPHATES POWDER PACK 280-160-250 MG 2 PACKET: 280-160-250 PACK at 08:17

## 2018-05-28 RX ADMIN — LOPERAMIDE HYDROCHLORIDE 4 MG: 1 SOLUTION ORAL at 14:20

## 2018-05-28 RX ADMIN — Medication 2 PACKET: at 08:19

## 2018-05-28 RX ADMIN — PANTOPRAZOLE SODIUM 40 MG: 40 TABLET, DELAYED RELEASE ORAL at 08:18

## 2018-05-28 RX ADMIN — LOPERAMIDE HYDROCHLORIDE 4 MG: 1 SOLUTION ORAL at 20:14

## 2018-05-28 RX ADMIN — AMOXICILLIN AND CLAVULANATE POTASSIUM 875 MG: 400; 57 POWDER, FOR SUSPENSION ORAL at 08:18

## 2018-05-28 RX ADMIN — POTASSIUM & SODIUM PHOSPHATES POWDER PACK 280-160-250 MG 2 PACKET: 280-160-250 PACK at 20:02

## 2018-05-28 RX ADMIN — PANTOPRAZOLE SODIUM 40 MG: 40 TABLET, DELAYED RELEASE ORAL at 20:06

## 2018-05-28 NOTE — PLAN OF CARE
Patient is alert and oriented X4. Independent with walker in hallway. Patient denies SOB and/or pain during cares. Changed CT dressing; clean, dry and intact. Chest tube drainage 5ml. TF started at 2000 per orders and is running with no signs and symptoms of concerns; flushed per orders without problems. G-Tube is draining to gravity; output: 150 ml. Will continue to monitor.     Temp: 98.1  F (36.7  C) Temp src: Oral BP: 116/69 Pulse: 69   Resp: 16 SpO2: 99 % O2 Device: None (Room air)

## 2018-05-28 NOTE — PLAN OF CARE
Problem: Goal Outcome Summary  Goal: Goal Outcome Summary  Outcome: No Change  Patient alert and oriented,ambulating in the halls independently,denies pain.Minimal Op in the chest drainage canister.G-tube clamped @ 8 am and j-tube irrigated last @ 1400 with 120 cc of water,site dressing C/D/I.

## 2018-05-28 NOTE — PLAN OF CARE
Problem: Goal Outcome Summary  Goal: Goal Outcome Summary  Outcome: No Change  Alert and oriented x4. Patient reported no new issues. No complaints of pain. TF as per orders at 120 ml/h and water flushes at 180 ml q 4 hours. Patient is tolerating feedings without complaints. Chest tube with no further output overnight. Independent in the room. Reported no problems with B/B. Call light in reach. Continue with POC.

## 2018-05-29 ENCOUNTER — RADIANT APPOINTMENT (OUTPATIENT)
Dept: GENERAL RADIOLOGY | Facility: CLINIC | Age: 58
End: 2018-05-29
Payer: MEDICAID

## 2018-05-29 ENCOUNTER — RADIANT APPOINTMENT (OUTPATIENT)
Dept: GENERAL RADIOLOGY | Facility: CLINIC | Age: 58
End: 2018-05-29
Attending: PHYSICIAN ASSISTANT
Payer: MEDICAID

## 2018-05-29 ENCOUNTER — OFFICE VISIT (OUTPATIENT)
Dept: SURGERY | Facility: CLINIC | Age: 58
End: 2018-05-29
Attending: THORACIC SURGERY (CARDIOTHORACIC VASCULAR SURGERY)
Payer: MEDICAID

## 2018-05-29 VITALS
RESPIRATION RATE: 16 BRPM | WEIGHT: 166 LBS | DIASTOLIC BLOOD PRESSURE: 65 MMHG | HEIGHT: 65 IN | OXYGEN SATURATION: 98 % | HEART RATE: 65 BPM | SYSTOLIC BLOOD PRESSURE: 106 MMHG | BODY MASS INDEX: 27.66 KG/M2 | TEMPERATURE: 97.9 F

## 2018-05-29 DIAGNOSIS — K22.3 ESOPHAGEAL PERFORATION: ICD-10-CM

## 2018-05-29 DIAGNOSIS — K22.3 ESOPHAGEAL PERFORATION: Primary | ICD-10-CM

## 2018-05-29 LAB
GLUCOSE BLDC GLUCOMTR-MCNC: 120 MG/DL (ref 70–99)
GLUCOSE BLDC GLUCOMTR-MCNC: 65 MG/DL (ref 70–99)
GLUCOSE BLDC GLUCOMTR-MCNC: 82 MG/DL (ref 70–99)

## 2018-05-29 PROCEDURE — 25000132 ZZH RX MED GY IP 250 OP 250 PS 637: Performed by: PHYSICIAN ASSISTANT

## 2018-05-29 PROCEDURE — 99024 POSTOP FOLLOW-UP VISIT: CPT | Mod: ZP | Performed by: THORACIC SURGERY (CARDIOTHORACIC VASCULAR SURGERY)

## 2018-05-29 PROCEDURE — G0463 HOSPITAL OUTPT CLINIC VISIT: HCPCS | Mod: ZF

## 2018-05-29 PROCEDURE — 25000132 ZZH RX MED GY IP 250 OP 250 PS 637: Performed by: INTERNAL MEDICINE

## 2018-05-29 PROCEDURE — 27210437 ZZH NUTRITION PRODUCT SEMIELEM INTERMED LITER

## 2018-05-29 PROCEDURE — 12000022 ZZH R&B SNF

## 2018-05-29 RX ADMIN — LOPERAMIDE HYDROCHLORIDE 4 MG: 1 SOLUTION ORAL at 09:24

## 2018-05-29 RX ADMIN — AMOXICILLIN AND CLAVULANATE POTASSIUM 875 MG: 400; 57 POWDER, FOR SUSPENSION ORAL at 09:23

## 2018-05-29 RX ADMIN — AMOXICILLIN AND CLAVULANATE POTASSIUM 875 MG: 400; 57 POWDER, FOR SUSPENSION ORAL at 20:09

## 2018-05-29 RX ADMIN — POTASSIUM & SODIUM PHOSPHATES POWDER PACK 280-160-250 MG 2 PACKET: 280-160-250 PACK at 20:10

## 2018-05-29 RX ADMIN — LOPERAMIDE HYDROCHLORIDE 4 MG: 1 SOLUTION ORAL at 19:06

## 2018-05-29 RX ADMIN — PANTOPRAZOLE SODIUM 40 MG: 40 TABLET, DELAYED RELEASE ORAL at 09:24

## 2018-05-29 RX ADMIN — POTASSIUM & SODIUM PHOSPHATES POWDER PACK 280-160-250 MG 2 PACKET: 280-160-250 PACK at 09:24

## 2018-05-29 RX ADMIN — FLUCONAZOLE 200 MG: 40 POWDER, FOR SUSPENSION ORAL at 09:24

## 2018-05-29 RX ADMIN — Medication 2 PACKET: at 19:07

## 2018-05-29 RX ADMIN — Medication 15 ML: at 09:23

## 2018-05-29 RX ADMIN — PANTOPRAZOLE SODIUM 40 MG: 40 TABLET, DELAYED RELEASE ORAL at 20:09

## 2018-05-29 RX ADMIN — Medication 2 PACKET: at 09:24

## 2018-05-29 ASSESSMENT — PAIN SCALES - GENERAL: PAINLEVEL: NO PAIN (0)

## 2018-05-29 NOTE — PLAN OF CARE
Problem: Goal Outcome Summary  Goal: Goal Outcome Summary  Outcome: No Change  No new issues or concerns overnight. Denies pain, discomfort, CP and SOB. Cycled TF infusing per order via JT and GT is open to gravity drainage. CT intact / patent, appears minimal output. Up ad nidhi indep.to RAFIQ Chowhas appt.@ K[facility] [place] @ 1600. Also Esophagram scheduled for today.

## 2018-05-29 NOTE — LETTER
"5/29/2018       RE: Michael Saldivar  [address]  [place] [address]     Dear Colleague,    Thank you for referring your patient, Michael Saldivar, to the [facility]. Please see a copy of my visit note below.    THORACIC SURGERY FOLLOW UP VISIT    Dear Dr. Michel,  I saw Mr. Michael Saldivar in follow-up today. The clinical summary follows:     PREOP DIAGNOSIS   Esophageal perforation  PROCEDURE   3/19/2018: Left thoracotomy, primary repair of esophageal perforation, EGD  3/28/2018: EGD exchange of gastrostomy tube, gastropexy and pharyngostomy tube placement.   3/30/2018 IR exchanged G for GJ tube      4/20/2018 EGD with Esophageal Stent placement (23 x 150 mm, fully covered)    5/15/2018  EGD with stent revision    5/23/2018  EGD with stent removal    COMPLICATIONS  Esophageal leak    INTERVAL STUDIES  I reviewed the images from an esophagram done today which shows no leak.    /65  Pulse 65  Temp 97.9  F (36.6  C) (Oral)  Resp 16  Ht 1.651 m (5' 5\")  Wt 75.3 kg (166 lb)  SpO2 98%  BMI 27.62 kg/m2   No air leak  Thin, scant drainage since 5/23  Incisions well healed    SUBJECTIVE  The patient remains in the TCU.  He has been ambulating without difficulty.  He has no more abdominal pain since the stent was removed.  He is tolerating jejunal feeds and bowel movements have leveled out.  He is tolerating clear liquids.  Per Mr. Saldivar, he has had no fevers or chills.    IMPRESSION (K22.3) Esophageal perforation  (primary encounter diagnosis)  This is a 57 year-old man with esophageal perforation s/p primary repair complicated by leak managed with pharyngostomy, GJ and esophageal stent.  There is no evidence of leak on today's  esophogram.  He is tolerating a clear liquid diet.    PLAN  I spent a total of 15 minutes with Mr. Michael Saldivar, more than 50% of which were spent in counseling, coordination of care, and face-to-face time. I reviewed the plan as follows:  The chest tube " was removed without complication.  The patient will undergo a chest x-ray today.  The dressing may be removed in 2 days and then he may shower.    The patient may advance to a soft diet  and in 3 days may advance to a regular diet as tolerated.  The tube feeds can start to be weaned to off.  Can give 75% starting today and decrease as his oral intake improves.  He should remain on proton pump inhibitor therapy.  The patient should increase his activity as tolerated.  The gastrostomy tube may be capped at all times.     Necessary Tests & Appointments:  F/u 2 weeks  All questions were answered and the patient and present family were in agreement with the plan.  I appreciate the opportunity to participate in the care of your patient and will keep you updated.  Sincerely,      Keith Ybarra MD    Again, thank you for allowing me to participate in the care of your patient.      Sincerely,    Keith Ybarra MD

## 2018-05-29 NOTE — PLAN OF CARE
Problem: TCU Patient Goals  Goal: TCU Plan of Care  Outcome: No Change  Pt alert and oriented. Denies any pain or SOB. Independent with ADL's. Pt stated no BM this shift. Chest tube dressing CDI. TF started around 2000 until 8am. G-tube unclamped after 8 hrs of clamping, draining to a 60 mls of greenish output. Pt denies nausea and vomiting. GJT dressing changed.   Will continue to monitor pt.    Blood pressure 112/66, pulse 69, temperature 96.9  F (36.1  C), temperature source Oral, resp. rate 16, weight 75.5 kg (166 lb 6.4 oz), SpO2 100 %.

## 2018-05-29 NOTE — PROGRESS NOTES
"THORACIC SURGERY FOLLOW UP VISIT    Dear Dr. Michel,  I saw Mr. Michael Saldivar in follow-up today. The clinical summary follows:     PREOP DIAGNOSIS   Esophageal perforation  PROCEDURE   3/19/2018: Left thoracotomy, primary repair of esophageal perforation, EGD  3/28/2018: EGD exchange of gastrostomy tube, gastropexy and pharyngostomy tube placement.   3/30/2018 IR exchanged G for GJ tube      4/20/2018 EGD with Esophageal Stent placement (23 x 150 mm, fully covered)    5/15/2018  EGD with stent revision    5/23/2018  EGD with stent removal    COMPLICATIONS  Esophageal leak    INTERVAL STUDIES  I reviewed the images from an esophagram done today which shows no leak.    /65  Pulse 65  Temp 97.9  F (36.6  C) (Oral)  Resp 16  Ht 1.651 m (5' 5\")  Wt 75.3 kg (166 lb)  SpO2 98%  BMI 27.62 kg/m2   No air leak  Thin, scant drainage since 5/23  Incisions well healed    SUBJECTIVE  The patient remains in the TCU.  He has been ambulating without difficulty.  He has no more abdominal pain since the stent was removed.  He is tolerating jejunal feeds and bowel movements have leveled out.  He is tolerating clear liquids.  Per Mr. Saldivar, he has had no fevers or chills.    IMPRESSION (K22.3) Esophageal perforation  (primary encounter diagnosis)  This is a 57 year-old man with esophageal perforation s/p primary repair complicated by leak managed with pharyngostomy, GJ and esophageal stent.  There is no evidence of leak on today's  esophogram.  He is tolerating a clear liquid diet.    PLAN  I spent a total of 15 minutes with Mr. Michael Saldivar, more than 50% of which were spent in counseling, coordination of care, and face-to-face time. I reviewed the plan as follows:  The chest tube was removed without complication.  The patient will undergo a chest x-ray today.  The dressing may be removed in 2 days and then he may shower.    The patient may advance to a soft diet and in 3 days may advance to a regular diet as " tolerated.  The tube feeds can start to be weaned to off.  Can give 75% starting today and decrease as his oral intake improves.  He should remain on proton pump inhibitor therapy.  The patient should increase his activity as tolerated.  The gastrostomy tube may be capped at all times.     Necessary Tests & Appointments:  F/u 2 weeks  All questions were answered and the patient and present family were in agreement with the plan.  I appreciate the opportunity to participate in the care of your patient and will keep you updated.  Sincerely,      Keith Ybarra MD

## 2018-05-29 NOTE — PLAN OF CARE
Problem: Goal Outcome Summary  Goal: Goal Outcome Summary  RN: Pt to appointment and esophagram, left at 1300, 1300 medications not given r/t NPO. Resume cares when pt returns, denies pain.

## 2018-05-29 NOTE — MR AVS SNAPSHOT
After Visit Summary   5/29/2018    Michael Saldivar    MRN: [id number]           Patient Information     Date Of Birth          1960        Visit Information        Provider Department      5/29/2018 4:00 PM Keith Ybarra MD formerly [facility]        Today's Diagnoses     Esophageal perforation    -  1       Follow-ups after your visit        Your next 10 appointments already scheduled     May 29, 2018  4:00 PM CDT   (Arrive by 3:45 PM)   Return Visit with Keith Ybarra MD   [facility] ([facility])    [address]  [address]  [facility] [facility] [address] [phone]            May 29, 2018  4:30 PM CDT   XR CHEST 2 VIEWS with UCXR1   [facility] ([facility])    [address]  [address]  [place] [facility] [address] [phone]           Please bring a list of your current medicines to your exam. (Include vitamins, minerals and over-thecounter medicines.) Leave your valuables at home.  Tell your doctor if there is a chance you may be pregnant.  You do not need to do anything special for this exam.              Who to contact     If you have questions or need follow up information about today's clinic visit or your schedule please contact [facility] directly at [phone].  Normal or non-critical lab and imaging results will be communicated to you by MyChart, letter or phone within 4 business days after the clinic has received the results. If you do not hear from us within 7 days, please contact the clinic through MyChart or phone. If you have a critical or abnormal lab result, we will notify you by phone as soon as possible.  Submit refill requests through [facility] or call your pharmacy and they will forward the refill request to us. Please allow 3 business days for your refill to be completed.          Additional Information About Your Visit        MyChart  "Information     [facility] lets you send messages to your doctor, view your test results, renew your prescriptions, schedule appointments and more. To sign up, go to [email or web address] . Click on \"Log in\" on the left side of the screen, which will take you to the Welcome page. Then click on \"Sign up Now\" on the right side of the page.     You will be asked to enter the access code listed below, as well as some personal information. Please follow the directions to create your username and password.     Your access code is: [email or web address]  Expires: 2018 12:07 PM     Your access code will  in 90 days. If you need help or a new code, please call your [facility] clinic or [phone].        Care EveryWhere ID     This is your Care EveryWhere ID. This could be used by other organizations to access your [facility] medical records  [id number]        Your Vitals Were     Pulse Temperature Respirations Height Pulse Oximetry BMI (Body Mass Index)    65 97.9  F (36.6  C) (Oral) 16 1.651 m (5' 5\") 98% 27.62 kg/m2       Blood Pressure from Last 3 Encounters:   18 106/65   18 110/64   18 126/71    Weight from Last 3 Encounters:   18 75.3 kg (166 lb)   18 75.5 kg (166 lb 8 oz)   18 76 kg (167 lb 8.8 oz)              Today, you had the following     No orders found for display         Today's Medication Changes      Notice     This visit is during an admission. Changes to the med list made in this visit will be reflected in the After Visit Summary of the admission.             Primary Care Provider Office Phone # Fax #    Jemal Michel [phone]       [facility] [address]   [facility] [address]        Equal Access to Services     [facility] LEATHA : Monique Segal, jose espinosa, lexie [place] . [facility] [phone].    ATENCIÓN: Si habla kaycee, tiene a patel disposición servicios gratuitos " de asistencia lingüística. Mary jose [phone].    We comply with applicable federal civil rights laws and [place] laws. We do not discriminate on the basis of race, color, national origin, age, disability, sex, sexual orientation, or gender identity.            Thank you!     Thank you for choosing [facility]  for your care. Our goal is always to provide you with excellent care. Hearing back from our patients is one way we can continue to improve our services. Please take a few minutes to complete the written survey that you may receive in the mail after your visit with us. Thank you!             Your Updated Medication List - Protect others around you: Learn how to safely use, store and throw away your medicines at www.disposemymeds.org.      Notice     This visit is during an admission. Changes to the med list made in this visit will be reflected in the After Visit Summary of the admission.

## 2018-05-29 NOTE — NURSING NOTE
"Oncology Rooming Note    May 29, 2018 2:37 PM   Michael Saldivar is a 57 year old male who presents for:    Chief Complaint   Patient presents with     Oncology Clinic Visit     Return:      Initial Vitals: /65  Pulse 65  Temp 97.9  F (36.6  C) (Oral)  Resp 16  Ht 1.651 m (5' 5\")  Wt 75.3 kg (166 lb)  SpO2 98%  BMI 27.62 kg/m2 Estimated body mass index is 27.62 kg/(m^2) as calculated from the following:    Height as of this encounter: 1.651 m (5' 5\").    Weight as of this encounter: 75.3 kg (166 lb). Body surface area is 1.86 meters squared.  No Pain (0) Comment: Data Unavailable   No LMP for male patient.  Allergies reviewed: Yes  Medications reviewed: Yes    Medications: Medication refills not needed today.  Pharmacy name entered into EPIC: Data Unavailable    Clinical concerns: no new concerns today Dr. Ybarra was notified.    10 minutes for nursing intake (face to face time)     Nils Ibrahim CMA              "

## 2018-05-30 PROCEDURE — 99207 ZZC CDG-MDM COMPONENT: MEETS LOW - DOWN CODED: CPT | Performed by: PHYSICIAN ASSISTANT

## 2018-05-30 PROCEDURE — 99309 SBSQ NF CARE MODERATE MDM 30: CPT | Performed by: PHYSICIAN ASSISTANT

## 2018-05-30 PROCEDURE — 25000132 ZZH RX MED GY IP 250 OP 250 PS 637: Performed by: PHYSICIAN ASSISTANT

## 2018-05-30 PROCEDURE — 27210437 ZZH NUTRITION PRODUCT SEMIELEM INTERMED LITER

## 2018-05-30 PROCEDURE — 12000022 ZZH R&B SNF

## 2018-05-30 PROCEDURE — 25000132 ZZH RX MED GY IP 250 OP 250 PS 637: Performed by: INTERNAL MEDICINE

## 2018-05-30 RX ADMIN — FLUCONAZOLE 200 MG: 40 POWDER, FOR SUSPENSION ORAL at 08:38

## 2018-05-30 RX ADMIN — Medication 2 PACKET: at 14:08

## 2018-05-30 RX ADMIN — AMOXICILLIN AND CLAVULANATE POTASSIUM 875 MG: 400; 57 POWDER, FOR SUSPENSION ORAL at 08:38

## 2018-05-30 RX ADMIN — LOPERAMIDE HYDROCHLORIDE 4 MG: 1 SOLUTION ORAL at 14:07

## 2018-05-30 RX ADMIN — PANTOPRAZOLE SODIUM 40 MG: 40 TABLET, DELAYED RELEASE ORAL at 21:33

## 2018-05-30 RX ADMIN — LOPERAMIDE HYDROCHLORIDE 4 MG: 1 SOLUTION ORAL at 21:33

## 2018-05-30 RX ADMIN — POTASSIUM & SODIUM PHOSPHATES POWDER PACK 280-160-250 MG 2 PACKET: 280-160-250 PACK at 08:35

## 2018-05-30 RX ADMIN — POTASSIUM & SODIUM PHOSPHATES POWDER PACK 280-160-250 MG 2 PACKET: 280-160-250 PACK at 21:32

## 2018-05-30 RX ADMIN — PANTOPRAZOLE SODIUM 40 MG: 40 TABLET, DELAYED RELEASE ORAL at 08:48

## 2018-05-30 RX ADMIN — AMOXICILLIN AND CLAVULANATE POTASSIUM 875 MG: 400; 57 POWDER, FOR SUSPENSION ORAL at 21:33

## 2018-05-30 RX ADMIN — Medication 2 PACKET: at 20:23

## 2018-05-30 RX ADMIN — LOPERAMIDE HYDROCHLORIDE 4 MG: 1 SOLUTION ORAL at 17:14

## 2018-05-30 RX ADMIN — LOPERAMIDE HYDROCHLORIDE 4 MG: 1 SOLUTION ORAL at 08:38

## 2018-05-30 RX ADMIN — Medication 2 PACKET: at 08:34

## 2018-05-30 RX ADMIN — Medication 15 ML: at 08:38

## 2018-05-30 NOTE — PROGRESS NOTES
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 Post op course c/b esophageal leak into the L pleural cavity requiring pharyngostomy tube placement and gastropexy on 3/28. Transferred to  TCU 4/6/18 for nutritional support and drain cares.      Boerhaave Esophageal Perforation - S/p left thoracotomy, chest tube placement, PEG placement, and repair of esophogeal perforation on 3/18/18 by Dr. Ybarra. Post op course c/b persistent leak from esophagus to left pleural cavity s/p pharyngostomy tube placement (removed 4/20). IR added jejunal limb to G tube on 3/30. Treated with Zosyn 3/18-4/2, Augmentin 4/2-present, Fluconazole 3/18-present. Underwent EGD with stent placement 4/20, stent exchange 5/11, and stent removal 5/23. Chest tube removed 5/29.   - Advance diet to mechanical soft x 1 week, then to regular diet as tolerate  - Continue TFs via J tube. Wean as able.   - G tube to remain capped at all times  - Continue PPI BID   - Continue Augmentin and Fluconazole - stop date TBD by Dr. Ybarra  - Pain control: Tylenol PRN  - Follow up with Dr. Ybarra on 6/12      Chronic Loose Stools - Stable. Presumed 2/2 TFs and antibiotics. C.Diff PCR negative x 2, most recently 4/16. Continue Fiber TID, Imodium QID, and Lomotil PRN.        Hx of GERD - Asymptomatic. Continue Protonix BID.       Normocytic Anemia - Hgb 11.7 on hospital admission, unknown basline Hgb. Hgb stable ~9-10 currently. No s/s of bleeding. Trend CBC q M/Th.      Resolved TCU Issues  Abnormal LFTs - Intermittent from 3/26-5/10. RUQ US (4/26) showed mildly echogenic liver with coarsened echo texture c/w steatosis. Possibly 2/2 Fluconazole. Resolved as of 5/17. If recurs, OK change to  Micafungin per Dr. Ybarra. Monitor LFTs q MTh.   Mild Leukocytosis - Intermittent since hospital admission. Likely due to inflammation from esophageal perforation and subsequent procedures. WBC normalized as of 5/28. Remains afebrile without s/s of infection. Monitor CBC q M/Th.  Hypophosphatemia - Likely nutritional. Phosphorus 3.5 on 5/28. Continue Neutraphos 2 packets BID. Monitor Phos q M/Th.     Diet and/or tube feedings: Mechanical Soft, TF via J tube  Lines, tubes, drains: GJ tube  DVT/GI prophylaxis: Mechanical/Ambulation, PPI  Indications for psychotropic medications: No diagnosis  Pneumococcal Vaccination Status: Not due until >65  Code status discussed on admission: Full Code    Laury Gross PA-C  Hospitalist Service  [phone]         Consults:   Nutrition         Discharge Planning:   Home once TF weaned off.        Interval History:   Doing well today. Tolerating mechanical soft diet without difficulty. Loose stools unchanged from baseline. Denies fevers, chills, headaches, dizziness, URI symptoms, chest pain, SOB, n/v, abdominal pain, constipation, dysuria, peripheral edema, or skin rashes.         Physical Exam:   Blood pressure 121/68, pulse 72, temperature 97.2  F (36.2  C), temperature source Oral, resp. rate 20, weight 75.9 kg (167 lb 6.4 oz), SpO2 98 %.  GENERAL: Awake. Sitting up in bed. Appears comfortable and non-toxic. NAD.  HEENT: NC/AT. Anicteric sclera. Mucous membranes moist.  CV: RRR. S1,S2. No appreciable murmurs.   RESPIRATORY: Normal respiratory effort on RA. Lungs CTAB without wheezes, rales, or rhonchi.   GI: Soft, non-tender, and non-distended with bowel sounds present. GJ tube c/d/i.  EXTREMITIES: No peripheral edema. Warm & well perfused.  NEUROLOGIC: Alert and orientated x 3. Moves all extremities. No focal deficits.   SKIN: No jaundice, rashes, or lesions on exposed areas of skin. Prior chest tube site with dressing in place which is c/d/i.

## 2018-05-30 NOTE — PLAN OF CARE
Problem: Goal Outcome Summary  Goal: Goal Outcome Summary  Outcome: Improving  Pt independent in room. G tube clamped since 0830- pt doing good. Denies nausea and vomiting. Pt ordered Mec- Soft diet this am, tolerated well. Pt verbalizing satisfaction of eating something solid- seems excited about 1st soft meal in a long time. Chest tube drainage site- dressing C/D/I- will leave occlusive dressing on for 48 hrs. Denies pain. Will continue to monitor nausea, old chest tube site, appetite and tolerance of new diet.

## 2018-05-30 NOTE — PLAN OF CARE
Problem: Goal Outcome Summary  Goal: Goal Outcome Summary  Outcome: No Change  Pt.appears to be sleeping well and has no c/o's or requests as of yet. CT removed yesterday - dsg C/D/I. Cycled TF infusing per order via JT, mechanical/dental soft diet. GT is open to gravity drainage. Indep.tranfers/ambulation and ADLs.

## 2018-05-30 NOTE — PLAN OF CARE
Patient is alert and oriented. Denies any pain and SOB during cares. Independent with ADL's. Chest tube was removed. Dressing is clean, dry and intact with no drainage noted. TF started around 2000 per orders with no signs and symptoms of concern. G-Tube is unclamped at 2000 and draining to gravity with milky greenish output. GJT dressing changed this shift. Diet was changed from clear liquid to mechanical/soft diet. Patient ordered soup and ice cream of which he ate 100%.      Temp: 96  F (35.6  C) Temp src: Oral BP: 114/62 Pulse: 69   Resp: 16 SpO2: 100 % O2 Device: None (Room air)

## 2018-05-31 ENCOUNTER — APPOINTMENT (OUTPATIENT)
Dept: LAB | Facility: CLINIC | Age: 58
End: 2018-05-31
Attending: INTERNAL MEDICINE
Payer: MEDICAID

## 2018-05-31 LAB
ALBUMIN SERPL-MCNC: 2.9 G/DL (ref 3.4–5)
ALP SERPL-CCNC: 106 U/L (ref 40–150)
ALT SERPL W P-5'-P-CCNC: 68 U/L (ref 0–70)
ANION GAP SERPL CALCULATED.3IONS-SCNC: 10 MMOL/L (ref 3–14)
AST SERPL W P-5'-P-CCNC: 24 U/L (ref 0–45)
BILIRUB SERPL-MCNC: 0.3 MG/DL (ref 0.2–1.3)
BUN SERPL-MCNC: 28 MG/DL (ref 7–30)
CALCIUM SERPL-MCNC: 8.6 MG/DL (ref 8.5–10.1)
CHLORIDE SERPL-SCNC: 108 MMOL/L (ref 94–109)
CO2 SERPL-SCNC: 25 MMOL/L (ref 20–32)
CREAT SERPL-MCNC: 0.64 MG/DL (ref 0.66–1.25)
ERYTHROCYTE [DISTWIDTH] IN BLOOD BY AUTOMATED COUNT: 17.6 % (ref 10–15)
GFR SERPL CREATININE-BSD FRML MDRD: >90 ML/MIN/1.7M2
GLUCOSE SERPL-MCNC: 124 MG/DL (ref 70–99)
HCT VFR BLD AUTO: 36.1 % (ref 40–53)
HGB BLD-MCNC: 9.9 G/DL (ref 13.3–17.7)
MAGNESIUM SERPL-MCNC: 2.3 MG/DL (ref 1.6–2.3)
MCH RBC QN AUTO: 18.9 PG (ref 26.5–33)
MCHC RBC AUTO-ENTMCNC: 27.4 G/DL (ref 31.5–36.5)
MCV RBC AUTO: 69 FL (ref 78–100)
PHOSPHATE SERPL-MCNC: 2.3 MG/DL (ref 2.5–4.5)
PLATELET # BLD AUTO: 463 10E9/L (ref 150–450)
POTASSIUM SERPL-SCNC: 4.4 MMOL/L (ref 3.4–5.3)
PROT SERPL-MCNC: 6.4 G/DL (ref 6.8–8.8)
RBC # BLD AUTO: 5.24 10E12/L (ref 4.4–5.9)
SODIUM SERPL-SCNC: 143 MMOL/L (ref 133–144)
WBC # BLD AUTO: 9.4 10E9/L (ref 4–11)

## 2018-05-31 PROCEDURE — 25000132 ZZH RX MED GY IP 250 OP 250 PS 637: Performed by: PHYSICIAN ASSISTANT

## 2018-05-31 PROCEDURE — 12000022 ZZH R&B SNF

## 2018-05-31 PROCEDURE — 83735 ASSAY OF MAGNESIUM: CPT | Performed by: PHYSICIAN ASSISTANT

## 2018-05-31 PROCEDURE — 85027 COMPLETE CBC AUTOMATED: CPT | Performed by: PHYSICIAN ASSISTANT

## 2018-05-31 PROCEDURE — 80053 COMPREHEN METABOLIC PANEL: CPT | Performed by: PHYSICIAN ASSISTANT

## 2018-05-31 PROCEDURE — 25000132 ZZH RX MED GY IP 250 OP 250 PS 637: Performed by: INTERNAL MEDICINE

## 2018-05-31 PROCEDURE — 84100 ASSAY OF PHOSPHORUS: CPT | Performed by: PHYSICIAN ASSISTANT

## 2018-05-31 PROCEDURE — 27210437 ZZH NUTRITION PRODUCT SEMIELEM INTERMED LITER

## 2018-05-31 PROCEDURE — 36415 COLL VENOUS BLD VENIPUNCTURE: CPT | Performed by: PHYSICIAN ASSISTANT

## 2018-05-31 RX ADMIN — POTASSIUM & SODIUM PHOSPHATES POWDER PACK 280-160-250 MG 2 PACKET: 280-160-250 PACK at 13:42

## 2018-05-31 RX ADMIN — POTASSIUM & SODIUM PHOSPHATES POWDER PACK 280-160-250 MG 2 PACKET: 280-160-250 PACK at 19:53

## 2018-05-31 RX ADMIN — POTASSIUM & SODIUM PHOSPHATES POWDER PACK 280-160-250 MG 2 PACKET: 280-160-250 PACK at 08:00

## 2018-05-31 RX ADMIN — Medication 2 PACKET: at 07:56

## 2018-05-31 RX ADMIN — LOPERAMIDE HYDROCHLORIDE 4 MG: 1 SOLUTION ORAL at 12:06

## 2018-05-31 RX ADMIN — LOPERAMIDE HYDROCHLORIDE 4 MG: 1 SOLUTION ORAL at 17:25

## 2018-05-31 RX ADMIN — AMOXICILLIN AND CLAVULANATE POTASSIUM 875 MG: 400; 57 POWDER, FOR SUSPENSION ORAL at 08:00

## 2018-05-31 RX ADMIN — Medication 2 PACKET: at 19:54

## 2018-05-31 RX ADMIN — PANTOPRAZOLE SODIUM 40 MG: 40 TABLET, DELAYED RELEASE ORAL at 08:00

## 2018-05-31 RX ADMIN — LOPERAMIDE HYDROCHLORIDE 4 MG: 1 SOLUTION ORAL at 08:00

## 2018-05-31 RX ADMIN — FLUCONAZOLE 200 MG: 40 POWDER, FOR SUSPENSION ORAL at 07:57

## 2018-05-31 RX ADMIN — Medication 2 PACKET: at 12:07

## 2018-05-31 RX ADMIN — PANTOPRAZOLE SODIUM 40 MG: 40 TABLET, DELAYED RELEASE ORAL at 20:00

## 2018-05-31 RX ADMIN — LOPERAMIDE HYDROCHLORIDE 4 MG: 1 SOLUTION ORAL at 20:00

## 2018-05-31 RX ADMIN — Medication 15 ML: at 07:56

## 2018-05-31 NOTE — PLAN OF CARE
Problem: Goal Outcome Summary  Goal: Goal Outcome Summary  Outcome: Improving  Alert and oriented x4. Denied any pain overnight. Reported no new issues. Cycled TF as per orders at 120 ml/h and water flushes at 180 ml q 4 hours. Patient is tolerating without complaints. Independent in the room. Continue with POC.

## 2018-05-31 NOTE — PLAN OF CARE
Problem: TCU Patient Goals  Goal: TCU Plan of Care  Outcome: No Change  VSS. Alert and orientedx4. Denies any SOB or chest pain. Not in respiratory distress. Pt verbalized having a soft/loose stool approx 3x small to medium this shift. Scheduled Loperamide given. GJT tube dressing changed. Gastric tube clamped around 0800, TF stopped around 0800. Dressing to L chest CDI. Phosphorus level today 2.3. Sticky note left to physician if okay to increase dose of Neutra-phos to TID from BID, MD ordered. Caloric count started. No new skin issue noted. Will continue to monitor pt for diet tolerance, will unclamped if abdominal discomfort, N and V present.

## 2018-05-31 NOTE — PLAN OF CARE
Problem: Goal Outcome Summary  Goal: Goal Outcome Summary  Outcome: Improving  Continues to tolerate new diet. G tube has remain clamped all shift. Pt continues to deny N/V and bloating. Up ambulating halls independently with walker. Will continue to monitor.

## 2018-05-31 NOTE — PLAN OF CARE
FOCUS/GOAL  Nutrition/Feeding/Swallowing precautions    ASSESSMENT, INTERVENTIONS AND CONTINUING PLAN FOR GOAL:  TF is running at 120 ml/hr and water flushes at 180 ml q4h, tolerating it well. Pt felt bloated at about 2145, G tube is connected and opened to gravity, with relief. Denied pain. Call light within reach. Will continue to monitor.

## 2018-05-31 NOTE — PROGRESS NOTES
Brief Medicine Note    Contacted Dr. Ybarra regarding antibiotic plan with recommendations to discontinue Fluconazole and Augmentin (orders placed). Nutrition to start calorie counts and begin weaning TF as able. Plan for discharge home early to mid next week pending discontinuation of TF.    ADDENDUM: Phosphorus low at 2.3 today. Increase Neutra-Phos to 2 packets TID.    Laury Gross PA-C  Hospitalist Service  [phone]

## 2018-06-01 PROCEDURE — 25000132 ZZH RX MED GY IP 250 OP 250 PS 637: Performed by: INTERNAL MEDICINE

## 2018-06-01 PROCEDURE — 12000022 ZZH R&B SNF

## 2018-06-01 PROCEDURE — 25000132 ZZH RX MED GY IP 250 OP 250 PS 637: Performed by: PHYSICIAN ASSISTANT

## 2018-06-01 PROCEDURE — 25000132 ZZH RX MED GY IP 250 OP 250 PS 637: Performed by: HOSPITALIST

## 2018-06-01 PROCEDURE — 27210437 ZZH NUTRITION PRODUCT SEMIELEM INTERMED LITER

## 2018-06-01 RX ORDER — CALCIUM CARBONATE 500 MG/1
1000 TABLET, CHEWABLE ORAL 2 TIMES DAILY PRN
Status: DISCONTINUED | OUTPATIENT
Start: 2018-06-01 | End: 2018-06-09 | Stop reason: HOSPADM

## 2018-06-01 RX ADMIN — Medication 2 PACKET: at 20:38

## 2018-06-01 RX ADMIN — LOPERAMIDE HYDROCHLORIDE 4 MG: 1 SOLUTION ORAL at 17:44

## 2018-06-01 RX ADMIN — Medication 2 PACKET: at 09:13

## 2018-06-01 RX ADMIN — LOPERAMIDE HYDROCHLORIDE 4 MG: 1 SOLUTION ORAL at 13:29

## 2018-06-01 RX ADMIN — LOPERAMIDE HYDROCHLORIDE 4 MG: 1 SOLUTION ORAL at 20:38

## 2018-06-01 RX ADMIN — PANTOPRAZOLE SODIUM 40 MG: 40 TABLET, DELAYED RELEASE ORAL at 09:13

## 2018-06-01 RX ADMIN — PANTOPRAZOLE SODIUM 40 MG: 40 TABLET, DELAYED RELEASE ORAL at 20:38

## 2018-06-01 RX ADMIN — Medication 2 PACKET: at 13:29

## 2018-06-01 RX ADMIN — Medication 15 ML: at 09:12

## 2018-06-01 RX ADMIN — LOPERAMIDE HYDROCHLORIDE 4 MG: 1 SOLUTION ORAL at 09:12

## 2018-06-01 RX ADMIN — POTASSIUM & SODIUM PHOSPHATES POWDER PACK 280-160-250 MG 2 PACKET: 280-160-250 PACK at 20:37

## 2018-06-01 RX ADMIN — POTASSIUM & SODIUM PHOSPHATES POWDER PACK 280-160-250 MG 2 PACKET: 280-160-250 PACK at 09:13

## 2018-06-01 RX ADMIN — CALCIUM CARBONATE (ANTACID) CHEW TAB 500 MG 1000 MG: 500 CHEW TAB at 03:40

## 2018-06-01 RX ADMIN — POTASSIUM & SODIUM PHOSPHATES POWDER PACK 280-160-250 MG 2 PACKET: 280-160-250 PACK at 13:29

## 2018-06-01 NOTE — PLAN OF CARE
Patient is alert and oriented x4. Denies SOB and chest pain during cares. GJT tube dressing changed this shift as yellow scant drainage noted. Gastric tube is clamped and patient denies need for gravity drainage. TF started at 2000 with no signs and symptoms of concerns. Dressing to L chest changed this shift; clean, dry and intact. Will continue to monitor.     Temp: 96.5  F (35.8  C) Temp src: Oral BP: 119/75   Heart Rate: 90 Resp: 18 SpO2: 96 % O2 Device: None (Room air)

## 2018-06-01 NOTE — PROGRESS NOTES
Calorie Counts    5/30: 532 kcal and 17 g protein (2 meals, 0 supplements)  5/31: 749 kcal and 19 g protein (2 meals, 0 supplements)    Implementations:  1. Wean TF as PO intakes are improving as follows:  Impact Peptide 1.5 via J-tube @ 95 mL/hr x 10 hrs (10pm-8am) providing 950 mL, 1425 kcal (19 kcal/kg), 89 g protein (1.2 g/kg), 133 g CHO, 0 g fiber, and 732 mL free water per DW of 74 kg.  - Continue Water Flushes: 180 mL q 4 hr during + 60 mL before/after TF  - Continue NutriSource Fiber 2 pkts TID to provide an additional 18 g fiber.  2. Boost Breeze BID (AM and PM snacks)  3. Continue calorie counts       Angela Pelletier RD  Unit Pager: [phone]

## 2018-06-01 NOTE — PLAN OF CARE
Problem: Goal Outcome Summary  Goal: Goal Outcome Summary  Outcome: No Change  Pt.c/o reflux - opened GT to gravity drainage with some relief. Pt.requested Maalox but order was d/c'd. Paged H.O.and rec'd order for TUMS, given @ 2890. Cycled TF infusing per order via JT. Denies pain, CP and SOB. Up indep.to BR.

## 2018-06-01 NOTE — PLAN OF CARE
Problem: Goal Outcome Summary  Goal: Goal Outcome Summary  Outcome: No Change  Pt alert, oriented, able to make needs known. VSS. Denied SOB, denied pain. Denied gastric discomfort; writer clamped g-tube at start of shift. Pt tolerated tube feeding; changed j-tube dsg. Pt ambulates independently in room and hallway. Con't w/ POC.

## 2018-06-02 ENCOUNTER — APPOINTMENT (OUTPATIENT)
Dept: LAB | Facility: CLINIC | Age: 58
End: 2018-06-02
Attending: INTERNAL MEDICINE
Payer: COMMERCIAL

## 2018-06-02 LAB — PHOSPHATE SERPL-MCNC: 2.3 MG/DL (ref 2.5–4.5)

## 2018-06-02 PROCEDURE — 27210437 ZZH NUTRITION PRODUCT SEMIELEM INTERMED LITER

## 2018-06-02 PROCEDURE — 25000125 ZZHC RX 250: Performed by: PHYSICIAN ASSISTANT

## 2018-06-02 PROCEDURE — 99207 ZZC CDG-MDM COMPONENT: MEETS LOW - DOWN CODED: CPT | Performed by: PHYSICIAN ASSISTANT

## 2018-06-02 PROCEDURE — 12000022 ZZH R&B SNF

## 2018-06-02 PROCEDURE — 36415 COLL VENOUS BLD VENIPUNCTURE: CPT | Performed by: PHYSICIAN ASSISTANT

## 2018-06-02 PROCEDURE — 25000132 ZZH RX MED GY IP 250 OP 250 PS 637: Performed by: INTERNAL MEDICINE

## 2018-06-02 PROCEDURE — 25000132 ZZH RX MED GY IP 250 OP 250 PS 637: Performed by: PHYSICIAN ASSISTANT

## 2018-06-02 PROCEDURE — 99309 SBSQ NF CARE MODERATE MDM 30: CPT | Performed by: PHYSICIAN ASSISTANT

## 2018-06-02 PROCEDURE — 84100 ASSAY OF PHOSPHORUS: CPT | Performed by: PHYSICIAN ASSISTANT

## 2018-06-02 RX ORDER — LOPERAMIDE HYDROCHLORIDE 1 MG/5ML
4 SOLUTION ORAL 4 TIMES DAILY PRN
Status: DISCONTINUED | OUTPATIENT
Start: 2018-06-02 | End: 2018-06-05 | Stop reason: CLARIF

## 2018-06-02 RX ORDER — MUPIROCIN CALCIUM 20 MG/G
CREAM TOPICAL 3 TIMES DAILY
Status: DISCONTINUED | OUTPATIENT
Start: 2018-06-02 | End: 2018-06-06

## 2018-06-02 RX ADMIN — LOPERAMIDE HYDROCHLORIDE 4 MG: 1 SOLUTION ORAL at 07:45

## 2018-06-02 RX ADMIN — MUPIROCIN CALCIUM: 20 CREAM TOPICAL at 22:26

## 2018-06-02 RX ADMIN — MUPIROCIN CALCIUM: 20 CREAM TOPICAL at 14:23

## 2018-06-02 RX ADMIN — POTASSIUM & SODIUM PHOSPHATES POWDER PACK 280-160-250 MG 2 PACKET: 280-160-250 PACK at 22:26

## 2018-06-02 RX ADMIN — POTASSIUM & SODIUM PHOSPHATES POWDER PACK 280-160-250 MG 2 PACKET: 280-160-250 PACK at 18:06

## 2018-06-02 RX ADMIN — POTASSIUM & SODIUM PHOSPHATES POWDER PACK 280-160-250 MG 2 PACKET: 280-160-250 PACK at 07:45

## 2018-06-02 RX ADMIN — PANTOPRAZOLE SODIUM 40 MG: 40 TABLET, DELAYED RELEASE ORAL at 07:45

## 2018-06-02 RX ADMIN — Medication 2 PACKET: at 07:45

## 2018-06-02 RX ADMIN — Medication 15 ML: at 07:45

## 2018-06-02 RX ADMIN — Medication 2 PACKET: at 22:27

## 2018-06-02 RX ADMIN — Medication 2 PACKET: at 14:17

## 2018-06-02 RX ADMIN — PANTOPRAZOLE SODIUM 40 MG: 40 TABLET, DELAYED RELEASE ORAL at 22:26

## 2018-06-02 RX ADMIN — POTASSIUM & SODIUM PHOSPHATES POWDER PACK 280-160-250 MG 2 PACKET: 280-160-250 PACK at 14:20

## 2018-06-02 NOTE — PROGRESS NOTES
Transitional Care Unit  Progress Note          Assessment & Plan:   Michael Saldivar is a 57 year old male with a history of GERD who was admitted to [facility] on 3/18/18 for Boerhaave's esophageal perforation attributed to forceful vomiting from viral illness s/p left thoracotomy with repair of esophageal perforation. Post op course c/b esophageal leak into the L pleural cavity requiring pharyngostomy tube placement and gastropexy on 3/28. Transferred to  TCU 4/6/18 for nutritional support and drain cares.      Boerhaave Esophageal Perforation - S/p left thoracotomy, chest tube placement, PEG placement, and repair of esophogeal perforation on 3/18/18 by Dr. Ybarra. Post op course c/b persistent leak from esophagus to left pleural cavity s/p pharyngostomy tube placement (removed 4/20). IR added jejunal limb to G tube on 3/30. Treated with Zosyn 3/18-4/2, Augmentin 4/2-5/31, Fluconazole 3/18-5/31. Underwent EGD with stent placement 4/20, stent exchange 5/11, and stent removal 5/23. Chest tube removed 5/29.   - Mechanical soft x 1 week (through 6/4), then advance to regular diet as tolerated  - Continue TFs via J tube. Wean per Nutrition (last reduced 6/1).  - G tube to remain capped at all times  - Continue PPI BID   - Tylenol PRN for pain  - BMP, Mg, and Phos q M/Th while advancing diet and weaning TF  - Follow up with Dr. Ybarra on 6/12    Mild Erythema of G tube Site - Developed 6/2 involving left lateral aspect of GJ tube. Area is non-tender without fluctuance or drainage. Afebrile w/o leukocytosis.   - Start Bactroban ointment TID x 10 days.  - RN staff to outline area with marker and notify provider if erythema extends beyond demarcated borders      Chronic Loose Stools - Likely 2/2 TFs and antibiotics. C.Diff PCR negative x 2, most recently 4/16. Improving with discontinuation of abx and TF weaning.  - Continue Fiber 2 packets TID  - Change Imodium from scheduled to PRN   - Discontinue  Lomotil    Hypophosphatemia - Likely nutritional. On PO Phosphorus replacement as patient refers to avoid IV replacement if possible. Phos 2.3 today.  - Increase Neutraphos to 2 packets QID.   - Monitor Phos q M/Th.       Normocytic Anemia - Hgb 11.7 on hospital admission, unknown basline Hgb. Hgb stable ~9-10 currently. No s/s of bleeding. Trend CBC q Monday.    Thrombocytosis - Likely reactive. Platelets stable ~450-500, last 463 on 5/31. Trend CBC q Monday.    Hx of GERD - Asymptomatic. Continue Protonix BID.       Resolved TCU Issues  Abnormal LFTs - Intermittent from 3/26-5/10. RUQ US (4/26) showed mildly echogenic liver with coarsened echo texture c/w steatosis. Possibly 2/2 Fluconazole. LFTs wnl since 5/14. Monitor LFTs q Monday.  Mild Leukocytosis - Intermittent since hospital admission. Likely due to inflammation. WBC wnl since 5/28. Monitor CBC q Monday.    Diet and/or tube feedings: Mechanical Soft, TF via J tube  Lines, tubes, drains: GJ tube  DVT/GI prophylaxis: Mechanical/Ambulation, PPI  Indications for psychotropic medications: No diagnosis  Pneumococcal Vaccination Status: Not due until >65  Code status discussed on admission: Full Code    Laury Gross PA-C  Hospitalist Service  [phone]         Consults:   Nutrition         Discharge Planning:   Home once TF weaned off - likely sometime next week.        Interval History:   Doing well today. Tolerating mechanical soft diet. Mild discomfort at prior chest tube site. Stools are becoming less frequent and more formed. RN staff noted mild erythema at G tube site today; no pain or fluctuance surrounding GJ tube. Denies fevers, chills, headaches, dizziness, URI symptoms, chest pain, SOB, n/v, abdominal pain, constipation, dysuria, or peripheral edema.         Physical Exam:   Blood pressure 119/64, pulse 82, temperature 96.8  F (36  C), temperature source Oral, resp. rate 18, weight 77.6 kg (171 lb 1.6 oz), SpO2 98 %.  GENERAL: Awake. Sitting up in  bed. Appears comfortable and non-toxic. NAD.  HEENT: NC/AT. Anicteric sclera. Mucous membranes moist.  CV: RRR. S1,S2. No appreciable murmurs.   RESPIRATORY: Normal respiratory effort on RA. Lungs CTAB without wheezes, rales, or rhonchi.   GI: Soft, non-tender, and non-distended with bowel sounds present. GJ tube c/d/i with mild erythema along the left lateral aspect; no drainage or fluctuance.  EXTREMITIES: No peripheral edema. Warm & well perfused.  NEUROLOGIC: Alert and orientated x 3. Moves all extremities. No focal deficits.   SKIN: No jaundice, rashes, or lesions on exposed areas of skin. Prior chest tube site with dressing in place which is c/d/i.

## 2018-06-02 NOTE — PLAN OF CARE
Problem: Goal Outcome Summary  Goal: Goal Outcome Summary  Outcome: No Change  Pt.sleeping well tonight. Denies pain, discomfort, CP and SOB. Cycled TF infusing per order via JT and GT is kept clamped tonight with no c/o nausea/bloating or abd.discomfort. Mechanical/dental soft diet - continue calorie counts, weaning TF.

## 2018-06-02 NOTE — PLAN OF CARE
Problem: Goal Outcome Summary  Goal: Goal Outcome Summary  Outcome: No Change  Pt alert, oriented, able to make needs known. VSS. Denied SOB, denied pain. Phos level 2.3; dr notified via sticky note. GJ-tube site care provided; noted increase in erythema radiating from site; increased drainage present; pt denied pain at site; dr notified. Area of redness outlined per dr's order. Bacitracin ordered for topical administration on gj-tube site. G-tube clamped; pt denied nausea/abdominal discomfort. Good appetite at breakfast and lunch. Pt ambulates independently in room and hallway. Con't w/ POC.

## 2018-06-02 NOTE — PLAN OF CARE
Problem: Goal Outcome Summary  Goal: Goal Outcome Summary  Outcome: No Change  Alert and oriented x 4. Denied having pain and SOB. Refused to eat dinner and TF is running at 95ml/hr. Changed old chest tube site dressing scant brown drainage noted. Done GJ tube care.

## 2018-06-03 PROCEDURE — 27210437 ZZH NUTRITION PRODUCT SEMIELEM INTERMED LITER

## 2018-06-03 PROCEDURE — 12000022 ZZH R&B SNF

## 2018-06-03 PROCEDURE — 25000132 ZZH RX MED GY IP 250 OP 250 PS 637: Performed by: PHYSICIAN ASSISTANT

## 2018-06-03 RX ADMIN — Medication 2 PACKET: at 13:48

## 2018-06-03 RX ADMIN — MUPIROCIN CALCIUM: 20 CREAM TOPICAL at 13:48

## 2018-06-03 RX ADMIN — POTASSIUM & SODIUM PHOSPHATES POWDER PACK 280-160-250 MG 2 PACKET: 280-160-250 PACK at 21:49

## 2018-06-03 RX ADMIN — MUPIROCIN CALCIUM: 20 CREAM TOPICAL at 21:49

## 2018-06-03 RX ADMIN — PANTOPRAZOLE SODIUM 40 MG: 40 TABLET, DELAYED RELEASE ORAL at 08:33

## 2018-06-03 RX ADMIN — Medication 2 PACKET: at 21:49

## 2018-06-03 RX ADMIN — PANTOPRAZOLE SODIUM 40 MG: 40 TABLET, DELAYED RELEASE ORAL at 21:50

## 2018-06-03 RX ADMIN — MUPIROCIN CALCIUM: 20 CREAM TOPICAL at 08:33

## 2018-06-03 RX ADMIN — Medication 15 ML: at 08:33

## 2018-06-03 RX ADMIN — LOPERAMIDE HYDROCHLORIDE 4 MG: 1 SOLUTION ORAL at 08:32

## 2018-06-03 RX ADMIN — POTASSIUM & SODIUM PHOSPHATES POWDER PACK 280-160-250 MG 2 PACKET: 280-160-250 PACK at 18:09

## 2018-06-03 RX ADMIN — POTASSIUM & SODIUM PHOSPHATES POWDER PACK 280-160-250 MG 2 PACKET: 280-160-250 PACK at 08:33

## 2018-06-03 RX ADMIN — POTASSIUM & SODIUM PHOSPHATES POWDER PACK 280-160-250 MG 2 PACKET: 280-160-250 PACK at 13:48

## 2018-06-03 RX ADMIN — Medication 2 PACKET: at 08:33

## 2018-06-03 NOTE — PLAN OF CARE
Problem: Goal Outcome Summary  Goal: Goal Outcome Summary  Outcome: No Change  Pt.A&Ox4. Up ad nidhi indep. Denies pain, discomfort, CP and SOB. Cycled TF infusing per order via JT - GT has remained clamped with no c/o's nausea / bloating or abd.discomfort. Continue calorie counts - weaning TF.

## 2018-06-03 NOTE — PLAN OF CARE
Problem: Goal Outcome Summary  Goal: Goal Outcome Summary  Outcome: Improving  Pt alert, oriented, quiet, able to make needs known. VSS. Denied SOB, denied pain. Changed g-tube site dsg and applied antibiotic 2 x this shift; redness declined from yesterday. Pt continent of B/B; pt reported two loose stools this shift and requested Imodium w/ morning medications. Good appetite at breakfast and lunch. Pt ambulates independently in room and hallway. Con't w/ POC.

## 2018-06-03 NOTE — PLAN OF CARE
Problem: Goal Outcome Summary  Goal: Goal Outcome Summary  Outcome: No Change  Alert and oriented x 4. Denied having pain and discomfort. Good appetite and fluid intake, had BM x 2. Pt ambulated on hallway x 3. Changed old chest tube site dressing no drainage noted. Changed G J tube site dressing small light greenish drainage noted. redness on upper L side next to G tube ,cleaned and applied cream. TF is running at 95ml/hr with 180cc water Q4hrs.

## 2018-06-04 ENCOUNTER — APPOINTMENT (OUTPATIENT)
Dept: LAB | Facility: CLINIC | Age: 58
End: 2018-06-04
Attending: INTERNAL MEDICINE
Payer: COMMERCIAL

## 2018-06-04 LAB
ALBUMIN SERPL-MCNC: 2.9 G/DL (ref 3.4–5)
ALP SERPL-CCNC: 102 U/L (ref 40–150)
ALT SERPL W P-5'-P-CCNC: 30 U/L (ref 0–70)
ANION GAP SERPL CALCULATED.3IONS-SCNC: 3 MMOL/L (ref 3–14)
AST SERPL W P-5'-P-CCNC: 12 U/L (ref 0–45)
BILIRUB SERPL-MCNC: 0.2 MG/DL (ref 0.2–1.3)
BUN SERPL-MCNC: 20 MG/DL (ref 7–30)
CALCIUM SERPL-MCNC: 8.7 MG/DL (ref 8.5–10.1)
CHLORIDE SERPL-SCNC: 108 MMOL/L (ref 94–109)
CO2 SERPL-SCNC: 32 MMOL/L (ref 20–32)
CREAT SERPL-MCNC: 0.62 MG/DL (ref 0.66–1.25)
ERYTHROCYTE [DISTWIDTH] IN BLOOD BY AUTOMATED COUNT: 17.8 % (ref 10–15)
GFR SERPL CREATININE-BSD FRML MDRD: >90 ML/MIN/1.7M2
GLUCOSE SERPL-MCNC: 118 MG/DL (ref 70–99)
HCT VFR BLD AUTO: 36.1 % (ref 40–53)
HGB BLD-MCNC: 9.9 G/DL (ref 13.3–17.7)
MAGNESIUM SERPL-MCNC: 2.2 MG/DL (ref 1.6–2.3)
MCH RBC QN AUTO: 18.9 PG (ref 26.5–33)
MCHC RBC AUTO-ENTMCNC: 27.4 G/DL (ref 31.5–36.5)
MCV RBC AUTO: 69 FL (ref 78–100)
PHOSPHATE SERPL-MCNC: 2.7 MG/DL (ref 2.5–4.5)
PLATELET # BLD AUTO: 394 10E9/L (ref 150–450)
POTASSIUM SERPL-SCNC: 4.1 MMOL/L (ref 3.4–5.3)
PROT SERPL-MCNC: 6.4 G/DL (ref 6.8–8.8)
RBC # BLD AUTO: 5.25 10E12/L (ref 4.4–5.9)
SODIUM SERPL-SCNC: 143 MMOL/L (ref 133–144)
WBC # BLD AUTO: 7.7 10E9/L (ref 4–11)

## 2018-06-04 PROCEDURE — 25000132 ZZH RX MED GY IP 250 OP 250 PS 637: Performed by: PHYSICIAN ASSISTANT

## 2018-06-04 PROCEDURE — 83735 ASSAY OF MAGNESIUM: CPT | Performed by: PHYSICIAN ASSISTANT

## 2018-06-04 PROCEDURE — 85027 COMPLETE CBC AUTOMATED: CPT | Performed by: PHYSICIAN ASSISTANT

## 2018-06-04 PROCEDURE — 36415 COLL VENOUS BLD VENIPUNCTURE: CPT | Performed by: PHYSICIAN ASSISTANT

## 2018-06-04 PROCEDURE — 80053 COMPREHEN METABOLIC PANEL: CPT | Performed by: PHYSICIAN ASSISTANT

## 2018-06-04 PROCEDURE — 84100 ASSAY OF PHOSPHORUS: CPT | Performed by: PHYSICIAN ASSISTANT

## 2018-06-04 PROCEDURE — 12000022 ZZH R&B SNF

## 2018-06-04 RX ADMIN — POTASSIUM & SODIUM PHOSPHATES POWDER PACK 280-160-250 MG 2 PACKET: 280-160-250 PACK at 17:13

## 2018-06-04 RX ADMIN — Medication 2 PACKET: at 09:34

## 2018-06-04 RX ADMIN — PANTOPRAZOLE SODIUM 40 MG: 40 TABLET, DELAYED RELEASE ORAL at 20:33

## 2018-06-04 RX ADMIN — MUPIROCIN CALCIUM: 20 CREAM TOPICAL at 13:48

## 2018-06-04 RX ADMIN — POTASSIUM & SODIUM PHOSPHATES POWDER PACK 280-160-250 MG 2 PACKET: 280-160-250 PACK at 20:34

## 2018-06-04 RX ADMIN — Medication 15 ML: at 09:33

## 2018-06-04 RX ADMIN — POTASSIUM & SODIUM PHOSPHATES POWDER PACK 280-160-250 MG 2 PACKET: 280-160-250 PACK at 13:47

## 2018-06-04 RX ADMIN — MUPIROCIN CALCIUM: 20 CREAM TOPICAL at 09:33

## 2018-06-04 RX ADMIN — PANTOPRAZOLE SODIUM 40 MG: 40 TABLET, DELAYED RELEASE ORAL at 09:33

## 2018-06-04 RX ADMIN — POTASSIUM & SODIUM PHOSPHATES POWDER PACK 280-160-250 MG 2 PACKET: 280-160-250 PACK at 09:33

## 2018-06-04 RX ADMIN — MUPIROCIN CALCIUM: 20 CREAM TOPICAL at 19:31

## 2018-06-04 NOTE — PROGRESS NOTES
Met with patient to update the DC planning discussion. He is stopping tube feedings today, will see if his oral intake is sufficient in the next couple of days. Awaiting plan for GJ tube. If patient remains medically stable, potential DC this week. No home care needs anticipated if he does not need tube feeding. He will need to learn to care for dormant GJ tube if he is to go home with it.

## 2018-06-04 NOTE — PLAN OF CARE
Problem: Goal Outcome Summary  Goal: Goal Outcome Summary  Outcome: No Change  No new issues or concerns. Appears to be sleeping well and has no c/o's or requests as of yet tonight. Cycled TF infusing per order via JT. Mechanical / dental soft diet - calorie counts, weaning TF. Pt.has kept GT clamped without c/o's abd.pain, nausea/bloating. Up ad nidhi indep.

## 2018-06-04 NOTE — PLAN OF CARE
Problem: Goal Outcome Summary  Goal: Goal Outcome Summary  Outcome: No Change  Alert and oriented x 4. Denied having pain and discomfort. No SOB or respiratory distress noted. Good appetite and fluid intake. Ambulated on hallway x 3. Changed old chest tube site dressing no drainage noted. Changed G tube site dressing small yellowish drainage noted. Redness noted around G tube site, it looks better than 6/2.TF is running at 95ml/hr with 180 flush water Q4hrs.

## 2018-06-04 NOTE — PROGRESS NOTES
Brief Medicine Note    Chart reviewed.  No overnight events.  VSS.  Platelet count has normalized, phosphorus level WNL today.   - Continue current phos repletion as ordered.   - Rest of plan per comprehensive progress note by FRANCIS Salinas on 6/2    Bhakti Seaman, [facility]  Hospitalist Service  Pager [phone]

## 2018-06-04 NOTE — PROGRESS NOTES
Calorie Counts    6/1: 1345 kcal and 40 g protein (2 meals, 2 supplements)  6/2: 1955 kcal and 50 g protein (3 meals, 2 supplements)  6/3: 1861 kcal and 50 g protein (3 meals, 2 supplements)    3 day average PO intakes = 1720 kcal (93%) and 47 g protein (52%)      Angela Pelletier RD  Unit Pager: [phone]

## 2018-06-04 NOTE — PROGRESS NOTES
CLINICAL NUTRITION SERVICES - REASSESSMENT NOTE     Nutrition Prescription    RECOMMENDATIONS FOR MDs/PROVIDERS TO ORDER:  None today    Malnutrition Status:    Patient does not meet two of the above criteria necessary for diagnosing malnutrition     Recommendations already ordered by Registered Dietitian (RD):  D/C TF, NutriSource Fiber, and water flushes d/t adequate calorie intakes    Future/Additional Recommendations:  None today     EVALUATION OF THE PROGRESS TOWARD GOALS   Diet: Mechanical/Dental Soft  Nutrition Support:   Impact Peptide 1.5 via J-tube @ 95 mL/hr x 10 hrs (10pm-8am) providing 950 mL, 1425 kcal (19 kcal/kg), 89 g protein (1.2 g/kg), 133 g CHO, 0 g fiber, and 732 mL free water per DW of 74 kg.  - Continue Water Flushes: 180 mL q 4 hr during + 60 mL before/after TF  - Continue NutriSource Fiber 2 pkts TID to provide an additional 18 g fiber.    Intake: 3 day average PO intakes = 1720 kcal (93%) and 47 g protein (52%)  Pt is only ordering eggs, banana bread, buleberry muffin, jello, mashed potatoes, juice and Boost Breeze. Minimal protein sources and pt is not ordering and meats.     NEW FINDINGS   - Pt has gained 9 lbs (5%) over the last week.  - Pt reports eating well and denies difficulty chewing/swallowing.     MALNUTRITION  % Intake: Decreased intake does not meet criteria  % Weight Loss: None noted  Subcutaneous Fat Loss: None observed  Muscle Loss: None observed  Fluid Accumulation/Edema: None noted  Malnutrition Diagnosis: Patient does not meet two of the above criteria necessary for diagnosing malnutrition    Previous Goals   Total avg nutritional intake to meet a minimum of 25 kcal/kg and 1.2 g PRO/kg daily (per dosing wt 74 kg).  Evaluation: Met    Previous Nutrition Diagnosis  Predicted protein-energy intake related to reliance on EN as evidenced by mechanical soft diet with continued reliance on TF to meet nutritional needs     Evaluation: Improving    CURRENT NUTRITION  DIAGNOSIS  Predicted protein intake related to avoidence of high protein foods as evidenced by pt meeting 52% of protein needs via PO intakes (TF providing 100% of protein needs)        INTERVENTIONS  Implementation  - Enteral Nutrition - stop d/t adequate caloric intake - discussed with pt and he was agreeable to D/C TF.   - Discussed kcal/protein intake and discussed good sources of protein. Pt reports he was unaware he could eat meat on this diet, despite pointing to Mechanical Soft menu (provided at earlier visit) where it said ground meats. Pt is willing to try ground meats in order to increase his protein, as he is eating near his goal calorically.     Goals  Total avg nutritional intake to meet a minimum of 25 kcal/kg and 1.2 g PRO/kg daily (per dosing wt 74 kg).    Monitoring/Evaluation  Progress toward goals will be monitored and evaluated per protocol.      Angela Pelletier RD, LD  Unit Pager: [phone]

## 2018-06-04 NOTE — PLAN OF CARE
Problem: Goal Outcome Summary  Goal: Goal Outcome Summary  Outcome: No Change  Pt consumed 100% of breakfast and lunch. Adequate fluid intake noted. Pt had an episode of diarrhea once. Per pt's report, he had a watery stool than consistently ran for about 4 minutes. No other episode of diarrhea at this time. Additional fluid intake promoted. GT site remains reddened with greenish drainage, site cleansed and new dressing was applied with Bactroban ointment. Cycled tube feeding was stopped today as pt continues to have good oral intake. Old chest tube site is healed and scabbed, dressing was removed, No sting barrier film was applied. Site is currently HARJIT, no dressing is required at this time. No complaints of nausea or abdominal pain. Remains independent with ambulation and ADL's.

## 2018-06-05 PROCEDURE — 25000132 ZZH RX MED GY IP 250 OP 250 PS 637: Performed by: PHYSICIAN ASSISTANT

## 2018-06-05 PROCEDURE — 12000022 ZZH R&B SNF

## 2018-06-05 PROCEDURE — 25000132 ZZH RX MED GY IP 250 OP 250 PS 637

## 2018-06-05 RX ORDER — LOPERAMIDE HCL 2 MG
4 CAPSULE ORAL 4 TIMES DAILY PRN
Status: DISCONTINUED | OUTPATIENT
Start: 2018-06-05 | End: 2018-06-09 | Stop reason: HOSPADM

## 2018-06-05 RX ORDER — MULTIVITAMIN,THERAPEUTIC
1 TABLET ORAL DAILY
Status: DISCONTINUED | OUTPATIENT
Start: 2018-06-06 | End: 2018-06-09 | Stop reason: HOSPADM

## 2018-06-05 RX ORDER — PANTOPRAZOLE SODIUM 40 MG/1
40 TABLET, DELAYED RELEASE ORAL
Status: DISCONTINUED | OUTPATIENT
Start: 2018-06-05 | End: 2018-06-09 | Stop reason: HOSPADM

## 2018-06-05 RX ORDER — ACETAMINOPHEN 500 MG
500 TABLET ORAL EVERY 6 HOURS PRN
Status: DISCONTINUED | OUTPATIENT
Start: 2018-06-05 | End: 2018-06-09 | Stop reason: HOSPADM

## 2018-06-05 RX ADMIN — PANTOPRAZOLE SODIUM 40 MG: 40 TABLET, DELAYED RELEASE ORAL at 16:22

## 2018-06-05 RX ADMIN — POTASSIUM & SODIUM PHOSPHATES POWDER PACK 280-160-250 MG 2 PACKET: 280-160-250 PACK at 20:33

## 2018-06-05 RX ADMIN — POTASSIUM & SODIUM PHOSPHATES POWDER PACK 280-160-250 MG 2 PACKET: 280-160-250 PACK at 08:02

## 2018-06-05 RX ADMIN — POTASSIUM & SODIUM PHOSPHATES POWDER PACK 280-160-250 MG 2 PACKET: 280-160-250 PACK at 16:22

## 2018-06-05 RX ADMIN — MUPIROCIN CALCIUM: 20 CREAM TOPICAL at 08:03

## 2018-06-05 RX ADMIN — PANTOPRAZOLE SODIUM 40 MG: 40 TABLET, DELAYED RELEASE ORAL at 08:03

## 2018-06-05 RX ADMIN — Medication 15 ML: at 08:03

## 2018-06-05 RX ADMIN — MUPIROCIN CALCIUM: 20 CREAM TOPICAL at 14:00

## 2018-06-05 RX ADMIN — MUPIROCIN CALCIUM: 20 CREAM TOPICAL at 20:33

## 2018-06-05 RX ADMIN — POTASSIUM & SODIUM PHOSPHATES POWDER PACK 280-160-250 MG 2 PACKET: 280-160-250 PACK at 13:59

## 2018-06-05 NOTE — PLAN OF CARE
Problem: Goal Outcome Summary  Goal: Goal Outcome Summary  Outcome: Improving  Pt alert, oriented, quiet, able to make needs known. VSS. Denied SOB, denied pain. Changed dsg on g-tube site; comparable redness to 6/3 w/ mild yellow drainage. Chest tube drainage site scabbing over; applied No Sting barrier film. Pt had good appetite at supper. Ambulates independently in room and hallway. Con't w/ POC.

## 2018-06-05 NOTE — PLAN OF CARE
Problem: Goal Outcome Summary  Goal: Goal Outcome Summary  Outcome: Improving  Pt tolerated oral intake without any complaints. All liquid medications were given via the J-tube. Discussed with Pharmacist about changing medication to tablet form in preparation for discharge. Pt verbalized being ready to try oral medications. Dressing changed to the GT site, drainage remains greenish, Bactroban applied to the reddened areas. Independent with ambulation and ADL's. Reported 2 loose bowel movements today.

## 2018-06-05 NOTE — PLAN OF CARE
Problem: Goal Outcome Summary  Goal: Goal Outcome Summary  Outcome: Improving  Patient reported no new issues overnight. No complaints of pain. Appeared to be sleeping for majority of the night. Independent in the room. TF d/c'd yesterday. Call light in reach. Continue with POC.

## 2018-06-05 NOTE — PROGRESS NOTES
Calorie Counts    6/4: 2324 kcal and 104 g protein (3 meals, 2 supplements)      Angela Pelletier RD  Unit Pager: [phone]

## 2018-06-06 PROCEDURE — 25000132 ZZH RX MED GY IP 250 OP 250 PS 637

## 2018-06-06 PROCEDURE — 12000022 ZZH R&B SNF

## 2018-06-06 RX ORDER — MUPIROCIN CALCIUM 20 MG/G
CREAM TOPICAL 2 TIMES DAILY
Status: DISCONTINUED | OUTPATIENT
Start: 2018-06-06 | End: 2018-06-09 | Stop reason: HOSPADM

## 2018-06-06 RX ADMIN — MUPIROCIN CALCIUM: 20 CREAM TOPICAL at 08:49

## 2018-06-06 RX ADMIN — POTASSIUM & SODIUM PHOSPHATES POWDER PACK 280-160-250 MG 2 PACKET: 280-160-250 PACK at 14:00

## 2018-06-06 RX ADMIN — PANTOPRAZOLE SODIUM 40 MG: 40 TABLET, DELAYED RELEASE ORAL at 16:56

## 2018-06-06 RX ADMIN — MUPIROCIN CALCIUM: 20 CREAM TOPICAL at 20:50

## 2018-06-06 RX ADMIN — POTASSIUM & SODIUM PHOSPHATES POWDER PACK 280-160-250 MG 2 PACKET: 280-160-250 PACK at 16:56

## 2018-06-06 RX ADMIN — POTASSIUM & SODIUM PHOSPHATES POWDER PACK 280-160-250 MG 2 PACKET: 280-160-250 PACK at 20:50

## 2018-06-06 RX ADMIN — POTASSIUM & SODIUM PHOSPHATES POWDER PACK 280-160-250 MG 2 PACKET: 280-160-250 PACK at 08:49

## 2018-06-06 RX ADMIN — PANTOPRAZOLE SODIUM 40 MG: 40 TABLET, DELAYED RELEASE ORAL at 08:52

## 2018-06-06 RX ADMIN — THERA TABS 1 TABLET: TAB at 08:49

## 2018-06-06 NOTE — PROGRESS NOTES
Brief Medicine Note  June 6, 2018    Met with patient to re-evaluate G tube site redness. Pt reports redness improving. No fevers or chills. No new pain to area. On exam there is only mild erythema surrounding GT insertion site, no fluctuance or induration, no drainage. Cont with bactroban ointment, but decrease to BID x a total of 10d.     Zunilda Lazar PA-C

## 2018-06-06 NOTE — PHARMACY-MEDICATION REGIMEN REVIEW
Pharmacy Medication Regimen Review  Michael Saldivar is a 57 year old male who is currently in the Transitional Care Unit.    Assessment: All medications have an appropriate indications, durations and no unnecessary use was found    Plan:   Continue current meds.  Attending provider will be sent moise ferrari for review.  If there are any emergent issues noted above, pharmacist will contact provider directly by phone.      Pharmacy will periodically review the resident's medication regimen for any PRN medications not administered in > 72 hours and discontinue them. The pharmacist will discuss gradual dose reductions of psychopharmacologic medications with interdisciplinary team on a regular basis.    Please contact pharmacy if the above does not answer specific medication questions/concerns.    Background:  A pharmacist has reviewed all medications and pertinent medical history today.  Medications were reviewed for appropriate use and any irregularities found are listed with recommendations.      Current Facility-Administered Medications:      acetaminophen (TYLENOL) tablet 500 mg, 500 mg, Oral, Q6H PRN, Daisy, Hatice L, RPH     calcium carbonate (TUMS) chewable tablet 1,000 mg, 1,000 mg, Oral, BID PRN, Jagdish Cornelius MD, 1,000 mg at 06/01/18 0340     lidocaine (LMX4) kit, , Topical, Q1H PRN, Zunilda Lazar PA-C     lidocaine 1 % 1 mL, 1 mL, Other, Q1H PRN, Zunilda Lazar PA-C     loperamide (IMODIUM) capsule 4 mg, 4 mg, Oral, 4x Daily PRN, Daisy, Hatice L, RPH     medication instructions, , Does not apply, Continuous PRN, Zunilda Lazar PA-C     multivitamin, therapeutic (THERA-VIT) tablet 1 tablet, 1 tablet, Oral, Daily, Daisy, Hatice L, RPH, 1 tablet at 06/06/18 0849     mupirocin (BACTROBAN) 2 % cream, , Topical, BID, Zunilda Lazar PA-C     naloxone (NARCAN) injection 0.1-0.4 mg, 0.1-0.4 mg, Intravenous, Q2 Min PRN, Zunilda Lazar PA-C     pantoprazole (PROTONIX)  EC tablet 40 mg, 40 mg, Oral, BID AC, Daisy, Hatice L, RPH, 40 mg at 06/06/18 0852     potassium & sodium phosphates (NEUTRA-PHOS) Packet 2 packet, 2 packet, Oral, 4x Daily, Daisy, Hatice L, RPH, 2 packet at 06/06/18 1400  No current outpatient prescriptions on file.   PMH:   GERD, esophageal perforation due to forceful vomiting in the setting of viral illness

## 2018-06-06 NOTE — PLAN OF CARE
Problem: Goal Outcome Summary  Goal: Goal Outcome Summary  Outcome: No Change  G-J site with scant amount of brownish drainage, new dry gauze applied after cleansing. Redness around the site is minimal. Pt is expected to learn how to perform site cares and water flushes in preparation for discharge. Needs dressing supplies before discharge. Plans in place to discharge home on Friday, pt expressed mixed feelings of anxiety and happiness. Able to take oral medications this morning without any difficulty. Appetite is good, consumes 1005 of meals, calorie count was initiated. Old chest tube site is intact and HARJIT, no dehiscence noted. Remains independent with ambulation and ADL's. No complaints of discomfort.

## 2018-06-06 NOTE — PLAN OF CARE
Problem: Goal Outcome Summary  Goal: Goal Outcome Summary  Outcome: No Change  Alert and oriented x 4. Denied having pain and discomfort. Good appetite and fluid intake. Independent in room and hallway. Ambulated on hallway x 3. Changed G tube site dressing small yellowish drainage noted. Redness around G tube site getting better than the last 2 days and cleaned and applied cream. Old chest tube site skin intact and HARJIT. No SOB or respiratory distress noted.

## 2018-06-06 NOTE — PLAN OF CARE
Problem: Goal Outcome Summary  Goal: Goal Outcome Summary  Outcome: No Change  Patient report no new issues. Appears to be sleeping in between for most of the night. No complaints of pain.  Independent in the room. Call light in reach. Continue with POC.

## 2018-06-07 ENCOUNTER — DOCUMENTATION ONLY (OUTPATIENT)
Dept: PHARMACY | Facility: CLINIC | Age: 58
End: 2018-06-07

## 2018-06-07 ENCOUNTER — APPOINTMENT (OUTPATIENT)
Dept: LAB | Facility: CLINIC | Age: 58
End: 2018-06-07
Attending: INTERNAL MEDICINE
Payer: COMMERCIAL

## 2018-06-07 LAB
ANION GAP SERPL CALCULATED.3IONS-SCNC: 8 MMOL/L (ref 3–14)
BUN SERPL-MCNC: 12 MG/DL (ref 7–30)
CALCIUM SERPL-MCNC: 8.8 MG/DL (ref 8.5–10.1)
CHLORIDE SERPL-SCNC: 113 MMOL/L (ref 94–109)
CO2 SERPL-SCNC: 27 MMOL/L (ref 20–32)
CREAT SERPL-MCNC: 0.66 MG/DL (ref 0.66–1.25)
GFR SERPL CREATININE-BSD FRML MDRD: >90 ML/MIN/1.7M2
GLUCOSE SERPL-MCNC: 110 MG/DL (ref 70–99)
MAGNESIUM SERPL-MCNC: 2 MG/DL (ref 1.6–2.3)
PHOSPHATE SERPL-MCNC: 3.2 MG/DL (ref 2.5–4.5)
POTASSIUM SERPL-SCNC: 4 MMOL/L (ref 3.4–5.3)
SODIUM SERPL-SCNC: 148 MMOL/L (ref 133–144)

## 2018-06-07 PROCEDURE — 12000022 ZZH R&B SNF

## 2018-06-07 PROCEDURE — 36415 COLL VENOUS BLD VENIPUNCTURE: CPT | Performed by: PHYSICIAN ASSISTANT

## 2018-06-07 PROCEDURE — 83735 ASSAY OF MAGNESIUM: CPT | Performed by: PHYSICIAN ASSISTANT

## 2018-06-07 PROCEDURE — 99316 NF DSCHRG MGMT 30 MIN+: CPT | Performed by: PHYSICIAN ASSISTANT

## 2018-06-07 PROCEDURE — 25000132 ZZH RX MED GY IP 250 OP 250 PS 637

## 2018-06-07 PROCEDURE — 80048 BASIC METABOLIC PNL TOTAL CA: CPT | Performed by: PHYSICIAN ASSISTANT

## 2018-06-07 PROCEDURE — 84100 ASSAY OF PHOSPHORUS: CPT | Performed by: PHYSICIAN ASSISTANT

## 2018-06-07 PROCEDURE — 25000132 ZZH RX MED GY IP 250 OP 250 PS 637: Performed by: PHYSICIAN ASSISTANT

## 2018-06-07 RX ORDER — ACETAMINOPHEN 500 MG
500 TABLET ORAL EVERY 6 HOURS PRN
COMMUNITY
Start: 2018-06-07

## 2018-06-07 RX ORDER — PANTOPRAZOLE SODIUM 40 MG/1
40 TABLET, DELAYED RELEASE ORAL
Qty: 60 TABLET | Refills: 0 | Status: SHIPPED | OUTPATIENT
Start: 2018-06-07

## 2018-06-07 RX ORDER — LOPERAMIDE HCL 2 MG
4 CAPSULE ORAL 4 TIMES DAILY PRN
Qty: 20 CAPSULE | Refills: 0 | Status: SHIPPED | OUTPATIENT
Start: 2018-06-07

## 2018-06-07 RX ORDER — MULTIVITAMIN,THERAPEUTIC
1 TABLET ORAL DAILY
Qty: 30 TABLET | Refills: 0 | Status: SHIPPED | OUTPATIENT
Start: 2018-06-07

## 2018-06-07 RX ORDER — MUPIROCIN CALCIUM 20 MG/G
CREAM TOPICAL 2 TIMES DAILY
Qty: 15 G | Refills: 0 | Status: SHIPPED | OUTPATIENT
Start: 2018-06-07 | End: 2018-06-13

## 2018-06-07 RX ADMIN — MUPIROCIN CALCIUM: 20 CREAM TOPICAL at 19:44

## 2018-06-07 RX ADMIN — POTASSIUM & SODIUM PHOSPHATES POWDER PACK 280-160-250 MG 1 PACKET: 280-160-250 PACK at 19:42

## 2018-06-07 RX ADMIN — MUPIROCIN CALCIUM: 20 CREAM TOPICAL at 10:11

## 2018-06-07 RX ADMIN — PANTOPRAZOLE SODIUM 40 MG: 40 TABLET, DELAYED RELEASE ORAL at 10:09

## 2018-06-07 RX ADMIN — POTASSIUM & SODIUM PHOSPHATES POWDER PACK 280-160-250 MG 1 PACKET: 280-160-250 PACK at 11:00

## 2018-06-07 RX ADMIN — THERA TABS 1 TABLET: TAB at 10:10

## 2018-06-07 RX ADMIN — POTASSIUM & SODIUM PHOSPHATES POWDER PACK 280-160-250 MG 1 PACKET: 280-160-250 PACK at 14:49

## 2018-06-07 RX ADMIN — PANTOPRAZOLE SODIUM 40 MG: 40 TABLET, DELAYED RELEASE ORAL at 16:52

## 2018-06-07 NOTE — PROGRESS NOTES
Calorie Counts    6/4: 2324 kcal and 104 g protein (3 meals, 2 supplements)  6/5: 2732 kcal and 102 g protein (3 meals, 2 supplements)  6/6: 2440 kcal and 106 g protein (3 meals, 2 supplements)    3 day average PO intakes = 2499 kcal (100%) and 104 g protein (100%)    Pt has shown consistent PO intakes exceeding his nutritional needs x3+ days and will D/C calorie counts. Pt does not warrant EN at this time and recommend D/C'ing with no TF.       Angela Pelletier, RD  Unit Pager: [phone]

## 2018-06-07 NOTE — PLAN OF CARE
Patient is alert and oriented X4. Denies SOB and chest pain during cares. Independent in room and hallway. Gtube is clean, dry and intact,applied Bactroban and new dry gauze to G-tube site per orders; noted scant yellowish drainage. Patient was able to take oral meds this shift with no concerns noted. Old chest tube site is clean, dry and intact. Sodium is 148 this AM, encouraged fluids and added to sticky for MD to review.    Temp: 96.9  F (36.1  C) Temp src: Oral BP: 123/68   Heart Rate: 59 Resp: 16 SpO2: 98 % O2 Device: None (Room air)

## 2018-06-07 NOTE — DISCHARGE SUMMARY
[facility]  Discharge Summary    Michael Saldivar MRN# [id number]   YOB: 1960 Age: 57 year old     Date of Admission:  5/23/2018  Date of Discharge:  6/9/2018  Admitting Physician:  Kvng Russell MD  Discharge Physician:  Dr Marquez (Contact: Zunilda Lazar PA-C)  Discharging Service:  Internal Medicine     Primary Provider: Jemal Michel          Brief History of Illness:   Michael Saldivar is a very pleasant 57 year old male with a history of GERD who was admitted to [facility] on 3/18/18 for Boerhaave's esophageal perforation attributed to forceful vomiting from viral illness s/p left thoracotomy with repair of esophageal perforation. Post op course c/b esophageal leak into the L pleural cavity requiring pharyngostomy tube placement and gastropexy on 3/28. Transferred to  TCU 4/6/18 for nutritional support and drain cares. Has remained at TCU for drain cares, chest tube cares and tube feeds. Drains and chest tube were removed and patients diet was advanced. Once he was able to tolerate po intake and meet caloric needs, his TFs were weaned, and he was discharged home with plan for close follow up with thoracic surgery. He did have hypernatremia, hyperchloremia that is likely 2/2 GI losses and poor po intake, started on FW flushes for this.          Primary care provider to do:   - Please monitor BMP, and Phos. Taper off phos replacement as patients po intake improves.   - Ensure patient is following up with thoracic surgery          Discharge Diagnosis:   Boerhaave esophageal perforation  GERD  Normocytic anemia  Chronic loose stools  Hypophosphatemia  Hypernatremia, Hyperchloremia    Resolved TCU issues  Abnormal LFTs  Mild leukocytosis               Discharge Disposition:   Discharged to home           Condition on Discharge:   Discharge condition: Stable   Code status on discharge: Full Code           Procedures:   EGD with thoracic surgery 4/30 - esophageal stent  placement  EGD with thoracic surgery 5/11 - esophageal stent exchange  EGD with thoracic surgery 5/23 - esophageal stent removal  Chest tube removal by thoracic surgery on 5/29          Discharge Medications:     Current Discharge Medication List      START taking these medications    Details   acetaminophen (TYLENOL) 500 MG tablet Take 1 tablet (500 mg) by mouth every 6 hours as needed for mild pain or fever    Associated Diagnoses: Esophageal perforation      loperamide (IMODIUM) 2 MG capsule Take 2 capsules (4 mg) by mouth 4 times daily as needed for diarrhea  Qty: 20 capsule, Refills: 0    Associated Diagnoses: Diarrhea, unspecified type      multivitamin, therapeutic (THERA-VIT) TABS tablet Take 1 tablet by mouth daily  Qty: 30 tablet, Refills: 0    Associated Diagnoses: Malnutrition, unspecified type (H)      mupirocin (BACTROBAN) 2 % cream Apply topically 2 times daily for 6 days  Qty: 15 g, Refills: 0    Associated Diagnoses: Skin irritation      pantoprazole (PROTONIX) 40 MG EC tablet Take 1 tablet (40 mg) by mouth 2 times daily (before meals)  Qty: 60 tablet, Refills: 0    Associated Diagnoses: Esophageal perforation      potassium & sodium phosphates (NEUTRA-PHOS) 280-160-250 MG Packet Take 1 packet by mouth 3 times daily  Qty: 42 each, Refills: 0    Associated Diagnoses: Hypophosphatemia         CONTINUE these medications which have NOT CHANGED    Details   alum & mag hydroxide-simethicone (MYLANTA/MAALOX) 200-200-20 MG/5ML SUSP suspension Take 30 mLs by mouth every 4 hours as needed for indigestion      !! order for DME Equipment being ordered: Other: suction and cannister for pharyngostomy tube  Treatment Diagnosis: Esophageal perforation  Qty: 7 Container, Refills: 0    Associated Diagnoses: Esophageal perforation      !! order for DME Equipment being ordered:   [facility] Suction Machine  Suction Canister-2  Suction Connect Tube-2  5 in 1 Connector-2  Bacteria Filter-2    Treatment Diagnosis: Esophogeal  Perforation  Qty: 1 Month, Refills: 0    Associated Diagnoses: Esophageal perforation       !! - Potential duplicate medications found. Please discuss with provider.      STOP taking these medications       acetaminophen (TYLENOL) 32 mg/mL solution Comments:   Reason for Stopping:         amoxicillin-clavulanate (AUGMENTIN) 400-57 MG/5ML suspension Comments:   Reason for Stopping:         diphenoxylate-atropine (LOMOTIL) 2.5-0.025 MG/5ML liquid Comments:   Reason for Stopping:         fiber modular (NUTRISOURCE FIBER) packet Comments:   Reason for Stopping:         fluconazole (DIFLUCAN) 40 MG/ML suspension Comments:   Reason for Stopping:         loperamide (IMODIUM) 1 MG/5ML liquid Comments:   Reason for Stopping:         loperamide (IMODIUM) 1 MG/5ML liquid Comments:   Reason for Stopping:         multivitamins with minerals (CERTAVITE/CEROVITE) LIQD liquid Comments:   Reason for Stopping:         ondansetron (ZOFRAN-ODT) 4 MG ODT tab Comments:   Reason for Stopping:         pantoprazole (PROTONIX) SUSP suspension Comments:   Reason for Stopping:                     Consultations:   Consultation during this admission received from thoracic surgery, PT, OT, nutrition.               Hospital Course:   Boerhaave Esophageal Perforation. S/p left thoracotomy, chest tube placement, PEG placement, and repair of esophogeal perforation on 3/18/18 by Dr. Ybarra. Post op course c/b persistent leak from esophagus to left pleural cavity s/p pharyngostomy tube placement (removed 4/20). IR added jejunal limb to G tube on 3/30. Underwent EGD with stent placement 4/20, stent exchange 5/11, and stent removal 5/23. Chest tube removed 5/29. Treated with Zosyn 3/18-4/2, Augmentin 4/2-5/31, Fluconazole 3/18-5/31. Once diet was advanced from CLD to FLD to soft mechanical diet, TFs were stopped 6/4 and patient met all caloric needs without issue. He remained afebrile without abdominal pain, nausea or emesis on day of discharge.   -  FEN: OFF tube feeds since 6/4. Tolerating mechanical soft diet - pt preferred not to advance further until follow up with thoracic surgery as scheduled on 6/12.   - Percutaneous GJ tube remained in place on discharge - thoracic surgery to determine when appropriate to remove. Continue 60cc daily water flushes to both G and J port daily for patency.   - Continue PPI BID   - Pain control: Tylenol PRN  - Follow up with Dr. Ybarra on 6/12 as scheduled     Hypernatremia, hyperchloremia. Na upto 148, Cl 113 on 6/7. Pt reports oral intake has been good, but he has not been drinking a lot of water and has ongoing loose stools as above. His TFs were stopped on 6/4 along with his free water flushed. Tried to encourage po water intake, but Na remained elevated to 148 on 6/8. Started FW flushes 250cc q4h via J tube with slight improvement in Na to 147 on 6/9. Pt remained HD stable and felt well otherwise. We recommended that he stay overnight to ensure Na continue to downtrend, but pt felt comfortable administering FW flushes on his own and was okay with plan to see his primary care provider (or another provider in clinic) on 6/11 to have repeat labs drawn and FW flushes adjusted. His FW deficit on day of discharge was 2.4L, thus it was recommended for him to do 300cc water flushes q3h from 1686-2446 daily, which equals 1800cc of water daily. Did not continues overnight and he will be unable to wake up to administer himself. He feels comfortable and in agreeance with the plan.     Hypophosphatemia. Improving -near resolved. Was likely nutrition in setting of prolonged NPO status, although was on TFs. Phos supplementation was increased to 2pack QID at one point, but once diet was advanced to mechanical soft diet, pt met caloric needs without issue. D/w nutrition on discharge, decreased phos supplementation to 1 pack TID, with plan to follow up with PCP in 1-2 weeks to monitor labs and likely d/c altogether as long as po intake  improved.     Chronic Loose Stools. Presumed 2/2 TFs and antibiotics. C.Diff PCR negative x 2, most recently 4/16. Now that off abx (since 5/31) and off TFs (since 6/4) stools are slowly improving. Cont prn imodium on discharge.        Hx of GERD. Continued on protonix BID.       Normocytic Anemia. Hgb 11.7 on hospital admission, unknown basline Hgb. Hgb remained stable ~9-10 without any s/sx of bleeding this admission. Follow up with PCP on discharge for routine monitoring and further work up once acute issues resolved.    Resolved TCU Issues  Abnormal LFTs. Intermittent from 3/26-5/10. RUQ US (4/26) showed mildly echogenic liver with coarsened echo texture c/w steatosis. Possibly 2/2 Fluconazole. Resolved as of 5/17 Fluconazole was stopped 5/31.    Mild Leukocytosis. Intermittent since hospital admission. Likely due to inflammation from esophageal perforation and subsequent procedures. WBC normalized as of 5/28. Remains afebrile without s/s of infection, even following abx being stopped.               Final Day of Progress before Discharge:       Physical Exam:  Blood pressure 123/68, pulse 85, temperature 96.9  F (36.1  C), temperature source Oral, resp. rate 16, weight 78.7 kg (173 lb 9.6 oz), SpO2 98 %.  GENERAL: Alert and oriented x 3. NAD. WDWN. Pleasant and cooperative.   HEENT: MMM  CV: RRR.   RESPIRATORY: Effort normal on RA. Lungs CTAB with no wheezing, rales, rhonchi.   GI: Abdomen soft and non distended with normoactive bowel sounds present in all quadrants. No tenderness, rebound, guarding. GJ site with scant erythema surrounding, no leakage/fluctuance/induration.   MUSCULOSKELETAL: No joint swelling or tenderness.    NEUROLOGICAL: No focal deficits. Strength 5/5 bilaterally in upper and lower extremities.   EXTREMITIES: No peripheral edema. Intact bilateral pedal pulses.   SKIN: No jaundice or rashes on exposed areas of skin        Data:  All laboratory data reviewed             Discharge Instructions  and Follow-Up:     Discharge Procedure Orders  SPEECH THERAPY REFERRAL   Referral Type: Therapeutic Services     Reason for your hospital stay   Order Comments: Michael Saldivar is a very pleasant 57 year old male with a history of GERD who was admitted to [facility] on 3/18/18 for Boerhaave's esophageal perforation attributed to forceful vomiting from viral illness s/p left thoracotomy with repair of esophageal perforation. Post op course c/b esophageal leak into the L pleural cavity requiring pharyngostomy tube placement and gastropexy on 3/28. Transferred to  TCU 4/6/18 for nutritional support and drain cares. Has remained at TCU for drain cares, chest tube cares and tube feeds. Drains and chest tube were removed and patients diet was advanced. Once he was able to tolerate po intake and meet caloric needs, his TFs were weaned, and he was discharged home with plan for close follow up with thoracic surgery.     Adult [facility] Follow-up and recommended labs and tests   Order Comments: - Follow up with thoracic surgery as scheduled for next week  - Follow up with your primary care provider in 1-2 weeks, you will need to have the following labs checked: BMP, Phos    Appointments on [place] and/or [facility] (with [facility] or [facility] provider or service). Call [phone] if you haven't heard regarding these appointments within 7 days of discharge.     Activity   Order Comments: Your activity upon discharge: activity as tolerated   Order Specific Question Answer Comments   Is discharge order? Yes      When to contact your care team   Order Comments: Please contact your primary care provider or seek medical care if you develop chest pain, shortness of breath, fever >101F, chills, abdominal pain, or if any other concerns arise.     Tubes and drains   Order Comments: You are going home with the following tubes or drains: feeding tube GJ-Tube. Wound dressing changes as outlined above. Please flush your G and J part of tube  with 60cc of water daily to keep the tube patent.     Wound care and dressings   Order Comments: Instructions to care for your wound at home: continue to use bactroban twice daily to your feeding tube site through 6/12. Gently cleanse around feeding tube site daily with mild soap and water, ensure site is dry before placing gauze around feeding tube.     Full Code     Diet   Order Comments: Follow this diet upon discharge: mechanical soft diet, encourage water intake, ensure clear between meals   Order Specific Question Answer Comments   Is discharge order? Yes               Zunilda Espinoza [facility] Hospitalist  [phone]  June 9, 2018        Time spent on patient: 40 minutes total including face to face and coordinating care time reviewing current illness, any medication changes, and the care plan for today.    Discharge plan was discussed with RN CALIXTO, GIOVANNI, patient, care team.

## 2018-06-07 NOTE — PLAN OF CARE
Problem: Goal Outcome Summary  Goal: Goal Outcome Summary  Outcome: No Change  No new issues or concerns. Appears to be sleeping well. Has no c/o's pain, discomfort, CP and SOB. Up ad nidhi indep. Continues brent.counts.

## 2018-06-07 NOTE — PLAN OF CARE
Problem: Goal Outcome Summary  Goal: Goal Outcome Summary  Outcome: No Change  Alert and oriented x 4. Denied having pain and discomfort. Independent in room and hallway. Ambulated on hallway x 2. Good appetite and fluid in take. Pt did GJ tube site care, he also flushed his J tube with 30cc water.

## 2018-06-08 ENCOUNTER — APPOINTMENT (OUTPATIENT)
Dept: LAB | Facility: CLINIC | Age: 58
End: 2018-06-08
Attending: INTERNAL MEDICINE
Payer: COMMERCIAL

## 2018-06-08 LAB
ANION GAP SERPL CALCULATED.3IONS-SCNC: 6 MMOL/L (ref 3–14)
BUN SERPL-MCNC: 12 MG/DL (ref 7–30)
CALCIUM SERPL-MCNC: 8.5 MG/DL (ref 8.5–10.1)
CHLORIDE SERPL-SCNC: 114 MMOL/L (ref 94–109)
CO2 SERPL-SCNC: 28 MMOL/L (ref 20–32)
CREAT SERPL-MCNC: 0.69 MG/DL (ref 0.66–1.25)
GFR SERPL CREATININE-BSD FRML MDRD: >90 ML/MIN/1.7M2
GLUCOSE SERPL-MCNC: 110 MG/DL (ref 70–99)
POTASSIUM SERPL-SCNC: 4.1 MMOL/L (ref 3.4–5.3)
SODIUM SERPL-SCNC: 148 MMOL/L (ref 133–144)

## 2018-06-08 PROCEDURE — 80048 BASIC METABOLIC PNL TOTAL CA: CPT | Performed by: PHYSICIAN ASSISTANT

## 2018-06-08 PROCEDURE — 25000132 ZZH RX MED GY IP 250 OP 250 PS 637

## 2018-06-08 PROCEDURE — 25000132 ZZH RX MED GY IP 250 OP 250 PS 637: Performed by: PHYSICIAN ASSISTANT

## 2018-06-08 PROCEDURE — 36415 COLL VENOUS BLD VENIPUNCTURE: CPT | Performed by: PHYSICIAN ASSISTANT

## 2018-06-08 PROCEDURE — 12000022 ZZH R&B SNF

## 2018-06-08 RX ADMIN — PANTOPRAZOLE SODIUM 40 MG: 40 TABLET, DELAYED RELEASE ORAL at 17:19

## 2018-06-08 RX ADMIN — POTASSIUM & SODIUM PHOSPHATES POWDER PACK 280-160-250 MG 1 PACKET: 280-160-250 PACK at 08:30

## 2018-06-08 RX ADMIN — POTASSIUM & SODIUM PHOSPHATES POWDER PACK 280-160-250 MG 1 PACKET: 280-160-250 PACK at 13:45

## 2018-06-08 RX ADMIN — PANTOPRAZOLE SODIUM 40 MG: 40 TABLET, DELAYED RELEASE ORAL at 08:30

## 2018-06-08 RX ADMIN — THERA TABS 1 TABLET: TAB at 08:30

## 2018-06-08 RX ADMIN — MUPIROCIN CALCIUM: 20 CREAM TOPICAL at 21:23

## 2018-06-08 RX ADMIN — POTASSIUM & SODIUM PHOSPHATES POWDER PACK 280-160-250 MG 1 PACKET: 280-160-250 PACK at 20:13

## 2018-06-08 RX ADMIN — MUPIROCIN CALCIUM: 20 CREAM TOPICAL at 08:30

## 2018-06-08 NOTE — PLAN OF CARE
Brief Medicine Note  June 8, 2018      Patient was set to discharge today, but unfortunately Na still elevated to 148. Likely 2/2 GI losses and poor water intake. PO intake has been good as far as food intake goes.    Na 148. FW deficit 2.7L.     Will start 250cc water flushes q4h via J tube now. Plan to repeat BMP tomorrow.    Zunilda Lazar PA-C

## 2018-06-08 NOTE — PLAN OF CARE
Problem: Goal Outcome Summary  Goal: Goal Outcome Summary  Outcome: No Change  Alert and oriented x 4. Denied having pain and discomfort. Independent in his room and hallway. Ambulated on hallway x 3. NA and chloride is increased. Encouraged pt fluid intake.  Taught pt how to do G tube site care. He has been changing Gj tube sie dressing for  the last 2-3 days. He is very good at flushing his G tube and changing dressing. Gave dressing supplies for 7 days.

## 2018-06-08 NOTE — PROGRESS NOTES
Met with patient to update the DC planning discussion. He will DC to home today, daughter to pick him up around noon. He is aware of clinic f/u appointment with Dr. Stuart on 6/12/2018. He has been taught how to flush GJ tube and do site cares. No home care needs.

## 2018-06-08 NOTE — PLAN OF CARE
Problem: Goal Outcome Summary  Goal: Goal Outcome Summary  Outcome: No Change  Patients with no new issues overnight. Appeared to be sleeping in between cares/rounds. No complaints of pain. Independent in the room. Plan in place for patient to discharge today. Call light in reach. Continue with POC.

## 2018-06-08 NOTE — PLAN OF CARE
Problem: Goal Outcome Summary  Goal: Goal Outcome Summary  Outcome: No Change  Pt planning on discharging today but morning labs showed sodium level of 148 so pt considered not stable to discharge. 250 mL water to be flushed through G tube Q4H to help lower sodium level. Will recheck in the AM and discharge then if sodium levels are stable. Dressing changed to G tube site and site still has some redness. Pt denies pain and SOB.

## 2018-06-09 ENCOUNTER — APPOINTMENT (OUTPATIENT)
Dept: LAB | Facility: CLINIC | Age: 58
End: 2018-06-09
Attending: INTERNAL MEDICINE
Payer: COMMERCIAL

## 2018-06-09 VITALS
RESPIRATION RATE: 18 BRPM | OXYGEN SATURATION: 98 % | HEART RATE: 67 BPM | TEMPERATURE: 97.5 F | DIASTOLIC BLOOD PRESSURE: 79 MMHG | WEIGHT: 173 LBS | BODY MASS INDEX: 28.79 KG/M2 | SYSTOLIC BLOOD PRESSURE: 125 MMHG

## 2018-06-09 LAB
ANION GAP SERPL CALCULATED.3IONS-SCNC: 6 MMOL/L (ref 3–14)
BUN SERPL-MCNC: 10 MG/DL (ref 7–30)
CALCIUM SERPL-MCNC: 8.7 MG/DL (ref 8.5–10.1)
CHLORIDE SERPL-SCNC: 113 MMOL/L (ref 94–109)
CO2 SERPL-SCNC: 28 MMOL/L (ref 20–32)
CREAT SERPL-MCNC: 0.65 MG/DL (ref 0.66–1.25)
GFR SERPL CREATININE-BSD FRML MDRD: >90 ML/MIN/1.7M2
GLUCOSE SERPL-MCNC: 95 MG/DL (ref 70–99)
POTASSIUM SERPL-SCNC: 3.8 MMOL/L (ref 3.4–5.3)
SODIUM SERPL-SCNC: 147 MMOL/L (ref 133–144)

## 2018-06-09 PROCEDURE — 36415 COLL VENOUS BLD VENIPUNCTURE: CPT | Performed by: PHYSICIAN ASSISTANT

## 2018-06-09 PROCEDURE — 80048 BASIC METABOLIC PNL TOTAL CA: CPT | Performed by: PHYSICIAN ASSISTANT

## 2018-06-09 PROCEDURE — 25000132 ZZH RX MED GY IP 250 OP 250 PS 637

## 2018-06-09 PROCEDURE — 25000132 ZZH RX MED GY IP 250 OP 250 PS 637: Performed by: PHYSICIAN ASSISTANT

## 2018-06-09 RX ADMIN — POTASSIUM & SODIUM PHOSPHATES POWDER PACK 280-160-250 MG 1 PACKET: 280-160-250 PACK at 07:59

## 2018-06-09 RX ADMIN — PANTOPRAZOLE SODIUM 40 MG: 40 TABLET, DELAYED RELEASE ORAL at 08:00

## 2018-06-09 RX ADMIN — MUPIROCIN CALCIUM: 20 CREAM TOPICAL at 08:01

## 2018-06-09 RX ADMIN — THERA TABS 1 TABLET: TAB at 07:59

## 2018-06-09 NOTE — PLAN OF CARE
Problem: Goal Outcome Summary  Goal: Goal Outcome Summary  Outcome: No Change  Pt approved for D/C this morning after being seen by Zunilda BLANCO. Provider discussed with pt that NA level still not WDL at 147 this morning but pt chose to leave saying that he will be compliant with new orders to flush G tube with 300 mL water Q3H while awake and follow up with his primary physician. Pt left the unit safely with all belongings and daughter who is driving pt back home. Writer went over D/C paperwork with pt and daughter and answered all questions. Medications reviewed and sent with pt. Dressing change supplies also sent with pt.

## 2018-06-09 NOTE — PLAN OF CARE
Problem: Goal Outcome Summary  Goal: Goal Outcome Summary  Outcome: Improving  Alert and oriented x4. Endorsed no new concerns overnight. No complaints of pain. Plan was in place for patient to discharge yesterday(6/8) but this was postponed due to elevated Na(148), recheck this morning is 147.  Water flushes given as per orders via J-tube. Patient reported loose stool x1. Independent in the room. Daughter spent the night in the room. Call light in reach. Continue with POC.

## 2018-06-09 NOTE — PLAN OF CARE
Problem: Goal Outcome Summary  Goal: Goal Outcome Summary  Outcome: Improving  Patient is A&O x 3,Denies SOB or CP Indep in room. VS'S. Patient demonstrated back GT site dressing change and water flushes per GT,did good. Daughter at bedside. Continent of bowel and bladder. Tolerating mechanical diet ate 100%. Free water flushes done as per order and encouraged fluid. sNursing will continue to monitor.

## 2018-06-11 NOTE — PROGRESS NOTES
Prior Authorization Approval    Pantoprazole 40mg BID  Date Initiated: 06/07/2018  Date Completed: 06/07/2018  Prior Auth Type: Quantity Limit     Status: Approved    Effective Date: 06/07/18-06/06/2019.  Copay: $1.00  Mobile Filled: Yes    Insurance: [facility]  Ph: [phone]  ID: [id number]  Case Number: N/A  Submitted Via: Fax     -Agueda Sheridan Discharge Pharmacy Liaison, pager [phone]

## 2018-06-11 NOTE — PROGRESS NOTES
"THORACIC SURGERY FOLLOW UP VISIT    Dear Dr. Michel,  I saw Mr. Michael Saldivar in follow-up today. The clinical summary follows:     PREOP DIAGNOSIS   Esophageal perforation     PROCEDURE   3/19/2018: L thoracotomy, primary repair of esophageal perforation, EGD  3/29/2018: EGD exchange of gastrostomy tube, gastropexy and pharyngostomy tube placement  3/30/2018: IR exchanged G for GJ tube  4/20/2018: EGD w/ esophageal stent placement ([id number], fully covered)  5/15/2018: EGD w/ stent revision  5/23/2018: EGD w/ stent removal    COMPLICATIONS  Esophageal leak    INTERVAL STUDIES  No new imaging since prior clinic visit (5/29)    /80  Pulse 88  Temp 98.4  F (36.9  C) (Oral)  Resp 16  Ht 1.651 m (5' 5\")  Wt 78.5 kg (173 lb)  SpO2 98%  BMI 28.79 kg/m2       SUBJECTIVE  Mr. Saldivar has been eating soft foods without difficulty.  he stopped taking tube feeds on June 4, 2018, while still in the TCU.  He has been having bowel movements regularly and urinating without difficulty.  He has been flushing the G-tube with 300 cc every 4 hours, as per instructions from the TCU.  He has minimal pain.    From a personal perspective, the patient is here today with his daughter.  He is relieved to have the gastrojejunostomy removed today.    IMPRESSION (K22.3) Esophageal perforation  (primary encounter diagnosis)  57 year-old M with esophageal perforation s/p primary repair c/b leak managed w/ pharyngostomy, GJ, esophageal stent. No evidence of leak on prior esophagram.  He is now tolerating a soft diet without difficulty.    PLAN  I spent a total of 15 minutes with Mr. Michael Saldivar and his daughter, more than 50% of which were spent in counseling, coordination of care, and face-to-face time. I reviewed the plan as follows:  I removed the gastrojejunostomy tube without difficulty.  I placed a dressing and explained to the patient with the skin incision will heal up fairly quickly.  He may replace the gauze as " needed.  He will advance to regular diet as tolerated.  He will follow-up if he notices any dysphagia.  I explained that this can normally be treated with endoscopy with dilatation.  He will also continue PPI therapy.  All questions were answered and the patient and present family were in agreement with the plan.  I appreciate the opportunity to participate in the care of your patient and will keep you updated.  Sincerely,

## 2018-06-12 ENCOUNTER — OFFICE VISIT (OUTPATIENT)
Dept: SURGERY | Facility: CLINIC | Age: 58
End: 2018-06-12
Attending: THORACIC SURGERY (CARDIOTHORACIC VASCULAR SURGERY)
Payer: COMMERCIAL

## 2018-06-12 ENCOUNTER — TELEPHONE (OUTPATIENT)
Dept: ONCOLOGY | Facility: CLINIC | Age: 58
End: 2018-06-12

## 2018-06-12 VITALS
TEMPERATURE: 98.4 F | WEIGHT: 173 LBS | BODY MASS INDEX: 28.82 KG/M2 | RESPIRATION RATE: 16 BRPM | OXYGEN SATURATION: 98 % | HEART RATE: 88 BPM | SYSTOLIC BLOOD PRESSURE: 129 MMHG | DIASTOLIC BLOOD PRESSURE: 80 MMHG | HEIGHT: 65 IN

## 2018-06-12 DIAGNOSIS — K22.3 ESOPHAGEAL PERFORATION: Primary | ICD-10-CM

## 2018-06-12 PROCEDURE — 99024 POSTOP FOLLOW-UP VISIT: CPT | Mod: ZP | Performed by: THORACIC SURGERY (CARDIOTHORACIC VASCULAR SURGERY)

## 2018-06-12 PROCEDURE — G0463 HOSPITAL OUTPT CLINIC VISIT: HCPCS | Mod: ZF

## 2018-06-12 ASSESSMENT — PAIN SCALES - GENERAL: PAINLEVEL: NO PAIN (0)

## 2018-06-12 NOTE — NURSING NOTE
"Oncology Rooming Note    June 12, 2018 2:11 PM   Michael Saldivar is a 57 year old male who presents for:    Chief Complaint   Patient presents with     Oncology Clinic Visit     Return Esophageal perforation     Initial Vitals: /80  Pulse 88  Temp 98.4  F (36.9  C) (Oral)  Resp 16  Ht 1.651 m (5' 5\")  Wt 78.5 kg (173 lb)  SpO2 98%  BMI 28.79 kg/m2 Estimated body mass index is 28.79 kg/(m^2) as calculated from the following:    Height as of this encounter: 1.651 m (5' 5\").    Weight as of this encounter: 78.5 kg (173 lb). Body surface area is 1.9 meters squared.  No Pain (0) Comment: Data Unavailable   No LMP for male patient.  Allergies reviewed: Yes  Medications reviewed: Yes    Medications: Medication refills not needed today.  Pharmacy name entered into [facility]: [facility] [place], [place] - [address]    Clinical concerns: no new concerns     6 minutes for nursing intake (face to face time)     Diamante Pickering CMA              "

## 2018-06-12 NOTE — TELEPHONE ENCOUNTER
Received call on triage line of a frantic woman claiming to be the patient's older sister. She identified herself as Shana Steiner. She knew details of the patient's history although I could not confirm any of her assumptions regarding his medical past or his current diagnosis as she is not listed in patient's chart to release information to. She provided her phone numbers to have Michael contact her, this was also noted on the appointment notes as it appears she has also contacted scheduling prior to calling the nurseline.     I attempted to call Michael at the number listed in his chart - no answer - no vm set up. Tried to also contact his emergency contact, his brother Michael, also no answer.

## 2018-06-12 NOTE — LETTER
"6/12/2018       RE: Michael Saldivar  [address]  [place] [address]     Dear Colleague,    Thank you for referring your patient, Mihcael Saldivar, to the [facility]. Please see a copy of my visit note below.    THORACIC SURGERY FOLLOW UP VISIT    Dear Dr. Michel,  I saw Mr. Michael Saldivar in follow-up today. The clinical summary follows:     PREOP DIAGNOSIS   Esophageal perforation     PROCEDURE   3/19/2018: L thoracotomy, primary repair of esophageal perforation, EGD  3/29/2018: EGD exchange of gastrostomy tube, gastropexy and pharyngostomy tube placement  3/30/2018: IR exchanged G for GJ tube  4/20/2018: EGD w/ esophageal stent placement ([id number], fully covered)  5/15/2018: EGD w/ stent revision  5/23/2018: EGD w/ stent removal    COMPLICATIONS  Esophageal leak    INTERVAL STUDIES  No new imaging since prior clinic visit (5/29)    /80  Pulse 88  Temp 98.4  F (36.9  C) (Oral)  Resp 16  Ht 1.651 m (5' 5\")  Wt 78.5 kg (173 lb)  SpO2 98%  BMI 28.79 kg/m2       SUBJECTIVE  Mr. Saldivar has been eating soft foods without difficulty.  he stopped taking tube feeds on June 4, 2018, while still in the TCU.  He has been having bowel movements regularly and urinating without difficulty.  He has been flushing the G-tube with 300 cc every 4 hours, as per instructions from the TCU.  He has minimal pain.    From a personal perspective, the patient is here today with his daughter.  He is relieved to have the gastrojejunostomy removed today.    IMPRESSION (K22.3) Esophageal perforation  (primary encounter diagnosis)  57 year-old M with esophageal perforation s/p primary repair c/b leak managed w/ pharyngostomy, GJ, esophageal stent. No evidence of leak on prior esophagram.  He is now tolerating a soft diet without difficulty.    PLAN  I spent a total of 15 minutes with Mr. Michael Saldivar and his daughter, more than 50% of which were spent in counseling, coordination of care, and " face-to-face time. I reviewed the plan as follows:  I removed the gastrojejunostomy tube without difficulty.  I placed a dressing and explained to the patient with the skin incision will heal up fairly quickly.  He may replace the gauze as needed.  He will advance to regular diet as tolerated.  He will follow-up if he notices any dysphagia.  I explained that this can normally be treated with endoscopy with dilatation.  He will also continue PPI therapy.  All questions were answered and the patient and present family were in agreement with the plan.  I appreciate the opportunity to participate in the care of your patient and will keep you updated.  Sincerely,        Keith Ybarra MD

## 2018-06-12 NOTE — MR AVS SNAPSHOT
"              After Visit Summary   2018    Michael Saldivar    MRN: [id number]           Patient Information     Date Of Birth          1960        Visit Information        Provider Department      2018 3:00 PM Keith Ybarra MD [facility]        Today's Diagnoses     Esophageal perforation    -  1      Care Instructions    Advance to a regular diet.      Call if you have any difficulty swallowing.          Follow-ups after your visit        Who to contact     If you have questions or need follow up information about today's clinic visit or your schedule please contact [facility] directly at [phone].  Normal or non-critical lab and imaging results will be communicated to you by aka-aki networkshart, letter or phone within 4 business days after the clinic has received the results. If you do not hear from us within 7 days, please contact the clinic through aka-aki networkshart or phone. If you have a critical or abnormal lab result, we will notify you by phone as soon as possible.  Submit refill requests through FitOrbit or call your pharmacy and they will forward the refill request to us. Please allow 3 business days for your refill to be completed.          Additional Information About Your Visit        MyChart Information     [employer]Orbit lets you send messages to your doctor, view your test results, renew your prescriptions, schedule appointments and more. To sign up, go to [email or web address] . Click on \"Log in\" on the left side of the screen, which will take you to the Welcome page. Then click on \"Sign up Now\" on the right side of the page.     You will be asked to enter the access code listed below, as well as some personal information. Please follow the directions to create your username and password.     Your access code is: [email or web address]  Expires: 2018 12:07 PM     Your access code will  in 90 days. If you need help or a new code, please call your [facility] or " "[phone].        Care EveryWhere ID     This is your Care EveryWhere ID. This could be used by other organizations to access your [facility] medical records  [id number]        Your Vitals Were     Pulse Temperature Respirations Height Pulse Oximetry BMI (Body Mass Index)    88 98.4  F (36.9  C) (Oral) 16 1.651 m (5' 5\") 98% 28.79 kg/m2       Blood Pressure from Last 3 Encounters:   06/12/18 129/80   06/09/18 125/79   05/29/18 106/65    Weight from Last 3 Encounters:   06/12/18 78.5 kg (173 lb)   06/09/18 78.5 kg (173 lb)   05/29/18 75.3 kg (166 lb)              Today, you had the following     No orders found for display       Primary Care Provider Office Phone # Fax #    Jemal Michel [phone]       [facility] [address]   [facility] [address]        Equal Access to Services     [facility] CenterANDREW : Hadii aad ku hadasho Soomaali, waaxda luqadaha, qaybta kaalmada adeegyada, waxay idiin hayrosalien demario chambers . So [facility] [phone].    ATENCIÓN: Si habla español, tiene a patel disposición servicios gratuitos de asistencia lingüística. Ll[facility] [phone].    We comply with applicable federal civil rights laws and [place] laws. We do not discriminate on the basis of race, color, national origin, age, disability, sex, sexual orientation, or gender identity.            Thank you!     Thank you for choosing [facility]  for your care. Our goal is always to provide you with excellent care. Hearing back from our patients is one way we can continue to improve our services. Please take a few minutes to complete the written survey that you may receive in the mail after your visit with us. Thank you!             Your Updated Medication List - Protect others around you: Learn how to safely use, store and throw away your medicines at www.disposemymeds.org.          This list is accurate as of 6/12/18  3:18 PM.  Always use your most recent med list.                   Brand " Name Dispense Instructions for use Diagnosis    acetaminophen 500 MG tablet    TYLENOL     Take 1 tablet (500 mg) by mouth every 6 hours as needed for mild pain or fever    Esophageal perforation       alum & mag hydroxide-simethicone 200-200-20 MG/5ML Susp suspension    MYLANTA/MAALOX     Take 30 mLs by mouth every 4 hours as needed for indigestion        loperamide 2 MG capsule    IMODIUM    20 capsule    Take 2 capsules (4 mg) by mouth 4 times daily as needed for diarrhea    Diarrhea, unspecified type       multivitamin, therapeutic Tabs tablet     30 tablet    Take 1 tablet by mouth daily    Malnutrition, unspecified type (H)       mupirocin 2 % cream    BACTROBAN    15 g    Apply topically 2 times daily for 6 days    Skin irritation       order for DME     1 Month    Equipment being ordered:  [facility] Suction Machine Suction Canister-2 Suction Connect Tube-2 5 in 1 Connector-2 Bacteria Filter-2  Treatment Diagnosis: Esophogeal Perforation    Esophageal perforation       order for DME     7 Container    Equipment being ordered: Other: suction and cannister for pharyngostomy tube Treatment Diagnosis: Esophageal perforation    Esophageal perforation       pantoprazole 40 MG EC tablet    PROTONIX    60 tablet    Take 1 tablet (40 mg) by mouth 2 times daily (before meals)    Esophageal perforation       phosphorus tablet 250 mg 250 MG per tablet    PHOSPHA 250 NEUTRAL    42 tablet    Take 1 tablet (250 mg) by mouth 3 times daily    Hypophosphatemia

## 2018-06-13 ENCOUNTER — TELEPHONE (OUTPATIENT)
Dept: ONCOLOGY | Facility: CLINIC | Age: 58
End: 2018-06-13

## 2018-06-13 NOTE — TELEPHONE ENCOUNTER
"Sister of pt calling again, unable to reach pt (see note from yesterday). She stated she was never told which TCU he was discharged to and wants to come visit him. She is not listed as a contact in his chart so no information was given to her. She was advised to try his home number or his daughters again. She wanted to leave her numbers and ask for a call back from the provider as she is \"extremely worried\" about his health and was talking to him once a week when he was admitted in the hospital but hadn't heard from him once he was discharged to the TCU. Her home number is [phone] and cell is [phone].  "

## 2018-06-19 ENCOUNTER — DOCUMENTATION ONLY (OUTPATIENT)
Dept: SURGERY | Facility: CLINIC | Age: 58
End: 2018-06-19

## 2018-06-19 NOTE — PROGRESS NOTES
"I received the following message from our [facility] triage nurses:       Sister of pt, Donna,  calling again, unable to reach pt (see note from yesterday). She stated she was never told which TCU he was discharged to and wants to come visit him. She is not listed as a contact in his chart so no information was given to her. She was advised to try his home number or his daughters again. She wanted to leave her numbers and ask for a call back from the provider as she is \"extremely worried\" about his health and was talking to him once a week when he was admitted in the hospital but hadn't heard from him once he was discharged to the TCU. Her home number is [phone] and cell is [phone].         I tried to call Michael but got no answer, unable to leave VM.   I then called his daughter, Juanis, who we have permission to talk to and who accompanied Michael to his last visit with thoracic surgery.  I conveyed the message to her and gave her Tila's phone #'s.  I explained that we are unable to give Tila any information related to Michael.   She said she would call her and get her updated, stating there \"are some dynamics there in the family\".   She appreciated my call.  "

## 2021-01-06 NOTE — ANESTHESIA PROCEDURE NOTES
Arterial Line Procedure Note  Staff:     Anesthesiologist:  KIERAN SANTOS    Resident/CRNA:  JIAN AU    Arterial line performed by resident/CRNA in presence of a teaching physician    Location: In OR After Induction  Procedure Start/Stop Times:     patient identified, IV checked, site marked, risks and benefits discussed, informed consent, monitors and equipment checked, pre-op evaluation and at physician/surgeon's request      Correct Patient: Yes      Correct Position: Yes      Correct Site: Yes      Correct Procedure: Yes      Correct Laterality:  Yes    Site Marked:  Yes  Line Placement:     Procedure:  Arterial Line    Insertion Site:  Radial    Insertion laterality:  Left    Skin Prep: Chloraprep      Patient Prep: patient draped, mask, sterile gloves, hat and hand hygiene      Local skin infiltration:  None    Ultrasound Guided?: Yes      Artery evaluated via ultrasound confirming patency.   Using realtime imaging, the artery was punctured and the needle was observed entering the artery.      A permanent image is entered into patient's chart.      Catheter size:  20 gauge, 12 cm    Cath secured with: suture      Dressing:  Tegaderm    Complications:  None obvious    Arterial waveform: Yes      IBP within 10% of NIBP: Yes             Discharge Summary   Patient ID:   Germain Jenkins   [id number]   53 year old   1967   Admit date: 1/4/2021   Discharge date: 1/6/2021   Admitting Physician: Dwayne Parson DO   Discharge Physician: Serafin Jurado MD     Primary Diagnoses:   NSTEMI (non-ST elevated myocardial infarction)  Coronary artery disease involving native coronary artery of native heart    Secondary Diagnoses:   Essential hypertension, uncontrolled  Hyperlipidemia  Tobacco use    Hospital Course (see H&P for details of admission):   Mr. Jenkins presented with exertional chest pain in the setting of acute non ST-elevation MI, background CAD status post PCI to RCA.    He was started on heparin infusion and continued on aspirin, ticagrelor, beta-blocker and statin.  Coronary angiography showed disease to RCA that required another intervention with stent.  Echocardiography showed preserved LVEF.  He was discharged to continue the above cardiac medications, addition of lisinopril and increased statin dose.  Lifestyle modification and smoking cessation were discussed.  He will have cardiology follow-up as outpatient, referred to cardiac rehabilitation.    Consults   IP Consult Orders (From admission, onward)     Start     Ordered    01/04/21 1745  Inpatient consult to Cardiology  ONE TIME     Provider:  Fausto Edge MD    01/04/21 1744               Discharge Exam   Blood pressure 129/66, pulse 88, temperature 97.4 °F (36.3 °C), temperature source Oral, resp. rate 17, height 6' 4\" (1.93 m), weight 130.7 kg, SpO2 98 %.   General: laying comfortably in bed, well-nourished, appears as stated age  ENT: moist oral mucosa  Lungs: good air entry, clear breath sounds, no rales or wheezing  Heart: regular rate and rhythm, normal S1/S2, no gallop  Abdomen: normal bowel sounds, soft, non-distended, non-tender, no rebound or guarding  Extremities: no pedal edema or calf tenderness  Neuro: alert and coherent    Activity:  As tolerated     Diet:  Regular      Code Status: Full Resuscitation     Pending issues to be followed up by PCP   none    Discharge Medication List:    START taking these medications today unless otherwise stated      Details   lisinopril 5 MG tablet  Commonly known as: ZESTRIL  Next Dose Due: 1/7      Take 1 tablet by mouth daily.  Authorizing Provider: Serafin Jurado MD                     CONTINUE taking these medications which have CHANGED      Details   atorvastatin 80 MG tablet  Commonly known as: LIPITOR  What changed:   · medication strength  · how much to take  · when to take this  Next Dose Due: 1/6      Take 1 tablet by mouth nightly.  Authorizing Provider: Serafin Jurado MD                     CONTINUE taking these medications which have NOT CHANGED      Details   albuterol 108 (90 Base) MCG/ACT inhaler      Inhale 2 puffs into the lungs every 4 hours as needed for Shortness of Breath or Wheezing.     aspirin 81 MG tablet  Next Dose Due: 1/7      Take 1 tablet by mouth daily.  Authorizing Provider: Keaton Coffey MD     fluticasone propionate 110 MCG/ACT inhaler  Commonly known as: FLOVENT HFA  Next Dose Due: 1/6 Bedtime       Inhale 1 puff into the lungs 2 times daily.     isosorbide mononitrate 30 MG 24 hr tablet  Commonly known as: IMDUR  Next Dose Due: 1/7      Take 1 tablet by mouth daily.  Authorizing Provider: Felix Paredes MD     metoPROLOL tartrate 50 MG tablet  Commonly known as: LOPRESSOR      Take 1 tablet by mouth 2 times daily.  Authorizing Provider: Amy Yang PA-C     nitroGLYcerin 0.4 MG/SPRAY translingual solution  Commonly known as: NITROLINGUAL      Place 1 spray under the tongue every 5 minutes as needed for Chest pain.     omeprazole 20 MG tablet  Next Dose Due: 1/7      Take 20 mg by mouth daily.     ticagrelor 90 MG Tab  Commonly known as: BRILINTA      Take 1 tablet by mouth 2 times daily.  Authorizing Provider: Amy Yang PA-C       Disposition: patient is being discharged to  home.     Follow-up with:   Mina Zuñiga MD  [address]  [place] [id number] [phone]    In 7 days  Appointment scheduled 1/11/21 at 1:45pm    Bryant Barboza PA-C  [address] [place] [address]  [phone]    Go on 2/1/2021  at 10:00 AM for cardiology follow up       Time spent on discharge was greater than 30 minutes   Signed:   Serafin Jurado MD   1/6/2021   9:29 AM

## 2021-04-09 NOTE — PLAN OF CARE
Problem: Patient Care Overview  Goal: Discharge Needs Assessment  Outcome: No Change  D AVSS with sat's 93% on room air when awake. Did have to place him on 2L/min of oxygen due to sats dropping with sleep. Has denied nausea and pain is well controlled with current pca setting. Scant out of MALINA and small amount out of the Gastric tube NG tube. Remains a/o x 4 with flat affect. Using IS with encouragement and ambulated in hallway for >400feet with SBA and tolerated well. Noted active bowel sound and reported that he did pass a small amount of gas.   I Vital's, assessment and med's per order.   A resting in bed with call light in reach.   P Continue to monitor and update MD with changes.        Strain    A strain is a stretch or tear in one of the muscles in your body. This is caused by an injury to the area such as a twisting mechanism. Symptoms include pain, swelling, or bruising. Rest that area over the next several days and slowly resume activity when tolerated. Ice can help with swelling and pain.     SEEK IMMEDIATE MEDICAL CARE IF YOU HAVE ANY OF THE FOLLOWING SYMPTOMS: worsening pain, inability to move that body part, numbness or tingling. Strain    A strain is a stretch or tear in one of the muscles in your body. This is caused by an injury to the area such as a twisting mechanism. Symptoms include pain, swelling, or bruising. Rest that area over the next several days and slowly resume activity when tolerated. Ice can help with swelling and pain.     SEEK IMMEDIATE MEDICAL CARE IF YOU HAVE ANY OF THE FOLLOWING SYMPTOMS: worsening pain, inability to move that body part, numbness or tingling.    Pulmonary Nodule  A pulmonary nodule is a small, round growth of tissue in the lung. It is sometimes referred to as a "shadow" or "spot on the lung." Nodules range in size from less than 1/5 of an inch (4 mm) to a little bigger than an inch (30 mm).    Pulmonary nodules can be either noncancerous (benign) or cancerous (malignant). Most are noncancerous. Smaller nodules in people who do not smoke and do not have any other risk factors for lung cancer are more likely to be noncancerous. Larger, irregular nodules in people who smoke or who have a strong family history of lung cancer are more likely to be cancerous.    What are the causes?  This condition may be caused by:  A bacterial, fungal, or viral infection, such as tuberculosis. The infection is usually an old and inactive one.  A noncancerous mass of tissue.  Inflammation from conditions such as rheumatoid arthritis.  Abnormal blood vessels in the lungs.  Cancerous tissue, such as lung cancer or a cancer in another part of the body that has spread to the lung.  What are the signs or symptoms?  This condition usually does not cause symptoms. If symptoms appear, they are usually related to the underlying cause. For example, if the condition is caused by an infection, you may have a cough or fever.    How is this diagnosed?  This condition is usually diagnosed with an X-ray or CT scan. To help determine whether a pulmonary nodule is benign or malignant, your health care provider will:  Take your medical history.  Perform a physical exam.  Order tests, including:  Blood tests.  A skin test called a tuberculin test. This test is done to check if you have been exposed to the germ that causes tuberculosis.  Chest X-rays.  A CT scan. This test shows smaller pulmonary nodules more clearly and with more detail than an X-ray.  A positron emission tomography (PET) scan. This test is done to check if the nodule is cancerous. During the test, a safe amount of a radioactive substance is injected into the bloodstream. Then a picture is taken.  Biopsy. In this test, a tiny piece of the pulmonary nodule is removed and then examined under a microscope.  How is this treated?  Treatment for this condition depends on whether the pulmonary nodule is malignant or benign as well as your risk of getting cancer.  Noncancerous nodules usually do not need to be treated, but they may need to be monitored with CT scans. If a CT scan shows that the pulmonary nodule got bigger, more tests may be done.  Some nodules need to be removed. If this is the case, you may have a procedure called a thoractomy. During the procedure, your health care provider will make an incision in your chest and remove the part of the lung where the nodule is located.  Follow these instructions at home:  Image   Take over-the-counter and prescription medicines only as told by your health care provider.  Do not use any products that contain nicotine or tobacco, such as cigarettes and e-cigarettes. If you need help quitting, ask your health care provider.  Keep all follow-up visits as told by your health care provider. This is important.  Contact a health care provider if:  You have trouble breathing when you are active.  You feel sick or unusually tired.  You do not feel like eating.  You lose weight without trying.  You develop chills or night sweats.  Get help right away if:  You cannot catch your breath.  You begin wheezing.  You cannot stop coughing.  You cough up blood.  You become dizzy or feel like you are going to faint.  You have sudden chest pain.  You have a fever or persistent symptoms for more than 2–3 days.  You have a fever and your symptoms suddenly get worse.  Summary  A pulmonary nodule is a small, round growth of tissue in the lung. Most pulmonary nodules are noncancerous.  This condition is usually diagnosed with an X-ray or CT scan.  Common causes of pulmonary nodules include infection, inflammation, and noncancerous growths.  Though less common, if a nodule is found to be cancerous, you will need specific diagnostic tests and treatment options as directed by your medical provider.  Treatment for this condition depends on whether the pulmonary nodule is benign or malignant as well as your risk of getting cancer.  This information is not intended to replace advice given to you by your health care provider. Make sure

## 2022-11-30 NOTE — PLAN OF CARE
Problem: Goal Outcome Summary  Goal: Goal Outcome Summary  Outcome: No Change  Alert and oriented x4. Patient reported no new issues overnight. Medicated with Tylenol x1 this morning per request for right epigastric area pain. NPO starting at midnight in anticipation for EGD scheduled for today. NO TF overnight. G-tube to gravity. Independent in the room. Call light in reach. Continue with POC.        show

## 2023-07-24 NOTE — TELEPHONE ENCOUNTER
Spoke with Vira about patient's about upcoming procedure on 5/23/18 at 1:55pm(arrive at 12pm) with Dr. Ybarra at [facility] ([address], check in at [address] on 3rd floor)  Faxing surgery packet.    
PROCEDURES:  Robot-assisted total replacement of hip 24-Jul-2023 10:59:10  Jah Curiel

## (undated) DEVICE — SU VICRYL 3-0 SH 27" UND J416H

## (undated) DEVICE — ENDO SNARE POLYPECTOMY OVAL 15MM LOOP SD-240U-15

## (undated) DEVICE — ESU ELEC BLADE 6" COATED/INSULATED E1455-6

## (undated) DEVICE — TUBE TRANS GASTROJEJUNOSTOMY 22FRX45CM  0250-22

## (undated) DEVICE — WIRE GUIDE AMPLATZ SUPER STIFF 0.035"X260CM M001465261

## (undated) DEVICE — SOL NACL 0.9% 10ML VIAL 0409-4888-02

## (undated) DEVICE — KIT CONNECTOR FOR OLYMPUS ENDOSCOPES DEFENDO 100310

## (undated) DEVICE — DRSG GAUZE 4X4" TRAY 6939

## (undated) DEVICE — GLOVE PROTEXIS MICRO 6.0  2D73PM60

## (undated) DEVICE — GOWN IMPERVIOUS 2XL BLUE

## (undated) DEVICE — SPONGE KITTNER 30-101

## (undated) DEVICE — RAD KNIFE HANDLE W/11 BLADE DISPOSABLE 371611

## (undated) DEVICE — SU ETHIBOND 0 TIE 6X30" X306H

## (undated) DEVICE — STPL ENDO RELOAD 45MM MEDIUM THICK PURPLE EGIA45AMT

## (undated) DEVICE — TUBE GASTROSTOMY PONSKY PULL PEG KIT 20FR 000330

## (undated) DEVICE — SYR 30ML SLIP TIP W/O NDL 302833

## (undated) DEVICE — SU VICRYL 3-0 SH CR 8X18" J774

## (undated) DEVICE — SUCTION MANIFOLD DORNOCH ULTRA CART UL-CL500

## (undated) DEVICE — ESU PENCIL W/COATED BLADE E2450H

## (undated) DEVICE — SU VICRYL 0 CTX 36" J370H

## (undated) DEVICE — DRAIN JACKSON PRATT RESERVOIR 100ML SU130-1305

## (undated) DEVICE — PITCHER STERILE 1000ML  SSK9004A

## (undated) DEVICE — SUCTION TIP YANKAUER W/O VENT K86

## (undated) DEVICE — CLIP HORIZON MED BLUE 002200

## (undated) DEVICE — DRAIN CHEST TUBE RIGHT ANGLED 28FR 8128

## (undated) DEVICE — ENDO CAP AND TUBING STERILE FOR ENDOGATOR  100130

## (undated) DEVICE — JELLY LUBRICATING SURGILUBE 2OZ TUBE

## (undated) DEVICE — ENDO TUBING CO2 SMARTCAP STERILE DISP 100145CO2EXT

## (undated) DEVICE — KIT ENDO FIRST STEP DISINFECTANT 200ML W/POUCH EP-4

## (undated) DEVICE — LUBRICANT INST KIT ENDO-LUBE 220-90

## (undated) DEVICE — SOL WATER IRRIG 1000ML BOTTLE 2F7114

## (undated) DEVICE — SU MONOCRYL 4-0 PS-2 27" UND Y426H

## (undated) DEVICE — SOL ADH LIQUID BENZOIN SWAB 0.6ML C1544

## (undated) DEVICE — SU VICRYL 3-0 SH 27" J316H

## (undated) DEVICE — ENDO SYSTEM WATER BOTTLE & TUBING W/CO2 FILTER 00711549

## (undated) DEVICE — Device

## (undated) DEVICE — ENDO BITE BLOCK ADULT OMNI-BLOC

## (undated) DEVICE — SOL NACL 0.9% IRRIG 1000ML BOTTLE 2F7124

## (undated) DEVICE — SYR 10ML LL W/O NDL 302995

## (undated) DEVICE — WIRE GUIDE 0.035"X260CM NAVIPRO ANG 5625 M00556251

## (undated) DEVICE — ESU ELEC BLADE 6" COATED E1450-6

## (undated) DEVICE — DRAPE SHEET MED 44X70" 9355

## (undated) DEVICE — DRSG TELFA 3X8" 1238

## (undated) DEVICE — DRAIN CHEST TUBE 28FR STR 8028

## (undated) DEVICE — CLIP HORIZON SM RED WIDE SLOT 001201

## (undated) DEVICE — TAPE MICROPORE 2"X1.5YD 1530S-2

## (undated) DEVICE — WIRE GUIDE AMPLATZ SUPER STIFF 0.035"X260CM STR M001465090

## (undated) DEVICE — BLADE KNIFE SURG 10 371110

## (undated) DEVICE — ENDO FCP GRASPING 6.5MMX2.4MMX230CM RAT TOOTH DGR-276-5

## (undated) DEVICE — LINEN TOWEL PACK X6 WHITE 5487

## (undated) DEVICE — DRAIN PENROSE 5/8X18" LATEX 0918040

## (undated) DEVICE — TUBING SUCTION 10'X3/16" N510

## (undated) DEVICE — BLADE CLIPPER SGL USE 9680

## (undated) DEVICE — INTR PEELAWAY 26FRX20CM G06447

## (undated) DEVICE — LINEN GOWN XLG 5407

## (undated) DEVICE — DRAPE MAYO STAND 23X54 8337

## (undated) DEVICE — DECANTER VIAL 2006S

## (undated) DEVICE — DRSG MEDIPORE 3 1/2X13 3/4" 3573

## (undated) DEVICE — TAPE CLOTH 3" CARDINAL 3TRCL03

## (undated) DEVICE — SU SILK 0 SH 30" K834H

## (undated) DEVICE — LINEN TOWEL PACK X30 5481

## (undated) DEVICE — CATH ANGIO SELECTIVE SOFT-VU 5FRX100CM JB1 10734102

## (undated) DEVICE — DRAPE IOBAN INCISE 23X17" 6650EZ

## (undated) DEVICE — GLOVE PROTEXIS POWDER FREE 8.0 ORTHOPEDIC 2D73ET80

## (undated) DEVICE — SU VICRYL 1 CT-1 27" UND J261H

## (undated) DEVICE — SOL WATER IRRIG 1000ML BOTTLE 07139-09

## (undated) DEVICE — SUCTION PLEURAVAC UNIT CHEST 2002-000

## (undated) DEVICE — DRSG STERI STRIP 1/2X4" R1547

## (undated) DEVICE — TIES BANDING T50R

## (undated) DEVICE — SUCTION DRY CHEST DRAIN OASIS 3600-100

## (undated) DEVICE — SPONGE TONSIL W/STRING MED 23275-680

## (undated) DEVICE — CONNECTOR DRAIN CHEST Y EXTENSION SET 19909

## (undated) DEVICE — CLIP HORIZON LG ORANGE 004200

## (undated) DEVICE — STPL ENDO RELOAD 60MM MEDIUM THICK PURPLE EGIA60AMT

## (undated) DEVICE — PREP CHLORAPREP 26ML TINTED ORANGE  260815

## (undated) DEVICE — TUBING SUCTION MEDI-VAC SOFT 3/16"X20' N520A

## (undated) DEVICE — SU VICRYL 0 TIE 54" J608H

## (undated) DEVICE — DEVICE SUTURE GRASPER TROCAR CLOSURE 14GA PMITCSG

## (undated) DEVICE — SU ETHILON 2-0 FS 18" 664H

## (undated) DEVICE — TUBE GASTROSTOMY MIC 20FR SIL 0100-20

## (undated) DEVICE — DRAIN BLAKE 19FR W/TROCAR SIL

## (undated) DEVICE — SU VICRYL 2-0 CT-1 27" UND J259H

## (undated) DEVICE — LINEN TOWEL PACK X5 5464

## (undated) RX ORDER — PROPOFOL 10 MG/ML
INJECTION, EMULSION INTRAVENOUS
Status: DISPENSED
Start: 2018-03-28

## (undated) RX ORDER — FENTANYL CITRATE 50 UG/ML
INJECTION, SOLUTION INTRAMUSCULAR; INTRAVENOUS
Status: DISPENSED
Start: 2018-05-23

## (undated) RX ORDER — ONDANSETRON 2 MG/ML
INJECTION INTRAMUSCULAR; INTRAVENOUS
Status: DISPENSED
Start: 2018-04-20

## (undated) RX ORDER — DEXAMETHASONE SODIUM PHOSPHATE 4 MG/ML
INJECTION, SOLUTION INTRA-ARTICULAR; INTRALESIONAL; INTRAMUSCULAR; INTRAVENOUS; SOFT TISSUE
Status: DISPENSED
Start: 2018-05-23

## (undated) RX ORDER — CEFAZOLIN SODIUM 2 G/100ML
INJECTION, SOLUTION INTRAVENOUS
Status: DISPENSED
Start: 2018-05-11

## (undated) RX ORDER — ONDANSETRON 2 MG/ML
INJECTION INTRAMUSCULAR; INTRAVENOUS
Status: DISPENSED
Start: 2018-03-18

## (undated) RX ORDER — ONDANSETRON 2 MG/ML
INJECTION INTRAMUSCULAR; INTRAVENOUS
Status: DISPENSED
Start: 2018-05-23

## (undated) RX ORDER — ONDANSETRON 2 MG/ML
INJECTION INTRAMUSCULAR; INTRAVENOUS
Status: DISPENSED
Start: 2018-05-11

## (undated) RX ORDER — PHENYLEPHRINE HCL IN 0.9% NACL 1 MG/10 ML
SYRINGE (ML) INTRAVENOUS
Status: DISPENSED
Start: 2018-05-23

## (undated) RX ORDER — FENTANYL CITRATE 50 UG/ML
INJECTION, SOLUTION INTRAMUSCULAR; INTRAVENOUS
Status: DISPENSED
Start: 2018-04-20

## (undated) RX ORDER — DEXAMETHASONE SODIUM PHOSPHATE 4 MG/ML
INJECTION, SOLUTION INTRA-ARTICULAR; INTRALESIONAL; INTRAMUSCULAR; INTRAVENOUS; SOFT TISSUE
Status: DISPENSED
Start: 2018-04-20

## (undated) RX ORDER — LIDOCAINE HYDROCHLORIDE 10 MG/ML
INJECTION, SOLUTION EPIDURAL; INFILTRATION; INTRACAUDAL; PERINEURAL
Status: DISPENSED
Start: 2018-03-19

## (undated) RX ORDER — FENTANYL CITRATE 50 UG/ML
INJECTION, SOLUTION INTRAMUSCULAR; INTRAVENOUS
Status: DISPENSED
Start: 2018-03-18

## (undated) RX ORDER — PHENYLEPHRINE HCL IN 0.9% NACL 1 MG/10 ML
SYRINGE (ML) INTRAVENOUS
Status: DISPENSED
Start: 2018-05-11

## (undated) RX ORDER — PROPOFOL 10 MG/ML
INJECTION, EMULSION INTRAVENOUS
Status: DISPENSED
Start: 2018-05-23

## (undated) RX ORDER — LIDOCAINE HYDROCHLORIDE 20 MG/ML
INJECTION, SOLUTION EPIDURAL; INFILTRATION; INTRACAUDAL; PERINEURAL
Status: DISPENSED
Start: 2018-04-20

## (undated) RX ORDER — LIDOCAINE HYDROCHLORIDE 10 MG/ML
INJECTION, SOLUTION EPIDURAL; INFILTRATION; INTRACAUDAL; PERINEURAL
Status: DISPENSED
Start: 2018-03-30

## (undated) RX ORDER — PROPOFOL 10 MG/ML
INJECTION, EMULSION INTRAVENOUS
Status: DISPENSED
Start: 2018-03-18

## (undated) RX ORDER — ALBUMIN, HUMAN INJ 5% 5 %
SOLUTION INTRAVENOUS
Status: DISPENSED
Start: 2018-03-28

## (undated) RX ORDER — LIDOCAINE HYDROCHLORIDE 20 MG/ML
INJECTION, SOLUTION EPIDURAL; INFILTRATION; INTRACAUDAL; PERINEURAL
Status: DISPENSED
Start: 2018-05-23

## (undated) RX ORDER — EPHEDRINE SULFATE 50 MG/ML
INJECTION, SOLUTION INTRAMUSCULAR; INTRAVENOUS; SUBCUTANEOUS
Status: DISPENSED
Start: 2018-05-23

## (undated) RX ORDER — EPHEDRINE SULFATE 50 MG/ML
INJECTION, SOLUTION INTRAMUSCULAR; INTRAVENOUS; SUBCUTANEOUS
Status: DISPENSED
Start: 2018-03-18

## (undated) RX ORDER — PROPOFOL 10 MG/ML
INJECTION, EMULSION INTRAVENOUS
Status: DISPENSED
Start: 2018-04-20

## (undated) RX ORDER — CEFAZOLIN SODIUM 1 G/3ML
INJECTION, POWDER, FOR SOLUTION INTRAMUSCULAR; INTRAVENOUS
Status: DISPENSED
Start: 2018-05-11

## (undated) RX ORDER — CEFAZOLIN SODIUM 2 G/100ML
INJECTION, SOLUTION INTRAVENOUS
Status: DISPENSED
Start: 2018-04-20

## (undated) RX ORDER — ROCURONIUM BROMIDE 50 MG/5 ML
SYRINGE (ML) INTRAVENOUS
Status: DISPENSED
Start: 2018-03-18

## (undated) RX ORDER — GLYCOPYRROLATE 0.2 MG/ML
INJECTION, SOLUTION INTRAMUSCULAR; INTRAVENOUS
Status: DISPENSED
Start: 2018-05-23

## (undated) RX ORDER — GLYCOPYRROLATE 0.2 MG/ML
INJECTION, SOLUTION INTRAMUSCULAR; INTRAVENOUS
Status: DISPENSED
Start: 2018-04-20

## (undated) RX ORDER — PROPOFOL 10 MG/ML
INJECTION, EMULSION INTRAVENOUS
Status: DISPENSED
Start: 2018-05-11

## (undated) RX ORDER — EPHEDRINE SULFATE 50 MG/ML
INJECTION, SOLUTION INTRAMUSCULAR; INTRAVENOUS; SUBCUTANEOUS
Status: DISPENSED
Start: 2018-05-11

## (undated) RX ORDER — IOPAMIDOL 755 MG/ML
INJECTION, SOLUTION INTRAVASCULAR
Status: DISPENSED
Start: 2018-05-23

## (undated) RX ORDER — ESMOLOL HYDROCHLORIDE 10 MG/ML
INJECTION INTRAVENOUS
Status: DISPENSED
Start: 2018-05-23

## (undated) RX ORDER — FENTANYL CITRATE 50 UG/ML
INJECTION, SOLUTION INTRAMUSCULAR; INTRAVENOUS
Status: DISPENSED
Start: 2018-03-28

## (undated) RX ORDER — CEFAZOLIN SODIUM 2 G/100ML
INJECTION, SOLUTION INTRAVENOUS
Status: DISPENSED
Start: 2018-03-28

## (undated) RX ORDER — DEXAMETHASONE SODIUM PHOSPHATE 4 MG/ML
INJECTION, SOLUTION INTRA-ARTICULAR; INTRALESIONAL; INTRAMUSCULAR; INTRAVENOUS; SOFT TISSUE
Status: DISPENSED
Start: 2018-03-28

## (undated) RX ORDER — DEXAMETHASONE SODIUM PHOSPHATE 4 MG/ML
INJECTION, SOLUTION INTRA-ARTICULAR; INTRALESIONAL; INTRAMUSCULAR; INTRAVENOUS; SOFT TISSUE
Status: DISPENSED
Start: 2018-05-11

## (undated) RX ORDER — DEXAMETHASONE SODIUM PHOSPHATE 4 MG/ML
INJECTION, SOLUTION INTRA-ARTICULAR; INTRALESIONAL; INTRAMUSCULAR; INTRAVENOUS; SOFT TISSUE
Status: DISPENSED
Start: 2018-03-18

## (undated) RX ORDER — FENTANYL CITRATE 50 UG/ML
INJECTION, SOLUTION INTRAMUSCULAR; INTRAVENOUS
Status: DISPENSED
Start: 2018-05-11

## (undated) RX ORDER — LABETALOL HYDROCHLORIDE 5 MG/ML
INJECTION, SOLUTION INTRAVENOUS
Status: DISPENSED
Start: 2018-03-18

## (undated) RX ORDER — DEXTROSE MONOHYDRATE, SODIUM CHLORIDE, AND POTASSIUM CHLORIDE 50; 1.49; 4.5 G/1000ML; G/1000ML; G/1000ML
INJECTION, SOLUTION INTRAVENOUS
Status: DISPENSED
Start: 2018-03-18

## (undated) RX ORDER — GLYCOPYRROLATE 0.2 MG/ML
INJECTION, SOLUTION INTRAMUSCULAR; INTRAVENOUS
Status: DISPENSED
Start: 2018-05-11

## (undated) RX ORDER — LIDOCAINE HYDROCHLORIDE 20 MG/ML
INJECTION, SOLUTION EPIDURAL; INFILTRATION; INTRACAUDAL; PERINEURAL
Status: DISPENSED
Start: 2018-03-18

## (undated) RX ORDER — LIDOCAINE HYDROCHLORIDE 20 MG/ML
INJECTION, SOLUTION EPIDURAL; INFILTRATION; INTRACAUDAL; PERINEURAL
Status: DISPENSED
Start: 2018-05-11

## (undated) RX ORDER — ACETAMINOPHEN 10 MG/ML
INJECTION, SOLUTION INTRAVENOUS
Status: DISPENSED
Start: 2018-03-18

## (undated) RX ORDER — METOPROLOL TARTRATE 1 MG/ML
INJECTION, SOLUTION INTRAVENOUS
Status: DISPENSED
Start: 2018-05-11

## (undated) RX ORDER — IOPAMIDOL 755 MG/ML
INJECTION, SOLUTION INTRAVASCULAR
Status: DISPENSED
Start: 2018-04-20

## (undated) RX ORDER — IOPAMIDOL 755 MG/ML
INJECTION, SOLUTION INTRAVASCULAR
Status: DISPENSED
Start: 2018-05-11